# Patient Record
Sex: MALE | Race: WHITE | Employment: FULL TIME | URBAN - METROPOLITAN AREA
[De-identification: names, ages, dates, MRNs, and addresses within clinical notes are randomized per-mention and may not be internally consistent; named-entity substitution may affect disease eponyms.]

---

## 2017-01-17 ENCOUNTER — OFFICE VISIT (OUTPATIENT)
Dept: FAMILY MEDICINE | Facility: CLINIC | Age: 57
End: 2017-01-17
Payer: COMMERCIAL

## 2017-01-17 VITALS
HEART RATE: 72 BPM | HEIGHT: 71 IN | TEMPERATURE: 97.1 F | WEIGHT: 166 LBS | SYSTOLIC BLOOD PRESSURE: 108 MMHG | BODY MASS INDEX: 23.24 KG/M2 | DIASTOLIC BLOOD PRESSURE: 70 MMHG

## 2017-01-17 DIAGNOSIS — L30.9 ECZEMA, UNSPECIFIED TYPE: Primary | ICD-10-CM

## 2017-01-17 PROCEDURE — 99213 OFFICE O/P EST LOW 20 MIN: CPT | Performed by: NURSE PRACTITIONER

## 2017-01-17 RX ORDER — TRIAMCINOLONE ACETONIDE 1 MG/G
OINTMENT TOPICAL
Qty: 30 G | Refills: 0 | Status: SHIPPED | OUTPATIENT
Start: 2017-01-17 | End: 2017-03-20

## 2017-01-17 RX ORDER — LAMOTRIGINE 100 MG/1
100 TABLET ORAL 2 TIMES DAILY
Qty: 60 TABLET | Status: ON HOLD | COMMUNITY
Start: 2017-01-17 | End: 2018-06-20

## 2017-01-17 RX ORDER — ZIPRASIDONE HYDROCHLORIDE 40 MG/1
80 CAPSULE ORAL AT BEDTIME
COMMUNITY
Start: 2017-01-17 | End: 2018-06-14

## 2017-01-17 NOTE — PROGRESS NOTES
"  SUBJECTIVE:                                                    Jordan Barnett is a 56 year old male who presents to clinic today for the following health issues:        Wound on Right Hand- in between thumb and palm area      Duration: x 3 weeks     Description (location/character/radiation): wound/crack  on hand  between thumb and palm, keeps opening, sometimes it bleeds. painful    Intensity:  moderate    Accompanying signs and symptoms: pain    History (similar episodes/previous evaluation): \" cracked hands\" in the Winter     Precipitating or alleviating factors: None    Therapies tried and outcome: hand cream- unsure if this help. Helps when he is not using hand.            Problem list and histories reviewed & adjusted, as indicated.  Additional history: as documented    Problem list, Medication list, Allergies, and Medical/Social/Surgical histories reviewed in EPIC and updated as appropriate.    ROS:  Constitutional, HEENT, cardiovascular, pulmonary, gi and gu systems are negative, except as otherwise noted.    OBJECTIVE:                                                    /70 mmHg  Pulse 72  Temp(Src) 97.1  F (36.2  C) (Tympanic)  Ht 5' 10.5\" (1.791 m)  Wt 166 lb (75.297 kg)  BMI 23.47 kg/m2  Body mass index is 23.47 kg/(m^2).  GENERAL: healthy, alert and no distress  SKIN: dry skin throughout. Right hand - cracked skin in the webspace between the thumb and index finger.         ASSESSMENT/PLAN:                                                        ICD-10-CM    1. Eczema, unspecified type L30.9 triamcinolone (KENALOG) 0.1 % ointment       Patient Instructions   Apply triamcinolone ointment to cracked skin.  Cover with Aquaphor ointment.  Do this twice daily.      The risks, benefits and treatment options of prescribed medications or other treatments have been discussed with the patient. The patient verbalized their understanding and should call or follow up if no improvement or if they develop " further problems.  AUGUSTUS Rodriguez [facility]

## 2017-01-17 NOTE — MR AVS SNAPSHOT
After Visit Summary   1/17/2017    Jordan Barnett    MRN: [id number]           Patient Information     Date Of Birth          1960        Visit Information        Provider Department      1/17/2017 10:00 AM Halina Barrett APRN CNP [facility]        Today's Diagnoses     Eczema, unspecified type    -  1       Care Instructions    Apply triamcinolone ointment to cracked skin.  Cover with Aquaphor ointment.  Do this twice daily.          Thank you for choosing [facility]  You may be receiving a survey in the mail from Alicia Bojorquez regarding your visit today.  Please take a few minutes to complete and return the survey to let us know how we are doing.      If you have questions or concerns, please contact us via ethority or you can contact your care team at [phone].    Our Clinic hours are:  Monday 6:40 am  to 7:00 pm  Tuesday -Friday 6:40 am to 5:00 pm    The [place] outpatient lab hours are:  Monday - Friday 6:10 am to 4:45 pm  Saturdays 7:00 am to 11:00 am  Appointments are required, call [phone]    If you have clinical questions after hours or would like to schedule an appointment,  call the clinic at [phone].          Follow-ups after your visit        Who to contact     If you have questions or need follow up information about today's clinic visit or your schedule please contact [facility] directly at [phone].  Normal or non-critical lab and imaging results will be communicated to you by Moduslyhart, letter or phone within 4 business days after the clinic has received the results. If you do not hear from us within 7 days, please contact the clinic through [employer] or phone. If you have a critical or abnormal lab result, we will notify you by phone as soon as possible.  Submit refill requests through ethority or call your pharmacy and they will forward the refill request to us. Please allow 3 business days for your refill to be  "completed.          Additional Information About Your Visit        [employer] Information     [employer] lets you send messages to your doctor, view your test results, renew your prescriptions, schedule appointments and more. To sign up, go to [email or web address] . Click on \"Log in\" on the left side of the screen, which will take you to the Welcome page. Then click on \"Sign up Now\" on the right side of the page.     You will be asked to enter the access code listed below, as well as some personal information. Please follow the directions to create your username and password.     Your access code is: [email or web address]  Expires: 2017 10:16 AM     Your access code will  in 90 days. If you need help or a new code, please call your [place] clinic or [phone].        Care EveryWhere ID     This is your Care EveryWhere ID. This could be used by other organizations to access your [place] medical records  [id number]        Your Vitals Were     Pulse Temperature Height BMI (Body Mass Index)          72 97.1  F (36.2  C) (Tympanic) 5' 10.5\" (1.791 m) 23.47 kg/m2         Blood Pressure from Last 3 Encounters:   17 108/70   16 99/68   07/08/15 109/71    Weight from Last 3 Encounters:   17 166 lb (75.297 kg)   16 166 lb (75.297 kg)   07/08/15 158 lb (71.668 kg)              Today, you had the following     No orders found for display         Today's Medication Changes          These changes are accurate as of: 17 10:16 AM.  If you have any questions, ask your nurse or doctor.               Start taking these medicines.        Dose/Directions    triamcinolone 0.1 % ointment   Commonly known as:  KENALOG   Used for:  Eczema, unspecified type   Started by:  Halina Barrett APRN CNP        Apply sparingly to affected area two times daily for 14 days.   Quantity:  30 g   Refills:  0         These medicines have changed or have updated prescriptions.        Dose/Directions    " lamoTRIgine 100 MG tablet   Commonly known as:  LaMICtal   This may have changed:    - when to take this  - additional instructions   Changed by:  Halina Barrett APRN CNP        Dose:  100 mg   1 tablet (100 mg) 2 times daily   Quantity:  60 tablet   Refills:  0            Where to get your medicines      These medications were sent to [id number] - [place], [place] - [address]  [address], [facility] [address]     Phone:  [phone]    - triamcinolone 0.1 % ointment             Primary Care Provider Office Phone # Fax #    Juan Antonio Chadd Flanagan [phone]       [facility]        [address] [facility] [address]        Thank you!     Thank you for choosing [facility]  for your care. Our goal is always to provide you with excellent care. Hearing back from our patients is one way we can continue to improve our services. Please take a few minutes to complete the written survey that you may receive in the mail after your visit with us. Thank you!             Your Updated Medication List - Protect others around you: Learn how to safely use, store and throw away your medicines at www.disposemymeds.org.          This list is accurate as of: 1/17/17 10:16 AM.  Always use your most recent med list.                   Brand Name Dispense Instructions for use    FISH OIL CONCENTRATE 1000 MG Caps      2 in pm       GEODON 40 MG capsule   Generic drug:  ziprasidone      Take 2 capsules (80 mg) by mouth At Bedtime       lamoTRIgine 100 MG tablet    LaMICtal    60 tablet    1 tablet (100 mg) 2 times daily       losartan 100 MG tablet    COZAAR    90 tablet    Take 1 tablet (100 mg) by mouth daily       sildenafil 50 MG cap/tab    REVATIO/VIAGRA    6 tablet    Take 0.5-1 tablets (25-50 mg) by mouth daily as needed for erectile dysfunction Take 30 min to 4 hours before intercourse.  Never use with nitroglycerin, terazosin or  doxazosin.       triamcinolone 0.1 % ointment    KENALOG    30 g    Apply sparingly to affected area two times daily for 14 days.       TYLENOL 500 MG tablet   Generic drug:  acetaminophen      Take 1-2 tablets by mouth every 6 hours as needed.

## 2017-01-17 NOTE — NURSING NOTE
"Chief Complaint   Patient presents with     Wound Check     Right Hand between thumb and palm       Initial /70 mmHg  Pulse 72  Temp(Src) 97.1  F (36.2  C) (Tympanic)  Ht 5' 10.5\" (1.791 m)  Wt 166 lb (75.297 kg)  BMI 23.47 kg/m2 Estimated body mass index is 23.47 kg/(m^2) as calculated from the following:    Height as of this encounter: 5' 10.5\" (1.791 m).    Weight as of this encounter: 166 lb (75.297 kg).  BP completed using cuff size: regular    "

## 2017-01-17 NOTE — PATIENT INSTRUCTIONS
Apply triamcinolone ointment to cracked skin.  Cover with Aquaphor ointment.  Do this twice daily.          Thank you for choosing [facility].  You may be receiving a survey in the mail from Alicia Bojorquez regarding your visit today.  Please take a few minutes to complete and return the survey to let us know how we are doing.      If you have questions or concerns, please contact us via [employer] or you can contact your care team at [phone].    Our Clinic hours are:  Monday 6:40 am  to 7:00 pm  Tuesday -Friday 6:40 am to 5:00 pm    The [place] outpatient lab hours are:  Monday - Friday 6:10 am to 4:45 pm  Saturdays 7:00 am to 11:00 am  Appointments are required, call [phone]    If you have clinical questions after hours or would like to schedule an appointment,  call the clinic at [phone].

## 2017-01-31 ENCOUNTER — OFFICE VISIT (OUTPATIENT)
Dept: FAMILY MEDICINE | Facility: CLINIC | Age: 57
End: 2017-01-31
Payer: COMMERCIAL

## 2017-01-31 VITALS
HEART RATE: 70 BPM | SYSTOLIC BLOOD PRESSURE: 129 MMHG | HEIGHT: 70 IN | BODY MASS INDEX: 23.62 KG/M2 | TEMPERATURE: 97.3 F | OXYGEN SATURATION: 97 % | WEIGHT: 165 LBS | DIASTOLIC BLOOD PRESSURE: 92 MMHG

## 2017-01-31 DIAGNOSIS — M75.41 SHOULDER IMPINGEMENT SYNDROME, RIGHT: Primary | ICD-10-CM

## 2017-01-31 PROCEDURE — 99212 OFFICE O/P EST SF 10 MIN: CPT | Performed by: INTERNAL MEDICINE

## 2017-01-31 NOTE — PROGRESS NOTES
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  Sleeping on right side aggravates shoulder.     Alleviating factors:  Improved by: nothing       Therapies Tried and outcome: none     Pain is better today after avoiding overhead activities yesterday    Problem list and histories reviewed & adjusted, as indicated.  Additional history: as documented    Current Outpatient Prescriptions   Medication Sig Dispense Refill     lamoTRIgine (LAMICTAL) 100 MG tablet 1 tablet (100 mg) 2 times daily 60 tablet      ziprasidone (GEODON) 40 MG capsule Take 2 capsules (80 mg) by mouth At Bedtime       triamcinolone (KENALOG) 0.1 % ointment Apply sparingly to affected area two times daily for 14 days. 30 g 0     losartan (COZAAR) 100 MG tablet Take 1 tablet (100 mg) by mouth daily 90 tablet 3     acetaminophen (TYLENOL) 500 MG tablet Take 1-2 tablets by mouth every 6 hours as needed.       Allergies   Allergen Reactions     Lisinopril Nausea     Felt weakness nausea, couldn't sleep       ROS:  Constitutional and MSK systems are negative, except as otherwise noted.    OBJECTIVE:                                                    /92 mmHg  Pulse 70  Temp(Src) 97.3  F (36.3  C) (Tympanic)  Ht 5' 10.25\" (1.784 m)  Wt 165 lb (74.844 kg)  BMI 23.52 kg/m2  SpO2 97%  Body mass index is 23.52 " kg/(m^2).    GENERAL: healthy, alert and no distress  MS: lateral right shoulder tenderness on palpation, full shoulder ROM  NEURO: Normal strength and tone in arms and shoulder         ASSESSMENT/PLAN:                                                        1. Shoulder impingement syndrome, right    Location of pain and correlation with overhead activities is consistent with shoulder impingement.  Advised avoiding all overhead activities with the right arm for at least two weeks- work note provided.  He has Tylenol and Bengay at home- can use these for pain.  Avoid NSAIDs due to CKD3.  Follow-up if not improving in a few weeks.     Esteban Martinez MD  [facility]

## 2017-01-31 NOTE — MR AVS SNAPSHOT
After Visit Summary   1/31/2017    Jordan Barnett    MRN: [id number]           Patient Information     Date Of Birth          1960        Visit Information        Provider Department      1/31/2017 10:00 AM Esteban Martinez MD [facility]        Care Instructions      What Is Impingement Syndrome?  Shoulder pain when raising your arm may mean you have impingement syndrome. This is pinching within your shoulder. The problem may have been caused by repeating an overhead motion. In some cases, you may feel a nagging pain even when you re not using your shoulder.     A forceful action repeated day after day without rest can cause a repetitive motion injury (RMI). Shoulder impingement is often due to an RMI.      Symptoms of impingement  You may feel pain, pinching, or stiffness in your shoulder. Pain often comes with movement. But you may also feel it when you re not using your shoulder. For example, you may feel pain while trying to sleep.    Causes of impingement  Shoulder impingement is often caused by making repeated overhead movements. Constant shoulder use can irritate the tendons and bursa, leading to swelling. Swollen parts of the shoulder take up more room, making the joint space smaller:    Bursitis is inflammation of the bursa, a sac of fluid that cushions shoulder parts as they move. The bursa fills up with too much fluid, filling and squeezing the joint space.    Tendinitis is inflammation of the tendons, fibrous tissues that connect muscle to bone.    Bone problems can make impingement worse. The acromion is part of the shoulder bone. It may be flat or hooked. If your acromion is hooked, the joint space may be smaller than normal. This makes you more prone to shoulder problems. Bone spurs (growths on the bone) can also narrow the joint space.        7776-4472 [employer]. [address] [place] [address], [place] [address]. All rights reserved. This information is not  intended as a substitute for professional medical care. Always follow your healthcare professional's instructions.        Nonsurgical Treatment Options for Shoulder Impingement    Rest is key to healing your shoulder. If an activity hurts, don t do it. Otherwise, you may prevent healing and increase pain. Your shoulder needs active rest. This means avoiding overhead movements and activities that cause pain. But DO NOT stop using your shoulder completely. This can cause it to stiffen or  freeze.  In addition to rest, impingement can be treated a number of ways. Your healthcare provider can help you find which of these is best for you.     Ice  Ice reduces inflammation and relieves pain. Apply an ice pack for about 15 minutes, 3 times a day. You can also use a bag of frozen peas instead of an ice pack. A pillow placed under your arm may help make you more comfortable.  Note: Don t put the cold item directly on your skin. Place it on top of your shirt, or wrap it in a thin towel or washcloth.     Heat  Heat may soothe aching muscles, but it won t reduce inflammation. Use a heating pad or take a warm shower or bath. Do this for 15 minutes at a time.  Note: Avoid heat when pain is constant. Heat is best when used for warming up before an activity. You can also alternate ice and heat.     Medicine  To relieve pain and inflammation, try over-the-counter pain relievers, such as acetaminophen or ibuprofen. Or, your healthcare provider may prescribe medicines. Ask how and when to take your medicine. Be sure to follow all instructions you re given.     Electrical stimulation  Electrical stimulation can help reduce pain and swelling. Your healthcare provider attaches small pads to your shoulder. A mild electric current then flows into your shoulder. You may feel tingling, but you should not feel pain.     Ultrasound  Ultrasound can help reduce pain. First a slick gel or medicated cream is applied to your shoulder. Then your  healthcare provider places a small device over the area. The device uses sound waves to loosen shoulder tightness. This treatment should be pain-free.  A physical therapist can also help you with exercises specific for your condition.     Injection therapy  Injection therapy may be used to help diagnose your problem. It may also be used to reduce pain and inflammation. The injection typically includes two medicines. One is an anesthetic to numb the shoulder. The other is a steroid, such as cortisone, to help reduce painful swelling. It can take from a few hours to a couple of days before the injection helps. Talk to your healthcare provider about the possible risks and benefits of this therapy.    0326-2992 [employer]. [address] [place] [address], [place] [address]. All rights reserved. This information is not intended as a substitute for professional medical care. Always follow your healthcare professional's instructions.              Follow-ups after your visit        Who to contact     If you have questions or need follow up information about today's clinic visit or your schedule please contact [facility] directly at [phone].  Normal or non-critical lab and imaging results will be communicated to you by MyChart, letter or phone within 4 business days after the clinic has received the results. If you do not hear from us within 7 days, please contact the clinic through [employer] or phone. If you have a critical or abnormal lab result, we will notify you by phone as soon as possible.  Submit refill requests through [email or web address] or call your pharmacy and they will forward the refill request to us. Please allow 3 business days for your refill to be completed.          Additional Information About Your Visit        [email or web address] Information     [email or web address] lets you send messages to your doctor, view your test results, renew your prescriptions, schedule appointments and more. To sign up, go to  [email or web address] . Click on \"Log in\" on the left side of the screen, which will take you to the Welcome page. Then click on \"Sign up Now\" on the right side of the page.     You will be asked to enter the access code listed below, as well as some personal information. Please follow the directions to create your username and password.     Your access code is: [email or web address]  Expires: 2017 10:16 AM     Your access code will  in 90 days. If you need help or a new code, please call your [place] clinic or [phone].        Care EveryWhere ID     This is your Care EveryWhere ID. This could be used by other organizations to access your [place] medical records  [id number]        Your Vitals Were     Pulse Temperature Height BMI (Body Mass Index) Pulse Oximetry       70 97.3  F (36.3  C) (Tympanic) 5' 10.25\" (1.784 m) 23.52 kg/m2 97%        Blood Pressure from Last 3 Encounters:   17 129/92   17 108/70   16 99/68    Weight from Last 3 Encounters:   17 165 lb (74.844 kg)   17 166 lb (75.297 kg)   16 166 lb (75.297 kg)              Today, you had the following     No orders found for display       Primary Care Provider Office Phone # Fax #    Juan Antonio Flanagan [phone]       [facility]        [address] [facility] [address]        Thank you!     Thank you for choosing [facility]  for your care. Our goal is always to provide you with excellent care. Hearing back from our patients is one way we can continue to improve our services. Please take a few minutes to complete the written survey that you may receive in the mail after your visit with us. Thank you!             Your Updated Medication List - Protect others around you: Learn how to safely use, store and throw away your medicines at www.disposemymeds.org.          This list is accurate as of: 17 10:28 AM.  Always use your most recent med " list.                   Brand Name Dispense Instructions for use    FISH OIL CONCENTRATE 1000 MG Caps      2 in pm       GEODON 40 MG capsule   Generic drug:  ziprasidone      Take 2 capsules (80 mg) by mouth At Bedtime       lamoTRIgine 100 MG tablet    LaMICtal    60 tablet    1 tablet (100 mg) 2 times daily       losartan 100 MG tablet    COZAAR    90 tablet    Take 1 tablet (100 mg) by mouth daily       sildenafil 50 MG cap/tab    REVATIO/VIAGRA    6 tablet    Take 0.5-1 tablets (25-50 mg) by mouth daily as needed for erectile dysfunction Take 30 min to 4 hours before intercourse.  Never use with nitroglycerin, terazosin or doxazosin.       triamcinolone 0.1 % ointment    KENALOG    30 g    Apply sparingly to affected area two times daily for 14 days.       TYLENOL 500 MG tablet   Generic drug:  acetaminophen      Take 1-2 tablets by mouth every 6 hours as needed.

## 2017-01-31 NOTE — NURSING NOTE
"Chief Complaint   Patient presents with     Shoulder Pain     x 1.5 weeks, right shoulder pain, unable to lift arm        Initial /92 mmHg  Pulse 70  Temp(Src) 97.3  F (36.3  C) (Tympanic)  Ht 5' 10.25\" (1.784 m)  Wt 165 lb (74.844 kg)  BMI 23.52 kg/m2  SpO2 97% Estimated body mass index is 23.52 kg/(m^2) as calculated from the following:    Height as of this encounter: 5' 10.25\" (1.784 m).    Weight as of this encounter: 165 lb (74.844 kg).  BP completed using cuff size: alberta MONAHAN CMA (AAMA)        "

## 2017-01-31 NOTE — Clinical Note
[facility]  [address] [facility] [address]  Phone: [phone]    January 31, 2017        Jordan Barnett  [address]   [facility] [address]          To whom it may concern:    RE: Jordan WATSON Anibal    Patient was seen and treated today at our clinic.  Patient may return to work with the following:  Avoid all reaching overhead activities with the right arm for 2 weeks.  Can reach overhead with left arm.      Please contact me for questions or concerns.      Sincerely,        Esteban Martinez MD

## 2017-01-31 NOTE — PATIENT INSTRUCTIONS
What Is Impingement Syndrome?  Shoulder pain when raising your arm may mean you have impingement syndrome. This is pinching within your shoulder. The problem may have been caused by repeating an overhead motion. In some cases, you may feel a nagging pain even when you re not using your shoulder.     A forceful action repeated day after day without rest can cause a repetitive motion injury (RMI). Shoulder impingement is often due to an RMI.      Symptoms of impingement  You may feel pain, pinching, or stiffness in your shoulder. Pain often comes with movement. But you may also feel it when you re not using your shoulder. For example, you may feel pain while trying to sleep.    Causes of impingement  Shoulder impingement is often caused by making repeated overhead movements. Constant shoulder use can irritate the tendons and bursa, leading to swelling. Swollen parts of the shoulder take up more room, making the joint space smaller:    Bursitis is inflammation of the bursa, a sac of fluid that cushions shoulder parts as they move. The bursa fills up with too much fluid, filling and squeezing the joint space.    Tendinitis is inflammation of the tendons, fibrous tissues that connect muscle to bone.    Bone problems can make impingement worse. The acromion is part of the shoulder bone. It may be flat or hooked. If your acromion is hooked, the joint space may be smaller than normal. This makes you more prone to shoulder problems. Bone spurs (growths on the bone) can also narrow the joint space.        9429-6206 [employer]. [address] [place] [address], [place] [address]. All rights reserved. This information is not intended as a substitute for professional medical care. Always follow your healthcare professional's instructions.        Nonsurgical Treatment Options for Shoulder Impingement    Rest is key to healing your shoulder. If an activity hurts, don t do it. Otherwise, you may prevent healing and increase  pain. Your shoulder needs active rest. This means avoiding overhead movements and activities that cause pain. But DO NOT stop using your shoulder completely. This can cause it to stiffen or  freeze.  In addition to rest, impingement can be treated a number of ways. Your healthcare provider can help you find which of these is best for you.     Ice  Ice reduces inflammation and relieves pain. Apply an ice pack for about 15 minutes, 3 times a day. You can also use a bag of frozen peas instead of an ice pack. A pillow placed under your arm may help make you more comfortable.  Note: Don t put the cold item directly on your skin. Place it on top of your shirt, or wrap it in a thin towel or washcloth.     Heat  Heat may soothe aching muscles, but it won t reduce inflammation. Use a heating pad or take a warm shower or bath. Do this for 15 minutes at a time.  Note: Avoid heat when pain is constant. Heat is best when used for warming up before an activity. You can also alternate ice and heat.     Medicine  To relieve pain and inflammation, try over-the-counter pain relievers, such as acetaminophen or ibuprofen. Or, your healthcare provider may prescribe medicines. Ask how and when to take your medicine. Be sure to follow all instructions you re given.     Electrical stimulation  Electrical stimulation can help reduce pain and swelling. Your healthcare provider attaches small pads to your shoulder. A mild electric current then flows into your shoulder. You may feel tingling, but you should not feel pain.     Ultrasound  Ultrasound can help reduce pain. First a slick gel or medicated cream is applied to your shoulder. Then your healthcare provider places a small device over the area. The device uses sound waves to loosen shoulder tightness. This treatment should be pain-free.  A physical therapist can also help you with exercises specific for your condition.     Injection therapy  Injection therapy may be used to help diagnose  your problem. It may also be used to reduce pain and inflammation. The injection typically includes two medicines. One is an anesthetic to numb the shoulder. The other is a steroid, such as cortisone, to help reduce painful swelling. It can take from a few hours to a couple of days before the injection helps. Talk to your healthcare provider about the possible risks and benefits of this therapy.    2551-7854 [employer]. [address] [place] [address], [place] [address]. All rights reserved. This information is not intended as a substitute for professional medical care. Always follow your healthcare professional's instructions.

## 2017-02-06 ENCOUNTER — TELEPHONE (OUTPATIENT)
Dept: FAMILY MEDICINE | Facility: CLINIC | Age: 57
End: 2017-02-06

## 2017-02-14 ENCOUNTER — OFFICE VISIT (OUTPATIENT)
Dept: FAMILY MEDICINE | Facility: CLINIC | Age: 57
End: 2017-02-14
Payer: COMMERCIAL

## 2017-02-14 ENCOUNTER — TELEPHONE (OUTPATIENT)
Dept: FAMILY MEDICINE | Facility: CLINIC | Age: 57
End: 2017-02-14

## 2017-02-14 VITALS
DIASTOLIC BLOOD PRESSURE: 84 MMHG | SYSTOLIC BLOOD PRESSURE: 115 MMHG | WEIGHT: 167 LBS | HEART RATE: 72 BPM | HEIGHT: 71 IN | BODY MASS INDEX: 23.38 KG/M2 | TEMPERATURE: 96.5 F

## 2017-02-14 DIAGNOSIS — M25.811 SHOULDER IMPINGEMENT, RIGHT: Primary | ICD-10-CM

## 2017-02-14 DIAGNOSIS — F31.64 SEVERE BIPOLAR I DISORDER, MOST RECENT EPISODE MIXED, WITH PSYCHOTIC FEATURES (H): ICD-10-CM

## 2017-02-14 PROCEDURE — 99213 OFFICE O/P EST LOW 20 MIN: CPT | Performed by: INTERNAL MEDICINE

## 2017-02-14 NOTE — NURSING NOTE
"Chief Complaint   Patient presents with     Forms     HARSHA or return to work, Possible physical therapy       Initial /84 (BP Location: Left arm, Patient Position: Chair, Cuff Size: Adult Regular)  Pulse 72  Temp 96.5  F (35.8  C) (Tympanic)  Ht 5' 10.5\" (1.791 m)  Wt 167 lb (75.8 kg)  BMI 23.62 kg/m2 Estimated body mass index is 23.62 kg/(m^2) as calculated from the following:    Height as of this encounter: 5' 10.5\" (1.791 m).    Weight as of this encounter: 167 lb (75.8 kg).  Medication Reconciliation: complete   Vidhya MONAHAN CMA (AAMA)        "

## 2017-02-14 NOTE — MR AVS SNAPSHOT
After Visit Summary   2/14/2017    Jordan Barnett    MRN: [id number]           Patient Information     Date Of Birth          1960        Visit Information        Provider Department      2/14/2017 9:40 AM Esteban Martinez MD [facility]        Today's Diagnoses     CLIVE (generalized anxiety disorder)    -  1    Severe bipolar I disorder, most recent episode mixed, with psychotic features (H)           Follow-ups after your visit        Additional Services     CARE COORDINATION REFERRAL       Services are provided by a Care Coordinator for people with complex needs such as: medical, social, or financial troubles.  The Care Coordinator works with the patient and their Primary Care Provider to determine health goals, obtain resources, achieve outcomes, and develop care plans that help coordinate the patient's care.     Reason for Referral: Mental Status/Behavioral Changes    Provide additional details for Care Coordination to best meet the patient's current needs: Patient would like to pursue therapy for mental illness (already sees a psychiatrist, but doesn't have a counselor), but he has financial concerns and hasn't been able to afford the options he's found.  Wondering if you could help him investigate other options.  Thanks.     Clinical Staff have discussed the Care Coordination Referral with the patient and/or caregiver: yes                  Who to contact     If you have questions or need follow up information about today's clinic visit or your schedule please contact [facility] directly at [phone].  Normal or non-critical lab and imaging results will be communicated to you by MyChart, letter or phone within 4 business days after the clinic has received the results. If you do not hear from us within 7 days, please contact the clinic through MyChart or phone. If you have a critical or abnormal lab result, we will notify you by phone as soon as possible.  Submit  "refill requests through [facility] or call your pharmacy and they will forward the refill request to us. Please allow 3 business days for your refill to be completed.          Additional Information About Your Visit        [facility] Information     [facility] lets you send messages to your doctor, view your test results, renew your prescriptions, schedule appointments and more. To sign up, go to [email or web address] . Click on \"Log in\" on the left side of the screen, which will take you to the Welcome page. Then click on \"Sign up Now\" on the right side of the page.     You will be asked to enter the access code listed below, as well as some personal information. Please follow the directions to create your username and password.     Your access code is: [email or web address]  Expires: 2017 10:16 AM     Your access code will  in 90 days. If you need help or a new code, please call your [facility] clinic or [phone].        Care EveryWhere ID     This is your Care EveryWhere ID. This could be used by other organizations to access your [facility] medical records  [id number]        Your Vitals Were     Pulse Temperature Height BMI (Body Mass Index)          72 96.5  F (35.8  C) (Tympanic) 5' 10.5\" (1.791 m) 23.62 kg/m2         Blood Pressure from Last 3 Encounters:   17 115/84   17 (!) 129/92   17 108/70    Weight from Last 3 Encounters:   17 167 lb (75.8 kg)   17 165 lb (74.8 kg)   17 166 lb (75.3 kg)              We Performed the Following     CARE COORDINATION REFERRAL        Primary Care Provider Office Phone # Fax #    Juan Antonio Flanagan [phone]       [facility]        [address] [facility] [address]        Thank you!     Thank you for choosing [facility]  for your care. Our goal is always to provide you with excellent care. Hearing back from our patients is one way we can continue to improve our services. " Please take a few minutes to complete the written survey that you may receive in the mail after your visit with us. Thank you!             Your Updated Medication List - Protect others around you: Learn how to safely use, store and throw away your medicines at www.disposemymeds.org.          This list is accurate as of: 2/14/17 10:18 AM.  Always use your most recent med list.                   Brand Name Dispense Instructions for use    GEODON 40 MG capsule   Generic drug:  ziprasidone      Take 2 capsules (80 mg) by mouth At Bedtime       lamoTRIgine 100 MG tablet    LaMICtal    60 tablet    1 tablet (100 mg) 2 times daily       losartan 100 MG tablet    COZAAR    90 tablet    Take 1 tablet (100 mg) by mouth daily       MULTIVITAMIN ADULT PO          triamcinolone 0.1 % ointment    KENALOG    30 g    Apply sparingly to affected area two times daily for 14 days.       TYLENOL 500 MG tablet   Generic drug:  acetaminophen      Take 1-2 tablets by mouth every 6 hours as needed.

## 2017-02-14 NOTE — PROGRESS NOTES
SUBJECTIVE:                                                    Jordan Barnett is a 56 year old male who presents to clinic today for the following health issues:      Chief Complaint   Patient presents with     Forms     HARSHA or return to work, Possible physical therapy       I saw Jordan on 1/31 with right shoulder pain due to impingement syndrome.  I recommended he be on work restrictions with no overhead activities for 2 weeks, but apparently his job was not able to accommodate that so he was required to take 2 weeks of leave instead and requests paperwork to be filled out for that.  His shoulder is improved and he feels it would be appropriate to return to work later this week.        Anxiety Follow-Up    Status since last visit: Worsened     Other associated symptoms:None       GAD7     Jordan has a history of bipolar 1 disorder and schizoaffective disorder.  He is having significant anxiety related to his work situation- apparently in addition to the leave, he is also arguing with his employer about some unfair treatment of other employees.  He has a psychiatrist, but isn't due to see him for another couple of months.  He does not have a therapist because he states he has not been able to afford any of the options that he has found.        Problem list and histories reviewed & adjusted, as indicated.  Additional history: as documented    Current Outpatient Prescriptions   Medication Sig Dispense Refill     Multiple Vitamins-Minerals (MULTIVITAMIN ADULT PO)        lamoTRIgine (LAMICTAL) 100 MG tablet 1 tablet (100 mg) 2 times daily 60 tablet      ziprasidone (GEODON) 40 MG capsule Take 2 capsules (80 mg) by mouth At Bedtime       losartan (COZAAR) 100 MG tablet Take 1 tablet (100 mg) by mouth daily 90 tablet 3     triamcinolone (KENALOG) 0.1 % ointment Apply sparingly to affected area two times daily for 14 days. 30 g 0     acetaminophen (TYLENOL) 500 MG tablet Take 1-2 tablets by mouth every 6 hours as needed.  "      Allergies   Allergen Reactions     Lisinopril Nausea     Felt weakness nausea, couldn't sleep       ROS:  Constitutional, MSK, psych systems are negative, except as otherwise noted.    OBJECTIVE:                                                    /84 (BP Location: Left arm, Patient Position: Chair, Cuff Size: Adult Regular)  Pulse 72  Temp 96.5  F (35.8  C) (Tympanic)  Ht 5' 10.5\" (1.791 m)  Wt 167 lb (75.8 kg)  BMI 23.62 kg/m2  Body mass index is 23.62 kg/(m^2).  GENERAL: healthy, alert and no distress  MS: no shoulder pain on palpation, normal ROM and strength  PSYCH: tangential, anxious, speech pressured, judgement and insight intact and appearance well groomed    Diagnostic Test Results:  none      ASSESSMENT/PLAN:                                                        1. Shoulder impingement, right    Improved with rest.  His job was not able to accommodate work restriction, so he had to take a leave of absence, so I will fill out paperwork for this.  He is appropriate to return to work on 2/16.  If worsening shoulder symptoms, recommend PT.      2. Severe bipolar I disorder, most recent episode mixed, with psychotic features (H)    Jordan appears very anxious and agitated today.  His anxiety has been exacerbated by his workplace situation.  I feel that he would benefit from regular meetings with a therapist, however, he states all the options he has looked in to have not been affordable for him.  I have placed a care coordination referral with the hope that they will be able to explore some other options with him and hopefully help him find some affordable therapy.      - CARE COORDINATION REFERRAL    Follow-up as needed.      Esteban Martinez MD  [facility]  "

## 2017-02-15 ENCOUNTER — CARE COORDINATION (OUTPATIENT)
Dept: CARE COORDINATION | Facility: CLINIC | Age: 57
End: 2017-02-15

## 2017-02-15 NOTE — PROGRESS NOTES
Clinic Care Coordination Contact  [facility]/Voicemail    Referral Source: PCP  Clinical Data: Care Coordinator Outreach  Outreach attempted x 1.  Left message on voicemail with call back information and requested return call.  Plan: Care Coordinator will mail out care coordination introduction letter with care coordinator contact information and explanation of care coordination services. Care Coordinator will try to reach patient again in 1-2 business days.    Neeta Silva  Social Work Care Coordinator  [facility]  [phone]

## 2017-02-15 NOTE — TELEPHONE ENCOUNTER
[place] form completed, signed and faxed to [place] at [phone] and original mailed to patient.

## 2017-02-16 NOTE — PROGRESS NOTES
"Clinic Care Coordination Contact--late note for 2/15/16 due to computer problems  OUTREACH    Referral Information:  Referral Source: PCP  Reason for Contact: Provider referral states, \"Patient would like to pursue therapy for mental illness (already sees a psychiatrist, but doesn't have a counselor), but he has financial concerns and hasn't been able to afford the options he's found.  Wondering if you could help him investigate other options.\"  Care Conference: No     Universal Utilization:   ED Visits in last year: 0  Hospital visits in last year: 0  Last PCP appointment: 02/14/17  Missed Appointments: 0  Concerns: yes  Multiple Providers or Specialists: yes    Clinical Concerns:  Current Medical Concerns: Pt with sore shoulder, but going back to work from [place] on 2-16-17    Current Behavioral Concerns: Pt reports long history of mental health concerns; history of bipolar 1 disorder and schizoaffective disorder, and significant anxiety.  Pt reports he sees Psychiatrist, Rebel Chavez at [email or web address] in [place], but isn't due to see him for another month. He does not have a therapist because he states he has not been able to afford any of the options that he has found.    Education Provided to patient: SW and pt discussed options of [facility] vs Community based services.      Clinical Pathway: None    Medication Management:  Pt reports to taking medications as prescribed      Functional Status:  Mobility Status: Independent  Equipment Currently Used at Home: none  Transportation: Pt stated he has transportation as needed      Psychosocial:  Current living arrangement: I live in a private home with Lluvia gonzalez, and her children  Financial/Insurance: Works at [employer], pt states he has a $2500 deductible    Pt and SW discussed pt's options for therapy.  Pt shared that over a year ago, he was in a crisis situation and went to [facility] in [place] & worked " with a therapist, Radha, but that he was charged $80/session.  SW offered to establish cares with [facility] until pt can been seen appointment with [facility], however pt declined at this time.      Resources and Interventions:  Current Resources: [facility], [phone]        Advanced Care Plans/Directives on file:: No  Referrals Placed: Mental Health     Goals:   Goal 1 Statement: I want to discuss the need for a therapist  Goal 1 Progression Percent: 30%  Goal 1 Progression Date: 02/15/17  Barriers: Pt unable to make a decision today on need/want for a therapist  Strengths: Pt has a Psychiatrist that he has been seeing for 2+ years & wants to discuss topic with him.  Patient/Caregiver understanding: Pt and SW discussed several options and then created a TO DO list for the pt to help organize his thoughts/tasks.    Pt reports no feelings of self harm, most recent suicide attempt was when he was 20 years old, and has a safety play in place with his Psychiatrist.  Pt also was given [facility] phone numbers should pt find himself needing more urgent help.  Pt also aware he can utilize the ED for self cares if he has thoughts of self harm.         Plan: Pt is very particular about needing to do the following steps before allowing this writer to assist with finding him a therapist:  1.  Pt will call his Psychiatrist to speak with him and see if the Psych MD recommends a Therapist  2.  Pt will discuss the options of going to a therapist with his fiance, Lluvia, as it will affect their household finances  3.  Pt will contact his insurance company to: see how much therapy visit co-pays cost & to see if [facility] are in his network for services  4.  Pt will contact his employers EAP program    Pt is very concerned about the cost of going to a therapist, which is understandable.  However, pt would  not take resources or sliding fee scale or free resources, as he only wanted to use [facility] or [facility] for his therapy needs at this time.  SW to contact pt in 1-2 days to see how far he has gotten on his list of 1-4 above and offer to assist as needed.    Neeta Silva  Social Work Care Coordinator  [facility]  [phone]

## 2017-02-20 ENCOUNTER — TRANSFERRED RECORDS (OUTPATIENT)
Dept: HEALTH INFORMATION MANAGEMENT | Facility: CLINIC | Age: 57
End: 2017-02-20

## 2017-03-20 DIAGNOSIS — L30.9 ECZEMA, UNSPECIFIED TYPE: ICD-10-CM

## 2017-03-20 NOTE — TELEPHONE ENCOUNTER
Triamcinolon      Last Written Prescription Date: 01/17/17  Last Fill Quantity: 30g,  # refills: 0   Last Office Visit with G, [facility] or [facility] prescribing provider: 02/14/17

## 2017-03-27 ENCOUNTER — CARE COORDINATION (OUTPATIENT)
Dept: CARE COORDINATION | Facility: CLINIC | Age: 57
End: 2017-03-27

## 2017-03-27 RX ORDER — TRIAMCINOLONE ACETONIDE 1 MG/G
OINTMENT TOPICAL
Qty: 30 G | Refills: 0 | Status: SHIPPED | OUTPATIENT
Start: 2017-03-27 | End: 2017-12-27

## 2017-03-27 NOTE — LETTER
[facility] CARE COORDINATION  [address] [place] [address]      March 27, 2017      Jordan Barnett  [address]   [facility] [address]    Dear Jordan,  I am the Clinic Care Coordinator that works with your primary care provider's clinic. I recently tried to call and was unable to reach you. Below is a description of what Clinic Care Coordination is and how I can further assist you.     The Clinic Care Coordinator role is a Registered Nurse and/or  who understands the health care system. The goal of Clinic Care Coordination is to help you manage your health and improve access to the [facility] system in the most efficient manner.  The Registered Nurse can assist you in meeting your health care goals by providing education, coordinating services, and strengthening the communication among your providers. The  can assist you with financial, behavioral, psychosocial, and chemical dependency and counseling/psychiatric resources.    Please feel free to keep this letter and contact information to contact me at [phone] with any further questions or concerns that may arise. We at [facility] are focused on providing you with the highest-quality healthcare experience possible and that all starts with you.       Sincerely,     Neeta Stone    Enclosed: I have enclosed a copy of a 24 Hour Access Plan. This has helpful phone numbers for you to call when needed. Please keep this in an easy to access place to use as needed.

## 2017-03-27 NOTE — LETTER
Health Care Home - Access Care Plan    About Me  Patient Name:  Jordan Barnett    YOB: 1960  Age:                            56 year old   [place] MRN:         [id number] Telephone Information:     Home Phone [phone]   Mobile [phone]       Address:    [address] [place] [address] [address]   [facility] [address] Email address:  No e-mail address on record      Emergency Contact(s)  Name Relationship Lgl Grd Work Phone Home Phone Mobile Phone   1. JACI BARNETT Father   [phone]    2. BRISEYDA HAWLEY Friend    [phone]             Health Maintenance:      My Access Plan  Medical Emergency 919   Questions or concerns during clinic hours Primary Clinic Line, I will call the clinic directly: Primary Clinic: [facility]- [phone]   24 Hour Appointment Line [phone] or  [phone] [phone] ([phone])  (toll free)   24 Hour Nurse Line [phone] (toll free)   Questions or concerns outside clinic hours 24 Hour Appointment Line, I will call the after-hours on-call line:   [facility] [phone] or [phone] ([phone]) (toll-free)   Preferred Urgent Care Preferred Urgent Care: [facility] [phone]   Preferred Hospital Preferred Hospital: [place], [place]  [phone]   Preferred Pharmacy [facility] [address] [place] [address] [address] Behavioral Health Crisis Line [facility], [phone] or 911     My Care Team Members  Patient Care Team       Relationship Specialty Notifications Start End    Juan Antonio Flanagan MD PCP - General Family Practice  6/22/15     Phone: [phone] Fax: [phone]         [facility]        [address] [facility] [address]        My Medical and Care Information  Problem List   Patient Active Problem List   Diagnosis     Severe bipolar I disorder, most recent episode mixed, with psychotic features (H)     Sebaceous cyst     CKD  (chronic kidney disease) stage 3, GFR 30-59 ml/min     Schizoaffective disorder (H)     Hyperlipidemia LDL goal <160     Tubular adenoma of colon     Former smoker     Hypertension goal BP (blood pressure) < 140/90     Former smoker, stopped smoking in distant past      Current Medications and Allergies:  See printed Medication Report

## 2017-03-27 NOTE — PROGRESS NOTES
Clinic Care Coordination Contact  [facility]/Voicemail    Referral Source: PCP  Clinical Data: Care Coordinator Outreach  Outreach attempted x 1.  Left message on voicemail with call back information and requested return call.  Plan: Care Coordinator mailed out care coordination introduction letter on 3-27-17. Care Coordinator will try to reach patient again in 3-5 business days.    Neeta Silva  Social Work Care Coordinator  [facility]  [phone]

## 2017-05-02 ENCOUNTER — TRANSFERRED RECORDS (OUTPATIENT)
Dept: HEALTH INFORMATION MANAGEMENT | Facility: CLINIC | Age: 57
End: 2017-05-02

## 2017-05-10 ENCOUNTER — CARE COORDINATION (OUTPATIENT)
Dept: CARE COORDINATION | Facility: CLINIC | Age: 57
End: 2017-05-10

## 2017-05-10 NOTE — PROGRESS NOTES
Clinic Care Coordination Contact  [facility]/Voicemail    Referral Source: PCP  Clinical Data: Care Coordinator Outreach  Outreach attempted x 3.  Left message on voicemail with call back information and requested return call.  Plan: Care Coordinator mailed out care coordination introduction letter on 3/27/17. Care Coordinator will do no further outreaches at this time.      Neeta Price [facility]  Social Work Care Coordinator  [facility]  [phone]

## 2017-08-04 DIAGNOSIS — N18.30 CKD (CHRONIC KIDNEY DISEASE) STAGE 3, GFR 30-59 ML/MIN (H): ICD-10-CM

## 2017-08-04 DIAGNOSIS — I10 ESSENTIAL HYPERTENSION WITH GOAL BLOOD PRESSURE LESS THAN 140/90: ICD-10-CM

## 2017-08-04 NOTE — TELEPHONE ENCOUNTER
Losartan     Last Written Prescription Date: 07/28/16  Last Fill Quantity: 90, # refills: 3  Last Office Visit with [facility], [facility] or [facility] prescribing provider: 02/14/17       Potassium   Date Value Ref Range Status   07/28/2016 4.6 3.4 - 5.3 mmol/L Final     Creatinine   Date Value Ref Range Status   07/28/2016 1.67 (H) 0.66 - 1.25 mg/dL Final     BP Readings from Last 3 Encounters:   02/14/17 115/84   01/31/17 (!) 129/92   01/17/17 108/70

## 2017-08-09 NOTE — TELEPHONE ENCOUNTER
Routing refill request to provider for review/approval because:  Labs out of range:  See below    Kellen Mathews RN

## 2017-08-10 RX ORDER — LOSARTAN POTASSIUM 100 MG/1
100 TABLET ORAL DAILY
Qty: 30 TABLET | Refills: 0 | Status: SHIPPED | OUTPATIENT
Start: 2017-08-10 | End: 2017-08-17

## 2017-08-10 NOTE — TELEPHONE ENCOUNTER
Patient called and notified with refill and have scheduled office visit for 8-16-17, have reminded patient to be fasting for lab work. LORENA Nowak

## 2017-08-15 DIAGNOSIS — N18.30 CKD (CHRONIC KIDNEY DISEASE) STAGE 3, GFR 30-59 ML/MIN (H): ICD-10-CM

## 2017-08-15 DIAGNOSIS — I10 ESSENTIAL HYPERTENSION WITH GOAL BLOOD PRESSURE LESS THAN 140/90: ICD-10-CM

## 2017-08-15 RX ORDER — LOSARTAN POTASSIUM 100 MG/1
100 TABLET ORAL DAILY
Qty: 30 TABLET | Refills: 0 | Status: CANCELLED | OUTPATIENT
Start: 2017-08-15

## 2017-08-15 NOTE — TELEPHONE ENCOUNTER
Cozaar      Last Written Prescription Date: 08/10/2017  Last Fill Quantity: 30, # refills: 0  Last Office Visit with [facility], [facility] or [facility] prescribing provider: 02/14/2017  Next 5 appointments (look out 90 days)     Aug 17, 2017  7:20 AM CDT   SHORT with Juan Antonio [facility]agan MD   [facility]    [address] [address]   [phone]                   Potassium   Date Value Ref Range Status   07/28/2016 4.6 3.4 - 5.3 mmol/L Final     Creatinine   Date Value Ref Range Status   07/28/2016 1.67 (H) 0.66 - 1.25 mg/dL Final     BP Readings from Last 3 Encounters:   02/14/17 115/84   01/31/17 (!) 129/92   01/17/17 108/70

## 2017-08-17 ENCOUNTER — OFFICE VISIT (OUTPATIENT)
Dept: FAMILY MEDICINE | Facility: CLINIC | Age: 57
End: 2017-08-17
Payer: COMMERCIAL

## 2017-08-17 VITALS
BODY MASS INDEX: 22.26 KG/M2 | TEMPERATURE: 96.9 F | DIASTOLIC BLOOD PRESSURE: 71 MMHG | WEIGHT: 159 LBS | HEART RATE: 57 BPM | HEIGHT: 71 IN | SYSTOLIC BLOOD PRESSURE: 104 MMHG

## 2017-08-17 DIAGNOSIS — N18.30 CKD (CHRONIC KIDNEY DISEASE) STAGE 3, GFR 30-59 ML/MIN (H): ICD-10-CM

## 2017-08-17 DIAGNOSIS — E78.5 HYPERLIPIDEMIA LDL GOAL <160: ICD-10-CM

## 2017-08-17 DIAGNOSIS — Z11.59 NEED FOR HEPATITIS C SCREENING TEST: ICD-10-CM

## 2017-08-17 DIAGNOSIS — I10 ESSENTIAL HYPERTENSION WITH GOAL BLOOD PRESSURE LESS THAN 140/90: Primary | ICD-10-CM

## 2017-08-17 LAB
ANION GAP SERPL CALCULATED.3IONS-SCNC: 5 MMOL/L (ref 3–14)
BUN SERPL-MCNC: 27 MG/DL (ref 7–30)
CALCIUM SERPL-MCNC: 9.8 MG/DL (ref 8.5–10.1)
CHLORIDE SERPL-SCNC: 105 MMOL/L (ref 94–109)
CHOLEST SERPL-MCNC: 159 MG/DL
CO2 SERPL-SCNC: 28 MMOL/L (ref 20–32)
CREAT SERPL-MCNC: 1.52 MG/DL (ref 0.66–1.25)
CREAT UR-MCNC: 21 MG/DL
GFR SERPL CREATININE-BSD FRML MDRD: 48 ML/MIN/1.7M2
GLUCOSE SERPL-MCNC: 75 MG/DL (ref 70–99)
HDLC SERPL-MCNC: 77 MG/DL
HGB BLD-MCNC: 12.8 G/DL (ref 13.3–17.7)
LDLC SERPL CALC-MCNC: 64 MG/DL
MICROALBUMIN UR-MCNC: 9 MG/L
MICROALBUMIN/CREAT UR: 40.89 MG/G CR (ref 0–17)
NONHDLC SERPL-MCNC: 82 MG/DL
POTASSIUM SERPL-SCNC: 4.7 MMOL/L (ref 3.4–5.3)
SODIUM SERPL-SCNC: 138 MMOL/L (ref 133–144)
TRIGL SERPL-MCNC: 89 MG/DL

## 2017-08-17 PROCEDURE — 86803 HEPATITIS C AB TEST: CPT | Performed by: FAMILY MEDICINE

## 2017-08-17 PROCEDURE — 80061 LIPID PANEL: CPT | Performed by: FAMILY MEDICINE

## 2017-08-17 PROCEDURE — 85018 HEMOGLOBIN: CPT | Performed by: FAMILY MEDICINE

## 2017-08-17 PROCEDURE — 99214 OFFICE O/P EST MOD 30 MIN: CPT | Performed by: FAMILY MEDICINE

## 2017-08-17 PROCEDURE — 80048 BASIC METABOLIC PNL TOTAL CA: CPT | Performed by: FAMILY MEDICINE

## 2017-08-17 PROCEDURE — 36415 COLL VENOUS BLD VENIPUNCTURE: CPT | Performed by: FAMILY MEDICINE

## 2017-08-17 PROCEDURE — 82043 UR ALBUMIN QUANTITATIVE: CPT | Performed by: FAMILY MEDICINE

## 2017-08-17 RX ORDER — LOSARTAN POTASSIUM 100 MG/1
100 TABLET ORAL DAILY
Qty: 90 TABLET | Refills: 3 | Status: SHIPPED | OUTPATIENT
Start: 2017-08-17 | End: 2018-09-13

## 2017-08-17 NOTE — PROGRESS NOTES
SUBJECTIVE:                                                    Jordan Barnett is a 57 year old male who presents to clinic today for the following health issues:      Hypertension Follow-up  Chronic kidney disease       Outpatient blood pressures are not being checked.  States medication is more for his CKD.    Low Salt Diet: no added salt    Denies chest pain, dyspnea, HA, BOV, dizziness or urinary changes.    Patient has hx of hyperlipidemia as well but not on statin.  Needs repeat lipid panel today. Patient states he is fasting.    Also need for hep c screening.        Amount of exercise or physical activity: Active at work, stretches-tries to do this.    Problems taking medications regularly: No    Medication side effects: none  Diet: low fat/cholesterol      Problem list and histories reviewed & adjusted, as indicated.  Additional history: as documented    Patient Active Problem List   Diagnosis     Severe bipolar I disorder, most recent episode mixed, with psychotic features (H)     Sebaceous cyst     CKD (chronic kidney disease) stage 3, GFR 30-59 ml/min     Schizoaffective disorder (H)     Hyperlipidemia LDL goal <160     Tubular adenoma of colon     Former smoker     Hypertension goal BP (blood pressure) < 140/90     Former smoker, stopped smoking in distant past     Past Surgical History:   Procedure Laterality Date     COLONOSCOPY      normal. has fam hx colon cancer     COLONOSCOPY N/A 10/13/2014    Procedure: COLONOSCOPY;  Surgeon: Santy Constantino MD;  Location: [facility]     HERNIA REPAIR, INGUINAL RT/LT  02/12/2004     SURGICAL HISTORY OF -       Incised & Drained - ? Perirectal Abscess        Social History   Substance Use Topics     Smoking status: Former Smoker     Packs/day: 1.00     Years: 30.00     Types: Cigarettes     Quit date: 4/2/2012     Smokeless tobacco: Former User     Quit date: 4/2/2012      Comment: 2 PPD     Alcohol use Yes      Comment: RARE     Family History   Problem Relation  Age of Onset     Thyroid Disease Mother      Coronary Artery Disease Mother      pacemaker     CANCER Paternal Grandfather      CANCER Sister      Cancer - colorectal Sister      MENTAL ILLNESS Nephew      committed suicide at age 27         Current Outpatient Prescriptions   Medication Sig Dispense Refill     losartan (COZAAR) 100 MG tablet Take 1 tablet (100 mg) by mouth daily 90 tablet 3     triamcinolone (KENALOG) 0.1 % ointment Apply sparingly to affected area two times daily for 14 days. 30 g 0     Multiple Vitamins-Minerals (MULTIVITAMIN ADULT PO)        lamoTRIgine (LAMICTAL) 100 MG tablet 1 tablet (100 mg) 2 times daily 60 tablet      ziprasidone (GEODON) 40 MG capsule Take 2 capsules (80 mg) by mouth At Bedtime       acetaminophen (TYLENOL) 500 MG tablet Take 1-2 tablets by mouth every 6 hours as needed.       [DISCONTINUED] losartan (COZAAR) 100 MG tablet Take 1 tablet (100 mg) by mouth daily 30 tablet 0     Allergies   Allergen Reactions     Lisinopril Nausea     Felt weakness nausea, couldn't sleep     BP Readings from Last 3 Encounters:   08/17/17 104/71   02/14/17 115/84   01/31/17 (!) 129/92    Wt Readings from Last 3 Encounters:   08/17/17 159 lb (72.1 kg)   02/14/17 167 lb (75.8 kg)   01/31/17 165 lb (74.8 kg)                  Labs reviewed in EPIC        Reviewed and updated as needed this visit by clinical staffTobacco  Allergies  Med Hx  Surg Hx  Fam Hx  Soc Hx      Reviewed and updated as needed this visit by Provider         ROS:  C: NEGATIVE for fever, chills, change in weight  I: NEGATIVE for worrisome rashes, moles or lesions  E: NEGATIVE for vision changes or irritation  E/M: NEGATIVE for ear, mouth and throat problems  R: NEGATIVE for significant cough or SOB  CV: NEGATIVE for chest pain, palpitations or peripheral edema  GI: NEGATIVE for nausea, abdominal pain, heartburn, or change in bowel habits  : NEGATIVE for frequency, dysuria, or hematuria  M: NEGATIVE for significant  "arthralgias or myalgia  N: NEGATIVE for weakness, dizziness or paresthesias  E: NEGATIVE for temperature intolerance, skin/hair changes  H: NEGATIVE for bleeding problems  P: NEGATIVE for changes in mood or affect    OBJECTIVE:                                                    /71  Pulse 57  Temp 96.9  F (36.1  C) (Tympanic)  Ht 5' 10.5\" (1.791 m)  Wt 159 lb (72.1 kg)  BMI 22.49 kg/m2  Body mass index is 22.49 kg/(m^2).  GENERAL:  alert and no distress, ambulatory w/o assist  NECK: no tenderness, no adenopathy,  Thyroid not enlarged  RESP: lungs clear to auscultation - no rales, no rhonchi, no wheezes  CV: regular rates and rhythm, no murmur  MS: no edema  SKIN: no suspicious lesions, no rashes  NEURO: strength and tone- normal, sensory exam- grossly normal, mentation- intact, speech- normal, reflexes- symmetric  ABD:  nontender    Diagnostic test results:  Diagnostic Test Results:  none        ASSESSMENT/PLAN:                                                      ICD-10-CM    1. Essential hypertension with goal blood pressure less than 140/90 I10 Basic metabolic panel     losartan (COZAAR) 100 MG tablet  Controlled.  Low salt, low fat diet.   Exercise as tolerated.  Take meds as prescribed; call if with side effects.      2. CKD (chronic kidney disease) stage 3, GFR 30-59 ml/min N18.3 Basic metabolic panel     Hemoglobin     Albumin Random Urine Quantitative     losartan (COZAAR) 100 MG tablet  Patient's creatinine baseline is slightly above normal range.  Repeat labs today   Reinforced oral hydration.     3. Hyperlipidemia LDL goal <160 E78.5 Lipid panel reflex to direct LDL  Reinforced heart healthy lifestyle.     4. Need for hepatitis C screening test Z11.59 Hepatitis C Screen Reflex to HCV RNA Quant and Genotype       Follow up with Provider - depending on results   Patient Instructions   Go to [facility] now for your blood draw.  You will be contacted in 1-2 days for results of your lab tests.    Low " salt, low fat diet.   Eat 5 cups of vegetables, fruits and whole grains per day.  Limit starchy food (white rice, white bread, white pasta, white potatoes) to less than a cup per meal.  Minimize sweets, junk food and fastfood.  Exercise: moderate intensity sustained for at least 30 mins per episode, goal of 150 mins per week at least    Follow up and other instructions will be given once results are available.    Thank you for choosing [facility].  You may be receiving a survey in the mail from [employer] regarding your visit today.  Please take a few minutes to complete and return the survey to let us know how we are doing.      If you have questions or concerns, please contact us via [employer] or you can contact your care team at [phone].    Our Clinic hours are:  Monday 6:40 am  to 7:00 pm  Tuesday -Friday 6:40 am to 5:00 pm    The [place] outpatient lab hours are:  Monday - Friday 6:10 am to 4:45 pm  Saturdays 7:00 am to 11:00 am  Appointments are required, call [phone]    If you have clinical questions after hours or would like to schedule an appointment,  call the clinic at [phone].        Juan Antonio Flnaagan MD  [facility]

## 2017-08-17 NOTE — PATIENT INSTRUCTIONS
Go to Clinic B now for your blood draw.  You will be contacted in 1-2 days for results of your lab tests.    Low salt, low fat diet.   Eat 5 cups of vegetables, fruits and whole grains per day.  Limit starchy food (white rice, white bread, white pasta, white potatoes) to less than a cup per meal.  Minimize sweets, junk food and fastfood.  Exercise: moderate intensity sustained for at least 30 mins per episode, goal of 150 mins per week at least    Follow up and other instructions will be given once results are available.    Thank you for choosing [facility].  You may be receiving a survey in the mail from [employer] regarding your visit today.  Please take a few minutes to complete and return the survey to let us know how we are doing.      If you have questions or concerns, please contact us via [employer] or you can contact your care team at [phone].    Our Clinic hours are:  Monday 6:40 am  to 7:00 pm  Tuesday -Friday 6:40 am to 5:00 pm    The [place] outpatient lab hours are:  Monday - Friday 6:10 am to 4:45 pm  Saturdays 7:00 am to 11:00 am  Appointments are required, call [phone]    If you have clinical questions after hours or would like to schedule an appointment,  call the clinic at [phone].

## 2017-08-17 NOTE — NURSING NOTE
"Chief Complaint   Patient presents with     Hypertension     Recheck on blood pressure medication.  Also for CKD.     Lipids     He is fasting today for labs.       Initial /71  Pulse 57  Temp 96.9  F (36.1  C) (Tympanic)  Ht 5' 10.5\" (1.791 m)  Wt 159 lb (72.1 kg)  BMI 22.49 kg/m2 Estimated body mass index is 22.49 kg/(m^2) as calculated from the following:    Height as of this encounter: 5' 10.5\" (1.791 m).    Weight as of this encounter: 159 lb (72.1 kg).  Medication Reconciliation: complete  "

## 2017-08-17 NOTE — MR AVS SNAPSHOT
After Visit Summary   8/17/2017    Jordan Barnett    MRN: [id number]           Patient Information     Date Of Birth          1960        Visit Information        Provider Department      8/17/2017 7:20 AM Juan Antonio Flanagan MD [facility]        Today's Diagnoses     Essential hypertension with goal blood pressure less than 140/90    -  1    CKD (chronic kidney disease) stage 3, GFR 30-59 ml/min        Hyperlipidemia LDL goal <160        Need for hepatitis C screening test          Care Instructions    Go to Clinic B now for your blood draw.  You will be contacted in 1-2 days for results of your lab tests.    Low salt, low fat diet.   Eat 5 cups of vegetables, fruits and whole grains per day.  Limit starchy food (white rice, white bread, white pasta, white potatoes) to less than a cup per meal.  Minimize sweets, junk food and fastfood.  Exercise: moderate intensity sustained for at least 30 mins per episode, goal of 150 mins per week at least    Follow up and other instructions will be given once results are available.    Thank you for choosing [facility].  You may be receiving a survey in the mail from [employer] regarding your visit today.  Please take a few minutes to complete and return the survey to let us know how we are doing.      If you have questions or concerns, please contact us via [employer] or you can contact your care team at [phone].    Our Clinic hours are:  Monday 6:40 am  to 7:00 pm  Tuesday -Friday 6:40 am to 5:00 pm    The [place] outpatient lab hours are:  Monday - Friday 6:10 am to 4:45 pm  Saturdays 7:00 am to 11:00 am  Appointments are required, call [phone]    If you have clinical questions after hours or would like to schedule an appointment,  call the clinic at [phone].            Follow-ups after your visit        Who to contact     If you have questions or need follow up information about today's clinic visit or your schedule  "please contact [facility] directly at [phone].  Normal or non-critical lab and imaging results will be communicated to you by MyChart, letter or phone within 4 business days after the clinic has received the results. If you do not hear from us within 7 days, please contact the clinic through [employer] or phone. If you have a critical or abnormal lab result, we will notify you by phone as soon as possible.  Submit refill requests through [employer] or call your pharmacy and they will forward the refill request to us. Please allow 3 business days for your refill to be completed.          Additional Information About Your Visit        [employer] Information     [employer] lets you send messages to your doctor, view your test results, renew your prescriptions, schedule appointments and more. To sign up, go to [email or web address] . Click on \"Log in\" on the left side of the screen, which will take you to the Welcome page. Then click on \"Sign up Now\" on the right side of the page.     You will be asked to enter the access code listed below, as well as some personal information. Please follow the directions to create your username and password.     Your access code is: [email or web address]  Expires: 11/15/2017  8:00 AM     Your access code will  in 90 days. If you need help or a new code, please call your [place] clinic or [phone].        Care EveryWhere ID     This is your Care EveryWhere ID. This could be used by other organizations to access your [place] medical records  [id number]        Your Vitals Were     Pulse Temperature Height BMI (Body Mass Index)          57 96.9  F (36.1  C) (Tympanic) 5' 10.5\" (1.791 m) 22.49 kg/m2         Blood Pressure from Last 3 Encounters:   17 104/71   17 115/84   17 (!) 129/92    Weight from Last 3 Encounters:   17 159 lb (72.1 kg)   17 167 lb (75.8 kg)   17 165 lb (74.8 kg)              We Performed the Following     Albumin Random " Urine Quantitative     Basic metabolic panel     Hemoglobin     Hepatitis C Screen Reflex to HCV RNA Quant and Genotype     Lipid panel reflex to direct LDL          Where to get your medicines      These medications were sent to [facility] - [place], [place] - [address]   [address] [facility] [address]     Phone:  [phone]     losartan 100 MG tablet          Primary Care Provider Office Phone # Fax #    Juan Antonio Chadd Flanagan [phone] [address] [address]        Equal Access to Services     SWAPNA REAL : Hadii aad ku hadasho Soomaali, waaxda luqadaha, qaybta kaalmada adeegyada, waxthomas salas . So [facility] [phone].    ATENCIÓN: Si habla español, tiene a eaton disposición servicios gratuitos de asistencia lingüística. Llame al [phone].    We comply with applicable federal civil rights laws and [place] laws. We do not discriminate on the basis of race, color, national origin, age, disability sex, sexual orientation or gender identity.            Thank you!     Thank you for choosing [facility]  for your care. Our goal is always to provide you with excellent care. Hearing back from our patients is one way we can continue to improve our services. Please take a few minutes to complete the written survey that you may receive in the mail after your visit with us. Thank you!             Your Updated Medication List - Protect others around you: Learn how to safely use, store and throw away your medicines at www.disposemymeds.org.          This list is accurate as of: 8/17/17  8:00 AM.  Always use your most recent med list.                   Brand Name Dispense Instructions for use Diagnosis    GEODON 40 MG capsule   Generic drug:  ziprasidone      Take 2 capsules (80 mg) by mouth At Bedtime        lamoTRIgine 100 MG tablet    LaMICtal    60 tablet    1 tablet (100 mg) 2 times daily         losartan 100 MG tablet    COZAAR    90 tablet    Take 1 tablet (100 mg) by mouth daily    Essential hypertension with goal blood pressure less than 140/90, CKD (chronic kidney disease) stage 3, GFR 30-59 ml/min       MULTIVITAMIN ADULT PO           triamcinolone 0.1 % ointment    KENALOG    30 g    Apply sparingly to affected area two times daily for 14 days.    Eczema, unspecified type       TYLENOL 500 MG tablet   Generic drug:  acetaminophen      Take 1-2 tablets by mouth every 6 hours as needed.

## 2017-08-18 LAB — HCV AB SERPL QL IA: NONREACTIVE

## 2017-10-12 ENCOUNTER — TRANSFERRED RECORDS (OUTPATIENT)
Dept: HEALTH INFORMATION MANAGEMENT | Facility: CLINIC | Age: 57
End: 2017-10-12

## 2017-11-30 ENCOUNTER — OFFICE VISIT (OUTPATIENT)
Dept: FAMILY MEDICINE | Facility: CLINIC | Age: 57
End: 2017-11-30
Payer: OTHER MISCELLANEOUS

## 2017-11-30 VITALS
DIASTOLIC BLOOD PRESSURE: 79 MMHG | WEIGHT: 160.2 LBS | HEIGHT: 71 IN | BODY MASS INDEX: 22.43 KG/M2 | TEMPERATURE: 97.9 F | SYSTOLIC BLOOD PRESSURE: 105 MMHG | HEART RATE: 73 BPM

## 2017-11-30 DIAGNOSIS — M25.562 ACUTE PAIN OF LEFT KNEE: Primary | ICD-10-CM

## 2017-11-30 DIAGNOSIS — Z23 NEED FOR PROPHYLACTIC VACCINATION AND INOCULATION AGAINST INFLUENZA: ICD-10-CM

## 2017-11-30 DIAGNOSIS — S90.121A: ICD-10-CM

## 2017-11-30 PROCEDURE — 99213 OFFICE O/P EST LOW 20 MIN: CPT | Performed by: FAMILY MEDICINE

## 2017-11-30 PROCEDURE — 90471 IMMUNIZATION ADMIN: CPT | Performed by: FAMILY MEDICINE

## 2017-11-30 PROCEDURE — 90686 IIV4 VACC NO PRSV 0.5 ML IM: CPT | Performed by: FAMILY MEDICINE

## 2017-11-30 NOTE — MR AVS SNAPSHOT
After Visit Summary   11/30/2017    Jordan Barnett    MRN: [id number]           Patient Information     Date Of Birth          1960        Visit Information        Provider Department      11/30/2017 1:20 PM Juan Antonio Flanagan MD [facility]        Today's Diagnoses     Acute pain of left knee    -  1    Traumatic ecchymosis of toe, right, initial encounter        Need for prophylactic vaccination and inoculation against influenza          Care Instructions    Patient declined printed AVS.  Work restrictions form filled out for patient and given to him today; scanned copy in chart.          Follow-ups after your visit        Follow-up notes from your care team     Return if symptoms worsen or fail to improve.      Who to contact     If you have questions or need follow up information about today's clinic visit or your schedule please contact [facility] directly at [phone].  Normal or non-critical lab and imaging results will be communicated to you by [employer], letter or phone within 4 business days after the clinic has received the results. If you do not hear from us within 7 days, please contact the clinic through [employer] or phone. If you have a critical or abnormal lab result, we will notify you by phone as soon as possible.  Submit refill requests through [employer] or call your pharmacy and they will forward the refill request to us. Please allow 3 business days for your refill to be completed.          Additional Information About Your Visit        [employer] Information     [employer] gives you secure access to your electronic health record. If you see a primary care provider, you can also send messages to your care team and make appointments. If you have questions, please call your primary care clinic.  If you do not have a primary care provider, please call [phone] and they will assist you.        Care EveryWhere ID     This is your Care EveryWhere ID.  "This could be used by other organizations to access your [facility] medical records  [id number]        Your Vitals Were     Pulse Temperature Height BMI (Body Mass Index)          73 97.9  F (36.6  C) (Tympanic) 5' 10.5\" (1.791 m) 22.66 kg/m2         Blood Pressure from Last 3 Encounters:   11/30/17 105/79   08/17/17 104/71   02/14/17 115/84    Weight from Last 3 Encounters:   11/30/17 160 lb 3.2 oz (72.7 kg)   08/17/17 159 lb (72.1 kg)   02/14/17 167 lb (75.8 kg)              We Performed the Following     FLU VAC, SPLIT VIRUS IM > 3 YO (QUADRIVALENT) [81176]     Vaccine Administration, Initial [91915]        Primary Care Provider Office Phone # Fax #    Juan Antonio Chadd Flanagan [phone] [address] [facility] [address]        Equal Access to Services     SWAPNA REAL : Hadii aad ku hadasho Soomaali, waaxda luqadaha, qaybta kaalmada adeegyada, waxay torie salas . So [facility] [phone].    ATENCIÓN: Si habla español, tiene a eaton disposición servicios gratuitos de asistencia lingüística. Avelina al [phone].    We comply with applicable federal civil rights laws and [place] laws. We do not discriminate on the basis of race, color, national origin, age, disability, sex, sexual orientation, or gender identity.            Thank you!     Thank you for choosing [facility]  for your care. Our goal is always to provide you with excellent care. Hearing back from our patients is one way we can continue to improve our services. Please take a few minutes to complete the written survey that you may receive in the mail after your visit with us. Thank you!             Your Updated Medication List - Protect others around you: Learn how to safely use, store and throw away your medicines at www.disposemymeds.org.          This list is accurate as of: 11/30/17  5:23 PM.  Always use your most recent med list.                   Brand Name Dispense " Instructions for use Diagnosis    GEODON 40 MG capsule   Generic drug:  ziprasidone      Take 2 capsules (80 mg) by mouth At Bedtime        lamoTRIgine 100 MG tablet    LaMICtal    60 tablet    1 tablet (100 mg) 2 times daily        losartan 100 MG tablet    COZAAR    90 tablet    Take 1 tablet (100 mg) by mouth daily    Essential hypertension with goal blood pressure less than 140/90, CKD (chronic kidney disease) stage 3, GFR 30-59 ml/min       MULTIVITAMIN ADULT PO           triamcinolone 0.1 % ointment    KENALOG    30 g    Apply sparingly to affected area two times daily for 14 days.    Eczema, unspecified type       TYLENOL 500 MG tablet   Generic drug:  acetaminophen      Take 1-2 tablets by mouth every 6 hours as needed.

## 2017-11-30 NOTE — NURSING NOTE
"Chief Complaint   Patient presents with     Musculoskeletal Problem     Pt here for left knee pain.       Initial /79  Pulse 73  Temp 97.9  F (36.6  C) (Tympanic)  Ht 5' 10.5\" (1.791 m)  Wt 160 lb 3.2 oz (72.7 kg)  BMI 22.66 kg/m2 Estimated body mass index is 22.66 kg/(m^2) as calculated from the following:    Height as of this encounter: 5' 10.5\" (1.791 m).    Weight as of this encounter: 160 lb 3.2 oz (72.7 kg).  Medication Reconciliation: complete  Brianna Araujo CMA    "

## 2017-11-30 NOTE — PROGRESS NOTES
SUBJECTIVE:   Jordan Barnett is a 57 year old male who presents to clinic today for the following health issues:  Chief Complaint   Patient presents with     Musculoskeletal Problem     Pt here for left knee pain.     Flu Shot       Joint Pain    Onset: DOI 11/27/17    Description:   Location: left knee  Character: Sharp pain with bending and Dull ache, worse with walking on it    Intensity: 4-7/10    Progression of Symptoms: better    Accompanying Signs & Symptoms:  Other symptoms: radiation of pain to calve area, weakness of leg if he bends it and swelling of foot     History:   Previous similar pain: no       Precipitating factors:   Trauma or overuse: YES- pt was pulling a pallet, ran over his foot and ended up falling, knee started hurting after 2 hours, used crutches the next day    Alleviating factors:  Improved by: acetaminophen    Therapies Tried and outcome: ace wrap x1 day and a little ice    Verified above history with patient.    Patient states the knee and foot pain is 50% better.  STill hurts to bend knee, kneel and squat.  Patient reports can wal independently albeit with a limp and quite slower than his usual.        Problem list and histories reviewed & adjusted, as indicated.  Additional history: as documented    Patient Active Problem List   Diagnosis     Severe bipolar I disorder, most recent episode mixed, with psychotic features (H)     Sebaceous cyst     CKD (chronic kidney disease) stage 3, GFR 30-59 ml/min     Schizoaffective disorder (H)     Hyperlipidemia LDL goal <160     Tubular adenoma of colon     Former smoker     Hypertension goal BP (blood pressure) < 140/90     Former smoker, stopped smoking in distant past     Past Surgical History:   Procedure Laterality Date     COLONOSCOPY      normal. has fam hx colon cancer     COLONOSCOPY N/A 10/13/2014    Procedure: COLONOSCOPY;  Surgeon: Santy Constantino MD;  Location: [facility]     HERNIA REPAIR, INGUINAL RT/LT  02/12/2004     SURGICAL  "HISTORY OF -       Incised & Drained - ? Perirectal Abscess        Social History   Substance Use Topics     Smoking status: Former Smoker     Packs/day: 1.00     Years: 30.00     Types: Cigarettes     Quit date: 4/2/2012     Smokeless tobacco: Former User     Quit date: 4/2/2012      Comment: 2 PPD     Alcohol use Yes      Comment: RARE     Family History   Problem Relation Age of Onset     Thyroid Disease Mother      Coronary Artery Disease Mother      pacemaker     CANCER Paternal Grandfather      CANCER Sister      Cancer - colorectal Sister      MENTAL ILLNESS Nephew      committed suicide at age 27         Current Outpatient Prescriptions   Medication Sig Dispense Refill     losartan (COZAAR) 100 MG tablet Take 1 tablet (100 mg) by mouth daily 90 tablet 3     Multiple Vitamins-Minerals (MULTIVITAMIN ADULT PO)        lamoTRIgine (LAMICTAL) 100 MG tablet 1 tablet (100 mg) 2 times daily 60 tablet      ziprasidone (GEODON) 40 MG capsule Take 2 capsules (80 mg) by mouth At Bedtime       acetaminophen (TYLENOL) 500 MG tablet Take 1-2 tablets by mouth every 6 hours as needed.       triamcinolone (KENALOG) 0.1 % ointment Apply sparingly to affected area two times daily for 14 days. (Patient not taking: Reported on 11/30/2017) 30 g 0     Allergies   Allergen Reactions     Lisinopril Nausea     Felt weakness nausea, couldn't sleep         Reviewed and updated as needed this visit by clinical staffTobacco  Allergies  Med Hx  Surg Hx  Fam Hx  Soc Hx      Reviewed and updated as needed this visit by Provider         ROS:  C: NEGATIVE for fever, chills, change in weight  I: NEGATIVE for worrisome rashes, moles or lesions  MUSCULOSKELETAL:see above  N: NEGATIVE for weakness, dizziness or paresthesias  H: NEGATIVE for bleeding problems    OBJECTIVE:                                                    /79  Pulse 73  Temp 97.9  F (36.6  C) (Tympanic)  Ht 5' 10.5\" (1.791 m)  Wt 160 lb 3.2 oz (72.7 kg)  BMI 22.66 " kg/m2  Body mass index is 22.66 kg/(m^2).  GENERAL: well-nourished, alert and no distress, ambulatory with mild antalgia but no assist  Knee Exam, LEFT:  Inspection: AP/lateral alignment normal, small effusion, No quad atrophy, no ecchymosis/erythema  Tender: none  Active Range of Motion: full flexion, mild pain with flexion, full extension, mild pain with extension, no patellofemoral crepitus with extension  Strength: 5/5 all muscle groups  Special tests: normal Valgus stress test, normal Varus, negative Lachman's test, negative posterior drawer, negative hyperflexion external rotation test, no apprehension with lateral stress of the patella    RIGHT FOOT: ecchymosis on the 2nd toe but no TTP or swelling; full range of motion with no pain of all joints.    Also examined: hip full range of motion     Diagnostic test results:  Diagnostic Test Results:  none        ASSESSMENT/PLAN:                                                      ICD-10-CM    1. Acute pain of left knee M25.562 Patient able to ambulate with no assist. No red flag signs on exam.  Possibly moderate strain.  Patient concurerd with no imaging.  Activity modifications.  Ice compress.  Acetaminophen 325 mg orally 1-2 tabs every 4-6 hrs as needed for pain  Return precautions discussed and given to patient.     2. Traumatic ecchymosis of toe, right, initial encounter S90.121A No pain or swelling and patient is ambulatory w/o assist.  Patient was advised no strong suspicion for fracture.  Cold and warm compress.  Return precautions discussed and given to patient.  Consider imaging if pain ensues.     3. Need for prophylactic vaccination and inoculation against influenza Z23 FLU VAC, SPLIT VIRUS IM > 3 YO (QUADRIVALENT) [39807]     Vaccine Administration, Initial [34610]       Follow up with Provider - 1-2 weeks if worsening   There are no Patient Instructions on file for this visit.    Juan Antonio Flanagan MD  [facility]  Injectable Influenza  Immunization Documentation    1.  Is the person to be vaccinated sick today?   No    2. Does the person to be vaccinated have an allergy to a component   of the vaccine?   No  Egg Allergy Algorithm Link    3. Has the person to be vaccinated ever had a serious reaction   to influenza vaccine in the past?   No    4. Has the person to be vaccinated ever had Guillain-Barré syndrome?   No    Form completed by Brianna Araujo CMA

## 2017-11-30 NOTE — PATIENT INSTRUCTIONS
Patient declined printed AVS.  Work restrictions form filled out for patient and given to him today; scanned copy in chart.

## 2017-12-03 ENCOUNTER — TELEPHONE (OUTPATIENT)
Dept: FAMILY MEDICINE | Facility: CLINIC | Age: 57
End: 2017-12-03

## 2017-12-13 ENCOUNTER — OFFICE VISIT (OUTPATIENT)
Dept: FAMILY MEDICINE | Facility: CLINIC | Age: 57
End: 2017-12-13
Payer: OTHER MISCELLANEOUS

## 2017-12-13 VITALS
DIASTOLIC BLOOD PRESSURE: 73 MMHG | WEIGHT: 159.6 LBS | HEART RATE: 90 BPM | BODY MASS INDEX: 22.34 KG/M2 | SYSTOLIC BLOOD PRESSURE: 122 MMHG | TEMPERATURE: 98 F | HEIGHT: 71 IN

## 2017-12-13 DIAGNOSIS — M70.42 PREPATELLAR BURSITIS OF LEFT KNEE: Primary | ICD-10-CM

## 2017-12-13 PROCEDURE — 99213 OFFICE O/P EST LOW 20 MIN: CPT | Performed by: FAMILY MEDICINE

## 2017-12-13 ASSESSMENT — PAIN SCALES - GENERAL: PAINLEVEL: NO PAIN (1)

## 2017-12-13 NOTE — PATIENT INSTRUCTIONS
You will be contacted in 1-2 business days to get a schedule for the [id number] specialist    You may return to work as you tolerate.    Rest as needed.    Keep ACE bandage on left knee.    Thank you for choosing [facility].  You may be receiving a survey in the mail from [employer] regarding your visit today.  Please take a few minutes to complete and return the survey to let us know how we are doing.      If you have questions or concerns, please contact us via [employer] or you can contact your care team at [phone].    Our Clinic hours are:  Monday 6:40 am  to 7:00 pm  Tuesday -Friday 6:40 am to 5:00 pm    The [place] outpatient lab hours are:  Monday - Friday 6:10 am to 4:45 pm  Saturdays 7:00 am to 11:00 am  Appointments are required, call [phone]    If you have clinical questions after hours or would like to schedule an appointment,  call the clinic at [phone].    What is bursitis?  A bursa is a fluid-filled sac that helps cushion the muscles, tendons, and bones around a joint. When a bursa becomes inflamed, it s called bursitis. Common symptoms of bursitis include pain, tenderness, and swelling that limits movement of the joint.  What causes bursitis?  Bursitis is most often caused by overuse of a joint. The repeated movements irritate the bursa and may cause it to swell. When that happens, other surrounding tissues may become inflamed or have less space to move. Bursitis is most common in large joints such as the knee, shoulder, and hip.       Nonsurgical treatment involves both rest and exercise.    How is bursitis treated?  To help reduce pain and swelling, your healthcare provider may recommend one or more of the following:     Rest gives the bursa time to heal. This means limiting activities that put stress on the joint.    Anti-inflammatory medications help reduce painful swelling. In some cases, this can include injections of cortisone or other steroid medicines into the  bursa.    Splints and support bandages improve your comfort and allow the bursa to heal.    Physical therapy may be used to increase flexibility and strengthen muscles that support the joint.    Aspiration removes extra fluid from the bursa using a needle. This can help your healthcare provider find out what is causing your bursitis. For example, it might be an infection or overuse.     Surgery can be used to remove an inflamed or infected bursa. This is rarely needed.  Date Last Reviewed: 9/11/2015 2000-2017 [employer]. [address] [place] [address], [place] [address]. All rights reserved. This information is not intended as a substitute for professional medical care. Always follow your healthcare professional's instructions.        Understanding Prepatellar Bursitis    A bursa is a thin, slippery, sac-like film. It contains a small amount of fluid. This structure is found between bones and soft tissues in and around joints. A bursa cushions and protects a joint. It keeps parts of a joint from rubbing against each other.  The prepatellar bursa is found on top of the kneecap (patella). It lies just under the skin. If this bursa becomes irritated and inflamed, the condition is called prepatellar bursitis.  Causes of prepatellar bursitis  A common cause of this problem is repeated kneeling on the floor. For this reason, it is sometimes called  s knee. Injury from a blow to your kneecap can also cause it. Running on uneven ground may also play a role.  Symptoms of prepatellar bursitis  These may include:    Knee pain that gets worse with bending or pressure to the knee, and gets better with rest    Swelling over the kneecap    Tenderness or warmth over the kneecap    Crackling sound from the kneecap with movement  Treatment for prepatellar bursitis  This problem often gets better with rest and medicines. Other treatments may be needed. Possible treatments include:    Resting your knee. This allows  the area to heal. It includes avoiding things that trigger symptoms.    Prescription or over-the-counter pain medicines. These help reduce pain and swelling.    Stretching and strengthening exercises. These help improve the strength and flexibility of the muscles around the knee.    Cold packs or heat packs. These help reduce pain and swelling.    Physical therapy. This may include exercises, ultrasound, or other treatments.    Kneepads. These help protect your knees during sports.    Injection of medicine into the bursa or drainage of fluid from the bursa. These may help relieve symptoms.  For symptoms that don t get better with these treatments, you may need surgery to remove the bursa.  Possible complications    If germs get into the bursa through a cut in your skin, the bursa may become infected. An infection is generally treated with antibiotic medicine. In some cases, the infected bursa must be removed.    If your knee isn t given time to heal, this problem may become long-term (chronic). This can lead to trouble moving the knee joint.     When to call your healthcare provider  Call your healthcare provider right away if you have:    Fever of 100.4 F (38 C) or higher, or as directed    Increased swelling or warmth of the area, or drainage from the area    Symptoms that don t get better or get worse    New symptoms   Date Last Reviewed: 3/10/2016    6573-1748 [employer]. [address] [place] [address], [place] [address]. All rights reserved. This information is not intended as a substitute for professional medical care. Always follow your healthcare professional's instructions.

## 2017-12-13 NOTE — MR AVS SNAPSHOT
After Visit Summary   12/13/2017    Jordan Barnett    MRN: [id number]           Patient Information     Date Of Birth          1960        Visit Information        Provider Department      12/13/2017 10:00 AM Juan Antonio Flanagan MD [facility]        Today's Diagnoses     Prepatellar bursitis of left knee    -  1      Care Instructions    You will be contacted in 1-2 business days to get a schedule for the [id number] specialist    You may return to work as you tolerate.    Rest as needed.    Keep ACE bandage on left knee.    Thank you for choosing [facility].  You may be receiving a survey in the mail from [employer] regarding your visit today.  Please take a few minutes to complete and return the survey to let us know how we are doing.      If you have questions or concerns, please contact us via [employer] or you can contact your care team at [phone].    Our Clinic hours are:  Monday 6:40 am  to 7:00 pm  Tuesday -Friday 6:40 am to 5:00 pm    The [place] outpatient lab hours are:  Monday - Friday 6:10 am to 4:45 pm  Saturdays 7:00 am to 11:00 am  Appointments are required, call [phone]    If you have clinical questions after hours or would like to schedule an appointment,  call the clinic at [phone].    What is bursitis?  A bursa is a fluid-filled sac that helps cushion the muscles, tendons, and bones around a joint. When a bursa becomes inflamed, it s called bursitis. Common symptoms of bursitis include pain, tenderness, and swelling that limits movement of the joint.  What causes bursitis?  Bursitis is most often caused by overuse of a joint. The repeated movements irritate the bursa and may cause it to swell. When that happens, other surrounding tissues may become inflamed or have less space to move. Bursitis is most common in large joints such as the knee, shoulder, and hip.       Nonsurgical treatment involves both rest and exercise.    How is bursitis  treated?  To help reduce pain and swelling, your healthcare provider may recommend one or more of the following:     Rest gives the bursa time to heal. This means limiting activities that put stress on the joint.    Anti-inflammatory medications help reduce painful swelling. In some cases, this can include injections of cortisone or other steroid medicines into the bursa.    Splints and support bandages improve your comfort and allow the bursa to heal.    Physical therapy may be used to increase flexibility and strengthen muscles that support the joint.    Aspiration removes extra fluid from the bursa using a needle. This can help your healthcare provider find out what is causing your bursitis. For example, it might be an infection or overuse.     Surgery can be used to remove an inflamed or infected bursa. This is rarely needed.  Date Last Reviewed: 9/11/2015 2000-2017 [employer]. [address] [place] [address]. All rights reserved. This information is not intended as a substitute for professional medical care. Always follow your healthcare professional's instructions.        Understanding Prepatellar Bursitis    A bursa is a thin, slippery, sac-like film. It contains a small amount of fluid. This structure is found between bones and soft tissues in and around joints. A bursa cushions and protects a joint. It keeps parts of a joint from rubbing against each other.  The prepatellar bursa is found on top of the kneecap (patella). It lies just under the skin. If this bursa becomes irritated and inflamed, the condition is called prepatellar bursitis.  Causes of prepatellar bursitis  A common cause of this problem is repeated kneeling on the floor. For this reason, it is sometimes called  s knee. Injury from a blow to your kneecap can also cause it. Running on uneven ground may also play a role.  Symptoms of prepatellar bursitis  These may include:    Knee pain that gets worse with  bending or pressure to the knee, and gets better with rest    Swelling over the kneecap    Tenderness or warmth over the kneecap    Crackling sound from the kneecap with movement  Treatment for prepatellar bursitis  This problem often gets better with rest and medicines. Other treatments may be needed. Possible treatments include:    Resting your knee. This allows the area to heal. It includes avoiding things that trigger symptoms.    Prescription or over-the-counter pain medicines. These help reduce pain and swelling.    Stretching and strengthening exercises. These help improve the strength and flexibility of the muscles around the knee.    Cold packs or heat packs. These help reduce pain and swelling.    Physical therapy. This may include exercises, ultrasound, or other treatments.    Kneepads. These help protect your knees during sports.    Injection of medicine into the bursa or drainage of fluid from the bursa. These may help relieve symptoms.  For symptoms that don t get better with these treatments, you may need surgery to remove the bursa.  Possible complications    If germs get into the bursa through a cut in your skin, the bursa may become infected. An infection is generally treated with antibiotic medicine. In some cases, the infected bursa must be removed.    If your knee isn t given time to heal, this problem may become long-term (chronic). This can lead to trouble moving the knee joint.     When to call your healthcare provider  Call your healthcare provider right away if you have:    Fever of 100.4 F (38 C) or higher, or as directed    Increased swelling or warmth of the area, or drainage from the area    Symptoms that don t get better or get worse    New symptoms   Date Last Reviewed: 3/10/2016    6053-0849 [employer]. [address] [place] [address], [place] [address]. All rights reserved. This information is not intended as a substitute for professional medical care. Always follow your  healthcare professional's instructions.                Follow-ups after your visit        Additional Services     ORTHO  REFERRAL       [facility] is referring you to the Orthopedic  Services at [facility].       The  Representative will assist you in the coordination of your Orthopedic and Musculoskeletal Care as prescribed by your physician.    The  Representative will call you within 1 business day to help schedule your appointment, or you may contact the  Representative at:    All areas ~ [phone]     Type of Referral : Non Surgical       Timeframe requested: 1 - 2 days    Coverage of these services is subject to the terms and limitations of your health insurance plan.  Please call member services at your health plan with any benefit or coverage questions.      If X-rays, CT or MRI's have been performed, please contact the facility where they were done to arrange for , prior to your scheduled appointment.  Please bring this referral request to your appointment and present it to your specialist.                  Follow-up notes from your care team     Return if symptoms worsen or fail to improve.      Who to contact     If you have questions or need follow up information about today's clinic visit or your schedule please contact [facility] directly at [phone].  Normal or non-critical lab and imaging results will be communicated to you by MyChart, letter or phone within 4 business days after the clinic has received the results. If you do not hear from us within 7 days, please contact the clinic through MyChart or phone. If you have a critical or abnormal lab result, we will notify you by phone as soon as possible.  Submit refill requests through [employer] or call your pharmacy and they will forward the refill request to us. Please allow 3 business days for your refill to be completed.           "Additional Information About Your Visit        MyChart Information     [employer] gives you secure access to your electronic health record. If you see a primary care provider, you can also send messages to your care team and make appointments. If you have questions, please call your primary care clinic.  If you do not have a primary care provider, please call [phone] and they will assist you.        Care EveryWhere ID     This is your Care EveryWhere ID. This could be used by other organizations to access your [facility] medical records  [id number]        Your Vitals Were     Pulse Temperature Height BMI (Body Mass Index)          90 98  F (36.7  C) (Tympanic) 5' 10.5\" (1.791 m) 22.58 kg/m2         Blood Pressure from Last 3 Encounters:   12/13/17 122/73   11/30/17 105/79   08/17/17 104/71    Weight from Last 3 Encounters:   12/13/17 159 lb 9.6 oz (72.4 kg)   11/30/17 160 lb 3.2 oz (72.7 kg)   08/17/17 159 lb (72.1 kg)              We Performed the Following     ORTHO  REFERRAL        Primary Care Provider Office Phone # Fax #    Juan Antonio Flanagan [phone] [address] [facility] [address]        Equal Access to Services     SWAPNA REAL : Hadii zelda ku hadasho Soomaali, waaxda luqadaha, qaybta kaalmada adeegyada, waxay torie salas . So [facility] [phone].    ATENCIÓN: Si habla español, tiene a eaton disposición servicios gratuitos de asistencia lingüística. Llame al [phone].    We comply with applicable federal civil rights laws and [place] laws. We do not discriminate on the basis of race, color, national origin, age, disability, sex, sexual orientation, or gender identity.            Thank you!     Thank you for choosing [facility]  for your care. Our goal is always to provide you with excellent care. Hearing back from our patients is one way we can continue to improve our services. Please take a few minutes to " complete the written survey that you may receive in the mail after your visit with us. Thank you!             Your Updated Medication List - Protect others around you: Learn how to safely use, store and throw away your medicines at www.disposemymeds.org.          This list is accurate as of: 12/13/17 10:54 AM.  Always use your most recent med list.                   Brand Name Dispense Instructions for use Diagnosis    GEODON 40 MG capsule   Generic drug:  ziprasidone      Take 2 capsules (80 mg) by mouth At Bedtime        lamoTRIgine 100 MG tablet    LaMICtal    60 tablet    1 tablet (100 mg) 2 times daily        losartan 100 MG tablet    COZAAR    90 tablet    Take 1 tablet (100 mg) by mouth daily    Essential hypertension with goal blood pressure less than 140/90, CKD (chronic kidney disease) stage 3, GFR 30-59 ml/min       MULTIVITAMIN ADULT PO           triamcinolone 0.1 % ointment    KENALOG    30 g    Apply sparingly to affected area two times daily for 14 days.    Eczema, unspecified type       TYLENOL 500 MG tablet   Generic drug:  acetaminophen      Take 1-2 tablets by mouth every 6 hours as needed.

## 2017-12-13 NOTE — NURSING NOTE
"Chief Complaint   Patient presents with     Knee Pain     Left knee injury at work 11/27/17 at work, is improving, slight pain off and on with some movements, pain with kneeling but does not do this very often.  1/10 pain scale.       Initial /73 (BP Location: Right arm, Patient Position: Chair, Cuff Size: Adult Regular)  Pulse 90  Temp 98  F (36.7  C) (Tympanic)  Ht 5' 10.5\" (1.791 m)  Wt 159 lb 9.6 oz (72.4 kg)  BMI 22.58 kg/m2 Estimated body mass index is 22.58 kg/(m^2) as calculated from the following:    Height as of this encounter: 5' 10.5\" (1.791 m).    Weight as of this encounter: 159 lb 9.6 oz (72.4 kg).  Medication Reconciliation: complete  "

## 2017-12-13 NOTE — PROGRESS NOTES
SUBJECTIVE:   Jordan Barnett is a 57 year old male who presents to clinic today for the following health issues:      Chief Complaint   Patient presents with     Knee Pain     Left knee injury at work 11/27/17 at work, is improving, slight pain off and on with some movements, pain with kneeling but does not do this very often.  1/10 pain scale.         Patient states pain not interfering with work currently.  Patient denies new knee symptoms.    Problem list and histories reviewed & adjusted, as indicated.  Additional history: as documented    Patient Active Problem List   Diagnosis     Severe bipolar I disorder, most recent episode mixed, with psychotic features (H)     Sebaceous cyst     CKD (chronic kidney disease) stage 3, GFR 30-59 ml/min     Schizoaffective disorder (H)     Hyperlipidemia LDL goal <160     Tubular adenoma of colon     Former smoker     Hypertension goal BP (blood pressure) < 140/90     Former smoker, stopped smoking in distant past     Past Surgical History:   Procedure Laterality Date     COLONOSCOPY      normal. has fam hx colon cancer     COLONOSCOPY N/A 10/13/2014    Procedure: COLONOSCOPY;  Surgeon: Santy Constantino MD;  Location: [facility]     HERNIA REPAIR, INGUINAL RT/LT  02/12/2004     SURGICAL HISTORY OF -       Incised & Drained - ? Perirectal Abscess        Social History   Substance Use Topics     Smoking status: Former Smoker     Packs/day: 1.00     Years: 30.00     Types: Cigarettes     Quit date: 4/2/2012     Smokeless tobacco: Former User     Quit date: 4/2/2012      Comment: 2 PPD     Alcohol use Yes      Comment: RARE     Family History   Problem Relation Age of Onset     Thyroid Disease Mother      Coronary Artery Disease Mother      pacemaker     CANCER Paternal Grandfather      CANCER Sister      Cancer - colorectal Sister      MENTAL ILLNESS Nephew      committed suicide at age 27         Current Outpatient Prescriptions   Medication Sig Dispense Refill     losartan  "(COZAAR) 100 MG tablet Take 1 tablet (100 mg) by mouth daily 90 tablet 3     Multiple Vitamins-Minerals (MULTIVITAMIN ADULT PO)        lamoTRIgine (LAMICTAL) 100 MG tablet 1 tablet (100 mg) 2 times daily 60 tablet      ziprasidone (GEODON) 40 MG capsule Take 2 capsules (80 mg) by mouth At Bedtime       acetaminophen (TYLENOL) 500 MG tablet Take 1-2 tablets by mouth every 6 hours as needed.       triamcinolone (KENALOG) 0.1 % ointment Apply sparingly to affected area two times daily for 14 days. (Patient not taking: Reported on 11/30/2017) 30 g 0     Allergies   Allergen Reactions     Lisinopril Nausea     Felt weakness nausea, couldn't sleep         Reviewed and updated as needed this visit by clinical staff  Tobacco  Allergies  Meds  Problems  Med Hx  Surg Hx  Fam Hx  Soc Hx        Reviewed and updated as needed this visit by Provider  Allergies  Meds  Problems         ROS:  C: NEGATIVE for fever, chills, change in weight  I: NEGATIVE for worrisome rashes, moles or lesions  MUSCULOSKELETAL:see above  N: NEGATIVE for weakness, dizziness or paresthesias  H: NEGATIVE for bleeding problems    OBJECTIVE:                                                    /73 (BP Location: Right arm, Patient Position: Chair, Cuff Size: Adult Regular)  Pulse 90  Temp 98  F (36.7  C) (Tympanic)  Ht 5' 10.5\" (1.791 m)  Wt 159 lb 9.6 oz (72.4 kg)  BMI 22.58 kg/m2  Body mass index is 22.58 kg/(m^2).  GENERAL: healthy, alert and no distress  LEFT KNEE: no induration/erythema; soft boggy area over patella; no TTP; full range of motion with no pain.  SKIN: no suspicious lesions, no rashes    Diagnostic test results:  Diagnostic Test Results:  none        ASSESSMENT/PLAN:                                                        ICD-10-CM    1. Prepatellar bursitis of left knee M70.42 ORTHO  REFERRAL     Patient was advised on findings on exam. Most possibly due to recent trauma.  Discussed possible causes of the " condition.  Elastic wrap advised - patient states he prefers to use his own wrap at home.  May return to work with no restriction.  Consult ortho if not improving.  Return precautions discussed and given to patient.      Follow up with Provider - at ortho consult   Patient Instructions     You will be contacted in 1-2 business days to get a schedule for the [id number] specialist    You may return to work as you tolerate.    Rest as needed.    Keep ACE bandage on left knee.    Thank you for choosing [facility].  You may be receiving a survey in the mail from Alicia Bojorquez regarding your visit today.  Please take a few minutes to complete and return the survey to let us know how we are doing.      If you have questions or concerns, please contact us via [employer] or you can contact your care team at [phone].    Our Clinic hours are:  Monday 6:40 am  to 7:00 pm  Tuesday -Friday 6:40 am to 5:00 pm    The [place] outpatient lab hours are:  Monday - Friday 6:10 am to 4:45 pm  Saturdays 7:00 am to 11:00 am  Appointments are required, call [phone]    If you have clinical questions after hours or would like to schedule an appointment,  call the clinic at [phone].    What is bursitis?  A bursa is a fluid-filled sac that helps cushion the muscles, tendons, and bones around a joint. When a bursa becomes inflamed, it s called bursitis. Common symptoms of bursitis include pain, tenderness, and swelling that limits movement of the joint.  What causes bursitis?  Bursitis is most often caused by overuse of a joint. The repeated movements irritate the bursa and may cause it to swell. When that happens, other surrounding tissues may become inflamed or have less space to move. Bursitis is most common in large joints such as the knee, shoulder, and hip.       Nonsurgical treatment involves both rest and exercise.    How is bursitis treated?  To help reduce pain and swelling, your healthcare provider may recommend one  or more of the following:     Rest gives the bursa time to heal. This means limiting activities that put stress on the joint.    Anti-inflammatory medications help reduce painful swelling. In some cases, this can include injections of cortisone or other steroid medicines into the bursa.    Splints and support bandages improve your comfort and allow the bursa to heal.    Physical therapy may be used to increase flexibility and strengthen muscles that support the joint.    Aspiration removes extra fluid from the bursa using a needle. This can help your healthcare provider find out what is causing your bursitis. For example, it might be an infection or overuse.     Surgery can be used to remove an inflamed or infected bursa. This is rarely needed.  Date Last Reviewed: 9/11/2015 2000-2017 The [employer]. [address] [place] [address], [place] [address]. All rights reserved. This information is not intended as a substitute for professional medical care. Always follow your healthcare professional's instructions.        Understanding Prepatellar Bursitis    A bursa is a thin, slippery, sac-like film. It contains a small amount of fluid. This structure is found between bones and soft tissues in and around joints. A bursa cushions and protects a joint. It keeps parts of a joint from rubbing against each other.  The prepatellar bursa is found on top of the kneecap (patella). It lies just under the skin. If this bursa becomes irritated and inflamed, the condition is called prepatellar bursitis.  Causes of prepatellar bursitis  A common cause of this problem is repeated kneeling on the floor. For this reason, it is sometimes called  s knee. Injury from a blow to your kneecap can also cause it. Running on uneven ground may also play a role.  Symptoms of prepatellar bursitis  These may include:    Knee pain that gets worse with bending or pressure to the knee, and gets better with rest    Swelling over the  kneecap    Tenderness or warmth over the kneecap    Crackling sound from the kneecap with movement  Treatment for prepatellar bursitis  This problem often gets better with rest and medicines. Other treatments may be needed. Possible treatments include:    Resting your knee. This allows the area to heal. It includes avoiding things that trigger symptoms.    Prescription or over-the-counter pain medicines. These help reduce pain and swelling.    Stretching and strengthening exercises. These help improve the strength and flexibility of the muscles around the knee.    Cold packs or heat packs. These help reduce pain and swelling.    Physical therapy. This may include exercises, ultrasound, or other treatments.    Kneepads. These help protect your knees during sports.    Injection of medicine into the bursa or drainage of fluid from the bursa. These may help relieve symptoms.  For symptoms that don t get better with these treatments, you may need surgery to remove the bursa.  Possible complications    If germs get into the bursa through a cut in your skin, the bursa may become infected. An infection is generally treated with antibiotic medicine. In some cases, the infected bursa must be removed.    If your knee isn t given time to heal, this problem may become long-term (chronic). This can lead to trouble moving the knee joint.     When to call your healthcare provider  Call your healthcare provider right away if you have:    Fever of 100.4 F (38 C) or higher, or as directed    Increased swelling or warmth of the area, or drainage from the area    Symptoms that don t get better or get worse    New symptoms   Date Last Reviewed: 3/10/2016    [id number] [employer]. [address] [place] [address], [place] [address]. All rights reserved. This information is not intended as a substitute for professional medical care. Always follow your healthcare professional's instructions.            Juan Antonio Flanagan MD  [place]  [facility]

## 2017-12-27 ENCOUNTER — MYC REFILL (OUTPATIENT)
Dept: FAMILY MEDICINE | Facility: CLINIC | Age: 57
End: 2017-12-27

## 2017-12-27 DIAGNOSIS — L30.9 ECZEMA, UNSPECIFIED TYPE: ICD-10-CM

## 2017-12-28 RX ORDER — TRIAMCINOLONE ACETONIDE 1 MG/G
OINTMENT TOPICAL
Qty: 30 G | Refills: 0 | Status: ON HOLD | OUTPATIENT
Start: 2017-12-28 | End: 2018-06-20

## 2017-12-28 NOTE — TELEPHONE ENCOUNTER
Message from MyChart:  Original authorizing provider: MD Jordan Rodrigues would like a refill of the following medications:  triamcinolone (KENALOG) 0.1 % ointment [Juan Antonio Flanagan MD]    Preferred pharmacy: [facility] - [place], [place] - [address]    Comment:

## 2018-01-05 ENCOUNTER — OFFICE VISIT (OUTPATIENT)
Dept: ORTHOPEDICS | Facility: CLINIC | Age: 58
End: 2018-01-05
Payer: OTHER MISCELLANEOUS

## 2018-01-05 ENCOUNTER — RADIANT APPOINTMENT (OUTPATIENT)
Dept: GENERAL RADIOLOGY | Facility: CLINIC | Age: 58
End: 2018-01-05
Attending: PEDIATRICS
Payer: OTHER MISCELLANEOUS

## 2018-01-05 VITALS
HEIGHT: 71 IN | BODY MASS INDEX: 22.34 KG/M2 | WEIGHT: 159.6 LBS | SYSTOLIC BLOOD PRESSURE: 116 MMHG | DIASTOLIC BLOOD PRESSURE: 75 MMHG | HEART RATE: 80 BPM

## 2018-01-05 DIAGNOSIS — S82.092A OTHER CLOSED FRACTURE OF LEFT PATELLA, INITIAL ENCOUNTER: Primary | ICD-10-CM

## 2018-01-05 DIAGNOSIS — S89.92XA INJURY OF LEFT KNEE, INITIAL ENCOUNTER: ICD-10-CM

## 2018-01-05 PROCEDURE — 99244 OFF/OP CNSLTJ NEW/EST MOD 40: CPT | Performed by: PEDIATRICS

## 2018-01-05 PROCEDURE — 73562 X-RAY EXAM OF KNEE 3: CPT

## 2018-01-05 NOTE — LETTER
[facility]  [address]  [address]  [facility] [address]  Phone: [phone]  Fax: [phone]      REPORT OF WORK ABILITY    NOTE TO EMPLOYEE: You must promptly provide a copy of this report to your  employer or worker's compensation insurer, and Qualified Rehabilitation Consultant.    Date: 1/5/2018                     Employee Name: Jordan Barnett         YOB: 1960  Medical Record Number: [id number]   Soc.Sec.No: [id number]  Employer: None                Date of Injury: 11/27/2018  Managed Care Organization / Insurance Company Name: UNKNOWN    Diagnosis: Left Knee Patellar fracture  Work Related: yes     MMI: NO   Permanent Partial Disability(PPD) likely: UNKNOWN    EMPLOYEE IS ABLE TO WORK: Return to work with restrictions on 1/5/2018     RESTRICTIONS IF ANY:     Stand:  Continuously (6-8 hours)  Sit:  Continuously (6-8 hours)  Walk: Continuously (6-8 hours)  Kneel/[place]:  minimal  Ladder/Stair climb:  minimal    OTHER RESTRICTIONS: None    TREATMENT PLAN/NOTES: Obtain CT and follow up in clinic.    Lucita Goetz MD

## 2018-01-05 NOTE — MR AVS SNAPSHOT
After Visit Summary   2018    Jordan Barnett    MRN: [id number]           Patient Information     Date Of Birth          1960        Visit Information        Provider Department      2018 3:00 PM Lucita Goetz MD [facility]        Today's Diagnoses     Other closed fracture of left patella, initial encounter    -  1      Care Instructions    Plan:  - Today's Plan of Care:  CT of the left knee  Work Restrictions - letter provided  Medical Equipment: hinged knee brace    Advanced imaging is done by appointment in the imaging center.  Depending on your availability you can usually schedule within the next 1-2 days. Some insurance require a prior authorization to be completed which may delay the time until you are able to schedule your appointment.  Please call Advanced Imaging Central [phone] to schedule your CT.     Please make a follow up appointment in the clinic at least 2 days following your CT by calling [phone].              Follow-ups after your visit        Who to contact     If you have questions or need follow up information about today's clinic visit or your schedule please contact [facility] directly at [phone].  Normal or non-critical lab and imaging results will be communicated to you by MyChart, letter or phone within 4 business days after the clinic has received the results. If you do not hear from us within 7 days, please contact the clinic through [facility] or phone. If you have a critical or abnormal lab result, we will notify you by phone as soon as possible.  Submit refill requests through [facility] or call your pharmacy and they will forward the refill request to us. Please allow 3 business days for your refill to be completed.          Additional Information About Your Visit        MyChart Information     [facility] gives you secure access to your electronic health record. If you  "see a primary care provider, you can also send messages to your care team and make appointments. If you have questions, please call your primary care clinic.  If you do not have a primary care provider, please call [phone] and they will assist you.        Care EveryWhere ID     This is your Care EveryWhere ID. This could be used by other organizations to access your [facility] medical records  [id number]        Your Vitals Were     Pulse Height BMI (Body Mass Index)             80 5' 10.5\" (1.791 m) 22.58 kg/m2          Blood Pressure from Last 3 Encounters:   01/05/18 116/75   12/13/17 122/73   11/30/17 105/79    Weight from Last 3 Encounters:   01/05/18 159 lb 9.6 oz (72.4 kg)   12/13/17 159 lb 9.6 oz (72.4 kg)   11/30/17 160 lb 3.2 oz (72.7 kg)               Primary Care Provider Office Phone # Fax #    Juan Antonio Chadd Flanagan [phone] [address] [facility] [address]        Equal Access to Services     SWAPNA REAL AH: Hadii zelda cui hadasho Sovanessa, waaxda luqadaha, qaybta kaalmada adedenisse, shelly salas . So [facility] [phone].    ATENCIÓN: Si habla español, tiene a eaton disposición servicios gratuitos de asistencia lingüística. Dengame al [phone].    We comply with applicable federal civil rights laws and [place] laws. We do not discriminate on the basis of race, color, national origin, age, disability, sex, sexual orientation, or gender identity.            Thank you!     Thank you for choosing [facility]  for your care. Our goal is always to provide you with excellent care. Hearing back from our patients is one way we can continue to improve our services. Please take a few minutes to complete the written survey that you may receive in the mail after your visit with us. Thank you!             Your Updated Medication List - Protect others around you: Learn how to safely use, store and throw away your " medicines at www.disposemymeds.org.          This list is accurate as of: 1/5/18  3:48 PM.  Always use your most recent med list.                   Brand Name Dispense Instructions for use Diagnosis    GEODON 40 MG capsule   Generic drug:  ziprasidone      Take 2 capsules (80 mg) by mouth At Bedtime        lamoTRIgine 100 MG tablet    LaMICtal    60 tablet    1 tablet (100 mg) 2 times daily        losartan 100 MG tablet    COZAAR    90 tablet    Take 1 tablet (100 mg) by mouth daily    Essential hypertension with goal blood pressure less than 140/90, CKD (chronic kidney disease) stage 3, GFR 30-59 ml/min       MULTIVITAMIN ADULT PO           triamcinolone 0.1 % ointment    KENALOG    30 g    Apply sparingly to affected area two times daily for 14 days.    Eczema, unspecified type       TYLENOL 500 MG tablet   Generic drug:  acetaminophen      Take 1-2 tablets by mouth every 6 hours as needed.

## 2018-01-05 NOTE — PROGRESS NOTES
"Sports Medicine Clinic Visit    PCP: Juan Antonio Flanagan    Jordan Banrett is a 57 year old male who is seen  in consultation at the request of Dr. Flanagan presenting with left knee pain.    Injury: Patient reports an injury 5-6 weeks ago.  He had a fall, doesn't remember if he landed on knee or what exactly happened.  Swelling has resolved but has had an onset of pain and \"not feeling right.\"  Was on crutches for a couple days.  Pain over knee cap with standing for a long period of time.    Location of Pain: medial and lateral patellar border  Duration of Pain: 5-6 week(s)  Rating of Pain at worst: 5/10  Rating of Pain Currently: 1/10  Symptoms are better with: walking, avoid sitting  Symptoms are worse with: sitting, stairs and kneeling  Additional Features:   Positive: none   Negative: swelling, bruising, popping, grinding, catching, locking, instability, paresthesias, numbness, weakness, pain in other joints and systemic symptoms  Other evaluation and/or treatments so far consists of: referred by primary care  Prior History of related problems: none    Social History: [employer] [facility] [employer]    Review of Systems  Skin: no bruising, initial swelling  Musculoskeletal: as above  Neurologic: no numbness, paresthesias  Remainder of review of systems is negative including constitutional, CV, pulmonary, GI, except as noted in HPI or medical history.    Patient's current problem list, past medical and surgical history, and family history were reviewed.    Patient Active Problem List   Diagnosis     Severe bipolar I disorder, most recent episode mixed, with psychotic features (H)     Sebaceous cyst     CKD (chronic kidney disease) stage 3, GFR 30-59 ml/min     Schizoaffective disorder (H)     Hyperlipidemia LDL goal <160     Tubular adenoma of colon     Former smoker     Hypertension goal BP (blood pressure) < 140/90     Former smoker, stopped smoking in distant past     Past Medical History:   Diagnosis Date     " "Depressive disorder, not elsewhere classified     Manic      Unspecified schizophrenia, unspecified condition      Past Surgical History:   Procedure Laterality Date     COLONOSCOPY      normal. has fam hx colon cancer     COLONOSCOPY N/A 10/13/2014    Procedure: COLONOSCOPY;  Surgeon: Santy Constantino MD;  Location: [facility]     HERNIA REPAIR, INGUINAL RT/LT  02/12/2004     SURGICAL HISTORY OF -       Incised & Drained - ? Perirectal Abscess      Family History   Problem Relation Age of Onset     Thyroid Disease Mother      Coronary Artery Disease Mother      pacemaker     CANCER Paternal Grandfather      CANCER Sister      Cancer - colorectal Sister      MENTAL ILLNESS Nephew      committed suicide at age 27         Objective  /75  Pulse 80  Ht 5' 10.5\" (1.791 m)  Wt 159 lb 9.6 oz (72.4 kg)  BMI 22.58 kg/m2    GENERAL APPEARANCE: healthy, alert and no distress   GAIT: NORMAL  SKIN: no suspicious lesions or rashes  HEENT: Sclera clear, anicteric  CV: no lower extremity edema, good peripheral pulses  RESP: Breathing not labored  NEURO: Normal strength and tone, mentation intact and speech normal  PSYCH:  mentation appears normal and affect normal/bright    Bilateral Knee exam    Inspection:      Mild anterior knee swelling left    Patella:      Mobility -       hypomobile left       Crepitus noted in the patellofemoral joint left        positive (+) compression test left    Tender:      Mild directly over knee cap    Non Tender:      remainder of knee area left    Knee ROM:      Full extension, mildly decreased flexion    Strength:      5-/5 with knee extension left  - SLR intact    Special Tests:     neg (-) Trena left       neg (-) Lachmans left       neg (-) anterior drawer left       neg (-) posterior drawer left       neg (-) varus at 0 deg and 30 deg left       neg (-) valgus at 0 deg and 30 deg left    Gait:      normal    Neurovascular:      2+ peripheral pulses bilaterally and brisk capillary " refill       sensation grossly intact    Radiology  I ordered, visualized and reviewed these images with the patient  XR KNEE STANDING AP BILAT SUNRISE BILAT LAT LT   1/5/2018 3:25 PM   HISTORY: ; Injury of left knee, initial encounter  COMPARISON: None.  IMPRESSION: There is a longitudinal fracture of the left patella. No  significant displacement. Small knee joint effusion appears to be  present.  IMPRESSION: Fractured left patella.    Assessment:  1. Other closed fracture of left patella, initial encounter      Non-displaced, however, will obtain CT to evaluate for joint involvement and healing. Hinged brace and work restrictions in the interim.  Follow up after CT.    Plan:  - Today's Plan of Care:  CT of the left knee  Work Restrictions - letter provided  Medical Equipment: hinged knee brace    Concerning signs and symptoms were reviewed.  The patient expressed understanding of this management plan and all questions were answered at this time.    Thanks for the opportunity to participate in the care of this patient, I will keep you updated on their progress.    CC: Dr. Masha Goetz MD CAQ  Primary Care Sports Medicine  [facility]

## 2018-01-05 NOTE — PATIENT INSTRUCTIONS
Plan:  - Today's Plan of Care:  CT of the left knee  Work Restrictions - letter provided  Medical Equipment: hinged knee brace    Advanced imaging is done by appointment in the imaging center.  Depending on your availability you can usually schedule within the next 1-2 days. Some insurance require a prior authorization to be completed which may delay the time until you are able to schedule your appointment.  Please call Advanced Imaging Central [phone] to schedule your CT.     Please make a follow up appointment in the clinic at least 2 days following your CT by calling [phone].

## 2018-01-05 NOTE — LETTER
"    1/5/2018         RE: Jordan Barnett  [address]  [facility] [address]        Dear Colleague,    Thank you for referring your patient, Jordan Barnett, to the [facility]. Please see a copy of my visit note below.    Sports Medicine Clinic Visit    PCP: Juan Antonio Flanagan    Jordan Barnett is a 57 year old male who is seen  in consultation at the request of Dr. Flanagan presenting with left knee pain.    Injury: Patient reports an injury 5-6 weeks ago.  He had a fall, doesn't remember if he landed on knee or what exactly happened.  Swelling has resolved but has had an onset of pain and \"not feeling right.\"  Was on crutches for a couple days.  Pain over knee cap with standing for a long period of time.    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    Patient's current problem list, past medical and surgical history, and family history were reviewed.    Patient Active Problem List   Diagnosis     Severe bipolar I disorder, most recent episode mixed, with psychotic features (H)     Sebaceous cyst     CKD (chronic kidney disease) stage 3, GFR 30-59 ml/min     " "Schizoaffective disorder (H)     Hyperlipidemia LDL goal <160     Tubular adenoma of colon     Former smoker     Hypertension goal BP (blood pressure) < 140/90     Former smoker, stopped smoking in distant past     Past Medical History:   Diagnosis Date     Depressive disorder, not elsewhere classified     Manic      Unspecified schizophrenia, unspecified condition      Past Surgical History:   Procedure Laterality Date     COLONOSCOPY      normal. has fam hx colon cancer     COLONOSCOPY N/A 10/13/2014    Procedure: COLONOSCOPY;  Surgeon: Santy Constantino MD;  Location: [facility]     HERNIA REPAIR, INGUINAL RT/LT  02/12/2004     SURGICAL HISTORY OF -       Incised & Drained - ? Perirectal Abscess      Family History   Problem Relation Age of Onset     Thyroid Disease Mother      Coronary Artery Disease Mother      pacemaker     CANCER Paternal Grandfather      CANCER Sister      Cancer - colorectal Sister      MENTAL ILLNESS Nephew      committed suicide at age 27         Objective  /75  Pulse 80  Ht 5' 10.5\" (1.791 m)  Wt 159 lb 9.6 oz (72.4 kg)  BMI 22.58 kg/m2    GENERAL APPEARANCE: healthy, alert and no distress   GAIT: NORMAL  SKIN: no suspicious lesions or rashes  HEENT: Sclera clear, anicteric  CV: no lower extremity edema, good peripheral pulses  RESP: Breathing not labored  NEURO: Normal strength and tone, mentation intact and speech normal  PSYCH:  mentation appears normal and affect normal/bright    Bilateral Knee exam    Inspection:      Mild anterior knee swelling left    Patella:      Mobility -       hypomobile left       Crepitus noted in the patellofemoral joint left        positive (+) compression test left    Tender:      Mild directly over knee cap    Non Tender:      remainder of knee area left    Knee ROM:      Full extension, mildly decreased flexion    Strength:      5-/5 with knee extension left  - SLR intact    Special Tests:     neg (-) Trena left       neg (-) Lachmans left    "    neg (-) anterior drawer left       neg (-) posterior drawer left       neg (-) varus at 0 deg and 30 deg left       neg (-) valgus at 0 deg and 30 deg left    Gait:      normal    Neurovascular:      2+ peripheral pulses bilaterally and brisk capillary refill       sensation grossly intact    Radiology  I ordered, visualized and reviewed these images with the patient  XR KNEE STANDING AP BILAT SUNRISE BILAT LAT LT   1/5/2018 3:25 PM   HISTORY: ; Injury of left knee, initial encounter  COMPARISON: None.  IMPRESSION: There is a longitudinal fracture of the left patella. No  significant displacement. Small knee joint effusion appears to be  present.  IMPRESSION: Fractured left patella.    Assessment:  1. Other closed fracture of left patella, initial encounter      Non-displaced, however, will obtain CT to evaluate for joint involvement and healing. Hinged brace and work restrictions in the interim.  Follow up after CT.    Plan:  - Today's Plan of Care:  CT of the left knee  Work Restrictions - letter provided  Medical Equipment: hinged knee brace    Concerning signs and symptoms were reviewed.  The patient expressed understanding of this management plan and all questions were answered at this time.    Thanks for the opportunity to participate in the care of this patient, I will keep you updated on their progress.    CC: Dr. Masha Goetz MD [place]  Primary Care Sports Medicine  [facility]      Again, thank you for allowing me to participate in the care of your patient.        Sincerely,        Lucita Goetz MD

## 2018-01-11 ENCOUNTER — HOSPITAL ENCOUNTER (OUTPATIENT)
Dept: CT IMAGING | Facility: CLINIC | Age: 58
Discharge: HOME | End: 2018-01-11
Attending: PEDIATRICS | Admitting: PEDIATRICS
Payer: OTHER MISCELLANEOUS

## 2018-01-11 DIAGNOSIS — S82.092A OTHER CLOSED FRACTURE OF LEFT PATELLA, INITIAL ENCOUNTER: ICD-10-CM

## 2018-01-11 PROCEDURE — 73700 CT LOWER EXTREMITY W/O DYE: CPT | Mod: LT

## 2018-01-24 ENCOUNTER — OFFICE VISIT (OUTPATIENT)
Dept: ORTHOPEDICS | Facility: CLINIC | Age: 58
End: 2018-01-24
Payer: OTHER MISCELLANEOUS

## 2018-01-24 VITALS
WEIGHT: 160 LBS | BODY MASS INDEX: 22.4 KG/M2 | SYSTOLIC BLOOD PRESSURE: 125 MMHG | DIASTOLIC BLOOD PRESSURE: 85 MMHG | HEIGHT: 71 IN

## 2018-01-24 DIAGNOSIS — S82.092D OTHER CLOSED FRACTURE OF LEFT PATELLA WITH ROUTINE HEALING, SUBSEQUENT ENCOUNTER: Primary | ICD-10-CM

## 2018-01-24 PROCEDURE — 99213 OFFICE O/P EST LOW 20 MIN: CPT | Performed by: PEDIATRICS

## 2018-01-24 NOTE — MR AVS SNAPSHOT
"              After Visit Summary   1/24/2018    Jordan Barnett    MRN: [id number]           Patient Information     Date Of Birth          1960        Visit Information        Provider Department      1/24/2018 8:40 AM Lucita Goetz MD [facility]        Care Instructions    Plan:  - Today's Plan of Care:  Work Restrictions    Follow Up: 1 month in clinic for re-xray              Follow-ups after your visit        Who to contact     If you have questions or need follow up information about today's clinic visit or your schedule please contact [facility] directly at [phone].  Normal or non-critical lab and imaging results will be communicated to you by Tapuloushart, letter or phone within 4 business days after the clinic has received the results. If you do not hear from us within 7 days, please contact the clinic through Tapuloushart or phone. If you have a critical or abnormal lab result, we will notify you by phone as soon as possible.  Submit refill requests through [employer]ac or call your pharmacy and they will forward the refill request to us. Please allow 3 business days for your refill to be completed.          Additional Information About Your Visit        MyChart Information     [employer]ac gives you secure access to your electronic health record. If you see a primary care provider, you can also send messages to your care team and make appointments. If you have questions, please call your primary care clinic.  If you do not have a primary care provider, please call [phone] and they will assist you.        Care EveryWhere ID     This is your Care EveryWhere ID. This could be used by other organizations to access your [facility] medical records  [id number]        Your Vitals Were     Height BMI (Body Mass Index)                5' 10.5\" (1.791 m) 22.63 kg/m2           Blood Pressure from Last 3 Encounters:   01/24/18 125/85   01/05/18 116/75 "   12/13/17 122/73    Weight from Last 3 Encounters:   01/24/18 160 lb (72.6 kg)   01/05/18 159 lb 9.6 oz (72.4 kg)   12/13/17 159 lb 9.6 oz (72.4 kg)              Today, you had the following     No orders found for display       Primary Care Provider Office Phone # Fax #    Juan Antonio Chadd Flanagan [phone] [address] [facility] [address]        Equal Access to Services     SWAPNA REAL : Hadii aad ku hadasho Soomaali, waaxda luqadaha, qaybta kaalmada adeegyada, waxay torie haypeace salas . So [facility] [phone].    ATENCIÓN: Si habla español, tiene a eaton disposición servicios gratuitos de asistencia lingüística. Llame al [phone].    We comply with applicable federal civil rights laws and [place] laws. We do not discriminate on the basis of race, color, national origin, age, disability, sex, sexual orientation, or gender identity.            Thank you!     Thank you for choosing [facility]  for your care. Our goal is always to provide you with excellent care. Hearing back from our patients is one way we can continue to improve our services. Please take a few minutes to complete the written survey that you may receive in the mail after your visit with us. Thank you!             Your Updated Medication List - Protect others around you: Learn how to safely use, store and throw away your medicines at www.disposemymeds.org.          This list is accurate as of 1/24/18  9:17 AM.  Always use your most recent med list.                   Brand Name Dispense Instructions for use Diagnosis    GEODON 40 MG capsule   Generic drug:  ziprasidone      Take 2 capsules (80 mg) by mouth At Bedtime        lamoTRIgine 100 MG tablet    LaMICtal    60 tablet    1 tablet (100 mg) 2 times daily        losartan 100 MG tablet    COZAAR    90 tablet    Take 1 tablet (100 mg) by mouth daily    Essential hypertension with goal blood pressure less  than 140/90, CKD (chronic kidney disease) stage 3, GFR 30-59 ml/min       MULTIVITAMIN ADULT PO           order for DME     1 Device    Equipment being ordered: hinged knee brace    Other closed fracture of left patella, initial encounter       triamcinolone 0.1 % ointment    KENALOG    30 g    Apply sparingly to affected area two times daily for 14 days.    Eczema, unspecified type       TYLENOL 500 MG tablet   Generic drug:  acetaminophen      Take 1-2 tablets by mouth every 6 hours as needed.

## 2018-01-24 NOTE — PROGRESS NOTES
Sports Medicine Clinic Visit - Interim History January 24, 2018      PCP: Juan Antonio Flanagan    Jordan Barnett is a 57 year old male who is seen in f/u up for Other closed fracture of left patella with routine healing, subsequent encounter. Since last visit on 1/5/18, patient reports he has not been kneeling per restrictions and the only thing that is bothersome is sitting for a long period of time.  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    Patient's current problem list, past medical and surgical history, and family history were reviewed.    Patient Active Problem List   Diagnosis     Severe bipolar I disorder, most recent episode mixed, with psychotic features (H)     Sebaceous cyst     CKD (chronic kidney disease) stage 3, GFR 30-59 ml/min     Schizoaffective disorder (H)     Hyperlipidemia LDL goal <160     Tubular adenoma of colon     Former smoker     Hypertension goal BP (blood pressure) < 140/90     Former smoker, stopped smoking in distant past     Past Medical History:   Diagnosis Date     Depressive disorder, not elsewhere classified     Manic      Unspecified schizophrenia, unspecified condition      Past Surgical History:   Procedure Laterality Date     COLONOSCOPY      normal. has fam hx colon cancer     COLONOSCOPY N/A 10/13/2014    Procedure: COLONOSCOPY;  Surgeon: Santy Constantino MD;  Location: [facility]     HERNIA REPAIR, INGUINAL RT/LT  02/12/2004     SURGICAL HISTORY OF -       Incised & Drained - ? Perirectal Abscess      Family History   Problem Relation Age of Onset     Thyroid Disease Mother      Coronary Artery Disease Mother      pacemaker     CANCER Paternal Grandfather      CANCER Sister   "    Cancer - colorectal Sister      MENTAL ILLNESS Nephew      committed suicide at age 27       Objective  /85 (BP Location: Left arm, Patient Position: Sitting, Cuff Size: Adult Regular)  Ht 5' 10.5\" (1.791 m)  Wt 160 lb (72.6 kg)  BMI 22.63 kg/m2    GENERAL APPEARANCE: healthy, alert and no distress   GAIT: NORMAL  SKIN: no suspicious lesions or rashes  HEENT: Sclera clear, anicteric  CV: good peripheral pulses  RESP: Breathing not labored  NEURO: Normal strength and tone, mentation intact and speech normal  PSYCH:  mentation appears normal and affect normal/bright    Bilateral Knee exam  Inspection:      Mild anterior knee swelling left - improved     Patella:      Mobility -       hypomobile left       Crepitus noted in the patellofemoral joint left        positive (+) compression test left     Tender:      Mild directly over knee cap - improved     Non Tender:      remainder of knee area left     Knee ROM:      Full extension, mildly decreased flexion     Strength:      5-/5 with knee extension left  - SLR intact     Special Tests:     neg (-) Trena left       neg (-) Lachmans left       neg (-) anterior drawer left       neg (-) posterior drawer left       neg (-) varus at 0 deg and 30 deg left       neg (-) valgus at 0 deg and 30 deg left     Gait:      normal     Neurovascular:      2+ peripheral pulses bilaterally and brisk capillary refill       sensation grossly intact    Radiology  I ordered, visualized and reviewed these images with the patient    Recent Results (from the past 744 hour(s))   XR Knee Standing AP Bilat Clarks Bilat Lat Left    Narrative    XR KNEE STANDING AP BILAT SUNRISE BILAT LAT LT   1/5/2018 3:25 PM     HISTORY: ; Injury of left knee, initial encounter    COMPARISON: None.      Impression    IMPRESSION: There is a longitudinal fracture of the left patella. No  significant displacement. Small knee joint effusion appears to be  present.    IMPRESSION: Fractured left " patella.    PITA PENA MD   CT Lower Extremity Left w/o Contrast    Narrative    CT LOWER EXTREMITY (KNEE) LEFT WITHOUT CONTRAST 1/11/2018 3:12 PM     HISTORY: Evaluate patellar fracture for displacement. Other closed  fracture of left patella, initial encounter.     TECHNIQUE: Axial images with reconstructions. Radiation dose for this  scan was reduced using automated exposure control, adjustment of the  mA and/or kV according to patient size, or iterative reconstruction  technique.     FINDINGS: Anterior soft tissue swelling. Trochlear subchondral cystic  change presumably related to chondromalacia. There is a small  intra-articular spur of the trochlea and medial femoral condyle.      Impression    IMPRESSION: There is a vertically oriented fracture of the lateral  patella, with 3 mm of maximal distraction. Inferiorly, there is 2 mm  of articular surface depression.    JOAN PEDROZA MD         Assessment:  1. Other closed fracture of left patella with routine healing, subsequent encounter      Clinically doing well, no healing evident on CT.  We discussed continued bracing and work restrictions, follow up in 1 month.    Plan:  - Today's Plan of Care:  Work Restrictions    Follow Up: 1 month in clinic for re-xray    Concerning signs and symptoms were reviewed.  The patient expressed understanding of this management plan and all questions were answered at this time.    Lucita Goetz MD CA  Primary Care Sports Medicine  [facility]

## 2018-02-21 ENCOUNTER — OFFICE VISIT (OUTPATIENT)
Dept: ORTHOPEDICS | Facility: CLINIC | Age: 58
End: 2018-02-21
Payer: OTHER MISCELLANEOUS

## 2018-02-21 ENCOUNTER — RADIANT APPOINTMENT (OUTPATIENT)
Dept: GENERAL RADIOLOGY | Facility: CLINIC | Age: 58
End: 2018-02-21
Attending: PEDIATRICS
Payer: OTHER MISCELLANEOUS

## 2018-02-21 VITALS
SYSTOLIC BLOOD PRESSURE: 128 MMHG | BODY MASS INDEX: 22.4 KG/M2 | WEIGHT: 160 LBS | DIASTOLIC BLOOD PRESSURE: 84 MMHG | HEIGHT: 71 IN

## 2018-02-21 DIAGNOSIS — S82.092D OTHER FRACTURE OF LEFT PATELLA, SUBSEQUENT ENCOUNTER FOR CLOSED FRACTURE WITH ROUTINE HEALING: Primary | ICD-10-CM

## 2018-02-21 DIAGNOSIS — S82.092D OTHER FRACTURE OF LEFT PATELLA, SUBSEQUENT ENCOUNTER FOR CLOSED FRACTURE WITH ROUTINE HEALING: ICD-10-CM

## 2018-02-21 PROCEDURE — 99213 OFFICE O/P EST LOW 20 MIN: CPT | Performed by: PEDIATRICS

## 2018-02-21 PROCEDURE — 73562 X-RAY EXAM OF KNEE 3: CPT | Mod: LT

## 2018-02-21 NOTE — LETTER
[facility]  [address]  [address]  [facility] [place] [facility] [address]  Phone: [phone]  Fax: [phone]      REPORT OF WORK ABILITY      NOTE TO EMPLOYEE: You must promptly provide a copy of this report to your  employer or worker's compensation insurer, and Qualified Rehabilitation Consultant.      Date: 2/21/2018                     Employee Name: Jordan Barnett         YOB: 1960  Medical Record Number: [id number]   Soc.Sec.No: [id number]  Employer: None                Date of Injury: 11/27/2018  Managed Care Organization / Insurance Company Name: UNKNOWN      Diagnosis: Left Knee Patellar fracture  Work Related: yes     MMI: NO   Permanent Partial Disability(PPD) likely: UNKNOWN      EMPLOYEE IS ABLE TO WORK: Return to work with restrictions on 2/22/2018      RESTRICTIONS IF ANY:      Stand:  Continuously (6-8 hours)  Sit:  Continuously (6-8 hours)  Walk: Continuously (6-8 hours)  Kneel/Homestead:  minimal  Ladder/Stair climb:  minimal      OTHER RESTRICTIONS: None      TREATMENT PLAN/NOTES: Follow-up in clinic in 6-8 weeks for repeat x-ray      Lucita Goetz MD

## 2018-02-21 NOTE — MR AVS SNAPSHOT
After Visit Summary   2/21/2018    Jordan Barnett    MRN: [id number]           Patient Information     Date Of Birth          1960        Visit Information        Provider Department      2/21/2018 9:00 AM Lucita Goetz MD [facility]        Today's Diagnoses     Other fracture of left patella, subsequent encounter for closed fracture with routine healing    -  1      Care Instructions    Plan:  - Today's Plan of Care:  Work Restrictions--continue same restrictions    -We also discussed other future treatment options:  Rehab: Physical Therapy    Follow Up: 6 - 8 weeks              Follow-ups after your visit        Who to contact     If you have questions or need follow up information about today's clinic visit or your schedule please contact [facility] directly at [phone].  Normal or non-critical lab and imaging results will be communicated to you by SunLinkhart, letter or phone within 4 business days after the clinic has received the results. If you do not hear from us within 7 days, please contact the clinic through SunLinkhart or phone. If you have a critical or abnormal lab result, we will notify you by phone as soon as possible.  Submit refill requests through [employer] or call your pharmacy and they will forward the refill request to us. Please allow 3 business days for your refill to be completed.          Additional Information About Your Visit        MyChart Information     [employer] gives you secure access to your electronic health record. If you see a primary care provider, you can also send messages to your care team and make appointments. If you have questions, please call your primary care clinic.  If you do not have a primary care provider, please call [phone] and they will assist you.        Care EveryWhere ID     This is your Care EveryWhere ID. This could be used by other organizations to access your [facility]  "medical records  [id number]        Your Vitals Were     Height BMI (Body Mass Index)                5' 10.5\" (1.791 m) 22.63 kg/m2           Blood Pressure from Last 3 Encounters:   02/21/18 128/84   01/24/18 125/85   01/05/18 116/75    Weight from Last 3 Encounters:   02/21/18 160 lb (72.6 kg)   01/24/18 160 lb (72.6 kg)   01/05/18 159 lb 9.6 oz (72.4 kg)               Primary Care Provider Office Phone # Fax #    Juan Antonio Chadd Flanagan [phone] [address] [address]        Equal Access to Services     SWAPNA REAL : Alyssa westbrooko Sovanessa, waaxda luqadaha, qaybta kaalmada adeegyada, shelly salas . So [facility] [phone].    ATENCIÓN: Si habla español, tiene a eaton disposición servicios gratuitos de asistencia lingüística. Ll[facility] [phone].    We comply with applicable federal civil rights laws and [place] laws. We do not discriminate on the basis of race, color, national origin, age, disability, sex, sexual orientation, or gender identity.            Thank you!     Thank you for choosing [facility]  for your care. Our goal is always to provide you with excellent care. Hearing back from our patients is one way we can continue to improve our services. Please take a few minutes to complete the written survey that you may receive in the mail after your visit with us. Thank you!             Your Updated Medication List - Protect others around you: Learn how to safely use, store and throw away your medicines at www.disposemymeds.org.          This list is accurate as of 2/21/18  9:31 AM.  Always use your most recent med list.                   Brand Name Dispense Instructions for use Diagnosis    GEODON 40 MG capsule   Generic drug:  ziprasidone      Take 2 capsules (80 mg) by mouth At Bedtime        lamoTRIgine 100 MG tablet    LaMICtal    60 tablet    1 tablet (100 mg) 2 times daily        " losartan 100 MG tablet    COZAAR    90 tablet    Take 1 tablet (100 mg) by mouth daily    Essential hypertension with goal blood pressure less than 140/90, CKD (chronic kidney disease) stage 3, GFR 30-59 ml/min       MULTIVITAMIN ADULT PO           order for DME     1 Device    Equipment being ordered: hinged knee brace    Other closed fracture of left patella, initial encounter       triamcinolone 0.1 % ointment    KENALOG    30 g    Apply sparingly to affected area two times daily for 14 days.    Eczema, unspecified type       TYLENOL 500 MG tablet   Generic drug:  acetaminophen      Take 1-2 tablets by mouth every 6 hours as needed.

## 2018-02-21 NOTE — LETTER
2/21/2018         RE: Jordan Barnett  [address]  [facility] [address]        Dear Colleague,    Thank you for referring your patient, Jordan Barnett, to the [facility]. Please see a copy of my visit note below.    Sports Medicine Clinic Visit - Interim History February 21, 2018      PCP: Juan Atnonio Flanagan    Jordan Barnett is a 57 year old male who is seen in f/u up for Other fracture of left patella, subsequent encounter for closed fracture with routine healing. Since last visit on 1/24/18, patient has been doing well and working without problems other than not being able to kneel or squat.  Still wearing brace at work because it helps his stability.  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    Patient's current problem list, past medical and surgical history, and family history were reviewed.    Patient Active Problem List   Diagnosis     Severe bipolar I disorder, most recent episode mixed, with psychotic features (H)     Sebaceous cyst     CKD (chronic kidney disease) stage 3, GFR 30-59 ml/min     Schizoaffective disorder (H)     Hyperlipidemia LDL goal <160     Tubular adenoma of colon     Former smoker     Hypertension goal BP (blood pressure) < 140/90     Former smoker, stopped smoking in distant past     Past Medical History:   Diagnosis Date     Depressive disorder, not elsewhere classified     Manic      Unspecified schizophrenia, unspecified condition      Past Surgical History:   Procedure Laterality Date     COLONOSCOPY      normal. has fam hx colon cancer     COLONOSCOPY N/A 10/13/2014    Procedure: COLONOSCOPY;  Surgeon: Santy Constantino MD;  [facility]  "    HERNIA REPAIR, INGUINAL RT/LT  02/12/2004     SURGICAL HISTORY OF -       Incised & Drained - ? Perirectal Abscess      Family History   Problem Relation Age of Onset     Thyroid Disease Mother      Coronary Artery Disease Mother      pacemaker     CANCER Paternal Grandfather      CANCER Sister      Cancer - colorectal Sister      MENTAL ILLNESS Nephew      committed suicide at age 27       Objective  /84 (BP Location: Left arm, Patient Position: Sitting, Cuff Size: Adult Regular)  Ht 5' 10.5\" (1.791 m)  Wt 160 lb (72.6 kg)  BMI 22.63 kg/m2    GENERAL APPEARANCE: healthy, alert and no distress   GAIT: NORMAL  SKIN: no suspicious lesions or rashes  HEENT: Sclera clear, anicteric  CV: good peripheral pulses  RESP: Breathing not labored  NEURO: Normal strength and tone, mentation intact and speech normal  PSYCH:  mentation appears normal and affect normal/bright    Bilateral Knee exam  Inspection:      no swelling      Patella:      Mobility -       hypomobile left       Crepitus noted in the patellofemoral joint left        neg (-) compression test left      Tender:      none currently      Non Tender:      remainder of knee area left - including knee cap      Knee ROM:      Full extension, mildly decreased flexion      Strength:      5/5 with knee extension left  - SLR intact      Special Tests:     neg (-) Trena left       neg (-) Lachmans left       neg (-) anterior drawer left       neg (-) posterior drawer left       neg (-) varus at 0 deg and 30 deg left       neg (-) valgus at 0 deg and 30 deg left      Gait:      normal      Neurovascular:      2+ peripheral pulses bilaterally and brisk capillary refill       sensation grossly intact    Radiology  I ordered, visualized and reviewed these images with the patient  LEFT KNEE THREE VIEWS  2/21/2018 9:13 AM      HISTORY:  Other fracture of left patella, subsequent encounter for  closed fracture with routine healing.         IMPRESSION: Sagittally " oriented fracture in the lateral third of the  left patella, the alignment and appearance unchanged from 1/5/2018.  Lateral radiograph is indeterminate for small joint effusion.  Medial/lateral/patellofemoral compartment joint space widths appear  within normal limits.    Assessment:  1. Other fracture of left patella, subsequent encounter for closed fracture with routine healing      Healing fracture.  Would continue to limit kneeling and deep bending.  Follow up in 2 months, could consider PT if needed.    Plan:  - Today's Plan of Care:  Work Restrictions--continue same restrictions    -We also discussed other future treatment options:  Rehab: Physical Therapy    Follow Up: 6 - 8 weeks    Concerning signs and symptoms were reviewed.  The patient expressed understanding of this management plan and all questions were answered at this time.    Lucita Goetz MD CA  Primary Care Sports Medicine  [facility]      Again, thank you for allowing me to participate in the care of your patient.        Sincerely,        Lucita Goetz MD

## 2018-02-21 NOTE — PATIENT INSTRUCTIONS
Plan:  - Today's Plan of Care:  Work Restrictions--continue same restrictions    -We also discussed other future treatment options:  Rehab: Physical Therapy    Follow Up: 6 - 8 weeks

## 2018-02-21 NOTE — PROGRESS NOTES
Sports Medicine Clinic Visit - Interim History February 21, 2018      PCP: Juan Antonio Flanagan    Jordan Barnett is a 57 year old male who is seen in f/u up for Other fracture of left patella, subsequent encounter for closed fracture with routine healing. Since last visit on 1/24/18, patient has been doing well and working without problems other than not being able to kneel or squat.  Still wearing brace at work because it helps his stability.  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    Patient's current problem list, past medical and surgical history, and family history were reviewed.    Patient Active Problem List   Diagnosis     Severe bipolar I disorder, most recent episode mixed, with psychotic features (H)     Sebaceous cyst     CKD (chronic kidney disease) stage 3, GFR 30-59 ml/min     Schizoaffective disorder (H)     Hyperlipidemia LDL goal <160     Tubular adenoma of colon     Former smoker     Hypertension goal BP (blood pressure) < 140/90     Former smoker, stopped smoking in distant past     Past Medical History:   Diagnosis Date     Depressive disorder, not elsewhere classified     Manic      Unspecified schizophrenia, unspecified condition      Past Surgical History:   Procedure Laterality Date     COLONOSCOPY      normal. has fam hx colon cancer     COLONOSCOPY N/A 10/13/2014    Procedure: COLONOSCOPY;  Surgeon: Santy Constantino MD;  Location: [facility]     HERNIA REPAIR, INGUINAL RT/LT  02/12/2004     SURGICAL HISTORY OF -       Incised & Drained - ? Perirectal Abscess      Family History   Problem Relation Age of Onset     Thyroid Disease Mother      Coronary Artery Disease Mother      pacemaker     CANCER  "Paternal Grandfather      CANCER Sister      Cancer - colorectal Sister      MENTAL ILLNESS Nephew      committed suicide at age 27       Objective  /84 (BP Location: Left arm, Patient Position: Sitting, Cuff Size: Adult Regular)  Ht 5' 10.5\" (1.791 m)  Wt 160 lb (72.6 kg)  BMI 22.63 kg/m2    GENERAL APPEARANCE: healthy, alert and no distress   GAIT: NORMAL  SKIN: no suspicious lesions or rashes  HEENT: Sclera clear, anicteric  CV: good peripheral pulses  RESP: Breathing not labored  NEURO: Normal strength and tone, mentation intact and speech normal  PSYCH:  mentation appears normal and affect normal/bright    Bilateral Knee exam  Inspection:      no swelling      Patella:      Mobility -       hypomobile left       Crepitus noted in the patellofemoral joint left        neg (-) compression test left      Tender:      none currently      Non Tender:      remainder of knee area left - including knee cap      Knee ROM:      Full extension, mildly decreased flexion      Strength:      5/5 with knee extension left  - SLR intact      Special Tests:     neg (-) Trena left       neg (-) Lachmans left       neg (-) anterior drawer left       neg (-) posterior drawer left       neg (-) varus at 0 deg and 30 deg left       neg (-) valgus at 0 deg and 30 deg left      Gait:      normal      Neurovascular:      2+ peripheral pulses bilaterally and brisk capillary refill       sensation grossly intact    Radiology  I ordered, visualized and reviewed these images with the patient  LEFT KNEE THREE VIEWS  2/21/2018 9:13 AM      HISTORY:  Other fracture of left patella, subsequent encounter for  closed fracture with routine healing.         IMPRESSION: Sagittally oriented fracture in the lateral third of the  left patella, the alignment and appearance unchanged from 1/5/2018.  Lateral radiograph is indeterminate for small joint effusion.  Medial/lateral/patellofemoral compartment joint space widths appear  within normal " limits.    Assessment:  1. Other fracture of left patella, subsequent encounter for closed fracture with routine healing      Healing fracture.  Would continue to limit kneeling and deep bending.  Follow up in 2 months, could consider PT if needed.    Plan:  - Today's Plan of Care:  Work Restrictions--continue same restrictions    -We also discussed other future treatment options:  Rehab: Physical Therapy    Follow Up: 6 - 8 weeks    Concerning signs and symptoms were reviewed.  The patient expressed understanding of this management plan and all questions were answered at this time.    Lucita Goetz MD CAQ  Primary Care Sports Medicine  [facility]

## 2018-04-04 ENCOUNTER — RADIANT APPOINTMENT (OUTPATIENT)
Dept: GENERAL RADIOLOGY | Facility: CLINIC | Age: 58
End: 2018-04-04
Attending: PEDIATRICS
Payer: OTHER MISCELLANEOUS

## 2018-04-04 ENCOUNTER — OFFICE VISIT (OUTPATIENT)
Dept: ORTHOPEDICS | Facility: CLINIC | Age: 58
End: 2018-04-04
Payer: OTHER MISCELLANEOUS

## 2018-04-04 VITALS
DIASTOLIC BLOOD PRESSURE: 80 MMHG | SYSTOLIC BLOOD PRESSURE: 130 MMHG | BODY MASS INDEX: 21.91 KG/M2 | WEIGHT: 156.5 LBS | HEIGHT: 71 IN

## 2018-04-04 DIAGNOSIS — S82.092D OTHER FRACTURE OF LEFT PATELLA, SUBSEQUENT ENCOUNTER FOR CLOSED FRACTURE WITH ROUTINE HEALING: Primary | ICD-10-CM

## 2018-04-04 DIAGNOSIS — S82.092D OTHER FRACTURE OF LEFT PATELLA, SUBSEQUENT ENCOUNTER FOR CLOSED FRACTURE WITH ROUTINE HEALING: ICD-10-CM

## 2018-04-04 PROCEDURE — 99213 OFFICE O/P EST LOW 20 MIN: CPT | Performed by: PEDIATRICS

## 2018-04-04 PROCEDURE — 73562 X-RAY EXAM OF KNEE 3: CPT | Mod: LT

## 2018-04-04 NOTE — LETTER
[facility]  [address]  [address]  [facility] [address]  Phone: [phone]  Fax: [phone]      REPORT OF WORK ABILITY    NOTE TO EMPLOYEE: You must promptly provide a copy of this report to your  employer or worker's compensation insurer, and Qualified Rehabilitation Consultant.    Date: 4/4/2018                     Employee Name: Jordan Barnett         YOB: 1960  Medical Record Number: [id number]   Soc.Sec.No: [id number]  Employer: None                Date of Injury: 11/27/2018  Managed Care Organization / Insurance Company Name: UNKNOWN      Diagnosis: Left Knee Patellar fracture  Work Related: yes     MMI: NO   Permanent Partial Disability(PPD) likely: UNKNOWN      EMPLOYEE IS ABLE TO WORK: Return to work with restrictions on 4/6/2018      RESTRICTIONS IF ANY:      Stand:  Continuously (6-8 hours)  Sit:  Continuously (6-8 hours)  Walk: Continuously (6-8 hours)  Kneel/[place]:  minimal  Ladder/Stair climb:  minimal      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      TREATMENT PLAN/NOTES: Follow-up in clinic in 6-8 weeks.  Start PT.        Lucita Goetz MD

## 2018-04-04 NOTE — PATIENT INSTRUCTIONS
Plan:  - Today's Plan of Care:  Work Restrictions  Rehab: Physical Therapy[facility] - [phone]    Follow Up: 6 - 8 weeks    If you have any further questions for your physician or physician s care team you can call [phone] and use option 3 to leave a voice message. Calls received during business hours will be returned same day.

## 2018-04-04 NOTE — MR AVS SNAPSHOT
After Visit Summary   4/4/2018    Jordan Barnett    MRN: [id number]           Patient Information     Date Of Birth          1960        Visit Information        Provider Department      4/4/2018 9:20 AM Lucita Goetz MD [facility]        Today's Diagnoses     Other fracture of left patella, subsequent encounter for closed fracture with routine healing    -  1      Care Instructions    Plan:  - Today's Plan of Care:  Work Restrictions  Rehab: Physical Therapy: [facility] - [phone]    Follow Up: 6 - 8 weeks    If you have any further questions for your physician or physician s care team you can call [phone] and use option 3 to leave a voice message. Calls received during business hours will be returned same day.            Follow-ups after your visit        Additional Services     PHYSICAL THERAPY REFERRAL       *This therapy referral will be filtered to a centralized scheduling office at [facility] and the patient will receive a call to schedule an appointment at a [facility] location most convenient for them. *     [facility] provides Physical Therapy evaluation and treatment and many specialty services across the [facility] system.  If requesting a specialty program, please choose from the list below.    If you have not heard from the scheduling office within 2 business days, please call [phone] for all locations, with the exception of [place], please call [phone] and [facility], please call [phone]  Treatment: Evaluation & Treatment  Special Instructions/Modalities: None  Special Programs: None    Please be aware that coverage of these services is subject to the terms and limitations of your health insurance plan.  Call member services at your health plan with any benefit or coverage questions.      **Note to Provider:  If you are referring outside of [facility] for the therapy  "appointment, please list the name of the location in the \"special instructions\" above, print the referral and give to the patient to schedule the appointment.                  Who to contact     If you have questions or need follow up information about today's clinic visit or your schedule please contact [facility] directly at [phone].  Normal or non-critical lab and imaging results will be communicated to you by MyChart, letter or phone within 4 business days after the clinic has received the results. If you do not hear from us within 7 days, please contact the clinic through [employer] or phone. If you have a critical or abnormal lab result, we will notify you by phone as soon as possible.  Submit refill requests through [employer] or call your pharmacy and they will forward the refill request to us. Please allow 3 business days for your refill to be completed.          Additional Information About Your Visit        [employer] Information     [employer] gives you secure access to your electronic health record. If you see a primary care provider, you can also send messages to your care team and make appointments. If you have questions, please call your primary care clinic.  If you do not have a primary care provider, please call [phone] and they will assist you.        Care EveryWhere ID     This is your Care EveryWhere ID. This could be used by other organizations to access your [facility] medical records  [id number]        Your Vitals Were     Height BMI (Body Mass Index)                5' 10.5\" (1.791 m) 22.14 kg/m2           Blood Pressure from Last 3 Encounters:   04/04/18 130/80   02/21/18 128/84   01/24/18 125/85    Weight from Last 3 Encounters:   04/04/18 156 lb 8 oz (71 kg)   02/21/18 160 lb (72.6 kg)   01/24/18 160 lb (72.6 kg)              We Performed the Following     PHYSICAL THERAPY REFERRAL        Primary Care Provider Office Phone # Fax #    Juan Antonio Flanagan, " [phone]       [address] [address]        Equal Access to Services     SWAPNA REAL : Hadii aad ku hadelvasanaz Lissettvanessa, waspenserda leathaleftyha, alecta kasamyda gagandenisse, shelly kathrinein hayaafederica farahcande lopezsymone [place]. So [facility] [phone].    ATENCIÓN: Si habla español, tiene a eaton disposición servicios gratuitos de asistencia lingüística. Llame al [phone].    We comply with applicable federal civil rights laws and [place] laws. We do not discriminate on the basis of race, color, national origin, age, disability, sex, sexual orientation, or gender identity.            Thank you!     Thank you for choosing [facility]  for your care. Our goal is always to provide you with excellent care. Hearing back from our patients is one way we can continue to improve our services. Please take a few minutes to complete the written survey that you may receive in the mail after your visit with us. Thank you!             Your Updated Medication List - Protect others around you: Learn how to safely use, store and throw away your medicines at www.disposemymeds.org.          This list is accurate as of 4/4/18 10:00 AM.  Always use your most recent med list.                   Brand Name Dispense Instructions for use Diagnosis    GEODON 40 MG capsule   Generic drug:  ziprasidone      Take 2 capsules (80 mg) by mouth At Bedtime        lamoTRIgine 100 MG tablet    LaMICtal    60 tablet    1 tablet (100 mg) 2 times daily        losartan 100 MG tablet    COZAAR    90 tablet    Take 1 tablet (100 mg) by mouth daily    Essential hypertension with goal blood pressure less than 140/90, CKD (chronic kidney disease) stage 3, GFR 30-59 ml/min       MULTIVITAMIN ADULT PO           order for DME     1 Device    Equipment being ordered: hinged knee brace    Other closed fracture of left patella, initial encounter       triamcinolone 0.1 % ointment    KENALOG    30 g    Apply  sparingly to affected area two times daily for 14 days.    Eczema, unspecified type       TYLENOL 500 MG tablet   Generic drug:  acetaminophen      Take 1-2 tablets by mouth every 6 hours as needed.

## 2018-04-04 NOTE — PROGRESS NOTES
Sports Medicine Clinic Visit - Interim History April 4, 2018      PCP: Juan Antonio Flanagan    Abran Anibal is a 57 year old male who is seen in f/u up for Other fracture of left patella, subsequent encounter for closed fracture with routine healing. Since last visit on 2/21/18, patient has been doing well overall. 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  Some instability and weakness.  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    Patient's current problem list, past medical and surgical history, and family history were reviewed.    Patient Active Problem List   Diagnosis     Severe bipolar I disorder, most recent episode mixed, with psychotic features (H)     Sebaceous cyst     CKD (chronic kidney disease) stage 3, GFR 30-59 ml/min     Schizoaffective disorder (H)     Hyperlipidemia LDL goal <160     Tubular adenoma of colon     Former smoker     Hypertension goal BP (blood pressure) < 140/90     Former smoker, stopped smoking in distant past     Past Medical History:   Diagnosis Date     Depressive disorder, not elsewhere classified     Manic      Unspecified schizophrenia, unspecified condition      Past Surgical History:   Procedure Laterality Date     COLONOSCOPY      normal. has fam hx colon cancer     COLONOSCOPY N/A 10/13/2014    Procedure: COLONOSCOPY;  Surgeon: Santy Constantino MD;  Location: [facility]     HERNIA REPAIR, INGUINAL RT/LT  02/12/2004     SURGICAL HISTORY OF -       Incised & Drained - ? Perirectal Abscess      Family History   Problem Relation Age of Onset     Thyroid Disease Mother      Coronary Artery Disease Mother      pacemaker     CANCER Paternal Grandfather   "    CANCER Sister      Cancer - colorectal Sister      MENTAL ILLNESS Nephew      committed suicide at age 27       Objective  /80 (BP Location: Right arm, Patient Position: Sitting, Cuff Size: Adult Regular)  Ht 5' 10.5\" (1.791 m)  Wt 156 lb 8 oz (71 kg)  BMI 22.14 kg/m2    GENERAL APPEARANCE: healthy, alert and no distress   GAIT: NORMAL  SKIN: no suspicious lesions or rashes  HEENT: Sclera clear, anicteric  CV: good peripheral pulses  RESP: Breathing not labored  NEURO: Normal strength and tone, mentation intact and speech normal  PSYCH:  mentation appears normal and affect normal/bright    Bilateral Knee exam  Inspection:      no swelling      Patella:      Mobility -       hypomobile left       Crepitus noted in the patellofemoral joint left        neg (-) compression test left      Tender:      none currently      Non Tender:      remainder of knee area left - including knee cap      Knee ROM:      Full extension, mildly decreased flexion      Strength:      5/5 with knee extension left  - SLR intact      Special Tests:     neg (-) Trena left       neg (-) Lachmans left       neg (-) anterior drawer left       neg (-) posterior drawer left       neg (-) varus at 0 deg and 30 deg left       neg (-) valgus at 0 deg and 30 deg left      Gait:      normal      Neurovascular:      2+ peripheral pulses bilaterally and brisk capillary refill       sensation grossly intact    Radiology  I ordered, visualized and reviewed these images with the patient  3 XR views of left knee reviewed: progressive healing at left patellar fracture, still visible  - will follow official read      Assessment:  1. Other fracture of left patella, subsequent encounter for closed fracture with routine healing      Healing fracture, continue work restrictions and start PT for instability and strength.  Would consider CT to evaluate healing if needed in the future.    Plan:  - Today's Plan of Care:  Work Restrictions  Rehab: " Physical Therapy: [facility] - [phone]    Follow Up: 6 - 8 weeks    Concerning signs and symptoms were reviewed.  The patient expressed understanding of this management plan and all questions were answered at this time.    Lucita Goetz MD CAQ  Primary Care Sports Medicine  [facility]

## 2018-04-04 NOTE — LETTER
4/4/2018         RE: Jordan Barnett  [address]  [facility] [address]        Dear Colleague,    Thank you for referring your patient, Jordan Barnett, to the [facility]. Please see a copy of my visit note below.    Sports Medicine Clinic Visit - Interim History April 4, 2018      PCP: Juan Antonio Flanagan    Jordan Barnett is a 57 year old male who is seen in f/u up for Other fracture of left patella, subsequent encounter for closed fracture with routine healing. Since last visit on 2/21/18, patient has been doing well overall. 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  Some instability and weakness.  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    Patient's current problem list, past medical and surgical history, and family history were reviewed.    Patient Active Problem List   Diagnosis     Severe bipolar I disorder, most recent episode mixed, with psychotic features (H)     Sebaceous cyst     CKD (chronic kidney disease) stage 3, GFR 30-59 ml/min     Schizoaffective disorder (H)     Hyperlipidemia LDL goal <160     Tubular adenoma of colon     Former smoker     Hypertension goal BP (blood pressure) < 140/90     Former smoker, stopped smoking in distant past     Past Medical History:   Diagnosis Date     Depressive disorder, not elsewhere classified     Manic      Unspecified schizophrenia, unspecified condition      Past Surgical History:   Procedure Laterality Date     COLONOSCOPY      normal. has fam hx colon cancer     COLONOSCOPY N/A 10/13/2014    Procedure: COLONOSCOPY;  Surgeon: Santy Constantino MD;  Location: [facility]     HERNIA  "REPAIR, INGUINAL RT/LT  02/12/2004     SURGICAL HISTORY OF -       Incised & Drained - ? Perirectal Abscess      Family History   Problem Relation Age of Onset     Thyroid Disease Mother      Coronary Artery Disease Mother      pacemaker     CANCER Paternal Grandfather      CANCER Sister      Cancer - colorectal Sister      MENTAL ILLNESS Nephew      committed suicide at age 27       Objective  /80 (BP Location: Right arm, Patient Position: Sitting, Cuff Size: Adult Regular)  Ht 5' 10.5\" (1.791 m)  Wt 156 lb 8 oz (71 kg)  BMI 22.14 kg/m2    GENERAL APPEARANCE: healthy, alert and no distress   GAIT: NORMAL  SKIN: no suspicious lesions or rashes  HEENT: Sclera clear, anicteric  CV: good peripheral pulses  RESP: Breathing not labored  NEURO: Normal strength and tone, mentation intact and speech normal  PSYCH:  mentation appears normal and affect normal/bright    Bilateral Knee exam  Inspection:      no swelling      Patella:      Mobility -       hypomobile left       Crepitus noted in the patellofemoral joint left        neg (-) compression test left      Tender:      none currently      Non Tender:      remainder of knee area left - including knee cap      Knee ROM:      Full extension, mildly decreased flexion      Strength:      5/5 with knee extension left  - SLR intact      Special Tests:     neg (-) Trena left       neg (-) Lachmans left       neg (-) anterior drawer left       neg (-) posterior drawer left       neg (-) varus at 0 deg and 30 deg left       neg (-) valgus at 0 deg and 30 deg left      Gait:      normal      Neurovascular:      2+ peripheral pulses bilaterally and brisk capillary refill       sensation grossly intact    Radiology  I ordered, visualized and reviewed these images with the patient  3 XR views of left knee reviewed: progressive healing at left patellar fracture, still visible  - will follow official read      Assessment:  1. Other fracture of left patella, subsequent " encounter for closed fracture with routine healing      Healing fracture, continue work restrictions and start PT for instability and strength.  Would consider CT to evaluate healing if needed in the future.    Plan:  - Today's Plan of Care:  Work Restrictions  [phone]    Follow Up: 6 - 8 weeks    Concerning signs and symptoms were reviewed.  The patient expressed understanding of this management plan and all questions were answered at this time.    Lucita Goetz MD CAQ  Primary Care Sports Medicine  [facility]    Again, thank you for allowing me to participate in the care of your patient.        Sincerely,        Lucita Goetz MD

## 2018-04-05 ENCOUNTER — HOSPITAL ENCOUNTER (OUTPATIENT)
Dept: PHYSICAL THERAPY | Facility: CLINIC | Age: 58
Setting detail: THERAPIES SERIES
End: 2018-04-05
Attending: PEDIATRICS
Payer: OTHER MISCELLANEOUS

## 2018-04-05 PROCEDURE — 97161 PT EVAL LOW COMPLEX 20 MIN: CPT | Mod: GP | Performed by: PHYSICAL THERAPIST

## 2018-04-05 PROCEDURE — 97110 THERAPEUTIC EXERCISES: CPT | Mod: GP | Performed by: PHYSICAL THERAPIST

## 2018-04-05 PROCEDURE — 40000718 ZZHC STATISTIC PT DEPARTMENT ORTHO VISIT: Performed by: PHYSICAL THERAPIST

## 2018-04-05 NOTE — PROGRESS NOTES
"  Jordan Barnett   PHYSICAL THERAPY EVALUATION    04/05/18 1500   General Information   Type of Visit Initial OP Ortho PT Evaluation   Start of Care Date 04/05/18   Referring Physician Dr. Goetz   Patient/Family Goals Statement so knee doesn't feel wobbly   Orders Evaluate and Treat   Date of Order 04/04/18   Insurance Type (wprk comp)   Medical Diagnosis patellar fracture   Surgical/Medical history reviewed Yes   Body Part(s)   Body Part(s) Knee   Presentation and Etiology   Pertinent history of current problem (include personal factors and/or comorbidities that impact the POC) Pulling a pallet at work, fell on his face and must've hit knee 11/27/18.  Sx kneeling, crouching/full squat , is back working.  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  Pain 0-1/10.  Knee feels wobbly.    Onset date of current episode/exacerbation 11/27/17   Prior Level of Function   Functional Level Prior Comment work is physical, walks, [employer]   Current Level of Function   Patient role/employment history A. 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 No   Have you fallen and had an injury in the past year? Yes   Is patient a fall risk? No   Fall screen comments this one work injury   Knee Objective Findings   Side (if bilateral, select both right and left) Left   Posture mild genu varum B, slight hyperextension B   Gait/Locomotion WNL   Balance/Proprioception (Single Leg Stance) 30sec   Knee/Hip Strength Comments hip ext and flex 5/5   Step Test Height 6inches   Step Test Height Control Comment some ant knee migration over toes, correct with cues, \"can feel it\"  not painful   Anterior Drawer Test mild laxity L vs R   Palpation not tender   Left Knee Extension AROM 0*   Left Knee Extension PROM +5   Left Knee Flexion AROM WNL   Left Knee Flexion PROM WNL   Left Knee Flexion Strength 5   Left Knee Extension Strength 5   Left Hip " Abduction Strength 5   Left Quad Set Strength good   Left Hamstring Flexibility 65*   Left Hip Flexor Flexibility WNL   Left Quadricep Flexibility prone KF mild tight vs R   Planned Therapy Interventions   Planned Therapy Interventions strengthening;balance training   Clinical Impression   Criteria for Skilled Therapeutic Interventions Met yes, treatment indicated   PT Diagnosis mild knee laxity, minimal PF sx   Functional limitations due to impairments kneel, crouch   Clinical Presentation Stable/Uncomplicated   Clinical Decision Making (Complexity) Low complexity   Therapy Frequency 1 time/week   Predicted Duration of Therapy Intervention (days/wks) 1x planned, instructed pt to self monitor next 3-4 weeks, has follow up with MD during that time, will return if still have wobbly sensation   Risk & Benefits of therapy have been explained Yes   Patient, Family & other staff in agreement with plan of care Yes   Education Assessment   Preferred Learning Style Pictures/video   Barriers to Learning No barriers   ORTHO GOALS   PT Ortho Eval Goals 1   Ortho Goal 1   Goal Description pt to report no evidence for wobbliness while working in [employer]   Target Date 05/10/18   Total Evaluation Time   Total Evaluation Time 20   Kris Hoenk, PT #[id number]  [facility]

## 2018-04-09 ENCOUNTER — TRANSFERRED RECORDS (OUTPATIENT)
Dept: HEALTH INFORMATION MANAGEMENT | Facility: CLINIC | Age: 58
End: 2018-04-09

## 2018-05-16 ENCOUNTER — OFFICE VISIT (OUTPATIENT)
Dept: ORTHOPEDICS | Facility: CLINIC | Age: 58
End: 2018-05-16
Payer: OTHER MISCELLANEOUS

## 2018-05-16 ENCOUNTER — RADIANT APPOINTMENT (OUTPATIENT)
Dept: GENERAL RADIOLOGY | Facility: CLINIC | Age: 58
End: 2018-05-16
Attending: PEDIATRICS
Payer: OTHER MISCELLANEOUS

## 2018-05-16 VITALS
WEIGHT: 151.7 LBS | DIASTOLIC BLOOD PRESSURE: 62 MMHG | SYSTOLIC BLOOD PRESSURE: 118 MMHG | BODY MASS INDEX: 21.24 KG/M2 | HEIGHT: 71 IN

## 2018-05-16 DIAGNOSIS — S82.092D OTHER FRACTURE OF LEFT PATELLA, SUBSEQUENT ENCOUNTER FOR CLOSED FRACTURE WITH ROUTINE HEALING: Primary | ICD-10-CM

## 2018-05-16 DIAGNOSIS — S82.092D OTHER FRACTURE OF LEFT PATELLA, SUBSEQUENT ENCOUNTER FOR CLOSED FRACTURE WITH ROUTINE HEALING: ICD-10-CM

## 2018-05-16 PROCEDURE — 73560 X-RAY EXAM OF KNEE 1 OR 2: CPT | Mod: LT

## 2018-05-16 PROCEDURE — 99213 OFFICE O/P EST LOW 20 MIN: CPT | Performed by: PEDIATRICS

## 2018-05-16 NOTE — LETTER
[facility]  [address]  [address]  [place] [facility] [place] [address]  Phone: [phone]  Fax: [phone]      REPORT OF WORK ABILITY    NOTE TO EMPLOYEE: You must promptly provide a copy of this report to your  employer or worker's compensation insurer, and Qualified Rehabilitation Consultant.    Date: 5/16/2018  Employee Name: Jordan Barnett         YOB: 1960  Medical Record Number: [id number]   Soc.Sec.No: [id number]  Employer: None                Date of Injury: 11/27/2018  Managed Care Organization / Insurance Company Name: UNKNOWN      Diagnosis: Left Knee Patellar fracture  Work Related: yes     MMI: NO   Permanent Partial Disability(PPD) likely: UNKNOWN      EMPLOYEE IS ABLE TO WORK: Return to work with restrictions on 4/6/2018      RESTRICTIONS IF ANY:      Minimal kneeling on left knee - no weight on left knee if kneeling      OTHER RESTRICTIONS: No zoning lowest shelf      TREATMENT PLAN/NOTES: Follow-up as needed

## 2018-05-16 NOTE — LETTER
5/16/2018         RE: Jordan Barnett  [address]  [facility] [address]        Dear Colleague,    Thank you for referring your patient, Jordan Barnett, to the [facility]. Please see a copy of my visit note below.    Sports Medicine Clinic Visit - Interim History May 16, 2018      PCP: Juan Antonio Flanagan    Jordan Barnett is a 57 year old male who is seen in f/u up for Other fracture of left patella, subsequent encounter for closed fracture with routine healing. Since last visit on 4/4/18 patient has been evaluated by PT and was given home exercises to do that have been helpful. Reports he is still unable to kneel and crawl and zone the bottom shelf, however, otherwise doing well.  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    Patient's current problem list, past medical and surgical history, and family history were reviewed.    Patient Active Problem List   Diagnosis     Severe bipolar I disorder, most recent episode mixed, with psychotic features (H)     Sebaceous cyst     CKD (chronic kidney disease) stage 3, GFR 30-59 ml/min     Schizoaffective disorder (H)     Hyperlipidemia LDL goal <160     Tubular adenoma of colon     Former smoker     Hypertension goal BP (blood pressure) < 140/90     Former smoker, stopped smoking in distant past     Past Medical History:   Diagnosis Date     Depressive disorder, not elsewhere classified     Manic      Unspecified schizophrenia, unspecified condition      Past Surgical History:   Procedure Laterality Date     COLONOSCOPY      normal. has fam hx colon cancer     COLONOSCOPY N/A 10/13/2014    Procedure: COLONOSCOPY;  Surgeon: Santy Constantino  "MD Dale;  Location: [facility]     HERNIA REPAIR, INGUINAL RT/LT  02/12/2004     SURGICAL HISTORY OF -       Incised & Drained - ? Perirectal Abscess      Family History   Problem Relation Age of Onset     Thyroid Disease Mother      Coronary Artery Disease Mother      pacemaker     CANCER Paternal Grandfather      CANCER Sister      Cancer - colorectal Sister      MENTAL ILLNESS Nephew      committed suicide at age 27       Objective  /62 (BP Location: Right arm, Patient Position: Sitting, Cuff Size: Adult Regular)  Ht 5' 10.5\" (1.791 m)  Wt 151 lb 11.2 oz (68.8 kg)  BMI 21.46 kg/m2    GENERAL APPEARANCE: healthy, alert and no distress   GAIT: NORMAL  SKIN: no suspicious lesions or rashes  HEENT: Sclera clear, anicteric  CV: good peripheral pulses  RESP: Breathing not labored  NEURO: Normal strength and tone, mentation intact and speech normal  PSYCH:  mentation appears normal and affect normal/bright    Bilateral Knee exam  Inspection:      no swelling      Patella:      Mobility -       hypomobile left       Crepitus noted in the patellofemoral joint left        neg (-) compression test left      Tender:      none currently      Non Tender:      remainder of knee area left - including knee cap      Knee ROM:      Full extension, mildly decreased flexion      Strength:      5/5 with knee extension left  - SLR intact      Special Tests:     neg (-) Trena left       neg (-) Lachmans left       neg (-) anterior drawer left       neg (-) posterior drawer left       neg (-) varus at 0 deg and 30 deg left       neg (-) valgus at 0 deg and 30 deg left      Gait:      normal      Neurovascular:      2+ peripheral pulses bilaterally and brisk capillary refill       sensation grossly intact    Radiology  I ordered, visualized and reviewed these images with the patient  sunrise XR views of bilateral reviewed: healing left patella fracture  - will follow official read    Assessment:  1. Other fracture of left patella, " subsequent encounter for closed fracture with routine healing        Plan:  - Today's Plan of Care:  Work Restrictions - will likely have difficulty with kneeling for a while    Follow Up: as needed  - would consider CT next step if symptoms persist    Concerning signs and symptoms were reviewed.  The patient expressed understanding of this management plan and all questions were answered at this time.    Lucita Goetz MD [facility]  Primary Care Sports Medicine  [facility]      Again, thank you for allowing me to participate in the care of your patient.        Sincerely,        Lucita Goetz MD

## 2018-05-16 NOTE — PATIENT INSTRUCTIONS
Plan:  - Today's Plan of Care:  Work Restrictions    Follow Up: as needed    If you have any further questions for your physician or physician s care team you can call [phone] and use option 3 to leave a voice message. Calls received during business hours will be returned same day.

## 2018-05-16 NOTE — PROGRESS NOTES
Sports Medicine Clinic Visit - Interim History May 16, 2018      PCP: Juan Antonio Flanagan    Jordan Barnett is a 57 year old male who is seen in f/u up for Other fracture of left patella, subsequent encounter for closed fracture with routine healing. Since last visit on 4/4/18 patient has been evaluated by PT and was given home exercises to do that have been helpful. Reports he is still unable to kneel and crawl and zone the bottom shelf, however, otherwise doing well.  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    Patient's current problem list, past medical and surgical history, and family history were reviewed.    Patient Active Problem List   Diagnosis     Severe bipolar I disorder, most recent episode mixed, with psychotic features (H)     Sebaceous cyst     CKD (chronic kidney disease) stage 3, GFR 30-59 ml/min     Schizoaffective disorder (H)     Hyperlipidemia LDL goal <160     Tubular adenoma of colon     Former smoker     Hypertension goal BP (blood pressure) < 140/90     Former smoker, stopped smoking in distant past     Past Medical History:   Diagnosis Date     Depressive disorder, not elsewhere classified     Manic      Unspecified schizophrenia, unspecified condition      Past Surgical History:   Procedure Laterality Date     COLONOSCOPY      normal. has fam hx colon cancer     COLONOSCOPY N/A 10/13/2014    Procedure: COLONOSCOPY;  Surgeon: Santy Constantino MD;  Location: [facility]     HERNIA REPAIR, INGUINAL RT/LT  02/12/2004     SURGICAL HISTORY OF -       Incised & Drained - ? Perirectal Abscess      Family History   Problem Relation Age of Onset     Thyroid Disease Mother      Coronary Artery Disease Mother       "pacemaker     CANCER Paternal Grandfather      CANCER Sister      Cancer - colorectal Sister      MENTAL ILLNESS Nephew      committed suicide at age 27       Objective  /62 (BP Location: Right arm, Patient Position: Sitting, Cuff Size: Adult Regular)  Ht 5' 10.5\" (1.791 m)  Wt 151 lb 11.2 oz (68.8 kg)  BMI 21.46 kg/m2    GENERAL APPEARANCE: healthy, alert and no distress   GAIT: NORMAL  SKIN: no suspicious lesions or rashes  HEENT: Sclera clear, anicteric  CV: good peripheral pulses  RESP: Breathing not labored  NEURO: Normal strength and tone, mentation intact and speech normal  PSYCH:  mentation appears normal and affect normal/bright    Bilateral Knee exam  Inspection:      no swelling      Patella:      Mobility -       hypomobile left       Crepitus noted in the patellofemoral joint left        neg (-) compression test left      Tender:      none currently      Non Tender:      remainder of knee area left - including knee cap      Knee ROM:      Full extension, mildly decreased flexion      Strength:      5/5 with knee extension left  - SLR intact      Special Tests:     neg (-) Trena left       neg (-) Lachmans left       neg (-) anterior drawer left       neg (-) posterior drawer left       neg (-) varus at 0 deg and 30 deg left       neg (-) valgus at 0 deg and 30 deg left      Gait:      normal      Neurovascular:      2+ peripheral pulses bilaterally and brisk capillary refill       sensation grossly intact    Radiology  I ordered, visualized and reviewed these images with the patient  sunrise XR views of bilateral reviewed: healing left patella fracture  - will follow official read    Assessment:  1. Other fracture of left patella, subsequent encounter for closed fracture with routine healing        Plan:  - Today's Plan of Care:  Work Restrictions - will likely have difficulty with kneeling for a while    Follow Up: as needed  - would consider CT next step if symptoms persist    Concerning " signs and symptoms were reviewed.  The patient expressed understanding of this management plan and all questions were answered at this time.    Lucita Goetz MD CA  Primary Care Sports Medicine  [facility]

## 2018-05-16 NOTE — LETTER
[facility]  [address]  [address]  [place] [facility] [place] [address]  Phone: [phone]  Fax: [phone]      REPORT OF WORK ABILITY    NOTE TO EMPLOYEE: You must promptly provide a copy of this report to your  employer or worker's compensation insurer, and Qualified Rehabilitation Consultant.    Date: 5/16/2018                     Employee Name: Jordan Barnett         YOB: 1960  Medical Record Number: [id number]   Soc.Sec.No: [id number]  Employer: None                Date of Injury: 11/27/2018  Managed Care Organization / Insurance Company Name: UNKNOWN      Diagnosis: Left Knee Patellar fracture  Work Related: yes     MMI: NO   Permanent Partial Disability(PPD) likely: UNKNOWN      EMPLOYEE IS ABLE TO WORK: Return to work with restrictions on 4/6/2018      RESTRICTIONS IF ANY:      No kneeling on left knee      OTHER RESTRICTIONS: No zoning lowest shelf      TREATMENT PLAN/NOTES: Follow-up as needed

## 2018-05-25 NOTE — PROGRESS NOTES
PHYSICAL THERAPY DISCHARGE        Patient was seen one time as planned.  No further contacts have been made.  Will discharge this episode of care.    Kris Hoenk, PT #[id number]  [facility]

## 2018-05-25 NOTE — ADDENDUM NOTE
Encounter addended by: Hoenk, Kris, PT on: 5/25/2018 11:18 AM<BR>     Actions taken: Sign clinical note, Episode resolved

## 2018-06-05 ENCOUNTER — TELEPHONE (OUTPATIENT)
Dept: FAMILY MEDICINE | Facility: CLINIC | Age: 58
End: 2018-06-05

## 2018-06-05 NOTE — TELEPHONE ENCOUNTER
"Called him, \"I did call the psychiatrist. As soon as we hung up, I am waiting for the call back, I will just cancel that appt with Dr Flanagan, you can cancel it, I think I am ok, now, it is not bad, I appreciate your call though and I do understand, I will go to the [facility] if I am worse or unable to reach the psychiatrist. I am just waiting for them to call me back. \"    Patient seems to be clear and understands recommendations, and he asked me to cancel the appt with Dr Flanagan tomorrow, which I did do.     He was again advised he can call us back with any further questions or concerns as well. \"thank you, I appreciate your concern\" Alma Woods RNC    "

## 2018-06-05 NOTE — TELEPHONE ENCOUNTER
"S: Called to triage him per the request of [facility] Elisabet helping Dr Flanagan with appts tomorrow.   .   He has an appt tomorrow for \"feels nervous and auditory hallucinations\"     B: I called him, \"umm, boy, well, umm, no numbmess or weakness\"  Not dizzy, no fainting.   No trouble breathing.   \"I am worried about mental illness, I have a prn prescribed for me for hydroxyzine and I want to ask Dr Flanagan about it, \"  \"I have never really used it, and I was going to ask him about it. \"  \"would that help with like umm, getting upset? Because of auditory hallucinations maybe? \"  He has talked to the psychiatrist about the auditory hallucinations, \"he asked if I want to increase the medications and I said no\"   \"it is kind of like sometimes like if people are near me, I think that I can read their mind, it is an auditory hallucination and I think they might be thinking about me, and it is kind of hard. \"  \"I don't know for sure if I can believe what I am hearing. \"  \"They hydroxyzine was a long time ago, for the anxiety  And I have never taken it. \"  \"I didn't even get ahold of my psychiatrist yet, I didn't know what to do. It is hard to get in to him and I could get in with Dr Flanagan, so I thought I would see Dr Flanagan. \"   A: psych issue, has had these sx, and was recommended hydroxyzine by previous psychiatrist, which he never tried, and also this psychiatrist recommended increase in Geodon which he declined, but now he is wondering if that is what he should do, or take the hydroxyzine, or what to do to \"calm his nerves\" and \"quiet the auditory hallucinations \" he has , specifically when he is near someone else, he thinks he hears them talking about him/ thinking about him and can't \"trust\" what he is hearing.     R: Encouraged him to call psychiatrist and speak with their nurse about the hydroxyzine and geodon questions he has and his current sx.  \"Ok, I will do that, \"  Advised Dr Flanagan would likely defer issues pertaining to " "psych and psych meds to psychiatrist anyway, \"ok, makes sense, let me call them. \"    I told him I will leave his appt with Dr Flanagan, in case he still feels he needs to come in tomorrow am, and he says he will call back to cancel if he can get ahold of psychiatrist.     Dr Flanagan, FYI, unless you have anything else to add?   Alma Woods RNC      "

## 2018-06-05 NOTE — TELEPHONE ENCOUNTER
I agree that this is something that he should be addressing with his psychiatrist, particularly that he has been given advice on treatment that he has declined.  Active auditory hallucinations plus increasing symptoms of nervousness in spite of his current treatment may need more psychiatric involvement, and if severe should be evaluated at [facility].

## 2018-06-06 NOTE — ADDENDUM NOTE
Encounter addended by: Hoenk, Kris, PT on: 6/6/2018  7:58 AM<BR>     Actions taken: Episode un-resolved

## 2018-06-14 ENCOUNTER — TELEPHONE (OUTPATIENT)
Dept: BEHAVIORAL HEALTH | Facility: CLINIC | Age: 58
End: 2018-06-14

## 2018-06-14 ENCOUNTER — HOSPITAL ENCOUNTER (EMERGENCY)
Facility: CLINIC | Age: 58
Discharge: PSYCHIATRIC OR SUBSTANCE USE FACILITY | End: 2018-06-14
Attending: NURSE PRACTITIONER | Admitting: NURSE PRACTITIONER
Payer: COMMERCIAL

## 2018-06-14 ENCOUNTER — HOSPITAL ENCOUNTER (INPATIENT)
Facility: CLINIC | Age: 58
LOS: 6 days | Discharge: HOME | End: 2018-06-20
Attending: PSYCHIATRY & NEUROLOGY | Admitting: PSYCHIATRY & NEUROLOGY
Payer: COMMERCIAL

## 2018-06-14 VITALS
SYSTOLIC BLOOD PRESSURE: 140 MMHG | OXYGEN SATURATION: 100 % | DIASTOLIC BLOOD PRESSURE: 96 MMHG | HEART RATE: 65 BPM | BODY MASS INDEX: 21.5 KG/M2 | RESPIRATION RATE: 20 BRPM | TEMPERATURE: 97.4 F | WEIGHT: 152 LBS

## 2018-06-14 DIAGNOSIS — F22 DELUSIONS (H): ICD-10-CM

## 2018-06-14 DIAGNOSIS — R45.89 HOPELESSNESS: Primary | ICD-10-CM

## 2018-06-14 DIAGNOSIS — F25.0 SCHIZOAFFECTIVE DISORDER, BIPOLAR TYPE (H): Primary | ICD-10-CM

## 2018-06-14 LAB
ALBUMIN SERPL-MCNC: 3.6 G/DL (ref 3.4–5)
ALBUMIN UR-MCNC: NEGATIVE MG/DL
ALP SERPL-CCNC: 88 U/L (ref 40–150)
ALT SERPL W P-5'-P-CCNC: 32 U/L (ref 0–70)
AMPHETAMINES UR QL SCN: NEGATIVE
ANION GAP SERPL CALCULATED.3IONS-SCNC: 5 MMOL/L (ref 3–14)
APPEARANCE UR: CLEAR
AST SERPL W P-5'-P-CCNC: 15 U/L (ref 0–45)
BARBITURATES UR QL: NEGATIVE
BASOPHILS # BLD AUTO: 0 10E9/L (ref 0–0.2)
BASOPHILS NFR BLD AUTO: 0.4 %
BENZODIAZ UR QL: NEGATIVE
BILIRUB SERPL-MCNC: 0.2 MG/DL (ref 0.2–1.3)
BILIRUB UR QL STRIP: NEGATIVE
BUN SERPL-MCNC: 31 MG/DL (ref 7–30)
CALCIUM SERPL-MCNC: 9 MG/DL (ref 8.5–10.1)
CANNABINOIDS UR QL SCN: NEGATIVE
CHLORIDE SERPL-SCNC: 109 MMOL/L (ref 94–109)
CO2 SERPL-SCNC: 25 MMOL/L (ref 20–32)
COCAINE UR QL: NEGATIVE
COLOR UR AUTO: ABNORMAL
CREAT SERPL-MCNC: 1.52 MG/DL (ref 0.66–1.25)
DIFFERENTIAL METHOD BLD: ABNORMAL
EOSINOPHIL # BLD AUTO: 0.2 10E9/L (ref 0–0.7)
EOSINOPHIL NFR BLD AUTO: 1.8 %
ERYTHROCYTE [DISTWIDTH] IN BLOOD BY AUTOMATED COUNT: 12.2 % (ref 10–15)
GFR SERPL CREATININE-BSD FRML MDRD: 48 ML/MIN/1.7M2
GLUCOSE SERPL-MCNC: 96 MG/DL (ref 70–99)
GLUCOSE UR STRIP-MCNC: NEGATIVE MG/DL
HCT VFR BLD AUTO: 39.3 % (ref 40–53)
HGB BLD-MCNC: 13 G/DL (ref 13.3–17.7)
HGB UR QL STRIP: NEGATIVE
IMM GRANULOCYTES # BLD: 0.1 10E9/L (ref 0–0.4)
IMM GRANULOCYTES NFR BLD: 0.7 %
KETONES UR STRIP-MCNC: NEGATIVE MG/DL
LEUKOCYTE ESTERASE UR QL STRIP: NEGATIVE
LYMPHOCYTES # BLD AUTO: 2 10E9/L (ref 0.8–5.3)
LYMPHOCYTES NFR BLD AUTO: 23.4 %
MCH RBC QN AUTO: 31.2 PG (ref 26.5–33)
MCHC RBC AUTO-ENTMCNC: 33.1 G/DL (ref 31.5–36.5)
MCV RBC AUTO: 94 FL (ref 78–100)
MONOCYTES # BLD AUTO: 0.8 10E9/L (ref 0–1.3)
MONOCYTES NFR BLD AUTO: 8.9 %
MUCOUS THREADS #/AREA URNS LPF: PRESENT /LPF
NEUTROPHILS # BLD AUTO: 5.5 10E9/L (ref 1.6–8.3)
NEUTROPHILS NFR BLD AUTO: 64.8 %
NITRATE UR QL: NEGATIVE
NRBC # BLD AUTO: 0 10*3/UL
NRBC BLD AUTO-RTO: 0 /100
OPIATES UR QL SCN: NEGATIVE
PCP UR QL SCN: NEGATIVE
PH UR STRIP: 6 PH (ref 5–7)
PLATELET # BLD AUTO: 276 10E9/L (ref 150–450)
POTASSIUM SERPL-SCNC: 4.7 MMOL/L (ref 3.4–5.3)
PROT SERPL-MCNC: 7.6 G/DL (ref 6.8–8.8)
RBC # BLD AUTO: 4.17 10E12/L (ref 4.4–5.9)
RBC #/AREA URNS AUTO: 1 /HPF (ref 0–2)
SODIUM SERPL-SCNC: 139 MMOL/L (ref 133–144)
SOURCE: ABNORMAL
SP GR UR STRIP: 1 (ref 1–1.03)
TROPONIN I SERPL-MCNC: <0.015 UG/L (ref 0–0.04)
UROBILINOGEN UR STRIP-MCNC: NORMAL MG/DL (ref 0–2)
WBC # BLD AUTO: 8.4 10E9/L (ref 4–11)
WBC #/AREA URNS AUTO: 0 /HPF (ref 0–5)

## 2018-06-14 PROCEDURE — 25000132 ZZH RX MED GY IP 250 OP 250 PS 637: Performed by: NURSE PRACTITIONER

## 2018-06-14 PROCEDURE — 93005 ELECTROCARDIOGRAM TRACING: CPT | Performed by: NURSE PRACTITIONER

## 2018-06-14 PROCEDURE — 90791 PSYCH DIAGNOSTIC EVALUATION: CPT

## 2018-06-14 PROCEDURE — 81001 URINALYSIS AUTO W/SCOPE: CPT | Performed by: NURSE PRACTITIONER

## 2018-06-14 PROCEDURE — 85025 COMPLETE CBC W/AUTO DIFF WBC: CPT | Performed by: NURSE PRACTITIONER

## 2018-06-14 PROCEDURE — 84484 ASSAY OF TROPONIN QUANT: CPT | Performed by: NURSE PRACTITIONER

## 2018-06-14 PROCEDURE — 12400007 ZZH R&B MH INTERMEDIATE UMMC

## 2018-06-14 PROCEDURE — 80307 DRUG TEST PRSMV CHEM ANLYZR: CPT | Performed by: NURSE PRACTITIONER

## 2018-06-14 PROCEDURE — 93010 ELECTROCARDIOGRAM REPORT: CPT | Mod: Z6 | Performed by: NURSE PRACTITIONER

## 2018-06-14 PROCEDURE — 80053 COMPREHEN METABOLIC PANEL: CPT | Performed by: NURSE PRACTITIONER

## 2018-06-14 PROCEDURE — 99285 EMERGENCY DEPT VISIT HI MDM: CPT | Mod: 25 | Performed by: NURSE PRACTITIONER

## 2018-06-14 PROCEDURE — 99284 EMERGENCY DEPT VISIT MOD MDM: CPT | Mod: 25 | Performed by: NURSE PRACTITIONER

## 2018-06-14 RX ORDER — ACETAMINOPHEN 500 MG
500-1000 TABLET ORAL EVERY 6 HOURS PRN
Status: DISCONTINUED | OUTPATIENT
Start: 2018-06-14 | End: 2018-06-20 | Stop reason: HOSPADM

## 2018-06-14 RX ORDER — ZIPRASIDONE HYDROCHLORIDE 60 MG/1
120 CAPSULE ORAL DAILY
Status: DISCONTINUED | OUTPATIENT
Start: 2018-06-14 | End: 2018-06-15

## 2018-06-14 RX ORDER — LOSARTAN POTASSIUM 100 MG/1
100 TABLET ORAL DAILY
Status: DISCONTINUED | OUTPATIENT
Start: 2018-06-14 | End: 2018-06-20 | Stop reason: HOSPADM

## 2018-06-14 RX ORDER — ALUMINA, MAGNESIA, AND SIMETHICONE 2400; 2400; 240 MG/30ML; MG/30ML; MG/30ML
30 SUSPENSION ORAL EVERY 4 HOURS PRN
Status: DISCONTINUED | OUTPATIENT
Start: 2018-06-14 | End: 2018-06-20 | Stop reason: HOSPADM

## 2018-06-14 RX ORDER — HYDROXYZINE HYDROCHLORIDE 50 MG/1
50 TABLET, FILM COATED ORAL EVERY 6 HOURS PRN
Status: DISCONTINUED | OUTPATIENT
Start: 2018-06-14 | End: 2018-06-20 | Stop reason: HOSPADM

## 2018-06-14 RX ORDER — TRAZODONE HYDROCHLORIDE 50 MG/1
50 TABLET, FILM COATED ORAL
Status: DISCONTINUED | OUTPATIENT
Start: 2018-06-14 | End: 2018-06-20 | Stop reason: HOSPADM

## 2018-06-14 RX ORDER — BISACODYL 10 MG
10 SUPPOSITORY, RECTAL RECTAL DAILY PRN
Status: DISCONTINUED | OUTPATIENT
Start: 2018-06-14 | End: 2018-06-20 | Stop reason: HOSPADM

## 2018-06-14 RX ORDER — MULTIPLE VITAMINS W/ MINERALS TAB 9MG-400MCG
1 TAB ORAL AT BEDTIME
Status: DISCONTINUED | OUTPATIENT
Start: 2018-06-14 | End: 2018-06-20 | Stop reason: HOSPADM

## 2018-06-14 RX ORDER — OLANZAPINE 5 MG/1
5-10 TABLET ORAL 4 TIMES DAILY PRN
Status: DISCONTINUED | OUTPATIENT
Start: 2018-06-14 | End: 2018-06-20 | Stop reason: HOSPADM

## 2018-06-14 RX ORDER — LAMOTRIGINE 100 MG/1
100 TABLET ORAL 2 TIMES DAILY
Status: DISCONTINUED | OUTPATIENT
Start: 2018-06-14 | End: 2018-06-15

## 2018-06-14 RX ORDER — CHLORAL HYDRATE 500 MG
2000 CAPSULE ORAL EVERY EVENING
Status: DISCONTINUED | OUTPATIENT
Start: 2018-06-14 | End: 2018-06-20 | Stop reason: HOSPADM

## 2018-06-14 RX ADMIN — Medication 2000 MG: at 22:06

## 2018-06-14 RX ADMIN — ZIPRASIDONE HCL 120 MG: 60 CAPSULE ORAL at 22:07

## 2018-06-14 RX ADMIN — MULTIPLE VITAMINS W/ MINERALS TAB 1 TABLET: TAB at 22:06

## 2018-06-14 RX ADMIN — HYDROXYZINE HYDROCHLORIDE 50 MG: 50 TABLET, FILM COATED ORAL at 22:07

## 2018-06-14 RX ADMIN — LAMOTRIGINE 100 MG: 100 TABLET ORAL at 22:03

## 2018-06-14 RX ADMIN — LOSARTAN POTASSIUM 100 MG: 100 TABLET, FILM COATED ORAL at 22:07

## 2018-06-14 ASSESSMENT — ACTIVITIES OF DAILY LIVING (ADL)
SWALLOWING: 0 - SWALLOWS FOODS/LIQUIDS WITHOUT DIFFICULTY
COMMUNICATION: 0 - UNDERSTANDS/COMMUNICATES WITHOUT DIFFICULTY
RETIRED_COMMUNICATION: 0-->UNDERSTANDS/COMMUNICATES WITHOUT DIFFICULTY
DRESS: 0 - INDEPENDENT
NUMBER_OF_TIMES_PATIENT_HAS_FALLEN_WITHIN_LAST_SIX_MONTHS: 1
RETIRED_EATING: 0-->INDEPENDENT
TRANSFERRING: 0-->INDEPENDENT
EATING: 0 - INDEPENDENT
FALL_HISTORY_WITHIN_LAST_SIX_MONTHS: YES
AMBULATION: 0-->INDEPENDENT
COGNITION: 0 - NO COGNITION ISSUES REPORTED
DRESS: 0-->INDEPENDENT
GROOMING: INDEPENDENT
DRESS: INDEPENDENT
TOILETING: 0-->INDEPENDENT
AMBULATION: 0 - INDEPENDENT
TRANSFERRING: 0 - INDEPENDENT
SWALLOWING: 0-->SWALLOWS FOODS/LIQUIDS WITHOUT DIFFICULTY
TOILETING: 0 - INDEPENDENT
BATHING: 0-->INDEPENDENT
BATHING: 0 - INDEPENDENT
ORAL_HYGIENE: INDEPENDENT

## 2018-06-14 ASSESSMENT — ENCOUNTER SYMPTOMS
NAUSEA: 0
EYE PAIN: 0
HEMATURIA: 0
SHORTNESS OF BREATH: 0
FEVER: 0
DIARRHEA: 0
BLOOD IN STOOL: 0
DIFFICULTY URINATING: 0
VOMITING: 0
ABDOMINAL PAIN: 0
NERVOUS/ANXIOUS: 1
SORE THROAT: 0
COUGH: 0

## 2018-06-14 NOTE — ED PROVIDER NOTES
History     Chief Complaint   Patient presents with     Depression     pt presents states he's feeling hopeless, no thoughts or plan of self harm, pt is bipolar with schizo.     HPI  Jordan Barnett is a 58 year old male who presents with concerns of hopelessness.  Pt concerns that he is having extremely abnormal thoughts.  Pt states he is sure he is going to get admitted for this.  Pt has been hospitalized 4 times since age 22.  Pt denies thoughts of harming self or others.  Pt admits to delusions of grandeur but unable to elaborate.  Pt states when he was 18, he was in cross country skiing and had a teacher named Mr. Nj and feels that he came up with the idea of Teenage Mutant Ninja Turtles before it came out.  Pt states he know David through ideas that come to him.  Pt feels if you are hospitalized you are psychotic and if you just need medication then you are neurotic.      Problem List:    Patient Active Problem List    Diagnosis Date Noted     Former smoker, stopped smoking in distant past 07/28/2016     Priority: Medium     Hypertension goal BP (blood pressure) < 140/90 03/12/2013     Priority: Medium     Former smoker 02/25/2013     Priority: Medium     Tubular adenoma of colon 09/25/2012     Priority: Medium     On colonoscopy 2009       Hyperlipidemia LDL goal <160 08/29/2012     Priority: Medium     Schizoaffective disorder (H) 04/22/2011     Priority: Medium     Sebaceous cyst 04/21/2011     Priority: Medium     On scrotum.       CKD (chronic kidney disease) stage 3, GFR 30-59 ml/min 04/21/2011     Priority: Medium     secondary to medication  6/1/2011 Referral OK, though he probably does not need one now.   I would recommend starting lisinopril 10 mg daily, and recheck renal panel and microalbumin in 2 months.       Severe bipolar I disorder, most recent episode mixed, with psychotic features (H) 01/08/2008     Priority: Medium     SCHIZOPHRENIC TENDENCIES  Problem list name updated by automated  process. Provider to review          Past Medical History:    Past Medical History:   Diagnosis Date     Depressive disorder, not elsewhere classified      Unspecified schizophrenia, unspecified condition        Past Surgical History:    Past Surgical History:   Procedure Laterality Date     COLONOSCOPY      normal. has fam hx colon cancer     COLONOSCOPY N/A 10/13/2014    Procedure: COLONOSCOPY;  Surgeon: Santy Constantino MD;  Location: [facility]     HERNIA REPAIR, INGUINAL RT/LT  02/12/2004     SURGICAL HISTORY OF -       Incised & Drained - ? Perirectal Abscess        Family History:    Family History   Problem Relation Age of Onset     Thyroid Disease Mother      Coronary Artery Disease Mother      pacemaker     CANCER Paternal Grandfather      CANCER Sister      Cancer - colorectal Sister      MENTAL ILLNESS Nephew      committed suicide at age 27       Social History:  Marital Status:  Single [1]  Social History   Substance Use Topics     Smoking status: Former Smoker     Packs/day: 1.00     Years: 30.00     Types: Cigarettes     Quit date: 4/2/2012     Smokeless tobacco: Former User     Quit date: 4/2/2012      Comment: 2 PPD     Alcohol use Yes      Comment: RARE        Medications:      acetaminophen (TYLENOL) 500 MG tablet   HYDROXYZINE HCL PO   lamoTRIgine (LAMICTAL) 100 MG tablet   losartan (COZAAR) 100 MG tablet   Multiple Vitamins-Minerals (MULTIVITAMIN ADULT PO)   Omega-3 Fatty Acids (OMEGA 3 PO)   order for DME   triamcinolone (KENALOG) 0.1 % ointment   Ziprasidone HCl (GEODON PO)   [DISCONTINUED] ziprasidone (GEODON) 40 MG capsule         Review of Systems   Constitutional: Negative for fever.   HENT: Negative for ear pain and sore throat.    Eyes: Negative for pain.   Respiratory: Negative for cough and shortness of breath.    Cardiovascular: Negative for chest pain.   Gastrointestinal: Negative for abdominal pain, blood in stool, diarrhea, nausea and vomiting.   Genitourinary: Negative for  difficulty urinating and hematuria.   Psychiatric/Behavioral: Positive for behavioral problems. Negative for self-injury and suicidal ideas. The patient is nervous/anxious.    All other systems reviewed and are negative.      Physical Exam   BP: 123/80  Pulse: 65  Heart Rate: 65  Temp: 97.4  F (36.3  C)  Resp: 16  Weight: 68.9 kg (152 lb)  SpO2: 98 %      Physical Exam   Constitutional: He appears well-developed and well-nourished.   HENT:   Head: Normocephalic and atraumatic.   Cardiovascular: Normal rate, regular rhythm and normal heart sounds.  Exam reveals no gallop and no friction rub.    No murmur heard.  Pulmonary/Chest: Effort normal and breath sounds normal. No respiratory distress. He has no wheezes. He has no rales. He exhibits no tenderness.   Skin: He is not diaphoretic.   Nursing note and vitals reviewed.      ED Course     ED Course     Procedures       EKG Interpretation:      Interpreted by Helena Matthews and Kaleb Wahl MD   Time reviewed:1445   Symptoms at time of EKG: asymptomatic   Rhythm:  sinus  bradycardia  Rate: bradycardia  Axis: NORMAL  Ectopy: none  Conduction: normal  ST Segments/ T Waves: prominent T waves in V2, V3, V4  Q Waves: none  Comparison to prior: No old EKG available    Clinical Impression: no acute abnormalities      Results for orders placed or performed during the hospital encounter of 06/14/18 (from the past 24 hour(s))   CBC with platelets differential   Result Value Ref Range    WBC 8.4 4.0 - 11.0 10e9/L    RBC Count 4.17 (L) 4.4 - 5.9 10e12/L    Hemoglobin 13.0 (L) 13.3 - 17.7 g/dL    Hematocrit 39.3 (L) 40.0 - 53.0 %    MCV 94 78 - 100 fl    MCH 31.2 26.5 - 33.0 pg    MCHC 33.1 31.5 - 36.5 g/dL    RDW 12.2 10.0 - 15.0 %    Platelet Count 276 150 - 450 10e9/L    Diff Method Automated Method     % Neutrophils 64.8 %    % Lymphocytes 23.4 %    % Monocytes 8.9 %    % Eosinophils 1.8 %    % Basophils 0.4 %    % Immature Granulocytes 0.7 %    Nucleated RBCs 0 0 /100     Absolute Neutrophil 5.5 1.6 - 8.3 10e9/L    Absolute Lymphocytes 2.0 0.8 - 5.3 10e9/L    Absolute Monocytes 0.8 0.0 - 1.3 10e9/L    Absolute Eosinophils 0.2 0.0 - 0.7 10e9/L    Absolute Basophils 0.0 0.0 - 0.2 10e9/L    Abs Immature Granulocytes 0.1 0 - 0.4 10e9/L    Absolute Nucleated RBC 0.0    Comprehensive metabolic panel   Result Value Ref Range    Sodium 139 133 - 144 mmol/L    Potassium 4.7 3.4 - 5.3 mmol/L    Chloride 109 94 - 109 mmol/L    Carbon Dioxide 25 20 - 32 mmol/L    Anion Gap 5 3 - 14 mmol/L    Glucose 96 70 - 99 mg/dL    Urea Nitrogen 31 (H) 7 - 30 mg/dL    Creatinine 1.52 (H) 0.66 - 1.25 mg/dL    GFR Estimate 48 (L) >60 mL/min/1.7m2    GFR Estimate If Black 57 (L) >60 mL/min/1.7m2    Calcium 9.0 8.5 - 10.1 mg/dL    Bilirubin Total 0.2 0.2 - 1.3 mg/dL    Albumin 3.6 3.4 - 5.0 g/dL    Protein Total 7.6 6.8 - 8.8 g/dL    Alkaline Phosphatase 88 40 - 150 U/L    ALT 32 0 - 70 U/L    AST 15 0 - 45 U/L   Troponin I   Result Value Ref Range    Troponin I ES <0.015 0.000 - 0.045 ug/L       Medications - No data to display    Assessments & Plan (with Medical Decision Making)     I have reviewed the nursing notes.    I have reviewed the findings, diagnosis, plan and need for follow up with the patient.  Jordan Barnett is a 58 year old male who presents with concerns of hopelessness.  Pt concerns that he is having extremely abnormal thoughts.  During HPI total time spent with patient was 30 minutes of face-to-face time with patient having flight of ideas and tangential thought process throughout the time.  Patient expressing hopelessness and delusions throughout this timeframe.  EKG done with the thought process of consideration to Zyprexa showing prominent T waves.  Troponin completed due to prominent T waves and was negative.  CBC and comprehensive obtained and reveals elevated creatinine and BUN which can be consistent with mild dehydration.  DEC assessment completed with Ingrid, the therapist, due to  patient presentation and concerns regarding need for admission.  She agrees with need for admission.  Paperwork completed for admission to [facility].  Dr. Kaleb Barnett was consulted with this patient care for transfer and admission to [facility].    New Prescriptions    No medications on file       Final diagnoses:   Hopelessness   Delusions (H)       6/14/2018   [facility] EMERGENCY DEPARTMENT     Helena Matthews APRN CNP  06/14/18 [id number]       Helena Matthews APRN CNP  06/14/18 1527

## 2018-06-14 NOTE — IP AVS SNAPSHOT
MRN:[id number]                      After Visit Summary   6/14/2018    Jordan Barnett    MRN: [id number]           Thank you!     Thank you for choosing [facility] for your care. Our goal is always to provide you with excellent care.        Patient Information     Date Of Birth          1960        Designated Caregiver       Most Recent Value    Caregiver    Will someone help with your care after discharge? no      About your hospital stay     You were admitted on:  June 14, 2018 You last received care in the:   [facility] STP    You were discharged on:  June 20, 2018       Who to Call     For medical emergencies, please call 911.  For non-urgent questions about your medical care, please call your primary care provider or clinic, [phone]          Attending Provider     Provider Specialty    Néstor Isbell MD Psychiatry       Primary Care Provider Office Phone # Fax #    Juan Antonio Chadd Flanagan [phone]      Further instructions from your care team        Behavioral Discharge Planning and Instructions      Summary:  You were admitted on 6/14/2018  due to Psychotic Symptomology.  You were treated by AUGUSTUS Pollard DNP and discharged on 06/20/18 from [facility] to Home      Principal Diagnosis:   Schizoaffective disorder, bipolar type    Health Care Follow-up Appointments:   Psychiatrist:  Dr.Carlos Morrow- Your next appointment is scheduled for Friday July 27th 2018 3:00pm. Clinic has you on cancellation list and will contact you if an earlier appointment becomes available.   [facility]   [address] [place] [address] [address]  [place] [address]   [phone]  Fax: [phone]- [facility] to send AVS      Therapist:  You have requested to continue phone counseling through your EAP at [facility]. Once your health insurance deductible is met please discuss in person therapy options through your [facility] clinic listed above.      Attend all scheduled appointments with  "your outpatient providers. Call at least 24 hours in advance if you need to reschedule an appointment to ensure continued access to your outpatient providers.   Major Treatments, Procedures and Findings:  You were provided with: a psychiatric assessment, assessed for medical stability, medication evaluation and/or management, group therapy, milieu management and medical interventions    Symptoms to Report: feeling more aggressive, increased confusion, losing more sleep, mood getting worse or thoughts of suicide    Early warning signs can include: increased depression or anxiety sleep disturbances increased thoughts or behaviors of suicide or self-harm  increased unusual thinking, such as paranoia or hearing voices    Safety and Wellness:  Take all medicines as directed.  Make no changes unless your doctor suggests them.      Follow treatment recommendations.  Refrain from alcohol and non-prescribed drugs.  If there is a concern for safety, call [phone].    Resources:   Crisis Intervention: 754.984.7634 or 364-880-3242 (TTY: 747.126.3300).  Call anytime for help.  National [place] on Mental Illness (www.[place].frieda.org): 726.790.7053 or 726-851-7552.  Suicide Awareness Voices of Education (SAVE) (www.save.org): 049-878-MOFA (6409)  National Suicide Prevention Line (www.mentalhealthmn.org): 946-381-TMDB (8489)  Mental Health Consumer/Survivor Network of [place] (www.mhcsn.net): 317.202.5210 or 153-585-9267  Mental Health Association of [place] (www.mentalhealth.org): 577.447.8349 or 729-101-4205  Self- Management and Recovery Training., SMART-- Toll free: 547.721.3462  www.Needish.org  [facility] Crisis Response 164 223-9520  Text 4 Life: txt \"LIFE\" to 64258 for immediate support and crisis intervention  Crisis text line: Text \"[place]\" to 724913. Free, confidential, 24/7.  Crisis Intervention: 335.803.4281 or 284-886-5467. Call anytime for help.     The treatment team has appreciated the opportunity to work with you.     If " "you have any questions or concerns our unit number is [phone].  You may be receiving a follow-up phone call within the next three days from a representative from behavioral health.    You have identified the best phone number to reach you as [phone].        Pending Results     No orders found from 6/12/2018 to 6/15/2018.            Admission Information     Date & Time Provider Department Dept. Phone    6/14/2018 Néstor Isbell MD [facility] [phone] [phone]      Your Vitals Were     Blood Pressure Pulse Temperature Respirations Height Weight    134/88 61 95.7  F (35.4  C) (Tympanic) 16 1.778 m (5' 10\") 69.4 kg (153 lb)    Pulse Oximetry BMI (Body Mass Index)                98% 21.95 kg/m2          MyChart Information     [facility] gives you secure access to your electronic health record. If you see a primary care provider, you can also send messages to your care team and make appointments. If you have questions, please call your primary care clinic.  If you do not have a primary care provider, please call [phone] and they will assist you.        Care EveryWhere ID     This is your Care EveryWhere ID. This could be used by other organizations to access your [facility] medical records  [id number]        Equal Access to Services     SWAPNA REAL : Alyssa westbrooko Sovanessa, waaxda luqadaha, qaybta kaalmada adeegyada, shelly colorado. So [facility] [phone].    ATENCIÓN: Si habla español, tiene a eaton disposición servicios gratuitos de asistencia lingüística. Llame al [phone].    We comply with applicable federal civil rights laws and [place] laws. We do not discriminate on the basis of race, color, national origin, age, disability, sex, sexual orientation, or gender identity.               Review of your medicines      START taking        Dose / Directions    OLANZapine 7.5 MG tablet   Commonly known as:  zyPREXA        Dose:  7.5 mg   Take 1 tablet (7.5 mg) by mouth 2 times " daily   Quantity:  60 tablet   Refills:  0         CONTINUE these medicines which may have CHANGED, or have new prescriptions. If we are uncertain of the size of tablets/capsules you have at home, strength may be listed as something that might have changed.        Dose / Directions    lamoTRIgine 100 MG tablet   Commonly known as:  LaMICtal   This may have changed:  additional instructions        Dose:  100 mg   Take 1 tablet (100 mg) by mouth 2 times daily   Quantity:  60 tablet   Refills:  0         CONTINUE these medicines which have NOT CHANGED        Dose / Directions    HYDROXYZINE HCL PO   Indication:  Anxiety Neurosis, Sedation        Dose:  50 mg   Take 50 mg by mouth every 6 hours as needed   Refills:  0       losartan 100 MG tablet   Commonly known as:  COZAAR   Used for:  Essential hypertension with goal blood pressure less than 140/90, CKD (chronic kidney disease) stage 3, GFR 30-59 ml/min        Dose:  100 mg   Take 1 tablet (100 mg) by mouth daily   Quantity:  90 tablet   Refills:  3       MULTIVITAMIN ADULT PO        Dose:  1 tablet   Take 1 tablet by mouth At Bedtime   Refills:  0       OMEGA 3 PO        Dose:  2400 mg   Take 2,400 mg by mouth every evening   Refills:  0       order for DME   Used for:  Other closed fracture of left patella, initial encounter        Equipment being ordered: hinged knee brace   Quantity:  1 Device   Refills:  0       TYLENOL 500 MG tablet   Generic drug:  acetaminophen        Dose:  1-2 tablet   Take 1-2 tablets by mouth every 6 hours as needed.   Refills:  0         STOP taking     GEODON PO           triamcinolone 0.1 % ointment   Commonly known as:  KENALOG                Where to get your medicines      These medications were sent to [facility] [place], [place] - [address]  [address] [address]     Phone:  [phone]     lamoTRIgine 100 MG tablet    OLANZapine 7.5 MG tablet                Protect others around you:  Learn how to safely use, store and throw away your medicines at www.disposemymeds.org.             Medication List: This is a list of all your medications and when to take them. Check marks below indicate your daily home schedule. Keep this list as a reference.      Medications           Morning Afternoon Evening Bedtime As Needed    HYDROXYZINE HCL PO   Take 50 mg by mouth every 6 hours as needed   Last time this was given:  50 mg on 6/16/2018  9:09 AM                                lamoTRIgine 100 MG tablet   Commonly known as:  LaMICtal   Take 1 tablet (100 mg) by mouth 2 times daily   Last time this was given:  100 mg on 6/20/2018  8:33 AM                                losartan 100 MG tablet   Commonly known as:  COZAAR   Take 1 tablet (100 mg) by mouth daily   Last time this was given:  100 mg on 6/19/2018  9:54 PM                                MULTIVITAMIN ADULT PO   Take 1 tablet by mouth At Bedtime   Last time this was given:  1 tablet on 6/19/2018  9:55 PM                                OLANZapine 7.5 MG tablet   Commonly known as:  zyPREXA   Take 1 tablet (7.5 mg) by mouth 2 times daily   Last time this was given:  7.5 mg on 6/20/2018  8:33 AM                                OMEGA 3 PO   Take 2,400 mg by mouth every evening   Last time this was given:  2,000 mg on 6/19/2018  9:54 PM                                order for DME   Equipment being ordered: hinged knee brace                                TYLENOL 500 MG tablet   Take 1-2 tablets by mouth every 6 hours as needed.   Generic drug:  acetaminophen

## 2018-06-14 NOTE — TELEPHONE ENCOUNTER
S: DEC  calling from [facility] ED regarding this 58 year old male.     B: Pt has dx of schizoaffective disorder, bipolar type. Pt presenting as delusional and paranoid and tangential with flight of ideas. Pt reports the belief that he's receiving false information via the internet. Pt also feels like he's causing problems at work and thinks people are talking about him, though  doesn't elaborate much more regarding this. Also making comments of grandiosity, indicating he created the Teenage Mutant Martin Plunkett. Pt disorganized and jumping from topic to topic. Pt has not been hospitalized in many years for mental health. Lives alone and thinks he may get his medication mixed up sometimes and takes it wrong. Cooperative, wants help. Labs need to be drawn and resulted. Awaiting call from ED provider indicating that pt has been medically cleared. Update at 1613: Pt now medically cleared.    A: Voluntary.    R: Admit to 3B under [facility]/Dr. Isbell

## 2018-06-14 NOTE — ED NOTES
"Pt here voluntarily on the recommendation of a therapist he spoke to today. Hx of schizo-affective disorder with bipolar. Takes lamictal daily. Recently he has begun to feel \"hopeless\", denies thoughts of self-harm, suicide or hurting others. States he has been struggling recently following a fractured patella- states \"I don't think people at work believe that I am injured\". His girlfriend was also recently hospitalized for mental health problems and that is a big stressor for him. Pt is calm and cooperative in the room.  "

## 2018-06-14 NOTE — IP AVS SNAPSHOT
[facility]    [address]    [place] [address]    Phone:  [phone]                                       After Visit Summary   6/14/2018    Jordan Barnett    MRN: [id number]           After Visit Summary Signature Page     I have received my discharge instructions, and my questions have been answered. I have discussed any challenges I see with this plan with the nurse or doctor.    ..........................................................................................................................................  Patient/Patient Representative Signature      ..........................................................................................................................................  Patient Representative Print Name and Relationship to Patient    ..................................................               ................................................  Date                                            Time    ..........................................................................................................................................  Reviewed by Signature/Title    ...................................................              ..............................................  Date                                                            Time

## 2018-06-15 LAB
DEPRECATED CALCIDIOL+CALCIFEROL SERPL-MC: 36 UG/L (ref 20–75)
FOLATE SERPL-MCNC: 37.5 NG/ML
T PALLIDUM AB SER QL: NONREACTIVE
TSH SERPL DL<=0.005 MIU/L-ACNC: 3.7 MU/L (ref 0.4–4)
VIT B12 SERPL-MCNC: 1193 PG/ML (ref 193–986)

## 2018-06-15 PROCEDURE — 99223 1ST HOSP IP/OBS HIGH 75: CPT | Mod: AI | Performed by: NURSE PRACTITIONER

## 2018-06-15 PROCEDURE — 86780 TREPONEMA PALLIDUM: CPT | Performed by: NURSE PRACTITIONER

## 2018-06-15 PROCEDURE — 90853 GROUP PSYCHOTHERAPY: CPT

## 2018-06-15 PROCEDURE — 97150 GROUP THERAPEUTIC PROCEDURES: CPT | Mod: GO

## 2018-06-15 PROCEDURE — 99222 1ST HOSP IP/OBS MODERATE 55: CPT | Performed by: NURSE PRACTITIONER

## 2018-06-15 PROCEDURE — 84443 ASSAY THYROID STIM HORMONE: CPT | Performed by: NURSE PRACTITIONER

## 2018-06-15 PROCEDURE — 25000132 ZZH RX MED GY IP 250 OP 250 PS 637: Performed by: NURSE PRACTITIONER

## 2018-06-15 PROCEDURE — 82746 ASSAY OF FOLIC ACID SERUM: CPT | Performed by: NURSE PRACTITIONER

## 2018-06-15 PROCEDURE — 36415 COLL VENOUS BLD VENIPUNCTURE: CPT | Performed by: NURSE PRACTITIONER

## 2018-06-15 PROCEDURE — 99207 ZZC CONSULT E&M CHANGED TO INITIAL LEVEL: CPT | Performed by: NURSE PRACTITIONER

## 2018-06-15 PROCEDURE — 12400007 ZZH R&B MH INTERMEDIATE UMMC

## 2018-06-15 PROCEDURE — 82306 VITAMIN D 25 HYDROXY: CPT | Performed by: NURSE PRACTITIONER

## 2018-06-15 PROCEDURE — 82607 VITAMIN B-12: CPT | Performed by: NURSE PRACTITIONER

## 2018-06-15 RX ORDER — LAMOTRIGINE 100 MG/1
100 TABLET ORAL 2 TIMES DAILY
Status: DISCONTINUED | OUTPATIENT
Start: 2018-06-15 | End: 2018-06-20 | Stop reason: HOSPADM

## 2018-06-15 RX ORDER — OLANZAPINE 5 MG/1
5 TABLET ORAL 2 TIMES DAILY
Status: DISCONTINUED | OUTPATIENT
Start: 2018-06-15 | End: 2018-06-19

## 2018-06-15 RX ORDER — GABAPENTIN 100 MG/1
100-300 CAPSULE ORAL 3 TIMES DAILY PRN
Status: DISCONTINUED | OUTPATIENT
Start: 2018-06-15 | End: 2018-06-20 | Stop reason: HOSPADM

## 2018-06-15 RX ORDER — BENZTROPINE MESYLATE 1 MG/1
1 TABLET ORAL 2 TIMES DAILY PRN
Status: DISCONTINUED | OUTPATIENT
Start: 2018-06-15 | End: 2018-06-20 | Stop reason: HOSPADM

## 2018-06-15 RX ADMIN — LOSARTAN POTASSIUM 100 MG: 100 TABLET, FILM COATED ORAL at 21:55

## 2018-06-15 RX ADMIN — LAMOTRIGINE 100 MG: 100 TABLET ORAL at 07:56

## 2018-06-15 RX ADMIN — HYDROXYZINE HYDROCHLORIDE 50 MG: 50 TABLET, FILM COATED ORAL at 12:38

## 2018-06-15 RX ADMIN — MULTIPLE VITAMINS W/ MINERALS TAB 1 TABLET: TAB at 21:55

## 2018-06-15 RX ADMIN — Medication 2000 MG: at 21:55

## 2018-06-15 RX ADMIN — LAMOTRIGINE 100 MG: 100 TABLET ORAL at 17:31

## 2018-06-15 RX ADMIN — OLANZAPINE 5 MG: 5 TABLET, FILM COATED ORAL at 21:55

## 2018-06-15 ASSESSMENT — ACTIVITIES OF DAILY LIVING (ADL)
ORAL_HYGIENE: INDEPENDENT
DRESS: INDEPENDENT
ORAL_HYGIENE: INDEPENDENT
GROOMING: INDEPENDENT
DRESS: INDEPENDENT
GROOMING: INDEPENDENT
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
## 2018-06-15 NOTE — PLAN OF CARE
Problem: Patient Care Overview  Goal: Team Discussion  Team Plan:   BEHAVIORAL TEAM DISCUSSION    Participants: Amanda Stroud NP, Dwain Mcnair, RN, Ailin Nieto OT and FRED Ceja  Progress: Initial   Continued Stay Criteria/Rationale: Psychosis  Medical/Physical: Refer to medical   Precautions:   Behavioral Orders   Procedures     Code 1 - Restrict to Unit     Fall precautions     Routine Programming     As clinically indicated     Status 15     Every 15 minutes.     Plan: 58 y.o male with hx of schizoaffective d/o bipolar type bib self at the recommendation of his outpatient therapist for evaluation of delusions and paranoia. Pt has not had MH admission in 32 years. Has Hx of MH commitment in 1989. Current stressors: works over nights 5 days a week at [employer], missed medication, live in girlfriend had MH admission recently. Psychiatric and Medical Assessment. Medication Management. Therapeutic Milieu. Occupational therapy, mental health education, Individual and group support. Pt is voluntary.   Rationale for change in precautions or plan: NA

## 2018-06-15 NOTE — PLAN OF CARE
Problem: Patient Care Overview  Goal: Interdisciplinary Rounds/Family Conf  Outcome: Therapy, progress toward functional goals is gradual  Illness Management Recovery model:  Taking Action.    Patient identified the following actions they can take when early warning signs are present in order to prevent relapse:    1. Pt attendin groups and programming.

## 2018-06-15 NOTE — PROGRESS NOTES
"Initial Psychosocial Assessment    I have reviewed the chart, met with the patient, and developed Care Plan. Information for assessment was obtained from: pt and chart review    Presenting Problem:  58 y.o male with hx of schizoaffective disorder- bipolar type was referred to ED by his therapist for evaluation of delusions and paranoia. Pt has missed work recently and believes he \"invented the Teenage Mutant [employer] Turtles\".  Current stressors include- his live in girlfriend was discharged form hospital 5 days ago for mental health issues, work stress and irregular work and medication schedule.  Pt has Dr Chris Morrow as an outpatient psychiatrist at [facility]. Pt denied SI/HI/SIB. Pt was admitted voluntarily.     History of Mental Health and Chemical Dependency:  Schizoaffective d/o bipolar type.    admission 32 years ago.( Hx of 4  admits)  1 SA when pt was 18 y.o  Established outpatient psychiatrist and therapist.  Pt reports ETOH abuse hx but has not consumed ETOH for past 30 years. Pt attended AA in past but not currently.     Family Description (Constellation, Family Psychiatric History):  Pt is single with live in girlfriend.   Nephew committed suicide 1 year ago. Aunt with MI.      Significant Life Events (Illness, Abuse, Trauma, Death):  Irregular work (nights 5 days a week) schedule. Missed medications. Girlfriends recent  admission.  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   Financial Status:  Employement  Legal Issues:   Hx of  commitment at [place] 1989  Ethnic/Cultural Issues:      Spiritual Orientation:  [place]    Service History:  None  Current Treatment Providers are:    PCP:  Dr Flanagan- [facility]  Psychiatrist:  Dr Chris Morrow- [facility]  [phone] Fax: [phone]  Therapist:  Phone Counseling though [facility] at " [employer]- Pt has high deductible health insurance and reports he couldn't afford in person therapy, but once deductible is met he will seek in person therapy through [facility] as they have offered it in past.   CADI Worker:  None  Social Service Assessment/Plan:    Pt was  Hypervigilant but cooperative and insightful during assessment . Pt identified hearing AH prior to admission to [facility]. He was concerned about his employer knowing he is out as he works F- Tuesday 10pm-  7am.  assisted pt with going on employers website and reporting absence and need for medical leave during admission.  Pt was grateful for assistance. Pt would benefit from continued psychiatry support,individual therapy for symptoms management and to process stressful situations (IE girlfriends MH issues, stress at work). No MH admission for 32 years and pt appears to be functioning well with SPMI.     Hospital staff will provide a safe environment and a therapeutic milieu. Pt will have psychiatric assessment and medication management by the psychiatrist. CTC will do individual inpatient treatment planning and after care planning. Staff will continue to assess pt as needed. Patient will participate in unit groups and activities. Pt will receive individual and group support on the unit.     Patient admitted for safety/stabilization of mood disorder sx's.  Medication will be reviewed, adjusted per MD's as indicated.   Will contact outpatient providers for care coordination.  Will discuss options for increased community supports.  Will continue to assess, coordinate care, and ensure appropriate f/u care is in place.

## 2018-06-15 NOTE — PROGRESS NOTES
"   06/15/18 1200   General Information   Date Initially Attended OT 06/15/18   Clinical Impression   Affect Anxious   Orientation Oriented to person, place and time   Appearance and ADLs Neatly groomed   Attention to Internal Stimuli No observed signs   Interaction Skills Initiates appropriately with staff;Initiates appropriately with peers   Ability to Communicate Needs Independent   Verbal Content Clear;Appropriate to topic   Ability to Maintain Boundaries Maintains appropriate physical boundaries;Maintains appropriate verbal boundaries   Participation Initiates participation   Concentration Concentrates 50 minutes   Ability to Concentrate With structure   Follows and Comprehends Directions Independently follows 2 step verbal directions   Memory Delayed and immediate recall intact;Needs further assessment   Organization Independently organizes medium tasks   Decision Making Independent   Planning and Problem Solving Independently plans ahead;Needs further assessment   Ability to Apply and Learn Concepts Applies within group structure   Frustrations / Stress Tolerance Independently identifies sources of frustration/stress   Level of Insight Insightful into needs, issues, goals;Needs further assessment   Self Esteem Needs further assessment   Social Supports Unable to identify any supports   General Observation/Plan   General Observations/Plan See Comments   Attended 2 of 2 OT groups. He initiated comments, tried to offer feedback to a peer about an interaction which may have not been helpful, though in attempt to be thoughtful. He chose and worked on a task familiar in steps. Stated not wanting to work with a scissors so author adapted steps so he did not need to use one. He offered direct eye contact and was social. He participated in an activity focused on identifying helpful sensory modalities available on the unit. He appeared involved. He stated reason for admission as \"delusions care for self\". Changes he " "hopes for at time of d/c was \"live more with reality\". Pt was given and completed a written self assessment. He chose the goal focus to take better are of grooming, ask for help as needed, feel more comfortable with initiating comments and work more cooperatively with others. OT purpose was explained with the value of having involvement in treatment plan, and provided options to meet self identified goals. Plan: Provide structure, support, and encouragement. Offer education on coping strategies and life management skills. Assist pt to increase self awareness regarding mental health issues and expand network of stress and anxiety techniques. Assess further.       "

## 2018-06-15 NOTE — PROGRESS NOTES
Facilitated a group where participants stuart a life tree which represented pt's roots, aspirations and goals, legacies that have been passed on and which pt wishes to pass on and a compost heap that represents everything they do not wish to inhabit their life any longer. Pt participated in the group discussion.

## 2018-06-15 NOTE — PROGRESS NOTES
"CLINICAL NUTRITION SERVICES - ASSESSMENT NOTE     Nutrition Prescription    RECOMMENDATIONS FOR MDs/PROVIDERS TO ORDER:  None today    Malnutrition Status:    Non-severe malnutrition in the context of acute illness    Recommendations already ordered by Registered Dietitian (RD):  Double portions PRN    Future/Additional Recommendations:  If wt loss continues, consider Boost Plus     REASON FOR ASSESSMENT  Jordan Barnett is a/an 58 year old male assessed by the dietitian for Admission Nutrition Risk Screen for unintentional loss of 10# or more in the past two months    NUTRITION HISTORY  - Pt reports eating 3 meals a day PTA. He reports always having enough food but finds cooking to be more work then he always wants to do. He does not eat out often due to being expensive.   - Overall he has a good appetite, he just doesn't always cook so he sometimes will eat less or convenience foods d/t this.     CURRENT NUTRITION ORDERS  Diet: Regular  Intake/Tolerance: pt reports eating 100% of breakfast this morning and still being hungry.    LABS  Labs reviewed  - vitamin B12 1193 (H)  - vitamin D deficiency screen 36 (wnl)  - folate 37.5 (wnl)     MEDICATIONS  Medications reviewed  - fish oil  - theravit-m    ANTHROPOMETRICS  Height: 177.8 cm (5' 10\")  Most Recent Weight: 69.1 kg (152 lb 4.8 oz)    IBW: 75.5 kg  BMI: Normal BMI  Weight History: pt has lost 8 lbs (5%) over the last 3 months. He reports his UBW was 172 lbs but hasn't weighed this for years. Would like to regain to ~165 lbs.   Wt Readings from Last 10 Encounters:   06/14/18 69.1 kg (152 lb 4.8 oz)   06/14/18 68.9 kg (152 lb)   05/16/18 68.8 kg (151 lb 11.2 oz)   04/04/18 71 kg (156 lb 8 oz)   02/21/18 72.6 kg (160 lb)   01/24/18 72.6 kg (160 lb)   01/05/18 72.4 kg (159 lb 9.6 oz)   12/13/17 72.4 kg (159 lb 9.6 oz)   11/30/17 72.7 kg (160 lb 3.2 oz)   08/17/17 72.1 kg (159 lb)     Dosing Weight: 69 kg - admit wt    ASSESSED NUTRITION NEEDS  Estimated Energy " Needs: 4473-7520 kcals/day (25 - 30 kcals/kg )  Justification: Maintenance/Wt gain per pt preference   Estimated Protein Needs: 59-69 grams protein/day (0.8 - 1 grams of pro/kg)  Justification: Maintenance  Estimated Fluid Needs: 1 mL/kcal  Justification: Per provider pending fluid status    PHYSICAL FINDINGS  See malnutrition section below.    MALNUTRITION  % Intake: Decreased intake does not meet criteria  % Weight Loss: Weight loss does not meet criteria  Subcutaneous Fat Loss: Facial region: mild  Muscle Loss: Temporal, Facial & jaw region and Dorsal hand: mild  Fluid Accumulation/Edema: None noted  Malnutrition Diagnosis: Non-severe malnutrition in the context of acute illness    NUTRITION DIAGNOSIS  Predicted inadequate energy/protein intake related to variable PO intakes as evidenced by pt reliant on PO intakes to meet nutritional needs      INTERVENTIONS  Implementation  Discussed nutrition history and PO since admission. Discussed menu ordering and snacks available on the unit. Discussed oral nutrition supplements and pt would like to hold off for now, working on eating more food since he has a good appetite. Encouraged adequate PO of food and fluids.    Goals  Patient to consume % of nutritionally adequate meal trays TID, or the equivalent with supplements/snacks.     Monitoring/Evaluation  Progress toward goals will be monitored and evaluated per protocol.      Judi Palencia RD, LD  Unit Pager: [phone]

## 2018-06-15 NOTE — PLAN OF CARE
"Problem: Patient Care Overview  Goal: Individualization & Mutuality  Reasons you are in the hospital:  1) \"I thought my thoughts were unreal\".  2) Auditory hallucinations.     Goals for discharge:  1) Medication changes.  2) Physical therapy consultation. Per medical consult.        "

## 2018-06-15 NOTE — PROGRESS NOTES
06/14/18 2226   Patient Belongings   Did you bring any home meds/supplements to the hospital?  No   Patient Belongings other (see comments)   Disposition of Belongings Other (see comment)   Belongings Search Yes   Clothing Search Yes   Second Staff Chris WATSON               Admission:  I am responsible for any personal items that are not sent to the safe or pharmacy.  [facility] is not responsible for loss, theft or damage of any property in my possession.    Pt. Locker: shorts, belt, shoes, socks, phone, keys, shirt;  6/16/18: Car keys were given to brother.   Duffle bag with clothing, knee brace and keys were brought in by visitors-6/18/18    Pt. Room: glasses, watch, 2 jeans, 2 socks, 3 shirts, 2 underwear    Security: $22, visa (*[id number]), insurance card, [employer] card (*[id number]), Visa (*[id number]), 2 business cards, 's License (*[id number])    Signature:  _________________________________ Date: _______  Time: _____                                              Staff Signature:  ____________________________ Date: ________  Time: _____      2nd Staff person, if patient is unable/unwilling to sign:    Signature: ________________________________ Date: ________  Time: _____     Discharge:  [facility] has returned all of my personal belongings:    Signature: _________________________________ Date: ________  Time: _____                                          Staff Signature:  ____________________________ Date: ________  Time: _____

## 2018-06-15 NOTE — PROGRESS NOTES
Pt has been pleasant. Pt has been cooperative. Pt has been med compliant. Pt is eating well at meals. Pt has been busy with programming Pt has been  seen by medical and psychiatry. Pt denies any intent to injure himself. Pt with considerable anxiety. Offered and given hydroxyzine 50 mg. PRN.

## 2018-06-15 NOTE — CONSULTS
Internal Medicine Consult - Initial Visit       Jordan Barnett MRN# [id number]   YOB: 1960 Age: 58 year old   Date of Admission: 6/14/2018  PCP: Juan Antonio Flanagan  Date of Service: 6/15/2018    Referring Provider: Néstor Isbell MD  Reason for Consult: S/p fractured L patella          Assessment and Recommendations:   Jordan Barnett is a 58 year old male with a history of HTN, CKD stage III, schizophrenia, depression, and anxiety and is admitted for worsening depression and disorganized thoughts/delusions.     # Depression, schizophrenia - Management per Psychiatry.    # HTN - Stable.    - Continue PTA Losartan  - Monitor BPs per routine     # CKD stage III - BL Cr ~ 1.5, GFR 48.  BP well controlled.  Cr 1.52, BUN 31 this admission.   - Encourage PO intake  - BMP in am   - Avoid/minimize nephrotoxic medications     # S/p left patellar fracture - Occurred in 11/2017.  Has been going to PT oupt and doing well but complaining of some soreness over the last week.  Follows with Dr. Goetz (Sports Medicine), last seen in clinic on 5/16.    - PT consult   - Tylenol PRN for pain   - Monitor for redness, swelling       Medicine will review AM labs and sign off, no further recommendations at this time.  Please feel free to reconsult if patient's symptoms worsen or if new problems arise.  Thank you for the opportunity to care for this patient.       Alva Amaral CNP  Hospitalist Service   Pager: [phone]       Reason for Visit:   S/p fractured L patella          History of Present Illness:   History is obtained from the patient and medical record.     This patient is a 58 year old male with a history of HTN, CKD stage III, schizophrenia, depression, and anxiety and is admitted for worsening depression and disorganized thoughts/delusions.      Internal Medicine service was asked to see patient for recent L patellar fracture.  Jordan reports that he injured his left knee in November 2017.  He did  not require surgery.  He recently completed physical therapy outpatient with good effect but has been complaining of soreness over the last week.  Complains of some soreness with kneeling and ambulation.  Reports little headache today.  Otherwise denies chest pain, dyspnea, abdominal pain, nausea, vomiting, fevers, diarrhea, and chills.               Review of Systems:   A 10 point ROS was performed and negative unless otherwise noted in HPI.           Past Medical History:   Reviewed and updated in Epic.  Past Medical History:   Diagnosis Date     Depressive disorder, not elsewhere classified     Manic      Unspecified schizophrenia, unspecified condition              Past Surgical History:   Reviewed and updated in Epic.  Past Surgical History:   Procedure Laterality Date     COLONOSCOPY      normal. has fam hx colon cancer     COLONOSCOPY N/A 10/13/2014    Procedure: COLONOSCOPY;  Surgeon: Santy Constantino MD;  Location: [facility]     HERNIA REPAIR, INGUINAL RT/LT  02/12/2004     SURGICAL HISTORY OF -       Incised & Drained - ? Perirectal Abscess              Social History:   Reviewed and updated in Epic.  Social History     Social History     Marital status: Single     Spouse name: N/A     Number of children: N/A     Years of education: N/A     Occupational History     Not on file.     Social History Main Topics     Smoking status: Former Smoker     Packs/day: 1.00     Years: 30.00     Types: Cigarettes     Quit date: 4/2/2012     Smokeless tobacco: Former User     Quit date: 4/2/2012      Comment: 2 PPD     Alcohol use Yes      Comment: RARE     Drug use: No      Comment: 30 years ago     Sexual activity: No     Other Topics Concern     Parent/Sibling W/ Cabg, Mi Or Angioplasty Before 65f 55m? No     Social History Narrative              Family History:   Reviewed and updated in Epic.  Family History   Problem Relation Age of Onset     Thyroid Disease Mother      Coronary Artery Disease Mother      pacemaker  "    CANCER Paternal Grandfather      CANCER Sister      Cancer - colorectal Sister      MENTAL ILLNESS Nephew      committed suicide at age 27             Allergies:     Allergies   Allergen Reactions     Lisinopril Nausea     Felt weakness nausea, couldn't sleep             Medications:     Current Facility-Administered Medications   Medication     acetaminophen (TYLENOL) tablet 500-1,000 mg     alum & mag hydroxide-simethicone (MYLANTA ES/MAALOX  ES) suspension 30 mL     bisacodyl (DULCOLAX) Suppository 10 mg     fish oil-omega-3 fatty acids capsule 2,000 mg     hydrOXYzine (ATARAX) tablet 50 mg     lamoTRIgine (LaMICtal) tablet 100 mg     losartan (COZAAR) tablet 100 mg     magnesium hydroxide (MILK OF MAGNESIA) suspension 30 mL     multivitamin, therapeutic with minerals (THERA-VIT-M) tablet 1 tablet     OLANZapine (zyPREXA) tablet 5-10 mg     traZODone (DESYREL) tablet 50 mg     ziprasidone (GEODON) capsule 120 mg            Physical Exam:   Blood pressure 135/79, pulse 72, temperature 96.4  F (35.8  C), temperature source Tympanic, resp. rate 16, height 1.778 m (5' 10\"), weight 69.1 kg (152 lb 4.8 oz), SpO2 100 %.  Body mass index is 21.85 kg/(m^2).    GENERAL: Alert and oriented x 3. Well nourished, well developed.  No acute distress.    HEENT: Normocephalic, atraumatic. Anicteric sclera. Mucous membranes moist.   CV: RRR. S1, S2. No murmurs appreciated.   RESPIRATORY: Effort normal on room air. Lungs CTAB with no wheezing, rales, or rhonchi.   GI: Abdomen soft and non distended, bowel sounds present x all 4 quadrants. No tenderness, rebound, or guarding.   NEUROLOGICAL: No focal deficits. Follows commands.  Strength 5/5 in upper and lower extremities.   MUSCULOSKELETAL: No obvious joint swelling or tenderness. Moves all extremities.   EXTREMITIES: No gross deformities. No peripheral edema. Intact bilateral pedal pulses.   SKIN: Grossly warm, dry, and intact. No jaundice. No rashes.             Data:   I " personally reviewed the following studies:    ROUTINE IP LABS (Last four results)  CMP   Recent Labs  Lab 06/14/18  1500      POTASSIUM 4.7   CHLORIDE 109   CO2 25   ANIONGAP 5   GLC 96   BUN 31*   CR 1.52*   BENNY 9.0   PROTTOTAL 7.6   ALBUMIN 3.6   BILITOTAL 0.2   ALKPHOS 88   AST 15   ALT 32     CBC   Recent Labs  Lab 06/14/18  1500   WBC 8.4   RBC 4.17*   HGB 13.0*   HCT 39.3*   MCV 94   MCH 31.2   MCHC 33.1   RDW 12.2        INR No lab results found in last 7 days.      Unresulted Labs Ordered in the Past 30 Days of this Admission     Date and Time Order Name Status Description    6/15/2018 0030 Vitamin D In process     6/15/2018 0030 Folate In process     6/15/2018 0030 Vitamin B12 In process     6/15/2018 0030 Treponema Abs w Reflex to RPR and Titer In process

## 2018-06-16 LAB
ANION GAP SERPL CALCULATED.3IONS-SCNC: 6 MMOL/L (ref 3–14)
BUN SERPL-MCNC: 34 MG/DL (ref 7–30)
CALCIUM SERPL-MCNC: 9.1 MG/DL (ref 8.5–10.1)
CHLORIDE SERPL-SCNC: 107 MMOL/L (ref 94–109)
CO2 SERPL-SCNC: 27 MMOL/L (ref 20–32)
CREAT SERPL-MCNC: 1.54 MG/DL (ref 0.66–1.25)
GFR SERPL CREATININE-BSD FRML MDRD: 47 ML/MIN/1.7M2
GLUCOSE SERPL-MCNC: 88 MG/DL (ref 70–99)
POTASSIUM SERPL-SCNC: 4.3 MMOL/L (ref 3.4–5.3)
SODIUM SERPL-SCNC: 140 MMOL/L (ref 133–144)

## 2018-06-16 PROCEDURE — 36415 COLL VENOUS BLD VENIPUNCTURE: CPT | Performed by: NURSE PRACTITIONER

## 2018-06-16 PROCEDURE — 25000132 ZZH RX MED GY IP 250 OP 250 PS 637: Performed by: NURSE PRACTITIONER

## 2018-06-16 PROCEDURE — 80048 BASIC METABOLIC PNL TOTAL CA: CPT | Performed by: NURSE PRACTITIONER

## 2018-06-16 PROCEDURE — 12400007 ZZH R&B MH INTERMEDIATE UMMC

## 2018-06-16 RX ADMIN — HYDROXYZINE HYDROCHLORIDE 50 MG: 50 TABLET, FILM COATED ORAL at 09:09

## 2018-06-16 RX ADMIN — LAMOTRIGINE 100 MG: 100 TABLET ORAL at 07:17

## 2018-06-16 RX ADMIN — OLANZAPINE 5 MG: 5 TABLET, FILM COATED ORAL at 22:03

## 2018-06-16 RX ADMIN — MULTIPLE VITAMINS W/ MINERALS TAB 1 TABLET: TAB at 22:03

## 2018-06-16 RX ADMIN — LOSARTAN POTASSIUM 100 MG: 100 TABLET, FILM COATED ORAL at 22:02

## 2018-06-16 RX ADMIN — LAMOTRIGINE 100 MG: 100 TABLET ORAL at 13:11

## 2018-06-16 RX ADMIN — OLANZAPINE 5 MG: 5 TABLET, FILM COATED ORAL at 07:17

## 2018-06-16 RX ADMIN — Medication 2000 MG: at 22:03

## 2018-06-16 ASSESSMENT — ACTIVITIES OF DAILY LIVING (ADL)
DRESS: INDEPENDENT
ORAL_HYGIENE: INDEPENDENT
GROOMING: INDEPENDENT
GROOMING: INDEPENDENT
DRESS: INDEPENDENT
ORAL_HYGIENE: INDEPENDENT
LAUNDRY: UNABLE TO COMPLETE

## 2018-06-16 NOTE — PROGRESS NOTES
Patient stated he is having issues with his girlfriend and wonders about the future of their relationship.  It has cause a great deal of strain and there are trust issues  associated with this.  Patient did have a good meeting with his sister today and says he takes comfort in being here and is trying to be positive and the visitation helped with this. Patient denies SI and SIB at this time.

## 2018-06-16 NOTE — PLAN OF CARE
"Problem: Manic Symptoms  Goal: Manic Symptoms  Signs and symptoms of listed problems will be absent or manageable.  Pt will be absent of SI  Pt will exhibit a decrease in manic sx within 48 hours.   Pt will sleep a minimum of 6 hours per night.   Pt will be absent of disorganized thinking.   Pt will attend a minimum of 3 groups daily and participate in milieu activities.    Outcome: No Change    48-Hour Nursing Assessment:    Patient is visible in the milieu; social and participating in group activities. He is pleasant and cooperative. He takes all his medications. Patient denies SI/SIB but with auditory hallucinations. Patient did not elaborate the auditory hallucinations. Patient was unclear what these voices but one thing certain \"they are not telling to harm myself\". His speech is pressured. After calling his girlfriend after breakfast, patient became isolative. Patient states \"I'm upset with my girlfriend. She is aggressive and verbally abusive. I don't know if it will work but I love her\". Patient remains delusional. He thinks that he invented the mutant ninja turtles. Patient sleeps an average of 5-1/2 hours at night. His appetite is good.          "

## 2018-06-16 NOTE — PLAN OF CARE
"Problem: Patient Care Overview  Goal: Plan of Care/Patient Progress Review  Illness Management Recovery model:  Ways to [place].    Patient identified the following specific strategies to cope with their symptoms:    1. \"I meditate\"  2. \"I do exercise like walking\"  3. \"Keep it to myself\"        "

## 2018-06-16 NOTE — PLAN OF CARE
Problem: Patient Care Overview  Goal: Plan of Care/Patient Progress Review  PT - PT orders received and acknowledged for patellar fracture.  Per discussion with charge nurse, pt currently ambulating without an assistive device safely.  PT will follow up with pt tomorrow for home exercise program as needed.

## 2018-06-17 ENCOUNTER — APPOINTMENT (OUTPATIENT)
Dept: PHYSICAL THERAPY | Facility: CLINIC | Age: 58
End: 2018-06-17
Attending: NURSE PRACTITIONER
Payer: COMMERCIAL

## 2018-06-17 PROCEDURE — 97110 THERAPEUTIC EXERCISES: CPT | Mod: GP | Performed by: PHYSICAL THERAPIST

## 2018-06-17 PROCEDURE — 90853 GROUP PSYCHOTHERAPY: CPT

## 2018-06-17 PROCEDURE — 40000193 ZZH STATISTIC PT WARD VISIT: Performed by: PHYSICAL THERAPIST

## 2018-06-17 PROCEDURE — 97161 PT EVAL LOW COMPLEX 20 MIN: CPT | Mod: GP | Performed by: PHYSICAL THERAPIST

## 2018-06-17 PROCEDURE — 12400007 ZZH R&B MH INTERMEDIATE UMMC

## 2018-06-17 PROCEDURE — 25000132 ZZH RX MED GY IP 250 OP 250 PS 637: Performed by: NURSE PRACTITIONER

## 2018-06-17 PROCEDURE — 97530 THERAPEUTIC ACTIVITIES: CPT | Mod: GP | Performed by: PHYSICAL THERAPIST

## 2018-06-17 RX ADMIN — LAMOTRIGINE 100 MG: 100 TABLET ORAL at 14:50

## 2018-06-17 RX ADMIN — LAMOTRIGINE 100 MG: 100 TABLET ORAL at 08:06

## 2018-06-17 RX ADMIN — OLANZAPINE 5 MG: 5 TABLET, FILM COATED ORAL at 22:10

## 2018-06-17 RX ADMIN — LOSARTAN POTASSIUM 100 MG: 100 TABLET, FILM COATED ORAL at 22:10

## 2018-06-17 RX ADMIN — MULTIPLE VITAMINS W/ MINERALS TAB 1 TABLET: TAB at 22:10

## 2018-06-17 RX ADMIN — OLANZAPINE 5 MG: 5 TABLET, FILM COATED ORAL at 08:06

## 2018-06-17 RX ADMIN — Medication 2000 MG: at 22:10

## 2018-06-17 ASSESSMENT — ACTIVITIES OF DAILY LIVING (ADL)
DRESS: INDEPENDENT
ORAL_HYGIENE: INDEPENDENT
ORAL_HYGIENE: INDEPENDENT
LAUNDRY: WITH SUPERVISION
GROOMING: INDEPENDENT
GROOMING: INDEPENDENT
DRESS: INDEPENDENT

## 2018-06-17 NOTE — PROGRESS NOTES
"Physical Therapy Evaluation and Treatment    PT orders by Adrienne Amaral PA-C on 6/15/18 for patellar pain and soreness.    D: Pt reports L patellar fracture suffered in fall at work in November of 2017. 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 Pt states a week or 2 ago he was lifting boxes of bananas off just 1 pallet, but this is many boxes, and a couple days later he had some pain in the R knee.  Pt practices Dougie Chi, he has no pain with walking, stairs or squating. Pt is fearful of lifting and kneeling.  Pt went to 1 session of PT and was issued squats and 6\" step ups x30 reps each every other day. He does the exercises but not consistently.    I: EXAM: Pt demonstrates full active and passive L knee ROM without pain.  He demonstrates strength in L LE 5/5 for extension and flexion.  Pt completed R SLS for 25s. Pt able to complete 1/2 squat and then full catcher's squat. Pt performed lunge.  He denies pain. INTERVENTION: Exercise: Pt instructed in single limb stance as an exercises, wall squats or squats infront of bed tapping buttocks and standing back up. Activity: He is hesitant but trials kneeling. Pt able to kneel at EOB on pillow.  He is educated at home if he needs to kneel to use a pillow and at work he should have access to knee pads or not be required to kneel. Pt surprised to have no pain with tall kneeling and able to flex back for short kneeling with only a little pull at quads.  Pt transfers back to standing without a problem.  He is educated that some soreness is normal with return to activity after an injury and that he may need to use ice in the evening but to try to keep moving (going for a short walk).  PT asked if there is anything pt is allowed to take for pain (ex, ibuprophen, naproxen, etc) pt states that he can take Tylenol and PT suggested to do this if the pain is limiting sleeping or normal walking pattern following recommended doses.  Pt with good response to education.  He will " "try to do his squats and single leg stands.  Pt feeling like he can manage the pain with less fear of the \"patella splitting open\" now. Pt also states that he has been lifting with his back at work and educated to lift with his legs to restore strength he had prior to injury and prevent back injury.  PT impressed upon patient that back injury is not something that resolves as well and can be recurrent so very important to avoid.    A/P: Pt with fear avoidance due to patellar fracture with limited follow-up (1 session of PT and occasional MD visits). Pt could not reproduce pain with any activity, strength and ROM ar good. Fracture healing per x-rays but not completely resolved.  MD notes states \"Likely will have difficulty kneeling\" but not no kneeling, so PT educated on limiting and using padding, but no strict restriction to completely avoid. Pt appreciated reassurance, felt that going through the functional movements was helpful.    Total time: 23minutes: 5 eval, 8 therapeuctic exercise, 10 therapeutic activity  "

## 2018-06-17 NOTE — PROGRESS NOTES
"   06/16/18 1943   Behavioral Health   Hallucinations denies / not responding to hallucinations   Thinking distractable   Orientation person: oriented;place: oriented   Memory baseline memory   Insight poor   Judgement impaired   Eye Contact at examiner   Affect full range affect   Mood mood is calm   Physical Appearance/Attire neat   Hygiene well groomed   Suicidality other (see comments)  (denies )   1. Wish to be Dead No   2. Non-Specific Active Suicidal Thoughts  No   Self Injury other (see comment)  (denies )   Elopement (none reported or observed )   Activity other (see comment)  (attended group and socialized with others )   Speech pressured;clear;coherent   Activities of Daily Living   Hygiene/Grooming independent   Oral Hygiene independent   Dress independent   Laundry unable to complete   Room Organization independent       Pt denied SI and SIB.  Pt reported feeling \" I might be a little excited all of the sudden I feel really good.\"  Pt seems calm, ate supper, visible in the milieu, attended group and socialized with others.  Pt daily goal \" I guess to fill out my paper work for discharge.\"  Pt goal after discharge \" I think I'll take better care of my knee I think I need to talk to people more.\"  Pt denied having any psychotic symptoms \" I have had hallucination today, but not right now.\"  Pt denied having any racing thoughts.  Pt reported good appetite and sleeping well.  Pt reported pain broken knee cap sore (2).  Pt reported SE's feeling tired after taking hydroxyzine.  Pt reported feeling high for half an hour after taking lamictal.  Pt is independent with ADL's.  Pt wants visitors.     "

## 2018-06-17 NOTE — PROGRESS NOTES
IM aware of labs on 6/16, no changes at this time. Fluids encourage. Pt eating and taking fluids.

## 2018-06-17 NOTE — PLAN OF CARE
Problem: Manic Symptoms  Goal: Social and Therapeutic (Manic Symptoms)  Signs and symptoms of listed problems will be absent or manageable.     Pt attended 1 out of 1 OT groups offered. Pt attended a structured OT group with a focus on self-awareness and socialization. Pt appeared comfortable sharing positive personal information, and was respectful in listening and responding to peers. Pt was able to identify positive personal strengths and was pleasant during group this date.

## 2018-06-17 NOTE — PROGRESS NOTES
06/17/18 1305   Behavioral Health   Hallucinations denies / not responding to hallucinations   Thinking distractable   Orientation person: oriented;place: oriented;date: oriented;time: oriented   Memory baseline memory   Insight poor   Judgement impaired   Eye Contact at examiner   Affect full range affect   Mood mood is calm   Physical Appearance/Attire neat   Hygiene well groomed   Suicidality other (see comments)  (denied)   1. Wish to be Dead No   2. Non-Specific Active Suicidal Thoughts  No   Psycho Education   Type of Intervention 1:1 intervention   Response participates, initiates socially appropriate   Hours 0.5   Treatment Detail check in   Activities of Daily Living   Hygiene/Grooming independent   Oral Hygiene independent   Dress independent   Laundry with supervision   Room Organization independent      Pt has had a good day today. Less pressured speech and less paranoia than the previous day. Pt has spent a lot of time walking in the halls, playing games with other pts, and having conversations with other pts too. Pt shaved today and wants to shower. Pt seems to be in a good standing with SO right now. Pt feels that the medications are working well and pt is looking forward to going back to work. No depression and anxiety. No auditory hallucinations. Pt attended community meeting and the OT group. No SI/ SIB.

## 2018-06-18 PROCEDURE — 12400007 ZZH R&B MH INTERMEDIATE UMMC

## 2018-06-18 PROCEDURE — 25000132 ZZH RX MED GY IP 250 OP 250 PS 637: Performed by: NURSE PRACTITIONER

## 2018-06-18 PROCEDURE — 99232 SBSQ HOSP IP/OBS MODERATE 35: CPT | Performed by: NURSE PRACTITIONER

## 2018-06-18 PROCEDURE — 97150 GROUP THERAPEUTIC PROCEDURES: CPT | Mod: GO

## 2018-06-18 RX ADMIN — LAMOTRIGINE 100 MG: 100 TABLET ORAL at 15:16

## 2018-06-18 RX ADMIN — OLANZAPINE 5 MG: 5 TABLET, FILM COATED ORAL at 21:38

## 2018-06-18 RX ADMIN — LAMOTRIGINE 100 MG: 100 TABLET ORAL at 08:51

## 2018-06-18 RX ADMIN — MULTIPLE VITAMINS W/ MINERALS TAB 1 TABLET: TAB at 21:38

## 2018-06-18 RX ADMIN — OLANZAPINE 5 MG: 5 TABLET, FILM COATED ORAL at 08:52

## 2018-06-18 RX ADMIN — Medication 2000 MG: at 21:38

## 2018-06-18 ASSESSMENT — ACTIVITIES OF DAILY LIVING (ADL)
GROOMING: INDEPENDENT
HYGIENE/GROOMING: INDEPENDENT
DRESS: INDEPENDENT
ORAL_HYGIENE: INDEPENDENT
DRESS: INDEPENDENT
ORAL_HYGIENE: INDEPENDENT

## 2018-06-18 NOTE — PROGRESS NOTES
"Patient stated that he in not suicidal or self injurious and feel more tired than depressed with no anxiety at this time.  Patient stated he is perplexed with his relationship with his wife and feels she wants to have sex all the time and hejust two times a week. He approached her about that being part of her wanting to break up but has not gotten any real answer as of yet.  He says , \"she is very complex and I think about us all the time.\" he also stated he in concerned that she was also in the hospital not to long ago and is working on getting better herself.  "

## 2018-06-18 NOTE — PLAN OF CARE
Problem: Manic Symptoms  Goal: Social and Therapeutic (Manic Symptoms)  Signs and symptoms of listed problems will be absent or manageable.     Jordan participated in OT group designed to facilitate decision making, planning, judgment, concentration, trying new things, and experiencing success to improve self-esteem in treatment. Jordan continued on a goal-directed task. He was oriented to process with recollection of what he needed to do. He worked slowly and carefully. He was observed to mutter under his breath. He did not initiate conversation with others, but responding to questions asked by this writer and did participate in casual conversation about memories of childhood birthday parties. When he spoke, his speech was stuttered and he stammered often. It was unclear if he was responding occasionally to internal stimuli or just incredibly anxious. His eye contact was sporadic shifty and when he was not working on his project he wrung his hands. He reported that does not typically make things and remarked he  always tells my girlfriend s daughter that I can t do anything.  In response to how it feels to be making something he said  It feels really good!

## 2018-06-18 NOTE — PROGRESS NOTES
Writer met with pt regarding FMLA paperwork needed by pts employer. Pt plans to discharge Wednesday 06/20/18 home with follow up care.

## 2018-06-18 NOTE — PROGRESS NOTES
"[facility], [place]   Psychiatric Progress Note        Interim History:   Jordan Barnett is a 58 year old male admited for worsening anxiety and delusional thinking.  Patient was referred to the emergency room by his therapist.  He has a history of schizoaffective disorder, bipolar type.  Patient had been presenting with increased psychotic thinking, paranoia and delusions.  Patient has been perseverating over being \"very important person\", and that people are after him for fighting to get higher wages.  He has been feeling hopeless.  He has missed 3 or 4 days of work in the last week due to being disorganized.  Patient denies feeling suicidal and homicidal. He is not able to function in the community due to his recent decompensation. The patient is a very poor historian.  He is hypomanic, tangential, disorganized, difficult to follow.  His speech is pressured.  He is moving a lot.  He has symptoms of tardive dyskinesia including moving his feet, facial movement, and blinking.  Patient had a difficult time staying on topic.  He was very anxious. He was oriented to self, date, and place, and partially to situation.   The patient's care was discussed with the treatment team during the daily team meeting and/or staff's chart notes were reviewed.  Staff report patient has been calm, pleasant, cooperative. Patient is attending groups and participating appropriately. He is less pressured, more organized. Patient is eating well and taking medications as prescribed. Rates mood as depressed. Denies SI, SIB. Slept 6 hours.     Met with patient. Appear anxious. He is hyperverbal and pressured. States he is feeling much better. Anxiety is better but \"you make me anxious\". \"I don't feel anxious with others\". Patient things medications are working. He does not want more changes. He will ask for prn if he feels he needs it. States he is eating and sleeping well.           Medications:       fish " oil-omega-3 fatty acids  2,000 mg Oral QPM     lamoTRIgine  100 mg Oral BID     losartan  100 mg Oral Daily     multivitamin, therapeutic with minerals  1 tablet Oral At Bedtime     OLANZapine  5 mg Oral BID          Allergies:     Allergies   Allergen Reactions     Lisinopril Nausea     Felt weakness nausea, couldn't sleep          Labs:     Recent Results (from the past 672 hour(s))   CBC with platelets differential    Collection Time: 06/14/18  3:00 PM   Result Value Ref Range    WBC 8.4 4.0 - 11.0 10e9/L    RBC Count 4.17 (L) 4.4 - 5.9 10e12/L    Hemoglobin 13.0 (L) 13.3 - 17.7 g/dL    Hematocrit 39.3 (L) 40.0 - 53.0 %    MCV 94 78 - 100 fl    MCH 31.2 26.5 - 33.0 pg    MCHC 33.1 31.5 - 36.5 g/dL    RDW 12.2 10.0 - 15.0 %    Platelet Count 276 150 - 450 10e9/L    Diff Method Automated Method     % Neutrophils 64.8 %    % Lymphocytes 23.4 %    % Monocytes 8.9 %    % Eosinophils 1.8 %    % Basophils 0.4 %    % Immature Granulocytes 0.7 %    Nucleated RBCs 0 0 /100    Absolute Neutrophil 5.5 1.6 - 8.3 10e9/L    Absolute Lymphocytes 2.0 0.8 - 5.3 10e9/L    Absolute Monocytes 0.8 0.0 - 1.3 10e9/L    Absolute Eosinophils 0.2 0.0 - 0.7 10e9/L    Absolute Basophils 0.0 0.0 - 0.2 10e9/L    Abs Immature Granulocytes 0.1 0 - 0.4 10e9/L    Absolute Nucleated RBC 0.0    Comprehensive metabolic panel    Collection Time: 06/14/18  3:00 PM   Result Value Ref Range    Sodium 139 133 - 144 mmol/L    Potassium 4.7 3.4 - 5.3 mmol/L    Chloride 109 94 - 109 mmol/L    Carbon Dioxide 25 20 - 32 mmol/L    Anion Gap 5 3 - 14 mmol/L    Glucose 96 70 - 99 mg/dL    Urea Nitrogen 31 (H) 7 - 30 mg/dL    Creatinine 1.52 (H) 0.66 - 1.25 mg/dL    GFR Estimate 48 (L) >60 mL/min/1.7m2    GFR Estimate If Black 57 (L) >60 mL/min/1.7m2    Calcium 9.0 8.5 - 10.1 mg/dL    Bilirubin Total 0.2 0.2 - 1.3 mg/dL    Albumin 3.6 3.4 - 5.0 g/dL    Protein Total 7.6 6.8 - 8.8 g/dL    Alkaline Phosphatase 88 40 - 150 U/L    ALT 32 0 - 70 U/L    AST 15 0 - 45  U/L   Troponin I    Collection Time: 06/14/18  3:00 PM   Result Value Ref Range    Troponin I ES <0.015 0.000 - 0.045 ug/L   Drug abuse screen 77 urine (FL, RH, SH)    Collection Time: 06/14/18  4:56 PM   Result Value Ref Range    Amphetamine Qual Urine Negative NEG^Negative    Barbiturates Qual Urine Negative NEG^Negative    Benzodiazepine Qual Urine Negative NEG^Negative    Cannabinoids Qual Urine Negative NEG^Negative    Cocaine Qual Urine Negative NEG^Negative    Opiates Qualitative Urine Negative NEG^Negative    PCP Qual Urine Negative NEG^Negative   UA with Microscopic reflex to Culture    Collection Time: 06/14/18 10:45 PM   Result Value Ref Range    Color Urine Straw     Appearance Urine Clear     Glucose Urine Negative NEG^Negative mg/dL    Bilirubin Urine Negative NEG^Negative    Ketones Urine Negative NEG^Negative mg/dL    Specific Gravity Urine 1.005 1.003 - 1.035    Blood Urine Negative NEG^Negative    pH Urine 6.0 5.0 - 7.0 pH    Protein Albumin Urine Negative NEG^Negative mg/dL    Urobilinogen mg/dL Normal 0.0 - 2.0 mg/dL    Nitrite Urine Negative NEG^Negative    Leukocyte Esterase Urine Negative NEG^Negative    Source Midstream Urine     WBC Urine 0 0 - 5 /HPF    RBC Urine 1 0 - 2 /HPF    Mucous Urine Present (A) NEG^Negative /LPF   Treponema Abs w Reflex to RPR and Titer    Collection Time: 06/15/18  7:45 AM   Result Value Ref Range    Treponema Antibodies Nonreactive NR^Nonreactive   Vitamin B12    Collection Time: 06/15/18  7:45 AM   Result Value Ref Range    Vitamin B12 1193 (H) 193 - 986 pg/mL   Folate    Collection Time: 06/15/18  7:45 AM   Result Value Ref Range    Folate 37.5 >5.4 ng/mL   Vitamin D    Collection Time: 06/15/18  7:45 AM   Result Value Ref Range    Vitamin D Deficiency screening 36 20 - 75 ug/L   TSH with free T4 reflex and/or T3 as indicated    Collection Time: 06/15/18  7:45 AM   Result Value Ref Range    TSH 3.70 0.40 - 4.00 mU/L   Basic metabolic panel    Collection Time:  06/16/18  7:54 AM   Result Value Ref Range    Sodium 140 133 - 144 mmol/L    Potassium 4.3 3.4 - 5.3 mmol/L    Chloride 107 94 - 109 mmol/L    Carbon Dioxide 27 20 - 32 mmol/L    Anion Gap 6 3 - 14 mmol/L    Glucose 88 70 - 99 mg/dL    Urea Nitrogen 34 (H) 7 - 30 mg/dL    Creatinine 1.54 (H) 0.66 - 1.25 mg/dL    GFR Estimate 47 (L) >60 mL/min/1.7m2    GFR Estimate If Black 57 (L) >60 mL/min/1.7m2    Calcium 9.1 8.5 - 10.1 mg/dL            Psychiatric Examination:   Temp: 97.1  F (36.2  C) Temp src: Tympanic BP: 100/72 Pulse: 71   Resp: 16 SpO2: 97 % O2 Device: None (Room air)    Weight is 156 lbs 1.6 oz  Body mass index is 22.4 kg/(m^2).    Appearance: well groomed, awake, alert, cooperative, mild distress and thin  Attitude:  cooperative  Eye Contact:  good  Mood:  anxious  Affect:  mood congruent  Speech:  pressured speech and hyperverbal  Psychomotor Behavior:  evidence of tardive dyskinesia, evidence of tics, fidgeting and physical agitation  Throught Process:  disorganized, tangental and better  Associations:  no loose associations  Thought Content:  no evidence of suicidal ideation or homicidal ideation, no auditory hallucinations present and no visual hallucinations present  Insight:  fair  Judgement:  fair  Oriented to:  time, person, and place  Attention Span and Concentration:  intact  Recent and Remote Memory:  intact         Precautions:     Behavioral Orders   Procedures     Code 1 - Restrict to Unit     Fall precautions     Routine Programming     As clinically indicated     Status 15     Every 15 minutes.          DIagnoses:   1.  Schizoaffective disorder, bipolar type  2.  Medical problems includes chronic kidney disease, tubular adenoma of the colon.         Plan:   1.  Lamictal 100 mg twice a day.   2.  Zyprexa 5 mg twice a day  3.  As needed medications will include gabapentin, hydroxyzine, Zyprexa, and trazodone, cogentin  4.  Internal medicine to follow-up for medical problems  5.  Blood work  was reviewed  6.  Estimated length of stay 7-10 days  7.  Disposition; likely to home  The patient was consulted on nature of illness and treatment options. Care was coordinated with the treatment team.       Amanda COFFMAN CNP  Date: 06/18/18  Time: 12:32 PM

## 2018-06-18 NOTE — DISCHARGE INSTRUCTIONS
Behavioral Discharge Planning and Instructions      Summary:  You were admitted on 6/14/2018  due to Psychotic Symptomology.  You were treated by AUGUSTUS Pollard DNP and discharged on 06/20/18 from [facility] to Home      Principal Diagnosis:   Schizoaffective disorder, bipolar type    Health Care Follow-up Appointments:   Psychiatrist:  Dr.Carlos Morrow- Your next appointment is scheduled for Friday July 27th 2018 3:00pm. Clinic has you on cancellation list and will contact you if an earlier appointment becomes available.   [facility]   [address]  [place] [address]   [phone]  Fax: [phone]- HUC to send AVS      Therapist:  You have requested to continue phone counseling through your EAP at [facility]. Once your health insurance deductible is met please discuss in person therapy options through your [facility] clinic listed above.      Attend all scheduled appointments with your outpatient providers. Call at least 24 hours in advance if you need to reschedule an appointment to ensure continued access to your outpatient providers.   Major Treatments, Procedures and Findings:  You were provided with: a psychiatric assessment, assessed for medical stability, medication evaluation and/or management, group therapy, milieu management and medical interventions    Symptoms to Report: feeling more aggressive, increased confusion, losing more sleep, mood getting worse or thoughts of suicide    Early warning signs can include: increased depression or anxiety sleep disturbances increased thoughts or behaviors of suicide or self-harm  increased unusual thinking, such as paranoia or hearing voices    Safety and Wellness:  Take all medicines as directed.  Make no changes unless your doctor suggests them.      Follow treatment recommendations.  Refrain from alcohol and non-prescribed drugs.  If there is a concern for safety, call 911.    Resources:   Crisis Intervention:  "294.128.3295 or 716-744-2504 (TTY: 771.211.1577).  Call anytime for help.  National [place] on Mental Illness (www.[place].frieda.org): 205.559.6847 or 355-589-0321.  Suicide Awareness Voices of Education (SAVE) (www.save.org): 840-872-KUNY (0103)  National Suicide Prevention Line (www.mentalhealthmn.org): 656-956-DKNH (2795)  Mental Health Consumer/Survivor Network of [place] (www.mhcsn.net): 287.995.9643 or 202-428-3959  Mental Health Association of [place] (www.mentalhealth.org): 737.148.6658 or 115-478-9456  Self- Management and Recovery Training., SMART-- Toll free: 515.990.6779  www.EZMove.Knip  [facility] Crisis Response [phone]  Text 4 Life: txt \"LIFE\" to 45735 for immediate support and crisis intervention  Crisis text line: Text \"[place]\" to 009878. Free, confidential, 24/7.  Crisis Intervention: 336.927.1440 or 436-293-0516. Call anytime for help.     The treatment team has appreciated the opportunity to work with you.     If you have any questions or concerns our unit number is [phone].  You may be receiving a follow-up phone call within the next three days from a representative from behavioral health.    You have identified the best phone number to reach you as [phone].      "

## 2018-06-18 NOTE — PROGRESS NOTES
06/18/18 1400   Behavioral Health   Hallucinations denies / not responding to hallucinations   Thinking distractable   Orientation person: oriented;place: oriented;date: oriented;time: oriented   Memory baseline memory   Insight admits / accepts   Judgement intact   Eye Contact at examiner   Affect full range affect   Mood mood is calm   Physical Appearance/Attire attire appropriate to age and situation   1. Wish to be Dead No   2. Non-Specific Active Suicidal Thoughts  No   Psycho Education   Type of Intervention 1:1 intervention   Response participates, initiates socially appropriate   Hours 0.5   Treatment Detail check in   Activities of Daily Living   Hygiene/Grooming independent   Oral Hygiene independent   Dress independent   Room Organization independent   Activity   Activity Assistance Provided independent   Pt feels like he is getting more focused.  Thinks he might be able to to return to his job at [employer] by Thursday this week.  He likes work and the people contact it provides.

## 2018-06-19 PROCEDURE — G0177 OPPS/PHP; TRAIN & EDUC SERV: HCPCS

## 2018-06-19 PROCEDURE — 97150 GROUP THERAPEUTIC PROCEDURES: CPT | Mod: GO

## 2018-06-19 PROCEDURE — 25000132 ZZH RX MED GY IP 250 OP 250 PS 637: Performed by: NURSE PRACTITIONER

## 2018-06-19 PROCEDURE — 12400007 ZZH R&B MH INTERMEDIATE UMMC

## 2018-06-19 PROCEDURE — 99232 SBSQ HOSP IP/OBS MODERATE 35: CPT | Performed by: NURSE PRACTITIONER

## 2018-06-19 RX ADMIN — OLANZAPINE 7.5 MG: 2.5 TABLET, FILM COATED ORAL at 21:54

## 2018-06-19 RX ADMIN — LAMOTRIGINE 100 MG: 100 TABLET ORAL at 08:55

## 2018-06-19 RX ADMIN — MULTIPLE VITAMINS W/ MINERALS TAB 1 TABLET: TAB at 21:55

## 2018-06-19 RX ADMIN — OLANZAPINE 7.5 MG: 2.5 TABLET, FILM COATED ORAL at 08:55

## 2018-06-19 RX ADMIN — LOSARTAN POTASSIUM 100 MG: 100 TABLET, FILM COATED ORAL at 21:54

## 2018-06-19 RX ADMIN — LAMOTRIGINE 100 MG: 100 TABLET ORAL at 14:51

## 2018-06-19 RX ADMIN — Medication 2000 MG: at 21:54

## 2018-06-19 NOTE — PLAN OF CARE
"Problem: Manic Symptoms  Goal: Manic Symptoms  Signs and symptoms of listed problems will be absent or manageable.  Pt will be absent of SI  Pt will exhibit a decrease in manic sx within 48 hours.   Pt will sleep a minimum of 6 hours per night.   Pt will be absent of disorganized thinking.   Pt will attend a minimum of 3 groups daily and participate in milieu activities.    Outcome: Improving  Feels ready to go home. Accepts auditory hallucinations \"they arent bad\". Denies SI, denies wish to die. Wants to get back to his job. Slept 6 hours last night. Attends structured groups.       "

## 2018-06-19 NOTE — PLAN OF CARE
Problem: Manic Symptoms  Goal: Social and Therapeutic (Manic Symptoms)  Signs and symptoms of listed problems will be absent or manageable.       Pt. Attended and actively participated in 2 of 2 scheduled OT sessions today. Pt. Actively participated in goal directed task session. Quick to engage in task from previous day.  Pt worked independently.  Asked the group if they thought that hobbies were important.  Pt explained that he had few hobbies but was considering acquiring some.  Pt commented that he could even work on some craft activities with his girlfriend's 10yo daughter.  Patient attended goal directed task group which focussed on the topic of affirmations.  Session included a group discussion of affirmations and Pt. was given several handouts of affirmation examples.  Participants were asked to select a personal affirmation. Pt was organized in his efforts.  Affect remained fairly flat.  Pt.was cooperative and pleasant throughout session.

## 2018-06-19 NOTE — PROGRESS NOTES
"[facility], [place]   Psychiatric Progress Note        Interim History:   Jordan Barnett is a 58 year old male admited for worsening anxiety and delusional thinking.  Patient was referred to the emergency room by his therapist.  He has a history of schizoaffective disorder, bipolar type.  Patient had been presenting with increased psychotic thinking, paranoia and delusions.  Patient has been perseverating over being \"very important person\", and that people are after him for fighting to get higher wages.  He has been feeling hopeless.  He has missed 3 or 4 days of work in the last week due to being disorganized.  Patient denies feeling suicidal and homicidal. He is not able to function in the community due to his recent decompensation. The patient is a very poor historian.  He is hypomanic, tangential, disorganized, difficult to follow.  His speech is pressured.  He is moving a lot.  He has symptoms of tardive dyskinesia including moving his feet, facial movement, and blinking.  Patient had a difficult time staying on topic.  He was very anxious. He was oriented to self, date, and place, and partially to situation.   The patient's care was discussed with the treatment team during the daily team meeting and/or staff's chart notes were reviewed.  Staff report patient has been pleasant and cooperative. Patient is attending groups and participating appropriately. He is less pressured, more organized.Still a bit anxious. Appear responding to internal stimuli. Patient is eating well and taking medications as prescribed.  Denies SI, SIB. Slept 7 hours.     Met with patient. Continues to be anxious but able to sit still. Reports doing well. Admits of having anxiety. \"I am nervous around doctors\". \"Your eyes are too intense\". Speech continues to be disorganized and tangential but better. Discussed his medications and discharge. Patient is agreeable to increase Zyprexa. He would like to be " discharged tomorrow.   Increase Zyprexa to 7.5 mg, bid.          Medications:       fish oil-omega-3 fatty acids  2,000 mg Oral QPM     lamoTRIgine  100 mg Oral BID     losartan  100 mg Oral Daily     multivitamin, therapeutic with minerals  1 tablet Oral At Bedtime     OLANZapine  7.5 mg Oral BID          Allergies:     Allergies   Allergen Reactions     Lisinopril Nausea     Felt weakness nausea, couldn't sleep          Labs:     Recent Results (from the past 672 hour(s))   CBC with platelets differential    Collection Time: 06/14/18  3:00 PM   Result Value Ref Range    WBC 8.4 4.0 - 11.0 10e9/L    RBC Count 4.17 (L) 4.4 - 5.9 10e12/L    Hemoglobin 13.0 (L) 13.3 - 17.7 g/dL    Hematocrit 39.3 (L) 40.0 - 53.0 %    MCV 94 78 - 100 fl    MCH 31.2 26.5 - 33.0 pg    MCHC 33.1 31.5 - 36.5 g/dL    RDW 12.2 10.0 - 15.0 %    Platelet Count 276 150 - 450 10e9/L    Diff Method Automated Method     % Neutrophils 64.8 %    % Lymphocytes 23.4 %    % Monocytes 8.9 %    % Eosinophils 1.8 %    % Basophils 0.4 %    % Immature Granulocytes 0.7 %    Nucleated RBCs 0 0 /100    Absolute Neutrophil 5.5 1.6 - 8.3 10e9/L    Absolute Lymphocytes 2.0 0.8 - 5.3 10e9/L    Absolute Monocytes 0.8 0.0 - 1.3 10e9/L    Absolute Eosinophils 0.2 0.0 - 0.7 10e9/L    Absolute Basophils 0.0 0.0 - 0.2 10e9/L    Abs Immature Granulocytes 0.1 0 - 0.4 10e9/L    Absolute Nucleated RBC 0.0    Comprehensive metabolic panel    Collection Time: 06/14/18  3:00 PM   Result Value Ref Range    Sodium 139 133 - 144 mmol/L    Potassium 4.7 3.4 - 5.3 mmol/L    Chloride 109 94 - 109 mmol/L    Carbon Dioxide 25 20 - 32 mmol/L    Anion Gap 5 3 - 14 mmol/L    Glucose 96 70 - 99 mg/dL    Urea Nitrogen 31 (H) 7 - 30 mg/dL    Creatinine 1.52 (H) 0.66 - 1.25 mg/dL    GFR Estimate 48 (L) >60 mL/min/1.7m2    GFR Estimate If Black 57 (L) >60 mL/min/1.7m2    Calcium 9.0 8.5 - 10.1 mg/dL    Bilirubin Total 0.2 0.2 - 1.3 mg/dL    Albumin 3.6 3.4 - 5.0 g/dL    Protein Total 7.6 6.8  - 8.8 g/dL    Alkaline Phosphatase 88 40 - 150 U/L    ALT 32 0 - 70 U/L    AST 15 0 - 45 U/L   Troponin I    Collection Time: 06/14/18  3:00 PM   Result Value Ref Range    Troponin I ES <0.015 0.000 - 0.045 ug/L   Drug abuse screen 77 urine (FL, RH, SH)    Collection Time: 06/14/18  4:56 PM   Result Value Ref Range    Amphetamine Qual Urine Negative NEG^Negative    Barbiturates Qual Urine Negative NEG^Negative    Benzodiazepine Qual Urine Negative NEG^Negative    Cannabinoids Qual Urine Negative NEG^Negative    Cocaine Qual Urine Negative NEG^Negative    Opiates Qualitative Urine Negative NEG^Negative    PCP Qual Urine Negative NEG^Negative   UA with Microscopic reflex to Culture    Collection Time: 06/14/18 10:45 PM   Result Value Ref Range    Color Urine Straw     Appearance Urine Clear     Glucose Urine Negative NEG^Negative mg/dL    Bilirubin Urine Negative NEG^Negative    Ketones Urine Negative NEG^Negative mg/dL    Specific Gravity Urine 1.005 1.003 - 1.035    Blood Urine Negative NEG^Negative    pH Urine 6.0 5.0 - 7.0 pH    Protein Albumin Urine Negative NEG^Negative mg/dL    Urobilinogen mg/dL Normal 0.0 - 2.0 mg/dL    Nitrite Urine Negative NEG^Negative    Leukocyte Esterase Urine Negative NEG^Negative    Source Midstream Urine     WBC Urine 0 0 - 5 /HPF    RBC Urine 1 0 - 2 /HPF    Mucous Urine Present (A) NEG^Negative /LPF   Treponema Abs w Reflex to RPR and Titer    Collection Time: 06/15/18  7:45 AM   Result Value Ref Range    Treponema Antibodies Nonreactive NR^Nonreactive   Vitamin B12    Collection Time: 06/15/18  7:45 AM   Result Value Ref Range    Vitamin B12 1193 (H) 193 - 986 pg/mL   Folate    Collection Time: 06/15/18  7:45 AM   Result Value Ref Range    Folate 37.5 >5.4 ng/mL   Vitamin D    Collection Time: 06/15/18  7:45 AM   Result Value Ref Range    Vitamin D Deficiency screening 36 20 - 75 ug/L   TSH with free T4 reflex and/or T3 as indicated    Collection Time: 06/15/18  7:45 AM   Result  Value Ref Range    TSH 3.70 0.40 - 4.00 mU/L   Basic metabolic panel    Collection Time: 06/16/18  7:54 AM   Result Value Ref Range    Sodium 140 133 - 144 mmol/L    Potassium 4.3 3.4 - 5.3 mmol/L    Chloride 107 94 - 109 mmol/L    Carbon Dioxide 27 20 - 32 mmol/L    Anion Gap 6 3 - 14 mmol/L    Glucose 88 70 - 99 mg/dL    Urea Nitrogen 34 (H) 7 - 30 mg/dL    Creatinine 1.54 (H) 0.66 - 1.25 mg/dL    GFR Estimate 47 (L) >60 mL/min/1.7m2    GFR Estimate If Black 57 (L) >60 mL/min/1.7m2    Calcium 9.1 8.5 - 10.1 mg/dL            Psychiatric Examination:       BP: 101/78 Pulse: 73            Weight is 156 lbs 1.6 oz  Body mass index is 22.4 kg/(m^2).    Appearance: well groomed, awake, alert, cooperative, mild distress and thin  Attitude:  cooperative  Eye Contact:  good  Mood:  anxious  Affect:  mood congruent  Speech:  pressured speech and hyperverbal  Psychomotor Behavior:  evidence of tardive dyskinesia, evidence of tics, fidgeting and physical agitation  Throught Process:  disorganized, tangental and better  Associations:  no loose associations  Thought Content:  no evidence of suicidal ideation or homicidal ideation, no auditory hallucinations present and no visual hallucinations present  Insight:  fair  Judgement:  fair  Oriented to:  time, person, and place  Attention Span and Concentration:  intact  Recent and Remote Memory:  intact         Precautions:     Behavioral Orders   Procedures     Code 1 - Restrict to Unit     Fall precautions     Routine Programming     As clinically indicated     Status 15     Every 15 minutes.          DIagnoses:   1.  Schizoaffective disorder, bipolar type  2.  Medical problems includes chronic kidney disease, tubular adenoma of the colon.         Plan:   1.  Lamictal 100 mg twice a day.   2.  Increase Zyprexa to 7.5 mg twice a day  3.  As needed medications will include gabapentin, hydroxyzine, Zyprexa, and trazodone, cogentin  4.  Internal medicine to follow-up for medical  problems  5.  Blood work was reviewed. Repeat CBC and BMP on 6/20/18.   6.  Estimated length of stay 7-10 days  7.  Disposition; likely to home. Tentative discharge tomorrow.   The patient was consulted on nature of illness and treatment options. Care was coordinated with the treatment team.     Amanda COFFMAN CNP  Date: 06/19/18  Time: 1:26 PM

## 2018-06-19 NOTE — PROGRESS NOTES
"Showing some reactive anxiety in regards to medication schedule when going back to working nights and hx of antipsychotic causing him to be tired during day. No c/o with Zyprexa at this time. Compliant with increase in Zyprexa. Reports mood as \"serious\".   "

## 2018-06-19 NOTE — PROGRESS NOTES
06/19/18 1032   Behavioral Health   Hallucinations denies / not responding to hallucinations   Thinking distractable   Orientation person: oriented;place: oriented;date: oriented;time: oriented   Memory baseline memory   Insight insight appropriate to situation;insight appropriate to events   Judgement impaired   Eye Contact at examiner   Affect full range affect   Mood mood is calm   Physical Appearance/Attire attire appropriate to age and situation;appears stated age;neat   Hygiene well groomed   Suicidality other (see comments)  (denies)   Enviromental Risk Factors Medical bed   Self Injury other (see comment)  (denies)   Speech clear;coherent   Medication Sensitivity no observed side effects;no stated side effects   Psychomotor / Gait balanced;steady     Patient was calm, polite, and cooperative. He stated his move as good and appeared to have a positive shift. Patient attended groups and was appropriate with peers. Of note however, patient was questioning some discharge planning such as meeting with a therapist when he leaves. Also, patient did accidentally go in the wrong room and realized his mistake and went so far as to apoligize to the other patient by finding him in the lounge to say what happened.

## 2018-06-20 VITALS
BODY MASS INDEX: 21.9 KG/M2 | SYSTOLIC BLOOD PRESSURE: 134 MMHG | HEART RATE: 61 BPM | HEIGHT: 70 IN | TEMPERATURE: 95.7 F | DIASTOLIC BLOOD PRESSURE: 88 MMHG | OXYGEN SATURATION: 98 % | WEIGHT: 153 LBS | RESPIRATION RATE: 16 BRPM

## 2018-06-20 LAB
ANION GAP SERPL CALCULATED.3IONS-SCNC: 6 MMOL/L (ref 3–14)
BASOPHILS # BLD AUTO: 0 10E9/L (ref 0–0.2)
BASOPHILS NFR BLD AUTO: 0.3 %
BUN SERPL-MCNC: 22 MG/DL (ref 7–30)
CALCIUM SERPL-MCNC: 8.8 MG/DL (ref 8.5–10.1)
CHLORIDE SERPL-SCNC: 109 MMOL/L (ref 94–109)
CO2 SERPL-SCNC: 28 MMOL/L (ref 20–32)
CREAT SERPL-MCNC: 1.44 MG/DL (ref 0.66–1.25)
DIFFERENTIAL METHOD BLD: ABNORMAL
EOSINOPHIL # BLD AUTO: 0.3 10E9/L (ref 0–0.7)
EOSINOPHIL NFR BLD AUTO: 4.4 %
ERYTHROCYTE [DISTWIDTH] IN BLOOD BY AUTOMATED COUNT: 12.3 % (ref 10–15)
GFR SERPL CREATININE-BSD FRML MDRD: 51 ML/MIN/1.7M2
GLUCOSE SERPL-MCNC: 75 MG/DL (ref 70–99)
HCT VFR BLD AUTO: 38.2 % (ref 40–53)
HGB BLD-MCNC: 13 G/DL (ref 13.3–17.7)
IMM GRANULOCYTES # BLD: 0 10E9/L (ref 0–0.4)
IMM GRANULOCYTES NFR BLD: 0.4 %
LYMPHOCYTES # BLD AUTO: 1.4 10E9/L (ref 0.8–5.3)
LYMPHOCYTES NFR BLD AUTO: 20.5 %
MCH RBC QN AUTO: 31.2 PG (ref 26.5–33)
MCHC RBC AUTO-ENTMCNC: 34 G/DL (ref 31.5–36.5)
MCV RBC AUTO: 92 FL (ref 78–100)
MONOCYTES # BLD AUTO: 0.7 10E9/L (ref 0–1.3)
MONOCYTES NFR BLD AUTO: 9.4 %
NEUTROPHILS # BLD AUTO: 4.5 10E9/L (ref 1.6–8.3)
NEUTROPHILS NFR BLD AUTO: 65 %
NRBC # BLD AUTO: 0 10*3/UL
NRBC BLD AUTO-RTO: 0 /100
PLATELET # BLD AUTO: 249 10E9/L (ref 150–450)
POTASSIUM SERPL-SCNC: 4.4 MMOL/L (ref 3.4–5.3)
RBC # BLD AUTO: 4.17 10E12/L (ref 4.4–5.9)
SODIUM SERPL-SCNC: 143 MMOL/L (ref 133–144)
WBC # BLD AUTO: 7 10E9/L (ref 4–11)

## 2018-06-20 PROCEDURE — 80048 BASIC METABOLIC PNL TOTAL CA: CPT | Performed by: NURSE PRACTITIONER

## 2018-06-20 PROCEDURE — 99238 HOSP IP/OBS DSCHRG MGMT 30/<: CPT | Performed by: NURSE PRACTITIONER

## 2018-06-20 PROCEDURE — 85025 COMPLETE CBC W/AUTO DIFF WBC: CPT | Performed by: NURSE PRACTITIONER

## 2018-06-20 PROCEDURE — 36415 COLL VENOUS BLD VENIPUNCTURE: CPT | Performed by: NURSE PRACTITIONER

## 2018-06-20 PROCEDURE — 25000132 ZZH RX MED GY IP 250 OP 250 PS 637: Performed by: NURSE PRACTITIONER

## 2018-06-20 PROCEDURE — 97150 GROUP THERAPEUTIC PROCEDURES: CPT | Mod: GO

## 2018-06-20 RX ORDER — LAMOTRIGINE 100 MG/1
100 TABLET ORAL 2 TIMES DAILY
Qty: 60 TABLET | Refills: 0 | Status: SHIPPED | OUTPATIENT
Start: 2018-06-20 | End: 2023-01-12

## 2018-06-20 RX ORDER — OLANZAPINE 7.5 MG/1
7.5 TABLET, FILM COATED ORAL 2 TIMES DAILY
Qty: 60 TABLET | Refills: 0 | Status: SHIPPED | OUTPATIENT
Start: 2018-06-20 | End: 2018-07-21

## 2018-06-20 RX ADMIN — LAMOTRIGINE 100 MG: 100 TABLET ORAL at 08:33

## 2018-06-20 RX ADMIN — OLANZAPINE 7.5 MG: 2.5 TABLET, FILM COATED ORAL at 08:33

## 2018-06-20 RX ADMIN — LAMOTRIGINE 100 MG: 100 TABLET ORAL at 13:22

## 2018-06-20 ASSESSMENT — ACTIVITIES OF DAILY LIVING (ADL)
ORAL_HYGIENE: INDEPENDENT
GROOMING: INDEPENDENT
DRESS: INDEPENDENT
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
## 2018-06-20 NOTE — PLAN OF CARE
Problem: Manic Symptoms  Goal: Manic Symptoms  Signs and symptoms of listed problems will be absent or manageable.  Pt will be absent of SI  Pt will exhibit a decrease in manic sx within 48 hours.   Pt will sleep a minimum of 6 hours per night.   Pt will be absent of disorganized thinking.   Pt will attend a minimum of 3 groups daily and participate in milieu activities.    Outcome: Improving  Nursing Assessment:  Jordan was up ad philipp, spent most of the shift out of his room and in the milieu. Pt attended Community Meeting and Movie group at . Pt was pleasant on approach, he denied feeling anxious or depressed though appeared restless at times and his speech was slightly pressured. Jordan said his left knee cap pain is being effectively controlled while wearing his brace. Pt's gait has been steady. Jordan was med compliant, cooperative.

## 2018-06-21 ENCOUNTER — TELEPHONE (OUTPATIENT)
Dept: FAMILY MEDICINE | Facility: CLINIC | Age: 58
End: 2018-06-21

## 2018-06-21 NOTE — TELEPHONE ENCOUNTER
ED/UC/IP follow up phone call:   schizoaffective disorder, bipolar type (H)    RN please call to follow up    Number of ED visits in past 12 mths:   1    Dixie Salazar on 6/21/2018 at 10:43 AM

## 2018-06-21 NOTE — TELEPHONE ENCOUNTER
ED / Discharge Outreach Protocol    Patient Contact    Attempt # 1    Was call answered?  No.  Left message on voicemail with information to call me back.    Emely CORNEJO Rn

## 2018-06-26 NOTE — TELEPHONE ENCOUNTER
"ED / Discharge Outreach Protocol    Patient Contact    Attempt # 2    Was call answered?  Yes.  \"May I please speak with <patient name>\"  Is patient available?   Yes  Hospital/TCU/ED for chronic condition Discharge Protocol    \"Hi, my name is Alma Chuck, a registered nurse, and I am calling from [facility].  I am calling to follow up and see how things are going for you after your recent emergency visit/hospital/TCU stay.\"    Tell me how you are doing now that you are home?\" \"doing ok\"         Discharge Instructions    \"Let's review your discharge instructions.  What is/are the follow-up recommendations?  Pt. Response: \"I have an appt with my psychiatrist  - July 27th.... Chris Franco at [facility]. My  made the appt, that was the earliest I could get in. \"       \"Has an appointment with your primary care provider been scheduled?\"   \"dont' think I need to see Dr Flanagan at this time. I am going to see a psychiatrist, and then when the bills come in, I will see the counselor. \"    \"When you see the provider, I would recommend that you bring your medications with you.\"    Medications    \"Tell me what changed about your medicines when you discharged?\"    Changes to chronic meds?    0-1    \"What questions do you have about your medications?\"    None     New diagnoses of heart failure, COPD, diabetes, or MI?    No              Medication reconciliation completed? Yes  Was MTM referral placed (*Make sure to put transitions as reason for referral)?   No    Call Summary    \"What questions or concerns do you have about your recent visit and your follow-up care?\"     none  -\"I am going to call Dr Franco for the refills. \"      \"If you have questions or things don't continue to improve, we encourage you contact us through the main clinic number [phone] (give number).  Even if the clinic is not open, triage nurses are available 24/7 to help you.     We would like you to know that our clinic has " "extended hours 7 am to 7 pm some days each week (provide information).  We also have urgent care noon to eight in the ED daily  (provide details on closest location and hours/contact info)\"      \"Thank you for your time and take care!\"    Alma Woods RNC                   "

## 2018-07-21 ENCOUNTER — NURSE TRIAGE (OUTPATIENT)
Dept: NURSING | Facility: CLINIC | Age: 58
End: 2018-07-21

## 2018-07-21 DIAGNOSIS — F25.0 SCHIZOAFFECTIVE DISORDER, BIPOLAR TYPE (H): ICD-10-CM

## 2018-07-21 NOTE — TELEPHONE ENCOUNTER
Patient only has enough Olanzapine to get him through the weekend.  He has an appointment at [facility] on Wednesday July 25, so he will need enough medication to get him through.  He prefers to get his medication filled at [facility] pharmacy in [place].   Routed to Amanda Hardin RN  [facility]

## 2018-07-21 NOTE — TELEPHONE ENCOUNTER
Patient only has enough Olanzapine to get him through the weekend.  He has an appointment at [facility] on Wednesday July 25, so he will need enough medication to get him through.  He prefers to get his medication filled at [facility] in [place].   Routed to Amanda COFFMAN [facility]    Reason for Disposition    Caller requesting a NON-URGENT new prescription or refill and triager unable to refill per unit policy    Protocols used: MEDICATION QUESTION CALL-ADULT-

## 2018-07-23 ENCOUNTER — NURSE TRIAGE (OUTPATIENT)
Dept: NURSING | Facility: CLINIC | Age: 58
End: 2018-07-23

## 2018-07-24 NOTE — TELEPHONE ENCOUNTER
"Requested Prescriptions   Pending Prescriptions Disp Refills     OLANZapine (ZYPREXA) 7.5 MG tablet 60 tablet 0     Sig: Take 1 tablet (7.5 mg) by mouth 2 times daily    Antipsychotic Medications Failed    7/21/2018  5:17 PM       Failed - Recent (6 mo) or future (30 days) visit within the authorizing provider's specialty    Patient had office visit in the last 6 months or has a visit in the next 30 days with authorizing provider or within the authorizing provider's specialty.  See \"Patient Info\" tab in inbasket, or \"Choose Columns\" in Meds & Orders section of the refill encounter.           Passed - Blood pressure under 140/90 in past 12 months    BP Readings from Last 3 Encounters:   06/19/18 134/88   06/14/18 (!) 140/96   05/16/18 118/62                Passed - Patient is 12 years of age or older       Passed - Lipid panel on file within the past 12 months    Recent Labs   Lab Test  08/17/17   0807   09/19/15   0853   CHOL  159   < >  154   TRIG  89   < >  117   HDL  77   < >  69   LDL  64   < >  62   NHDL  82   < >   --    VLDL   --    --   23   CHOLHDLRATIO   --    --   2.2    < > = values in this interval not displayed.              Passed - CBC on file in past 12 months    Recent Labs   Lab Test  06/20/18   0854   WBC  7.0   RBC  4.17*   HGB  13.0*   HCT  38.2*   PLT  249       For [facility] ONLY: [id number] = WBC, [id number] = RBC         Passed - Heart Rate on file within past 12 months    Pulse Readings from Last 3 Encounters:   06/19/18 61   06/14/18 65   01/05/18 80              Passed - A1c or Glucose on file in past 12 months    Recent Labs   Lab Test  06/20/18   0854   07/28/16   0746   GLC  75   < >  97   A1C   --    --   5.5    < > = values in this interval not displayed.       Please review patients last 3 weights. If a weight gain of >10 lbs exists, you may refill the prescription once after instructing the patient to schedule an appointment within the next 30 days.    Wt Readings from Last 3 Encounters: "   06/19/18 153 lb (69.4 kg)   06/14/18 152 lb (68.9 kg)   05/16/18 151 lb 11.2 oz (68.8 kg)             Routing refill request to provider for review/approval because:  Failed protocol, prescribed by another Provider than Dr. Flanagan.

## 2018-07-27 RX ORDER — OLANZAPINE 7.5 MG/1
7.5 TABLET, FILM COATED ORAL 2 TIMES DAILY
Qty: 60 TABLET | Refills: 0 | Status: SHIPPED | OUTPATIENT
Start: 2018-07-27 | End: 2019-10-29 | Stop reason: DRUGHIGH

## 2018-09-13 ENCOUNTER — TELEPHONE (OUTPATIENT)
Dept: FAMILY MEDICINE | Facility: CLINIC | Age: 58
End: 2018-09-13

## 2018-09-13 DIAGNOSIS — N18.30 CKD (CHRONIC KIDNEY DISEASE) STAGE 3, GFR 30-59 ML/MIN (H): ICD-10-CM

## 2018-09-13 DIAGNOSIS — I10 ESSENTIAL HYPERTENSION WITH GOAL BLOOD PRESSURE LESS THAN 140/90: ICD-10-CM

## 2018-09-14 NOTE — TELEPHONE ENCOUNTER
Routing refill request to provider for review/approval because:  Labs out of range:  Creatinine    Creatinine   Date Value Ref Range Status   06/20/2018 1.44 (H) 0.66 - 1.25 mg/dL Final        Bianca JENKINS RN

## 2018-09-14 NOTE — TELEPHONE ENCOUNTER
"Requested Prescriptions   Pending Prescriptions Disp Refills     losartan (COZAAR) 100 MG tablet [Pharmacy Med Name: LOSARTAN 100MG   TAB] 90 tablet 3     Sig: TAKE ONE TABLET BY MOUTH ONCE DAILY    Angiotensin-II Receptors Failed    9/13/2018  9:56 PM       Failed - Normal serum creatinine on file in past 12 months    Recent Labs   Lab Test  06/20/18   0854   CR  1.44*            Passed - Blood pressure under 140/90 in past 12 months    BP Readings from Last 3 Encounters:   06/19/18 134/88   06/14/18 (!) 140/96   05/16/18 118/62                Passed - Recent (12 mo) or future (30 days) visit within the authorizing provider's specialty    Patient had office visit in the last 12 months or has a visit in the next 30 days with authorizing provider or within the authorizing provider's specialty.  See \"Patient Info\" tab in inbasket, or \"Choose Columns\" in Meds & Orders section of the refill encounter.           Passed - Patient is age 18 or older       Passed - Normal serum potassium on file in past 12 months    Recent Labs   Lab Test  06/20/18   0854   POTASSIUM  4.4                    Last Written Prescription Date:  8/17/17  Last Fill Quantity: 90,  # refills: 3   Last office visit: 12/13/2017 with prescribing provider:  FAUSTO   Future Office Visit:      "

## 2018-09-17 ENCOUNTER — MYC REFILL (OUTPATIENT)
Dept: FAMILY MEDICINE | Facility: CLINIC | Age: 58
End: 2018-09-17

## 2018-09-17 DIAGNOSIS — N18.30 CKD (CHRONIC KIDNEY DISEASE) STAGE 3, GFR 30-59 ML/MIN (H): ICD-10-CM

## 2018-09-17 DIAGNOSIS — I10 ESSENTIAL HYPERTENSION WITH GOAL BLOOD PRESSURE LESS THAN 140/90: ICD-10-CM

## 2018-09-17 RX ORDER — LOSARTAN POTASSIUM 100 MG/1
TABLET ORAL
Qty: 90 TABLET | Refills: 3 | Status: SHIPPED | OUTPATIENT
Start: 2018-09-17 | End: 2018-09-17

## 2018-09-18 RX ORDER — LOSARTAN POTASSIUM 100 MG/1
100 TABLET ORAL DAILY
Qty: 90 TABLET | Refills: 3 | Status: SHIPPED | OUTPATIENT
Start: 2018-09-18 | End: 2019-12-11

## 2018-09-18 NOTE — TELEPHONE ENCOUNTER
"Requested Prescriptions   Pending Prescriptions Disp Refills     losartan (COZAAR) 100 MG tablet 90 tablet 3     Sig: Take 1 tablet (100 mg) by mouth daily    Angiotensin-II Receptors Failed    9/18/2018  7:44 AM       Failed - Normal serum creatinine on file in past 12 months    Recent Labs   Lab Test  06/20/18   0854   CR  1.44*            Passed - Blood pressure under 140/90 in past 12 months    BP Readings from Last 3 Encounters:   06/19/18 134/88   06/14/18 (!) 140/96   05/16/18 118/62                Passed - Recent (12 mo) or future (30 days) visit within the authorizing provider's specialty    Patient had office visit in the last 12 months or has a visit in the next 30 days with authorizing provider or within the authorizing provider's specialty.  See \"Patient Info\" tab in inbasket, or \"Choose Columns\" in Meds & Orders section of the refill encounter.           Passed - Patient is age 18 or older       Passed - Normal serum potassium on file in past 12 months    Recent Labs   Lab Test  06/20/18   0854   POTASSIUM  4.4                      "

## 2018-09-18 NOTE — TELEPHONE ENCOUNTER
Message from [employer]:  Original authorizing provider: MD Jordan Rodrigues would like a refill of the following medications:  losartan (COZAAR) 100 MG tablet [Juan Antonio Flanagan MD]    Preferred pharmacy: [facility] - [place], [place] - [address]    Comment:

## 2018-09-18 NOTE — TELEPHONE ENCOUNTER
Dr. Flanagan,    Routing refill request to provider for review/approval because:  Labs out of range:  Creatinine of 1.44.    Last office visit 12/2017.  Please advise on refill for Losartan. Virgen JUNIOR RN

## 2018-09-26 ENCOUNTER — OFFICE VISIT (OUTPATIENT)
Dept: FAMILY MEDICINE | Facility: CLINIC | Age: 58
End: 2018-09-26
Payer: COMMERCIAL

## 2018-09-26 VITALS
OXYGEN SATURATION: 96 % | HEART RATE: 77 BPM | DIASTOLIC BLOOD PRESSURE: 78 MMHG | BODY MASS INDEX: 24.34 KG/M2 | WEIGHT: 170 LBS | SYSTOLIC BLOOD PRESSURE: 116 MMHG | HEIGHT: 70 IN | TEMPERATURE: 97.1 F | RESPIRATION RATE: 16 BRPM

## 2018-09-26 DIAGNOSIS — E78.5 HYPERLIPIDEMIA LDL GOAL <160: ICD-10-CM

## 2018-09-26 DIAGNOSIS — Z11.4 SCREENING FOR HUMAN IMMUNODEFICIENCY VIRUS: ICD-10-CM

## 2018-09-26 DIAGNOSIS — I10 BENIGN ESSENTIAL HYPERTENSION: ICD-10-CM

## 2018-09-26 DIAGNOSIS — Z23 NEED FOR PROPHYLACTIC VACCINATION AND INOCULATION AGAINST INFLUENZA: ICD-10-CM

## 2018-09-26 DIAGNOSIS — N18.30 CKD (CHRONIC KIDNEY DISEASE) STAGE 3, GFR 30-59 ML/MIN (H): ICD-10-CM

## 2018-09-26 DIAGNOSIS — Z00.00 ENCOUNTER FOR ROUTINE ADULT HEALTH EXAMINATION WITHOUT ABNORMAL FINDINGS: Primary | ICD-10-CM

## 2018-09-26 LAB
CREAT UR-MCNC: 18 MG/DL
MICROALBUMIN UR-MCNC: 11 MG/L
MICROALBUMIN/CREAT UR: 63.33 MG/G CR (ref 0–17)

## 2018-09-26 PROCEDURE — 82043 UR ALBUMIN QUANTITATIVE: CPT | Performed by: FAMILY MEDICINE

## 2018-09-26 PROCEDURE — 99396 PREV VISIT EST AGE 40-64: CPT | Mod: 25 | Performed by: FAMILY MEDICINE

## 2018-09-26 PROCEDURE — 90686 IIV4 VACC NO PRSV 0.5 ML IM: CPT | Performed by: FAMILY MEDICINE

## 2018-09-26 PROCEDURE — 90471 IMMUNIZATION ADMIN: CPT | Performed by: FAMILY MEDICINE

## 2018-09-26 NOTE — PROGRESS NOTES
SUBJECTIVE:   CC: Jordan Barnett is an 58 year old male who presents for preventative health visit.     Physical   Annual:     Getting at least 3 servings of Calcium per day:  Yes    Bi-annual eye exam:  Yes    Dental care twice a year:  Yes    Sleep apnea or symptoms of sleep apnea:  None    Diet:  Regular (no restrictions)    Frequency of exercise:  6-7 days/week    Duration of exercise:  15-30 minutes    Taking medications regularly:  Yes    Medication side effects:  Other    Additional concerns today:  No        Today's PHQ-2 Score:   PHQ-2 ( 1999 Pfizer) 9/26/2018   Q1: Little interest or pleasure in doing things 0   Q2: Feeling down, depressed or hopeless 0   PHQ-2 Score 0   Q1: Little interest or pleasure in doing things -   Q2: Feeling down, depressed or hopeless -   PHQ-2 Score -       Abuse: Current or Past(Physical, Sexual or Emotional)- No  Do you feel safe in your environment - Yes    Social History   Substance Use Topics     Smoking status: Former Smoker     Packs/day: 1.00     Years: 30.00     Types: Cigarettes     Quit date: 4/2/2012     Smokeless tobacco: Former User     Quit date: 4/2/2012      Comment: 2 PPD     Alcohol use Yes      Comment: RARE     Alcohol Use 9/25/2018   If you drink alcohol do you typically have greater than 3 drinks per day OR greater than 7 drinks per week? No       Last PSA:   PSA   Date Value Ref Range Status   06/09/2011 1.63 0 - 4 ug/L Final       Reviewed orders with patient. Reviewed health maintenance and updated orders accordingly - Yes  Patient Active Problem List   Diagnosis     Severe bipolar I disorder, most recent episode mixed, with psychotic features (H)     Sebaceous cyst     CKD (chronic kidney disease) stage 3, GFR 30-59 ml/min     Schizoaffective disorder (H)     Hyperlipidemia LDL goal <160     Tubular adenoma of colon     Former smoker     Benign essential hypertension     Former smoker, stopped smoking in distant past     Past Surgical History:    Procedure Laterality Date     COLONOSCOPY      normal. has fam hx colon cancer     COLONOSCOPY N/A 10/13/2014    Procedure: COLONOSCOPY;  Surgeon: Santy Constantino MD;  Location: [facility]     HERNIA REPAIR, INGUINAL RT/LT  02/12/2004     SURGICAL HISTORY OF -       Incised & Drained - ? Perirectal Abscess        Social History   Substance Use Topics     Smoking status: Former Smoker     Packs/day: 1.00     Years: 30.00     Types: Cigarettes     Quit date: 4/2/2012     Smokeless tobacco: Former User     Quit date: 4/2/2012      Comment: 2 PPD     Alcohol use Yes      Comment: RARE     Family History   Problem Relation Age of Onset     Thyroid Disease Mother      Coronary Artery Disease Mother      pacemaker     Cancer Paternal Grandfather      Cancer Sister      Cancer - colorectal Sister      Mental Illness Nephew      committed suicide at age 27         Current Outpatient Prescriptions   Medication Sig Dispense Refill     acetaminophen (TYLENOL) 500 MG tablet Take 1-2 tablets by mouth every 6 hours as needed.       HYDROXYZINE HCL PO Take 50 mg by mouth every 6 hours as needed        losartan (COZAAR) 100 MG tablet Take 1 tablet (100 mg) by mouth daily 90 tablet 3     Multiple Vitamins-Minerals (MULTIVITAMIN ADULT PO) Take 1 tablet by mouth At Bedtime        OLANZapine (ZYPREXA) 7.5 MG tablet Take 1 tablet (7.5 mg) by mouth 2 times daily 60 tablet 0     Omega-3 Fatty Acids (OMEGA 3 PO) Take 2,400 mg by mouth every evening       lamoTRIgine (LAMICTAL) 100 MG tablet Take 1 tablet (100 mg) by mouth 2 times daily 60 tablet 0     Allergies   Allergen Reactions     Lisinopril Nausea     Felt weakness nausea, couldn't sleep       Reviewed and updated as needed this visit by clinical staff  Tobacco  Allergies  Meds  Problems  Med Hx  Surg Hx  Fam Hx  Soc Hx          Reviewed and updated as needed this visit by Provider  Allergies  Meds  Problems        Past Medical History:   Diagnosis Date     CKD (chronic  "kidney disease)     stage 3     Depressive disorder, not elsewhere classified     Manic      HTN (hypertension)      Unspecified schizophrenia, unspecified condition       Past Surgical History:   Procedure Laterality Date     COLONOSCOPY      normal. has fam hx colon cancer     COLONOSCOPY N/A 10/13/2014    Procedure: COLONOSCOPY;  Surgeon: Santy Constantino MD;  Location: [facility]     HERNIA REPAIR, INGUINAL RT/LT  02/12/2004     SURGICAL HISTORY OF -       Incised & Drained - ? Perirectal Abscess        Review of Systems  CONSTITUTIONAL: NEGATIVE for fever, chills or change in weight  INTEGUMENTARY/SKIN: NEGATIVE for worrisome rashes, moles or lesions  EYES: NEGATIVE for vision changes or irritation  ENT: NEGATIVE for ear, mouth and throat problems  RESP: NEGATIVE for significant cough or SOB  CV: NEGATIVE for chest pain, palpitations or peripheral edema  GI: NEGATIVE for nausea, abdominal pain, heartburn, or change in bowel habits   male: negative for dysuria, hematuria, decreased urinary stream, erectile dysfunction, urethral discharge  MUSCULOSKELETAL: NEGATIVE for significant arthralgias or myalgia  NEURO: NEGATIVE for weakness, dizziness or paresthesias  ENDOCRINE: NEGATIVE for temperature intolerance, skin/hair changes  HEME/ALLERGY/IMMUNE: NEGATIVE for bleeding problems  PSYCHIATRIC: NEGATIVE for changes in mood or affect    Patient denies BM or urinary changes.  No fam hx of colon cancer.  No fam hx of prostate cancer.    OBJECTIVE:   /78 (BP Location: Right arm, Patient Position: Chair, Cuff Size: Adult Regular)  Pulse 77  Temp 97.1  F (36.2  C) (Tympanic)  Resp 16  Ht 5' 10.25\" (1.784 m)  Wt 170 lb (77.1 kg)  SpO2 96%  BMI 24.22 kg/m2    Physical Exam  GENERAL APPEARANCE: well-nourished, alert and no distress  EYES: pink conj, no icterus, PERRL, EOMI  HENT: ear canals and TM's normal, nose and mouth without ulcers or lesions, oropharynx clear and oral mucous membranes moist  NECK: no " adenopathy, no asymmetry, masses, or scars and thyroid normal to palpation  RESP: lungs clear to auscultation - no rales, rhonchi or wheezes  CV: regular rates and rhythm, normal S1 S2, no S3 or S4, no murmur, click or rub, no peripheral edema and peripheral pulses strong  ABDOMEN: soft, nontender, no hepatosplenomegaly, no masses and bowel sounds normal  RECTAL: deferred  MS: no musculoskeletal defects are noted and gait is age appropriate without ataxia  SKIN: no suspicious lesions or rashes  NEURO: Normal strength and tone, sensory exam grossly normal, mentation intact and speech normal    Diagnostic Test Results:  none     ASSESSMENT/PLAN:   Jordan was seen today for physical and flu shot.    Diagnoses and all orders for this visit:    Encounter for routine adult health examination without abnormal findings  Patient was advised on recommended screening and preventive health recommendations.  He verbalized understanding and agreed to the plans below.    Hyperlipidemia LDL goal <160  -     Lipid panel reflex to direct LDL Fasting; Future  Reinforced heart healthy lifestyle.    Benign essential hypertension  -     Albumin Random Urine Quantitative with Creat Ratio  Controlled.  Low salt, low fat diet.   Exercise as tolerated.  Take meds as prescribed; call if with side effects.     CKD (chronic kidney disease) stage 3, GFR 30-59 ml/min  -     Albumin Random Urine Quantitative with Creat Ratio  Stable creatinine recently.    Screening for human immunodeficiency virus  -     HIV Antigen Antibody Combo; Future    Need for prophylactic vaccination and inoculation against influenza  -     FLU VACCINE, SPLIT VIRUS, IM (QUADRIVALENT) [43839]- >3 YRS  -     Vaccine Administration, Initial [48723]        COUNSELING:   Reviewed preventive health counseling, as reflected in patient instructions       Regular exercise       Healthy diet/nutrition       Vision screening       Hearing screening       Immunizations    Vaccinated  "for: Influenza             Aspirin Prophylaxsis       Alcohol Use       Safe sex practices/STD prevention       HIV screeninx in teen years, 1x in adult years, and at intervals if high risk       Colon cancer screening       Prostate cancer screening       Osteoporosis Prevention/Bone Health       The 10-year ASCVD risk score (Gaurav FABIAN Jr, et al., 2013) is: 3.6%    Values used to calculate the score:      Age: 58 years      Sex: Male      Is Non- : No      Diabetic: No      Tobacco smoker: No      Systolic Blood Pressure: 116 mmHg      Is BP treated: Yes      HDL Cholesterol: 77 mg/dL      Total Cholesterol: 159 mg/dL    BP Readings from Last 1 Encounters:   18 116/78     Estimated body mass index is 24.22 kg/(m^2) as calculated from the following:    Height as of this encounter: 5' 10.25\" (1.784 m).    Weight as of this encounter: 170 lb (77.1 kg).           reports that he quit smoking about 6 years ago. His smoking use included Cigarettes. He has a 30.00 pack-year smoking history. He quit smokeless tobacco use about 6 years ago.      Counseling Resources:  ATP IV Guidelines  Pooled Cohorts Equation Calculator  FRAX Risk Assessment  ICSI Preventive Guidelines  Dietary Guidelines for Americans, 2010  [employer] MyPlate  ASA Prophylaxis  Lung CA Screening    Juan Antonio Flanagan MD  [facility]    Injectable Influenza Immunization Documentation    1.  Is the person to be vaccinated sick today?   No    2. Does the person to be vaccinated have an allergy to a component   of the vaccine?   No  Egg Allergy Algorithm Link    3. Has the person to be vaccinated ever had a serious reaction   to influenza vaccine in the past?   No    4. Has the person to be vaccinated ever had Guillain-Barré syndrome?   No    Form completed by patient  ALENA Weber MA           "

## 2018-09-26 NOTE — MR AVS SNAPSHOT
After Visit Summary   9/26/2018    Jordan Barnett    MRN: [id number]           Patient Information     Date Of Birth          1960        Visit Information        Provider Department      9/26/2018 9:20 AM Juan Antonio Flanagan MD [facility]        Today's Diagnoses     Need for prophylactic vaccination and inoculation against influenza    -  1    Encounter for routine adult health examination without abnormal findings        Hyperlipidemia LDL goal <160        Benign essential hypertension        CKD (chronic kidney disease) stage 3, GFR 30-59 ml/min        Screening for human immunodeficiency virus          Care Instructions    Schedule your lab tests; make sure you are fasting for more than 12 hrs before your blood draw.    Be consistent with low salt, low trans fat and saturated fat diet.  Eat food rich in omega-3-fatty acids as you tolerate. (salmon, olive oil)  Eat 5 cups of vegetables, fruits and whole grains per day.  Limit starchy food (white rice, white bread, white pasta, white potatoes) to less than a cup per meal.  Minimize sweets, junk food and fastfood. Limit soda beverages to one serving per day; best to avoid it altogether though.  Exercise: moderate intensity sustained for at least 30 mins per episode, goal of 150 mins per week at least  Combine cardiovascular and resistance exercises.  These exercise recommendations are in addition to your daily activity at work or home.    Flu shot today.      Preventive Health Recommendations  Male Ages 50 - 64    Yearly exam:             See your health care provider every year in order to  o   Review health changes.   o   Discuss preventive care.    o   Review your medicines if your doctor has prescribed any.     Have a cholesterol test every 5 years, or more frequently if you are at risk for high cholesterol/heart disease.     Have a diabetes test (fasting glucose) every three years. If you are at risk for diabetes, you  should have this test more often.     Have a colonoscopy at age 50, or have a yearly FIT test (stool test). These exams will check for colon cancer.      Talk with your health care provider about whether or not a prostate cancer screening test (PSA) is right for you.    You should be tested each year for STDs (sexually transmitted diseases), if you re at risk.     Shots: Get a flu shot each year. Get a tetanus shot every 10 years.     Nutrition:    Eat at least 5 servings of fruits and vegetables daily.     Eat whole-grain bread, whole-wheat pasta and brown rice instead of white grains and rice.     Get adequate Calcium and Vitamin D.     Lifestyle    Exercise for at least 150 minutes a week (30 minutes a day, 5 days a week). This will help you control your weight and prevent disease.     Limit alcohol to one drink per day.     No smoking.     Wear sunscreen to prevent skin cancer.     See your dentist every six months for an exam and cleaning.     See your eye doctor every 1 to 2 years.          Thank you for choosing [facility].  You may be receiving a survey in the mail from Alicia [employer] regarding your visit today.  Please take a few minutes to complete and return the survey to let us know how we are doing.      If you have questions or concerns, please contact us via [email or web address] or you can contact your care team at [phone].    Our Clinic hours are:  Monday 6:40 am  to 7:00 pm  Tuesday -Friday 6:40 am to 5:00 pm    The [place] outpatient lab hours are:  Monday - Friday 6:10 am to 4:45 pm  Saturdays 7:00 am to 11:00 am  Appointments are required, call [phone]    If you have clinical questions after hours or would like to schedule an appointment,  call the clinic at [phone].            Follow-ups after your visit        Follow-up notes from your care team     Return in about 8 days (around 10/4/2018) for Lab Work.      Your next 10 appointments already scheduled     Oct 04, 2018  9:10 AM CDT   LAB  with [facility]   [facility]    [address]  [facility] [place] [address] [phone]           Please do not eat 10-12 hours before your appointment if you are coming in fasting for labs on lipids, cholesterol, or glucose (sugar). This does not apply to pregnant women. Water, hot tea and black coffee (with nothing added) are okay. Do not drink other fluids, diet soda or chew gum.              Future tests that were ordered for you today     Open Future Orders        Priority Expected Expires Ordered    HIV Antigen Antibody Combo Routine 9/27/2018 12/29/2018 9/26/2018    Lipid panel reflex to direct LDL Fasting Routine 9/27/2018 12/29/2018 9/26/2018            Who to contact     If you have questions or need follow up information about today's clinic visit or your schedule please contact [facility] directly at [phone].  Normal or non-critical lab and imaging results will be communicated to you by Zapniphart, letter or phone within 4 business days after the clinic has received the results. If you do not hear from us within 7 days, please contact the clinic through [employer] or phone. If you have a critical or abnormal lab result, we will notify you by phone as soon as possible.  Submit refill requests through [employer] System or call your pharmacy and they will forward the refill request to us. Please allow 3 business days for your refill to be completed.          Additional Information About Your Visit        ZapnipharWHOOP Information     [employer] System gives you secure access to your electronic health record. If you see a primary care provider, you can also send messages to your care team and make appointments. If you have questions, please call your primary care clinic.  If you do not have a primary care provider, please call [phone] and they will assist you.        Care EveryWhere ID     This is your Care EveryWhere ID. This could be used by other organizations to access  "your [place] medical records  [id number]        Your Vitals Were     Pulse Temperature Respirations Height Pulse Oximetry BMI (Body Mass Index)    77 97.1  F (36.2  C) (Tympanic) 16 5' 10.25\" (1.784 m) 96% 24.22 kg/m2       Blood Pressure from Last 3 Encounters:   09/26/18 116/78   06/19/18 134/88   06/14/18 (!) 140/96    Weight from Last 3 Encounters:   09/26/18 170 lb (77.1 kg)   06/19/18 153 lb (69.4 kg)   06/14/18 152 lb (68.9 kg)              We Performed the Following     Albumin Random Urine Quantitative with Creat Ratio     FLU VACCINE, SPLIT VIRUS, IM (QUADRIVALENT) [43506]- >3 YRS     Vaccine Administration, Initial [[id number]]          Today's Medication Changes          These changes are accurate as of 9/26/18  9:56 AM.  If you have any questions, ask your nurse or doctor.               Stop taking these medicines if you haven't already. Please contact your care team if you have questions.     order for DME   Stopped by:  Juan Antonio Flanagan MD                    Primary Care Provider Office Phone # Fax #    Juan Antonio Flanagan [phone] [address] [facility] [address]        Equal Access to Services     SWAPNA REAL : Hadii zelda cui hadasho Soomaali, waaxda luqadaha, qaybta kaalmada adeegyada, shelly salas . So [facility] [phone].    ATENCIÓN: Si habla español, tiene a eaton disposición servicios gratuitos de asistencia lingüística. Avelina al [phone].    We comply with applicable federal civil rights laws and [place] laws. We do not discriminate on the basis of race, color, national origin, age, disability, sex, sexual orientation, or gender identity.            Thank you!     Thank you for choosing [facility]  for your care. Our goal is always to provide you with excellent care. Hearing back from our patients is one way we can continue to improve our services. Please take a few minutes to complete the " written survey that you may receive in the mail after your visit with us. Thank you!             Your Updated Medication List - Protect others around you: Learn how to safely use, store and throw away your medicines at www.disposemymeds.org.          This list is accurate as of 9/26/18  9:56 AM.  Always use your most recent med list.                   Brand Name Dispense Instructions for use Diagnosis    HYDROXYZINE HCL PO      Take 50 mg by mouth every 6 hours as needed        lamoTRIgine 100 MG tablet    LaMICtal    60 tablet    Take 1 tablet (100 mg) by mouth 2 times daily    Schizoaffective disorder, bipolar type (H)       losartan 100 MG tablet    COZAAR    90 tablet    Take 1 tablet (100 mg) by mouth daily    Essential hypertension with goal blood pressure less than 140/90, CKD (chronic kidney disease) stage 3, GFR 30-59 ml/min       MULTIVITAMIN ADULT PO      Take 1 tablet by mouth At Bedtime        OLANZapine 7.5 MG tablet    zyPREXA    60 tablet    Take 1 tablet (7.5 mg) by mouth 2 times daily    Schizoaffective disorder, bipolar type (H)       OMEGA 3 PO      Take 2,400 mg by mouth every evening        TYLENOL 500 MG tablet   Generic drug:  acetaminophen      Take 1-2 tablets by mouth every 6 hours as needed.

## 2018-09-26 NOTE — PATIENT INSTRUCTIONS
Schedule your lab tests; make sure you are fasting for more than 12 hrs before your blood draw.    Be consistent with low salt, low trans fat and saturated fat diet.  Eat food rich in omega-3-fatty acids as you tolerate. (salmon, olive oil)  Eat 5 cups of vegetables, fruits and whole grains per day.  Limit starchy food (white rice, white bread, white pasta, white potatoes) to less than a cup per meal.  Minimize sweets, junk food and fastfood. Limit soda beverages to one serving per day; best to avoid it altogether though.  Exercise: moderate intensity sustained for at least 30 mins per episode, goal of 150 mins per week at least  Combine cardiovascular and resistance exercises.  These exercise recommendations are in addition to your daily activity at work or home.    Flu shot today.      Preventive Health Recommendations  Male Ages 50 - 64    Yearly exam:             See your health care provider every year in order to  o   Review health changes.   o   Discuss preventive care.    o   Review your medicines if your doctor has prescribed any.     Have a cholesterol test every 5 years, or more frequently if you are at risk for high cholesterol/heart disease.     Have a diabetes test (fasting glucose) every three years. If you are at risk for diabetes, you should have this test more often.     Have a colonoscopy at age 50, or have a yearly FIT test (stool test). These exams will check for colon cancer.      Talk with your health care provider about whether or not a prostate cancer screening test (PSA) is right for you.    You should be tested each year for STDs (sexually transmitted diseases), if you re at risk.     Shots: Get a flu shot each year. Get a tetanus shot every 10 years.     Nutrition:    Eat at least 5 servings of fruits and vegetables daily.     Eat whole-grain bread, whole-wheat pasta and brown rice instead of white grains and rice.     Get adequate Calcium and Vitamin D.     Lifestyle    Exercise for at  least 150 minutes a week (30 minutes a day, 5 days a week). This will help you control your weight and prevent disease.     Limit alcohol to one drink per day.     No smoking.     Wear sunscreen to prevent skin cancer.     See your dentist every six months for an exam and cleaning.     See your eye doctor every 1 to 2 years.          Thank you for choosing [facility].  You may be receiving a survey in the mail from [employer] regarding your visit today.  Please take a few minutes to complete and return the survey to let us know how we are doing.      If you have questions or concerns, please contact us via [employer] or you can contact your care team at [phone].    Our Clinic hours are:  Monday 6:40 am  to 7:00 pm  Tuesday -Friday 6:40 am to 5:00 pm    The [place] outpatient lab hours are:  Monday - Friday 6:10 am to 4:45 pm  Saturdays 7:00 am to 11:00 am  Appointments are required, call [phone]    If you have clinical questions after hours or would like to schedule an appointment,  call the clinic at [phone].

## 2018-10-04 DIAGNOSIS — Z11.4 SCREENING FOR HUMAN IMMUNODEFICIENCY VIRUS: ICD-10-CM

## 2018-10-04 DIAGNOSIS — E78.5 HYPERLIPIDEMIA LDL GOAL <160: ICD-10-CM

## 2018-10-04 LAB
CHOLEST SERPL-MCNC: 163 MG/DL
HDLC SERPL-MCNC: 72 MG/DL
HIV 1+2 AB+HIV1 P24 AG SERPL QL IA: NONREACTIVE
LDLC SERPL CALC-MCNC: 77 MG/DL
NONHDLC SERPL-MCNC: 91 MG/DL
TRIGL SERPL-MCNC: 69 MG/DL

## 2018-10-04 PROCEDURE — 87389 HIV-1 AG W/HIV-1&-2 AB AG IA: CPT | Performed by: FAMILY MEDICINE

## 2018-10-04 PROCEDURE — 80061 LIPID PANEL: CPT | Performed by: FAMILY MEDICINE

## 2018-10-04 PROCEDURE — 36415 COLL VENOUS BLD VENIPUNCTURE: CPT | Performed by: FAMILY MEDICINE

## 2019-07-18 LAB — PHQ9 SCORE: 2

## 2019-09-20 ASSESSMENT — ENCOUNTER SYMPTOMS
DIARRHEA: 0
WEAKNESS: 0
PARESTHESIAS: 0
MYALGIAS: 0
CONSTIPATION: 0
JOINT SWELLING: 0
SHORTNESS OF BREATH: 0
SORE THROAT: 0
HEMATOCHEZIA: 0
NAUSEA: 0
DIZZINESS: 1
HEADACHES: 0
ARTHRALGIAS: 0
HEMATURIA: 0
FEVER: 0
HEARTBURN: 0
CHILLS: 0
DYSURIA: 0
FREQUENCY: 0
COUGH: 0
NERVOUS/ANXIOUS: 1
EYE PAIN: 0
PALPITATIONS: 0
ABDOMINAL PAIN: 0

## 2019-09-26 ENCOUNTER — OFFICE VISIT (OUTPATIENT)
Dept: FAMILY MEDICINE | Facility: CLINIC | Age: 59
End: 2019-09-26
Payer: COMMERCIAL

## 2019-09-26 VITALS
DIASTOLIC BLOOD PRESSURE: 72 MMHG | WEIGHT: 171 LBS | OXYGEN SATURATION: 97 % | SYSTOLIC BLOOD PRESSURE: 104 MMHG | HEART RATE: 58 BPM | BODY MASS INDEX: 24.48 KG/M2 | HEIGHT: 70 IN | TEMPERATURE: 96.9 F | RESPIRATION RATE: 16 BRPM

## 2019-09-26 DIAGNOSIS — Z12.11 SCREENING FOR MALIGNANT NEOPLASM OF COLON: ICD-10-CM

## 2019-09-26 DIAGNOSIS — E78.2 MIXED HYPERLIPIDEMIA: ICD-10-CM

## 2019-09-26 DIAGNOSIS — Z23 NEED FOR VACCINATION: ICD-10-CM

## 2019-09-26 DIAGNOSIS — Z87.891 PERSONAL HISTORY OF TOBACCO USE: ICD-10-CM

## 2019-09-26 DIAGNOSIS — F25.0 SCHIZOAFFECTIVE DISORDER, BIPOLAR TYPE (H): ICD-10-CM

## 2019-09-26 DIAGNOSIS — I10 ESSENTIAL HYPERTENSION WITH GOAL BLOOD PRESSURE LESS THAN 140/90: ICD-10-CM

## 2019-09-26 DIAGNOSIS — Z12.5 SCREENING FOR PROSTATE CANCER: ICD-10-CM

## 2019-09-26 DIAGNOSIS — N18.30 CKD (CHRONIC KIDNEY DISEASE) STAGE 3, GFR 30-59 ML/MIN (H): ICD-10-CM

## 2019-09-26 DIAGNOSIS — Z00.00 ROUTINE GENERAL MEDICAL EXAMINATION AT A HEALTH CARE FACILITY: Primary | ICD-10-CM

## 2019-09-26 LAB
ALBUMIN SERPL-MCNC: 3.7 G/DL (ref 3.4–5)
ALP SERPL-CCNC: 69 U/L (ref 40–150)
ALT SERPL W P-5'-P-CCNC: 39 U/L (ref 0–70)
ANION GAP SERPL CALCULATED.3IONS-SCNC: 5 MMOL/L (ref 3–14)
AST SERPL W P-5'-P-CCNC: 21 U/L (ref 0–45)
BILIRUB SERPL-MCNC: 0.5 MG/DL (ref 0.2–1.3)
BUN SERPL-MCNC: 35 MG/DL (ref 7–30)
CALCIUM SERPL-MCNC: 9.3 MG/DL (ref 8.5–10.1)
CHLORIDE SERPL-SCNC: 104 MMOL/L (ref 94–109)
CHOLEST SERPL-MCNC: 151 MG/DL
CO2 SERPL-SCNC: 24 MMOL/L (ref 20–32)
CREAT SERPL-MCNC: 1.43 MG/DL (ref 0.66–1.25)
CREAT UR-MCNC: 8 MG/DL
GFR SERPL CREATININE-BSD FRML MDRD: 53 ML/MIN/{1.73_M2}
GLUCOSE SERPL-MCNC: 82 MG/DL (ref 70–99)
HDLC SERPL-MCNC: 64 MG/DL
LDLC SERPL CALC-MCNC: 70 MG/DL
MICROALBUMIN UR-MCNC: 6 MG/L
MICROALBUMIN/CREAT UR: 74.73 MG/G CR (ref 0–17)
NONHDLC SERPL-MCNC: 87 MG/DL
POTASSIUM SERPL-SCNC: 4 MMOL/L (ref 3.4–5.3)
PROT SERPL-MCNC: 7.4 G/DL (ref 6.8–8.8)
PSA SERPL-ACNC: 2.47 UG/L (ref 0–4)
SODIUM SERPL-SCNC: 133 MMOL/L (ref 133–144)
TRIGL SERPL-MCNC: 83 MG/DL

## 2019-09-26 PROCEDURE — 82043 UR ALBUMIN QUANTITATIVE: CPT | Performed by: FAMILY MEDICINE

## 2019-09-26 PROCEDURE — G0296 VISIT TO DETERM LDCT ELIG: HCPCS | Performed by: FAMILY MEDICINE

## 2019-09-26 PROCEDURE — 36415 COLL VENOUS BLD VENIPUNCTURE: CPT | Performed by: FAMILY MEDICINE

## 2019-09-26 PROCEDURE — G0103 PSA SCREENING: HCPCS | Performed by: FAMILY MEDICINE

## 2019-09-26 PROCEDURE — 80061 LIPID PANEL: CPT | Performed by: FAMILY MEDICINE

## 2019-09-26 PROCEDURE — 99396 PREV VISIT EST AGE 40-64: CPT | Mod: 25 | Performed by: FAMILY MEDICINE

## 2019-09-26 PROCEDURE — 90682 RIV4 VACC RECOMBINANT DNA IM: CPT | Performed by: FAMILY MEDICINE

## 2019-09-26 PROCEDURE — 90471 IMMUNIZATION ADMIN: CPT | Performed by: FAMILY MEDICINE

## 2019-09-26 PROCEDURE — 80053 COMPREHEN METABOLIC PANEL: CPT | Performed by: FAMILY MEDICINE

## 2019-09-26 ASSESSMENT — ENCOUNTER SYMPTOMS
DIARRHEA: 0
DYSURIA: 0
NERVOUS/ANXIOUS: 1
ARTHRALGIAS: 0
MYALGIAS: 0
CONSTIPATION: 0
JOINT SWELLING: 0
SORE THROAT: 0
NAUSEA: 0
ABDOMINAL PAIN: 0
COUGH: 0
EYE PAIN: 0
SHORTNESS OF BREATH: 0
FEVER: 0
DIZZINESS: 1
HEMATOCHEZIA: 0
CHILLS: 0
HEMATURIA: 0
WEAKNESS: 0
FREQUENCY: 0
HEADACHES: 0
HEARTBURN: 0
PARESTHESIAS: 0
PALPITATIONS: 0

## 2019-09-26 ASSESSMENT — MIFFLIN-ST. JEOR: SCORE: 1605.87

## 2019-09-26 NOTE — PATIENT INSTRUCTIONS
You will be contacted in 1-2 days for results of your lab tests.    Be consistent with low salt,  low trans fat and low saturated fat diet.  Eat food rich in omega-3-fatty acids as you tolerate. (salmon, olive oil)  Eat 5 cups of vegetables, fruits and whole grains per day.  Limit starchy food (white rice, white bread, white pasta, white potatoes) to less than a cup per meal.  Minimize sweets, junk food and fastfood. Limit soda beverages to one serving per day; best to avoid it altogether though.  Exercise: moderate intensity sustained for at least 30 mins per episode, goal of 150 mins per week at least  Combine cardiovascular and resistance exercises.  These exercise recommendations are in addition to your daily activity at work or home.    Continue all medications unchanged.    Schedule colonoscopy for screening for colon cancer at your soonest convenience.    You may get the Shingrix vaccine for shingles if you desire, and after you verify with insurance how they cover the vaccine.    To schedule the CT scan, call [phone].    Preventive Health Recommendations  Male Ages 50 - 64    Yearly exam:             See your health care provider every year in order to  o   Review health changes.   o   Discuss preventive care.    o   Review your medicines if your doctor has prescribed any.     Have a cholesterol test every 5 years, or more frequently if you are at risk for high cholesterol/heart disease.     Have a diabetes test (fasting glucose) every three years. If you are at risk for diabetes, you should have this test more often.     Have a colonoscopy at age 50, or have a yearly FIT test (stool test). These exams will check for colon cancer.      Talk with your health care provider about whether or not a prostate cancer screening test (PSA) is right for you.    You should be tested each year for STDs (sexually transmitted diseases), if you re at risk.     Shots: Get a flu shot each year. Get a tetanus shot every  10 years.     Nutrition:    Eat at least 5 servings of fruits and vegetables daily.     Eat whole-grain bread, whole-wheat pasta and brown rice instead of white grains and rice.     Get adequate Calcium and Vitamin D.     Lifestyle    Exercise for at least 150 minutes a week (30 minutes a day, 5 days a week). This will help you control your weight and prevent disease.     Limit alcohol to one drink per day.     No smoking.     Wear sunscreen to prevent skin cancer.     See your dentist every six months for an exam and cleaning.     See your eye doctor every 1 to 2 years.    Lung Cancer Screening   Frequently Asked Questions  If you are at high-risk for lung cancer, getting screened with low-dose computed tomography (LDCT) every year can help save your life. This handout offers answers to some of the most common questions about lung cancer screening. If you have other questions, please call [phone] ([phone]).     What is it?  Lung cancer screening uses special X-ray technology to create an image of your lung tissue. The exam is quick and easy and takes less than 10 seconds. We don t give you any medicine or use any needles. You can eat before and after the exam. You don t need to change your clothes as long as the clothing on your chest doesn t contain metal. But, you do need to be able to hold your breath for at least 6 seconds during the exam.    What is the goal of lung cancer screening?  The goal of lung cancer screening is to save lives. Many times, lung cancer is not found until a person starts having physical symptoms. Lung cancer screening can help detect lung cancer in the earliest stages when it may be easier to treat.    Who should be screened for lung cancer?  We suggest lung cancer screening for anyone who is at high-risk for lung cancer. You are in the high-risk group if you:      are between the ages of 55 and 79, and    have smoked at least 1 pack of cigarettes a day for 30 or more  years, and    still smoke or have quit within the past 15 years.    However, if you have a new cough or shortness of breath, you should talk to your doctor before being screened.    Some national lung health advocacy groups also recommend screening for people ages 50 to 79 who have smoked an average of 1 pack of cigarettes a day for 20 years. They must also have at least 1 other risk factor for lung cancer, not including exposure to secondhand smoke. Other risk factors are having had cancer in the past, emphysema, pulmonary fibrosis, COPD, a family history of lung cancer, or exposure to certain materials such as arsenic, asbestos, beryllium, cadmium, chromium, diesel fumes, nickel, radon or silica. Your care team can help you know if you have one of these risk factors.     Why does it matter if I have symptoms?  Certain symptoms can be a sign that you have a condition in your lungs that should be checked and treated by your doctor. These symptoms include fever, chest pain, a new or changing cough, shortness of breath that you have never felt before, coughing up blood or unexplained weight loss. Having any of these symptoms can greatly affect the results of lung cancer screening.       Should all smokers get an LDCT lung cancer screening exam?  It depends. Lung cancer screening is for a very specific group of men and women who have a history of heavy smoking over a long period of time (see  Who should be screened for lung cancer  above).  I am in the high-risk group, but have been diagnosed with cancer in the past. Is LDCT lung cancer screening right for me?  In some cases, you should not have LDCT lung screening, such as when your doctor is already following your cancer with CT scan studies. Your doctor will help you decide if LDCT lung screening is right for you.  Do I need to have a screening exam every year?  Yes. If you are in the high-risk group described earlier, you should get an LDCT lung cancer screening  exam every year until you are 79, or are no longer willing or able to undergo screening and possible procedures to diagnose and treat lung cancer.  How effective is LDCT at preventing death from lung cancer?  Studies have shown that LDCT lung cancer screening can lower the risk of death from lung cancer by 20 percent in people who are at high-risk.  What are the risks?  There are some risks and limitations of LDCT lung cancer screening. We want to make sure you understand the risks and benefits, so please let us know if you have any questions. Your doctor may want to talk with you more about these risks.    Radiation exposure: As with any exam that uses radiation, there is a very small increased risk of cancer. The amount of radiation in LDCT is small--about the same amount a person would get from a mammogram. Your doctor orders the exam when he or she feels the potential benefits outweigh the risks.    False negatives: No test is perfect, including LDCT. It is possible that you may have a medical condition, including lung cancer, that is not found during your exam. This is called a false negative result.    False positives and more testing: LDCT very often finds something in the lung that could be cancer, but in fact is not. This is called a false positive result. False positive tests often cause anxiety. To make sure these findings are not cancer, you may need to have more tests. These tests will be done only if you give us permission. Sometimes patients need a treatment that can have side effects, such as a biopsy. For more information on false positives, see  What can I expect from the results?     Findings not related to lung cancer: Your LDCT exam also takes pictures of areas of your body next to your lungs. In a very small number of cases, the CT scan will show an abnormal finding in one of these areas, such as your kidneys, adrenal glands, liver or thyroid. This finding may not be serious, but you may need  more tests. Your doctor can help you decide what other tests you may need, if any.  What can I expect from the results?  About 1 out of 4 LDCT exams will find something that may need more tests. Most of the time, these findings are lung nodules. Lung nodules are very small collections of tissue in the lung. These nodules are very common, and the vast majority--more than 97 percent--are not cancer (benign). Most are normal lymph nodes or small areas of scarring from past infections.  But, if a small lung nodule is found to be cancer, the cancer can be cured more than 90 percent of the time. To know if the nodule is cancer, we may need to get more images before your next yearly screening exam. If the nodule has suspicious features (for example, it is large, has an odd shape or grows over time), we will refer you to a specialist for further testing.  Will my doctor also get the results?  Yes. Your doctor will get a copy of your results.  Is it okay to keep smoking now that there s a cancer screening exam?  No. Tobacco is one of the strongest cancer-causing agents. It causes not only lung cancer, but other cancers and cardiovascular (heart) diseases as well. The damage caused by smoking builds over time. This means that the longer you smoke, the higher your risk of disease. While it is never too late to quit, the sooner you quit, the better.  Where can I find help to quit smoking?  The best way to prevent lung cancer is to stop smoking. If you have already quit smoking, congratulations and keep it up! For help on quitting smoking, please call [employer] at 3-117-404-TOUS ([phone]) or the American Cancer Society at 1-592.903.9123 to find local resources near you.  One-on-one health coaching:  If you d prefer to work individually with a health care provider on tobacco cessation, we offer:      Medication Therapy Management:  Our specially trained pharmacists work closely with you and your doctor to help you quit smoking.  Call  [phone] or [phone] (toll free).     Can Do: Health coaching offered by [facility].  [email or web address]

## 2019-09-26 NOTE — PROGRESS NOTES
SUBJECTIVE:   CC: Jordan Barnett is an 59 year old male who presents for preventative health visit.     Healthy Habits:     Getting at least 3 servings of Calcium per day:  Yes    Bi-annual eye exam:  Yes    Dental care twice a year:  Yes    Sleep apnea or symptoms of sleep apnea:  None    Diet:  Regular (no restrictions)    Frequency of exercise:  6-7 days/week    Duration of exercise:  15-30 minutes    Taking medications regularly:  Yes    Barriers to taking medications:  None    Medication side effects:  Significant flushing and Lightheadedness    PHQ-2 Total Score: 1    Additional concerns today:  No    Patient requests annual therapeutic monitoring labs for his psychotropic and antihypertensive meds.    Patient states the flushing and lightheadedness appears to be related to increased anxiety - states he hyperventilates and he tighetns up on his upper back; he flushes during this time and then feels lightheaded due to breathing shallow and too fast. Patient is working with cognitive behavior therapist.    Today's PHQ-2 Score:   PHQ-2 (  [employer]) 2019   Q1: Little interest or pleasure in doing things 0   Q2: Feeling down, depressed or hopeless 1   PHQ-2 Score 1   Q1: Little interest or pleasure in doing things Not at all   Q2: Feeling down, depressed or hopeless Several days   PHQ-2 Score 1       Abuse: Current or Past(Physical, Sexual or Emotional)- No  Do you feel safe in your environment? Yes    Social History     Tobacco Use     Smoking status: Former Smoker     Packs/day: 1.00     Years: 30.00     Pack years: 30.00     Types: Cigarettes     Last attempt to quit: 2012     Years since quittin.4     Smokeless tobacco: Former User     Quit date: 2012     Tobacco comment: 2 PPD   Substance Use Topics     Alcohol use: Yes     Comment: RARE     If you drink alcohol do you typically have >3 drinks per day or >7 drinks per week? Not applicable      Last PSA:   PSA   Date Value Ref Range Status    2019 2.47 0 - 4 ug/L Final     Comment:     Assay Method:  Chemiluminescence using Siemens Vista analyzer       Reviewed orders with patient. Reviewed health maintenance and updated orders accordingly - Yes  Patient Active Problem List   Diagnosis     Severe bipolar I disorder, most recent episode mixed, with psychotic features (H)     Sebaceous cyst     CKD (chronic kidney disease) stage 3, GFR 30-59 ml/min (H)     Schizoaffective disorder (H)     Hyperlipidemia LDL goal <160     Tubular adenoma of colon     Former smoker     Benign essential hypertension     Former smoker, stopped smoking in distant past     Past Surgical History:   Procedure Laterality Date     COLONOSCOPY      normal. has fam hx colon cancer     COLONOSCOPY N/A 10/13/2014    Procedure: COLONOSCOPY;  Surgeon: Santy Constantino MD;  Location: [facility]     HERNIA REPAIR, INGUINAL RT/LT  2004     SURGICAL HISTORY OF -       Incised & Drained - ? Perirectal Abscess        Social History     Tobacco Use     Smoking status: Former Smoker     Packs/day: 1.00     Years: 30.00     Pack years: 30.00     Types: Cigarettes     Last attempt to quit: 2012     Years since quittin.4     Smokeless tobacco: Former User     Quit date: 2012     Tobacco comment: 2 PPD   Substance Use Topics     Alcohol use: Yes     Comment: RARE     Family History   Problem Relation Age of Onset     Thyroid Disease Mother      Coronary Artery Disease Mother         pacemaker     Cancer Paternal Grandfather      Cancer Sister      Cancer - colorectal Sister      Mental Illness Nephew         committed suicide at age 27         Current Outpatient Medications   Medication Sig Dispense Refill     losartan (COZAAR) 100 MG tablet Take 1 tablet (100 mg) by mouth daily 90 tablet 3     Multiple Vitamins-Minerals (MULTIVITAMIN ADULT PO) Take 1 tablet by mouth At Bedtime        OLANZapine (ZYPREXA) 7.5 MG tablet Take 1 tablet (7.5 mg) by mouth 2 times daily (Patient  taking differently: Take 5 mg by mouth 2 times daily ) 60 tablet 0     Omega-3 Fatty Acids (OMEGA 3 PO) Take 2,400 mg by mouth every evening       acetaminophen (TYLENOL) 500 MG tablet Take 1-2 tablets by mouth every 6 hours as needed.       HYDROXYZINE HCL PO Take 50 mg by mouth every 6 hours as needed        lamoTRIgine (LAMICTAL) 100 MG tablet Take 1 tablet (100 mg) by mouth 2 times daily 60 tablet 0     Allergies   Allergen Reactions     Lisinopril Nausea     Felt weakness nausea, couldn't sleep       Reviewed and updated as needed this visit by clinical staff  Tobacco  Allergies  Meds  Problems  Med Hx  Surg Hx  Fam Hx  Soc Hx          Reviewed and updated as needed this visit by Provider  Tobacco  Allergies  Meds  Problems  Med Hx  Surg Hx  Fam Hx        Past Medical History:   Diagnosis Date     CKD (chronic kidney disease)     stage 3     Depressive disorder, not elsewhere classified     Manic      HTN (hypertension)      Unspecified schizophrenia, unspecified condition       Past Surgical History:   Procedure Laterality Date     COLONOSCOPY      normal. has fam hx colon cancer     COLONOSCOPY N/A 10/13/2014    Procedure: COLONOSCOPY;  Surgeon: Santy Constantino MD;  Location: [facility]     HERNIA REPAIR, INGUINAL RT/LT  02/12/2004     SURGICAL HISTORY OF -       Incised & Drained - ? Perirectal Abscess        Review of Systems   Constitutional: Negative for chills and fever.   HENT: Negative for congestion, hearing loss and sore throat.    Eyes: Negative for pain and visual disturbance.   Respiratory: Negative for cough and shortness of breath.    Cardiovascular: Negative for chest pain, palpitations and peripheral edema.   Gastrointestinal: Negative for abdominal pain, constipation, diarrhea, heartburn, hematochezia and nausea.   Genitourinary: Negative for discharge, dysuria, frequency, genital sores, hematuria, impotence and urgency.   Musculoskeletal: Negative for arthralgias, joint swelling  "and myalgias.   Neurological: Positive for dizziness. Negative for weakness, headaches and paresthesias.   Psychiatric/Behavioral: The patient is nervous/anxious.      Patient denies BM or urinary changes.  No fam hx of colon cancer..  No fam hx of prostate cancer.  OBJECTIVE:   /72   Pulse 58   Temp 96.9  F (36.1  C) (Tympanic)   Resp 16   Ht 1.784 m (5' 10.25\")   Wt 77.6 kg (171 lb)   SpO2 97%   BMI 24.36 kg/m      Physical Exam  GENERAL APPEARANCE: healthy, alert and no distress  EYES: pink conj, no icterus, PERRL, EOMI  HENT: ear canals and TM's normal, nose and mouth without ulcers or lesions, oropharynx clear and oral mucous membranes moist  NECK: no adenopathy, no asymmetry, masses, or scars and thyroid normal to palpation  RESP: lungs clear to auscultation - no rales, rhonchi or wheezes  CV: regular rates and rhythm, normal S1 S2, no S3 or S4, no murmur, click or rub, no peripheral edema and peripheral pulses strong  ABDOMEN: soft, nontender, no hepatosplenomegaly, no masses and bowel sounds normal  RECTAL: deferred  MS: no musculoskeletal defects are noted and gait is age appropriate without ataxia  SKIN: no suspicious lesions or rashes  NEURO: Normal strength and tone, sensory exam grossly normal, mentation intact and speech normal  PSYCH: linear thought process, well-kept, normal mood, appropriate affect, no hallucination, no delusion.  Diagnostic Test Results:  none     ASSESSMENT/PLAN:   Jordan was seen today for physical.    Diagnoses and all orders for this visit:    Routine general medical examination at a health care facility  Patient was advised on recommended screening and preventive health recommendations.  He verbalized understanding and agreed to the plans below.    Mixed hyperlipidemia  -     Lipid Profile (Chol, Trig, HDL, LDL calc)  -     Comprehensive metabolic panel  Reinforced heart healthy lifestyle.    Essential hypertension with goal blood pressure less than 140/90  -     " Albumin Random Urine Quantitative with Creat Ratio  -     Comprehensive metabolic panel  Controlled.  Low salt, low fat diet.   Exercise as tolerated.  Take meds as prescribed; call if with side effects.     CKD (chronic kidney disease) stage 3, GFR 30-59 ml/min (H)  -     Albumin Random Urine Quantitative with Creat Ratio  Optimize control of co-morbidities.   Monitor renal function annually or as needed.    Schizoaffective disorder, bipolar type (H)  -     Comprehensive metabolic panel  Stable. Seeing psychiatry.    Personal history of tobacco use  -     Prof Fee: Shared Decision Making Visit for Lung Cancer Screening  -     CT Chest Lung Cancer Scrn Low Dose wo; Future    Screening for malignant neoplasm of colon  -     GASTROENTEROLOGY ADULT REF PROCEDURE ONLY [facility] [phone]; [place] [facility] Surgeon    Screening for prostate cancer  -     Prostate spec antigen screen    Need for vaccination  -     C RIV4 (FLUBLOK) VACCINE RECOMBINANT DNA PRSRV ANTIBIO FREE, IM [[id number]]        COUNSELING:   Reviewed preventive health counseling, as reflected in patient instructions       Regular exercise       Healthy diet/nutrition       Vision screening       Hearing screening       Immunizations    Vaccinated for: Influenza             Alcohol Use       Safe sex practices/STD prevention       Colon cancer screening       Prostate cancer screening       Osteoporosis Prevention/Bone Health       Consider lung cancer screening for ages 55-80 years and 30 pack-year smoking history         The 10-year ASCVD risk score (Gaurav FABIAN Jr., et al., 2013) is: 3.7%    Values used to calculate the score:      Age: 59 years      Sex: Male      Is Non- : No      Diabetic: No      Tobacco smoker: No      Systolic Blood Pressure: 104 mmHg      Is BP treated: Yes      HDL Cholesterol: 64 mg/dL      Total Cholesterol: 151 mg/dL    Estimated body mass index is 24.36 kg/m  as calculated from the following:     "Height as of this encounter: 1.784 m (5' 10.25\").    Weight as of this encounter: 77.6 kg (171 lb).     Weight management plan: Discussed healthy diet and exercise guidelines     reports that he quit smoking about 7 years ago. His smoking use included cigarettes. He has a 30.00 pack-year smoking history. He quit smokeless tobacco use about 7 years ago.      Counseling Resources:  ATP IV Guidelines  Pooled Cohorts Equation Calculator  FRAX Risk Assessment  ICSI Preventive Guidelines  Dietary Guidelines for Americans, 2010  [employer] MyPlate  ASA Prophylaxis  Lung CA Screening    Juan Antonio Flanagan MD  [facility]  Lung Cancer Screening Shared Decision Making Visit     Jordan Barnett is eligible for lung cancer screening on the basis of the information provided in my signed lung cancer screening order.     I have discussed with patient the risks and benefits of screening for lung cancer with low-dose CT.     The risks include:  radiation exposure: one low dose chest CT has as much ionizing radiation as about 15 chest x-rays or 6 months of background radiation living in [place]    false positives: 96% of positive findings/nodules are NOT cancer, but some might still require additional diagnostic evaluation, including biopsy  over-diagnosis: some slow growing cancers that might never have been clinically significant will be detected and treated unnecessarily     The benefit of early detection of lung cancer is contingent upon adherence to annual screening or more frequent follow up if indicated.     Furthermore, reaping the benefits of screening requires Jordan Barnett to be willing and physically able to undergo diagnostic procedures, if indicated. Although no specific guide is available for determining severity of comorbidities, it is reasonable to withhold screening in patients who have greater mortality risk from other diseases.     We did discuss that the only way to prevent lung cancer is to not " smoke. Smoking cessation assistance was not offered.    I did offer risk estimation using a calculator such as this one:    ShouldIScreen

## 2019-09-27 ENCOUNTER — HOSPITAL ENCOUNTER (OUTPATIENT)
Facility: CLINIC | Age: 59
End: 2019-09-27
Attending: SURGERY | Admitting: SURGERY
Payer: COMMERCIAL

## 2019-10-04 ENCOUNTER — HEALTH MAINTENANCE LETTER (OUTPATIENT)
Age: 59
End: 2019-10-04

## 2019-10-11 ENCOUNTER — TELEPHONE (OUTPATIENT)
Dept: FAMILY MEDICINE | Facility: CLINIC | Age: 59
End: 2019-10-11

## 2019-10-11 NOTE — TELEPHONE ENCOUNTER
"Reason for Call: Request for an order or referral: Soft CT Scan    Order or referral being requested: Soft CT Scan    Date needed: as soon as possible    Has the patient been seen by the PCP for this problem? YES    Additional comments: Patient was told at his last visit with Dr. Flanagan that he needed a \"Soft CT Scan\" and to call his insurance to make sure it was covered.  Patient called insurance and they stated they need a Prior Auth from us.  [employer] [place] is insurance and the number they gave the patient to have us call is [phone].      Phone number Patient can be reached at:  Home number on file [phone] (home)    Best Time:  Any    Can we leave a detailed message on this number?  YES    Call taken on 10/11/2019 at 12:36 PM by Bettina Cunningham  "

## 2019-10-11 NOTE — TELEPHONE ENCOUNTER
Patient notified he should schedule appointment to start the prior authorization process with the Imaging Department.      KISHA ArredondoN, RN

## 2019-10-29 ENCOUNTER — ALLIED HEALTH/NURSE VISIT (OUTPATIENT)
Dept: FAMILY MEDICINE | Facility: CLINIC | Age: 59
End: 2019-10-29
Payer: COMMERCIAL

## 2019-10-29 ENCOUNTER — HOSPITAL ENCOUNTER (OUTPATIENT)
Dept: ULTRASOUND IMAGING | Facility: CLINIC | Age: 59
Discharge: HOME | End: 2019-10-29
Attending: NURSE PRACTITIONER | Admitting: NURSE PRACTITIONER
Payer: COMMERCIAL

## 2019-10-29 ENCOUNTER — ANTICOAGULATION THERAPY VISIT (OUTPATIENT)
Dept: ANTICOAGULATION | Facility: CLINIC | Age: 59
End: 2019-10-29

## 2019-10-29 ENCOUNTER — OFFICE VISIT (OUTPATIENT)
Dept: FAMILY MEDICINE | Facility: CLINIC | Age: 59
End: 2019-10-29
Payer: COMMERCIAL

## 2019-10-29 VITALS
SYSTOLIC BLOOD PRESSURE: 110 MMHG | OXYGEN SATURATION: 97 % | DIASTOLIC BLOOD PRESSURE: 78 MMHG | HEIGHT: 70 IN | TEMPERATURE: 99.5 F | HEART RATE: 106 BPM | BODY MASS INDEX: 24.48 KG/M2 | WEIGHT: 171 LBS | RESPIRATION RATE: 20 BRPM

## 2019-10-29 DIAGNOSIS — I82.401 ACUTE DEEP VEIN THROMBOSIS (DVT) OF RIGHT LOWER EXTREMITY, UNSPECIFIED VEIN (H): Primary | ICD-10-CM

## 2019-10-29 DIAGNOSIS — I82.409 DEEP VEIN THROMBOSIS (DVT) (H): ICD-10-CM

## 2019-10-29 DIAGNOSIS — M79.89 RIGHT LEG SWELLING: ICD-10-CM

## 2019-10-29 DIAGNOSIS — I82.409 DEEP VEIN THROMBOSIS (DVT) (H): Primary | ICD-10-CM

## 2019-10-29 PROCEDURE — 93971 EXTREMITY STUDY: CPT | Mod: RT

## 2019-10-29 PROCEDURE — 99207 ZZC NO CHARGE NURSE ONLY: CPT

## 2019-10-29 PROCEDURE — 99214 OFFICE O/P EST MOD 30 MIN: CPT | Performed by: NURSE PRACTITIONER

## 2019-10-29 RX ORDER — OLANZAPINE 5 MG/1
5 TABLET ORAL 2 TIMES DAILY
COMMUNITY
End: 2022-12-01

## 2019-10-29 RX ORDER — WARFARIN SODIUM 5 MG/1
5 TABLET ORAL DAILY
Qty: 30 TABLET | Refills: 3 | Status: SHIPPED | OUTPATIENT
Start: 2019-10-29 | End: 2019-11-11

## 2019-10-29 ASSESSMENT — PAIN SCALES - GENERAL: PAINLEVEL: MILD PAIN (3)

## 2019-10-29 ASSESSMENT — MIFFLIN-ST. JEOR: SCORE: 1601.9

## 2019-10-29 NOTE — PROGRESS NOTES
"Subjective     Jordan Barnett is a 59 year old male who presents to clinic today for the following health issues:  Chief Complaint   Patient presents with     Leg Swelling     Right Lower leg swelling - calf area, \"feels hard all of the time\", warmth, Right upper leg feels like it has a \"cramp\" . No known injury      Letter for School/Work     will need letter for work if he has any restrictions or is to be off work. 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    Previous history of similar problem:   None   No history of blood clot  No history of cellulitis.    Precipitating factors:   Worsened by: laying down   No recent travel, surgery or inactivity    Alleviating factors:  Improved by: walking around     Therapies Tried and outcome: tylenol - \"unsure if this helped\"                 Reviewed and updated as needed this visit by Provider         Review of Systems   ROS COMP: Constitutional, HEENT, cardiovascular, pulmonary, gi and gu systems are negative, except as otherwise noted.      Objective    /78 (BP Location: Right arm, Patient Position: Chair, Cuff Size: Adult Regular)   Pulse 106   Temp 99.5  F (37.5  C) (Tympanic)   Resp 20   Ht 1.778 m (5' 10\")   Wt 77.6 kg (171 lb)   SpO2 97%   BMI 24.54 kg/m    Body mass index is 24.54 kg/m .  Physical Exam   GENERAL: healthy, alert and no distress  RESP: lungs clear to auscultation - no rales, rhonchi or wheezes  CV: regular rate and rhythm, normal S1 S2, no S3 or S4, no murmur, click or rub, no peripheral edema and peripheral pulses strong  MS: RLE: " Calf is edematous, semi-firm and tender to touch. Ankle and foot are erythematous, +1 edema - no tenderness to touch.    Diagnostic Test Results:  VENOUS DOPPLER RIGHT LOWER EXTREMITY 10/29/2019 11:36 AM     HISTORY: Right leg swelling.     COMPARISON: None.     FINDINGS: Color-flow imaging and Doppler waveform spectral analysis  were utilized. There is occlusive thrombus throughout the right  femoral, popliteal and visualized calf veins consistent with extensive  deep venous thrombosis. The common femoral vein appears patent. There  is also a patent deep femoral vein and superficial saphenous system.                                                                      IMPRESSION: Extensive deep venous thrombosis throughout the right  lower extremity.          Assessment & Plan       ICD-10-CM    1. Acute deep vein thrombosis (DVT) of right lower extremity, unspecified vein (H) I82.401 INR CLINIC REFERRAL     warfarin ANTICOAGULANT (COUMADIN) 5 MG tablet     enoxaparin ANTICOAGULANT (LOVENOX) 80 MG/0.8ML syringe   2. Right leg swelling M79.89 US Lower Extremity Venous Duplex Right     Acute occlusive DVT involving the right femoral, popliteal and calf veins.  Symptomatic with pain and swelling.  This clot appears to be unprovoked - this is his first clot.    Start warfarin - bridge with enoxaparin.  Referral to INR clinic.  Anticipate 3 months of treatment.    RN in clinic completed enoxaparin teaching and the patient's first injection.    Letter written excusing him from work.  Follow up with PCP next week.    Patient Instructions   Make an appointment with the INR (anticoagulation clinic) within the next 1-2 days.  Start warfarin 5 mg once daily.  Start enoxaparin injections twice daily - will stop when your INR is therapeutic.   The INR clinic will tell you when to stop the injections.  The INR clinic will tell you when to return for lab checks and how to dose your warfarin.      Thank you for choosing [facility].  You may be receiving an email and/or telephone survey request from [facility] Customer Experience regarding your visit today.  Please take a few minutes to respond to the survey to let us know how we are doing.      If you have questions or concerns, please contact us via [employer] or you can contact your care team at [phone].    Our Clinic hours are:  Monday 6:40 am  to 7:00 pm  Tuesday -Friday 6:40 am to 5:00 pm    The [place] outpatient lab hours are:  Monday - Friday 6:10 am to 4:45 pm  Saturdays 7:00 am to 11:00 am  Appointments are required, call [phone]    If you have clinical questions after hours or would like to schedule an appointment,  call the clinic at [phone].        Return in about 1 week (around 11/5/2019).    The risks, benefits and treatment options of prescribed medications or other treatments have been discussed with the patient. The patient verbalized their understanding and should call or follow up if no improvement or if they develop further problems.    AUGUSTUS Woodruff [facility]

## 2019-10-29 NOTE — PATIENT INSTRUCTIONS
Make an appointment with the INR (anticoagulation clinic) within the next 1-2 days.  Start warfarin 5 mg once daily.  Start enoxaparin injections twice daily - will stop when your INR is therapeutic.   The INR clinic will tell you when to stop the injections.  The INR clinic will tell you when to return for lab checks and how to dose your warfarin.      Thank you for choosing [facility].  You may be receiving an email and/or telephone survey request from [facility] Customer Experience regarding your visit today.  Please take a few minutes to respond to the survey to let us know how we are doing.      If you have questions or concerns, please contact us via [employer] or you can contact your care team at [phone].    Our Clinic hours are:  Monday 6:40 am  to 7:00 pm  Tuesday -Friday 6:40 am to 5:00 pm    The [place] outpatient lab hours are:  Monday - Friday 6:10 am to 4:45 pm  Saturdays 7:00 am to 11:00 am  Appointments are required, call [phone]    If you have clinical questions after hours or would like to schedule an appointment,  call the clinic at [phone].

## 2019-10-29 NOTE — LETTER
[facility]  [address] [address]  [phone]          October 29, 2019    RE:  Jordan WATSON Anibal                                                                                                                                                       [address]   [place] [facility] [facility] [address]            To whom it may concern:    Jordan Barnett is unable to work in any capacity for the next one week. He will be re-evaluated in clinic next week to reassess workability.        Sincerely,          Halina Barrett, CNP

## 2019-10-29 NOTE — PROGRESS NOTES
Pt saw Halina Barrett today for acute DVT of lower right leg confirmed on x-ray today.  Per Halina:  1.  Start Lovenox injections twice a day.  2.  Stop Lovenox injections once INR lab is in therapeutic range.  Anticoagulation clinic will let him know when he is in therapeutic range.  3.  Attend anticoagulation clinic ([facility]) within 1-2 days and thereafter according to [facility] instructions.  We have left a message with [facility] regarding the need to be seen in [facility].  Please call them at [phone] if you do not hear back from them by tomorrow.  4.  Begin warfarin, an oral medication taken to thin the blood.  Follow further instructions for how to take this from [facility].    Ailin Pradhan RN

## 2019-10-30 NOTE — NURSING NOTE
Pt presented to clinic at 1:15 for Lovenox teaching.  Pt paid for medication at retail pharmacy.  Dose verified and correct as prescribed from provider.  Pt will self-administer as directed every 12 hours.  Pt works the night shift so next scheduled injection at 1:15 pm is acceptable time for pt.    Reviewed step by step self-injection instructions with pt.  Pt declined written instructions for this because he prefers to use the step by step instructions that came with the Lovenox.  Pt self-administered the injection with good understanding and technique.  Pt waited 15 min in the lobby after injection.    Pt will start jantoven as prescribe today and take as directed.  Reviewed medication precautions with pt and pt verified that he received these instruction sheets from pharmacy for both Lovenox and jantoven.    Pt has pending appt on 11/1/19 with anticoagulation clinic.    Pt has no further questions or concerns.  Pt invited to call back if any questions or concerns.  He is welcome to meet with the RN again if any questions too.    Patient instructed to report any adverse reaction to staff immediately .    Ailin Pradhan RN

## 2019-10-30 NOTE — PATIENT INSTRUCTIONS
Patient Education     Deep Vein Thrombosis (DVT)     Vein, blood clot, embolus     Deep vein thrombosis (DVT) occurs when a blood clot (thrombus) forms in a deep vein. This happens most often in the leg. It can also happen in the arms or other parts of the body. A part of the clot called an embolus can break off and travel to the lungs. When this happens, it s called a pulmonary embolism (PE). PE is a medical emergency. It can cut off blood flow and lead to death. Both DVT and PE are closely related. Together, they are often referred to by the term venous thromboembolism (VTE).  Risk factors for DVT  Anything that slows blood flow, injures the lining of a vein, or increases blood clotting can make you more prone to having DVT. This includes the following:    Long periods without movement (such as when sitting for many hours at a time or when recovering from major surgery or illness)    Estrogen (female hormone) therapy, such as hormone replacement therapy (HRT) or birth control pills    Fractured hip or leg    Major surgery or joint replacement    Major trauma or spinal cord injury    Cancer    Family history    Excess weight or obesity    Smoking    Older age  Symptoms  DVT does not always cause symptoms. When symptoms do occur, they may appear around the site of the DVT, such as in the leg. Possible symptoms include:    Swelling    Pain    Warmth    Redness    Tenderness  Home care    You were likely prescribed blood thinners (anticoagulants). They may be given as pills (oral) or shots (injections). Follow all instructions when using these medicines. Note: Don't take blood thinners with other medicines, herbal remedies, or supplements without talking to your provider first. Certain medicines or products can affect how blood thinners work.    Follow your provider s instructions about activity and rest.    If support or compression stockings are prescribed, wear them as directed. These may help improve blood flow  in the legs.    When sitting or lying down, move your ankles, toes and knees often. This may also help improve blood flow in the legs.  Follow-up care  Follow up with your healthcare provider, or as advised. If imaging tests were done, they may need further review by a doctor. You will be told of any new findings that may affect your care.  When to seek medical advice  Call your healthcare provider right away if any of these occur:    New or increased swelling, pain, tenderness, warmth, or redness, in the leg, arm, or other area    Blood in the urine    Bleeding with bowel movements  Call 911  Call 911 if any of these occur:    Bleeding from the nose, gums, a cut, or vagina    Heavy or uncontrolled bleeding    Trouble breathing    Chest pain or discomfort that worsens with deep breathing or coughing    Coughing (may cough up blood)    Fast heartbeat    Sweating    Anxiety    Lightheadedness, dizziness, or fainting  Date Last Reviewed: 4/1/2018 2000-2018 [employer]. [address] [place] [address]. All rights reserved. This information is not intended as a substitute for professional medical care. Always follow your healthcare professional's instructions.           Patient Education     Enoxaparin Sodium Solution for injection  What is this medicine?  ENOXAPARIN (ee nox a PA rin) is used after knee, hip, or abdominal surgeries to prevent blood clotting. It is also used to treat existing blood clots in the lungs or in the veins.  This medicine may be used for other purposes; ask your health care provider or pharmacist if you have questions.  What should I tell my health care provider before I take this medicine?  They need to know if you have any of these conditions:    bleeding disorders, hemorrhage, or hemophilia    infection of the heart or heart valves    kidney or liver disease    previous stroke    prosthetic heart valve    recent surgery or delivery of a baby    ulcer in the stomach  or intestine, diverticulitis, or other bowel disease    an unusual or allergic reaction to enoxaparin, heparin, pork or pork products, other medicines, foods, dyes, or preservatives    pregnant or trying to get pregnant    breast-feeding  How should I use this medicine?  This medicine is for injection under the skin. It is usually given by a health-care professional. You or a family member may be trained on how to give the injections. If you are to give yourself injections, make sure you understand how to use the syringe, measure the dose if necessary, and give the injection. To avoid bruising, do not rub the site where this medicine has been injected. Do not take your medicine more often than directed. Do not stop taking except on the advice of your doctor or health care professional.  Make sure you receive a puncture-resistant container to dispose of the needles and syringes once you have finished with them. Do not reuse these items. Return the container to your doctor or health care professional for proper disposal.  Talk to your pediatrician regarding the use of this medicine in children. Special care may be needed.  Overdosage: If you think you have taken too much of this medicine contact a poison control center or emergency room at once.  NOTE: This medicine is only for you. Do not share this medicine with others.  What if I miss a dose?  If you miss a dose, take it as soon as you can. If it is almost time for your next dose, take only that dose. Do not take double or extra doses.  What may interact with this medicine?    aspirin and aspirin-like medicines    certain medicines that treat or prevent blood clots    dipyridamole    NSAIDs, medicines for pain and inflammation, like ibuprofen or naproxen  This list may not describe all possible interactions. Give your health care provider a list of all the medicines, herbs, non-prescription drugs, or dietary supplements you use. Also tell them if you smoke, drink  alcohol, or use illegal drugs. Some items may interact with your medicine.  What should I watch for while using this medicine?  Visit your doctor or health care professional for regular checks on your progress. Your condition will be monitored carefully while you are receiving this medicine.  Notify your doctor or health care professional and seek emergency treatment if you develop breathing problems; changes in vision; chest pain; severe, sudden headache; pain, swelling, warmth in the leg; trouble speaking; sudden numbness or weakness of the face, arm, or leg. These can be signs that your condition has gotten worse.  If you are going to have surgery, tell your doctor or health care professional that you are taking this medicine.  Do not stop taking this medicine without first talking to your doctor. Be sure to refill your prescription before you run out of medicine.  Avoid sports and activities that might cause injury while you are using this medicine. Severe falls or injuries can cause unseen bleeding. Be careful when using sharp tools or knives. Consider using an electric razor. Take special care brushing or flossing your teeth. Report any injuries, bruising, or red spots on the skin to your doctor or health care professional.  What side effects may I notice from receiving this medicine?  Side effects that you should report to your doctor or health care professional as soon as possible:    allergic reactions like skin rash, itching or hives, swelling of the face, lips, or tongue    feeling faint or lightheaded, falls    signs and symptoms of bleeding such as bloody or black, tarry stools; red or dark-brown urine; spitting up blood or brown material that looks like coffee grounds; red spots on the skin; unusual bruising or bleeding from the eye, gums, or nose  Side effects that usually do not require medical attention (report to your doctor or health care professional if they continue or are bothersome):    pain,  redness, or irritation at site where injected  This list may not describe all possible side effects. Call your doctor for medical advice about side effects. You may report side effects to FDA at 6-256-ASN-9398.  Where should I keep my medicine?  Keep out of the reach of children.  Store at room temperature between 15 and 30 degrees C (59 and 86 degrees F). Do not freeze. If your injections have been specially prepared, you may need to store them in the refrigerator. Ask your pharmacist. Throw away any unused medicine after the expiration date.  NOTE:This sheet is a summary. It may not cover all possible information. If you have questions about this medicine, talk to your doctor, pharmacist, or health care provider. Copyright  2016 [employer]           Patient Education     Enoxaparin injection  Brand Name: Lovenox  What is this medicine?  ENOXAPARIN (ee nox a PA rin) is used after knee, hip, or abdominal surgeries to prevent blood clotting. It is also used to treat existing blood clots in the lungs or in the veins.  How should I use this medicine?  This medicine is for injection under the skin. It is usually given by a health-care professional. You or a family member may be trained on how to give the injections. If you are to give yourself injections, make sure you understand how to use the syringe, measure the dose if necessary, and give the injection. To avoid bruising, do not rub the site where this medicine has been injected. Do not take your medicine more often than directed. Do not stop taking except on the advice of your doctor or health care professional.  Make sure you receive a puncture-resistant container to dispose of the needles and syringes once you have finished with them. Do not reuse these items. Return the container to your doctor or health care professional for proper disposal.  Talk to your pediatrician regarding the use of this medicine in children. Special care may be needed.  What side  effects may I notice from receiving this medicine?  Side effects that you should report to your doctor or health care professional as soon as possible:    allergic reactions like skin rash, itching or hives, swelling of the face, lips, or tongue    feeling faint or lightheaded, falls    signs and symptoms of bleeding such as bloody or black, tarry stools; red or dark-brown urine; spitting up blood or brown material that looks like coffee grounds; red spots on the skin; unusual bruising or bleeding from the eye, gums, or nose  Side effects that usually do not require medical attention (report to your doctor or health care professional if they continue or are bothersome):    pain, redness, or irritation at site where injected  What may interact with this medicine?      aspirin and aspirin-like medicines    certain medicines that treat or prevent blood clots    dipyridamole    NSAIDs, medicines for pain and inflammation, like ibuprofen or naproxen  What if I miss a dose?  If you miss a dose, take it as soon as you can. If it is almost time for your next dose, take only that dose. Do not take double or extra doses.  Where should I keep my medicine?  Keep out of the reach of children.  Store at room temperature between 15 and 30 degrees C (59 and 86 degrees F). Do not freeze. If your injections have been specially prepared, you may need to store them in the refrigerator. Ask your pharmacist. Throw away any unused medicine after the expiration date.  What should I tell my health care provider before I take this medicine?  They need to know if you have any of these conditions:    bleeding disorders, hemorrhage, or hemophilia    infection of the heart or heart valves    kidney or liver disease    previous stroke    prosthetic heart valve    recent surgery or delivery of a baby    ulcer in the stomach or intestine, diverticulitis, or other bowel disease    an unusual or allergic reaction to enoxaparin, heparin, pork or pork  products, other medicines, foods, dyes, or preservatives    pregnant or trying to get pregnant    breast-feeding  What should I watch for while using this medicine?  Visit your doctor or health care professional for regular checks on your progress. Your condition will be monitored carefully while you are receiving this medicine.  Notify your doctor or health care professional and seek emergency treatment if you develop breathing problems; changes in vision; chest pain; severe, sudden headache; pain, swelling, warmth in the leg; trouble speaking; sudden numbness or weakness of the face, arm, or leg. These can be signs that your condition has gotten worse.  If you are going to have surgery, tell your doctor or health care professional that you are taking this medicine.  Do not stop taking this medicine without first talking to your doctor. Be sure to refill your prescription before you run out of medicine.  Avoid sports and activities that might cause injury while you are using this medicine. Severe falls or injuries can cause unseen bleeding. Be careful when using sharp tools or knives. Consider using an electric razor. Take special care brushing or flossing your teeth. Report any injuries, bruising, or red spots on the skin to your doctor or health care professional.  NOTE:This sheet is a summary. It may not cover all possible information. If you have questions about this medicine, talk to your doctor, pharmacist, or health care provider. Copyright  2019 [employer]           Patient Education       Guide to Enoxaparin (Lovenox) Therapy: Treatment to Prevent Blood Clots     For informational purposes only. Not to replace the advice of your health care provider.  Copyright   2018 [facility]. All rights reserved.           Patient Education     Patient Education    Warfarin Sodium Oral tablet    Warfarin Sodium Solution for injection  Warfarin Sodium Oral tablet  What is this medicine?  WARFARIN (WAR far  in) is an anticoagulant. It is used to treat or prevent clots in the veins, arteries, lungs, or heart.  This medicine may be used for other purposes; ask your health care provider or pharmacist if you have questions.  What should I tell my health care provider before I take this medicine?  They need to know if you have any of these conditions:    alcoholism    anemia    bleeding disorders    cancer    diabetes    heart disease    high blood pressure    history of bleeding in the gastrointestinal tract    history of stroke or other brain injury or disease    kidney or liver disease    protein C deficiency    protein S deficiency    psychosis or dementia    recent injury, recent or planned surgery or procedure    an unusual or allergic reaction to warfarin, other medicines, foods, dyes, or preservatives    pregnant or trying to get pregnant    breast-feeding  How should I use this medicine?  Take this medicine by mouth with a glass of water. Follow the directions on the prescription label. You can take this medicine with or without food. Take your medicine at the same time each day. Do not take it more often than directed. Do not stop taking except on your doctor's advice. Stopping this medicine may increase your risk of a blood clot. Be sure to refill your prescription before you run out of medicine.  If your doctor or healthcare professional calls to change your dose, write down the dose and any other instructions. Always read the dose and instructions back to him or her to make sure you understand them. Tell your doctor or healthcare professional what strength of tablets you have on hand. Ask how many tablets you should take to equal your new dose. Write the date on the new instructions and keep them near your medicine. If you are told to stop taking your medicine until your next blood test, call your doctor or healthcare professional if you do not hear anything within 24 hours of the test to find out your new dose  or when to restart your prior dose.  A special MedGuide will be given to you by the pharmacist with each prescription and refill. Be sure to read this information carefully each time.  Talk to your pediatrician regarding the use of this medicine in children. Special care may be needed.  Overdosage: If you think you have taken too much of this medicine contact a poison control center or emergency room at once.  NOTE: This medicine is only for you. Do not share this medicine with others.  What if I miss a dose?  It is important not to miss a dose. If you miss a dose, call your healthcare provider. Take the dose as soon as possible on the same day. If it is almost time for your next dose, take only that dose. Do not take double or extra doses to make up for a missed dose.  What may interact with this medicine?  Do not take this medicine with any of the following medications:    agents that prevent or dissolve blood clots    aspirin or other salicylates    danshen    dextrothyroxine    mifepristone    [place]'s Wort    red yeast rice  This medicine may also interact with the following medications:    acetaminophen    agents that lower cholesterol    alcohol    allopurinol    amiodarone    antibiotics or medicines for treating bacterial, fungal or viral infections    azathioprine    barbiturate medicines for inducing sleep or treating seizures    certain medicines for diabetes    certain medicines for heart rhythm problems    certain medicines for high blood pressure    chloral hydrate    cisapride    disulfiram    female hormones, including contraceptive or birth control pills    general anesthetics    herbal or dietary products like garlic, ginkgo, ginseng, green tea, or kava kava    influenza virus vaccine    male hormones    medicines for mental depression or psychosis    medicines for some types of cancer    medicines for stomach problems    methylphenidate    NSAIDs, medicines for pain and inflammation, like  ibuprofen or naproxen    propoxyphene    quinidine, quinine    raloxifene    seizure or epilepsy medicine like carbamazepine, phenytoin, and valproic acid    steroids like cortisone and prednisone    tamoxifen    thyroid medicine    tramadol    vitamin c, vitamin e, and vitamin K    zafirlukast    zileuton  This list may not describe all possible interactions. Give your health care provider a list of all the medicines, herbs, non-prescription drugs, or dietary supplements you use. Also tell them if you smoke, drink alcohol, or use illegal drugs. Some items may interact with your medicine.  What should I watch for while using this medicine?  Visit your doctor or health care professional for regular checks on your progress. You will need to have a blood test called a PT/INR regularly. The PT/INR blood test is done to make sure you are getting the right dose of this medicine. It is important to not miss your appointment for the blood tests. When you first start taking this medicine, these tests are done often. Once the correct dose is determined and you take your medicine properly, these tests can be done less often.  Notify your doctor or health care professional and seek emergency treatment if you develop breathing problems; changes in vision; chest pain; severe, sudden headache; pain, swelling, warmth in the leg; trouble speaking; sudden numbness or weakness of the face, arm or leg. These can be signs that your condition has gotten worse.  While you are taking this medicine, carry an identification card with your name, the name and dose of medicine(s) being used, and the name and phone number of your doctor or health care professional or person to contact in an emergency.  Do not start taking or stop taking any medicines or over-the-counter medicines except on the advice of your doctor or health care professional.  You should discuss your diet with your doctor or health care professional. Do not make major changes  in your diet. Vitamin K can affect how well this medicine works. Many foods contain vitamin K. It is important to eat a consistent amount of foods with vitamin K. Other foods with vitamin K that you should eat in consistent amounts are asparagus, basil, beef or pork liver, black eyed peas, broccoli, brussel sprouts, cabbage, chickpeas, cucumber with peel, green onions, green tea, okra, parsley, peas, thyme, and green leafy vegetables like beet greens, fuad greens, endive, kale, mustard greens, spinach, turnip greens, watercress, or certain lettuces like green leaf or kendell.  This medicine can cause birth defects or bleeding in an unborn child. Women of childbearing age should use effective birth control while taking this medicine. If a woman becomes pregnant while taking this medicine, she should discuss the potential risks and her options with her health care professional.  Avoid sports and activities that might cause injury while you are using this medicine. Severe falls or injuries can cause unseen bleeding. Be careful when using sharp tools or knives. Consider using an electric razor. Take special care brushing or flossing your teeth. Report any injuries, bruising, or red spots on the skin to your doctor or health care professional.  If you have an illness that causes vomiting, diarrhea, or fever for more than a few days, contact your doctor. Also check with your doctor if you are unable to eat for several days. These problems can change the effect of this medicine.  Even after you stop taking this medicine, it takes several days before your body recovers its normal ability to clot blood. Ask your doctor or health care professional how long you need to be careful. If you are going to have surgery or dental work, tell your doctor or health care professional that you have been taking this medicine.  What side effects may I notice from receiving this medicine?  Side effects that you should report to your  doctor or health care professional as soon as possible:    back pain    chills    dizziness    fever    heavy menstrual bleeding or vaginal bleeding    painful, blue, or purple toes    painful, prolonged erection    signs and symptoms of bleeding such as bloody or black, tarry stools; red or dark-brown urine; spitting up blood or brown material that looks like coffee grounds; red spots on the skin; unusual bruising or bleeding from the eye, gums, or noseskin rash, itching or skin damage    stomach pain    unusually weak or tired    yellowing of skin or eyes  Side effects that usually do not require medical attention (report to your doctor or health care professional if they continue or are bothersome):    diarrhea    hair loss  This list may not describe all possible side effects. Call your doctor for medical advice about side effects. You may report side effects to FDA at 3-114-FDA-2237.  Where should I keep my medicine?  Keep out of the reach of children.  Store at room temperature between 15 and 30 degrees C (59 and 86 degrees F). Protect from light. Throw away any unused medicine after the expiration date. Do not flush down the toilet.  NOTE: This sheet is a summary. It may not cover all possible information. If you have questions about this medicine, talk to your doctor, pharmacist, or health care provider.  NOTE:This sheet is a summary. It may not cover all possible information. If you have questions about this medicine, talk to your doctor, pharmacist, or health care provider. Copyright  2016 [employer]

## 2019-11-01 ENCOUNTER — ANTICOAGULATION THERAPY VISIT (OUTPATIENT)
Dept: ANTICOAGULATION | Facility: CLINIC | Age: 59
End: 2019-11-01
Payer: COMMERCIAL

## 2019-11-01 DIAGNOSIS — I82.401 ACUTE DEEP VEIN THROMBOSIS (DVT) OF RIGHT LOWER EXTREMITY, UNSPECIFIED VEIN (H): ICD-10-CM

## 2019-11-01 DIAGNOSIS — I82.409 DEEP VEIN THROMBOSIS (DVT) (H): ICD-10-CM

## 2019-11-01 LAB — INR POINT OF CARE: 1.4 (ref 0.86–1.14)

## 2019-11-01 PROCEDURE — 36416 COLLJ CAPILLARY BLOOD SPEC: CPT

## 2019-11-01 PROCEDURE — 99207 ZZC NO CHARGE NURSE ONLY: CPT

## 2019-11-01 PROCEDURE — 85610 PROTHROMBIN TIME: CPT | Mod: QW

## 2019-11-01 NOTE — PROGRESS NOTES
ANTICOAGULATION FOLLOW-UP CLINIC VISIT    Patient Name:  Jordan Barnett  Date:  2019  Contact Type:  Face to Face    SUBJECTIVE:  Patient Findings     Comments:   Patient has a new DVT and is on lovenox. He had a colonoscopy scheduled for  but states they are cancelling this for him due to getting the DVT. The colonoscopy was standard and not urgent. Per protocol, INR where it should be at day 4. Will continue lovenox and 5 mg warfarin. Patient works overnights at [employer]. He will recheck INR on  and new consult on .        Clinical Outcomes     Comments:   Patient has a new DVT and is on lovenox. He had a colonoscopy scheduled for  but states they are cancelling this for him due to getting the DVT. The colonoscopy was standard and not urgent. Per protocol, INR where it should be at day 4. Will continue lovenox and 5 mg warfarin. Patient works overnights at [employer]. He will recheck INR on  and new consult on .           OBJECTIVE    INR Protime   Date Value Ref Range Status   2019 1.4 (A) 0.86 - 1.14 Final       ASSESSMENT / PLAN  INR assessment SUB initiation   Recheck INR In: 3 DAYS    INR Location Clinic      Anticoagulation Summary  As of 2019    INR goal:   2.0-3.0   TTR:   --   INR used for dosin.4! (2019)   Warfarin maintenance plan:   No maintenance plan   Full warfarin instructions:   : 5 mg; : 5 mg; 11/3: 5 mg; Otherwise No maintenance plan   Next INR check:   2019   Target end date:   2020    Indications    Deep vein thrombosis (DVT) (H) [I82.409]             Anticoagulation Episode Summary     INR check location:       Preferred lab:       Send INR reminders to:   FLAVIO FRANCIS    Comments:   * works overnights      Anticoagulation Care Providers     Provider Role Specialty Phone number    Juan Antonio Flanagan MD [facility] [phone]    Halina Barrett APRN CNP  Nurse Practitioner [phone]             See the Encounter Report to view Anticoagulation Flowsheet and Dosing Calendar (Go to Encounters tab in chart review, and find the Anticoagulation Therapy Visit)        Maryellen Alberts RN

## 2019-11-01 NOTE — PATIENT INSTRUCTIONS
Continue 5 mg daily of coumadin. Continue every 12 hours of lovenox (enoxaparin). Recheck INR 11-4 and 11-7 next week.

## 2019-11-04 ENCOUNTER — ANTICOAGULATION THERAPY VISIT (OUTPATIENT)
Dept: ANTICOAGULATION | Facility: CLINIC | Age: 59
End: 2019-11-04
Payer: COMMERCIAL

## 2019-11-04 DIAGNOSIS — I82.409 DEEP VEIN THROMBOSIS (DVT) (H): ICD-10-CM

## 2019-11-04 LAB — INR POINT OF CARE: 2.5 (ref 0.86–1.14)

## 2019-11-04 PROCEDURE — 99207 ZZC NO CHARGE NURSE ONLY: CPT

## 2019-11-04 PROCEDURE — 36416 COLLJ CAPILLARY BLOOD SPEC: CPT

## 2019-11-04 PROCEDURE — 85610 PROTHROMBIN TIME: CPT | Mod: QW

## 2019-11-04 NOTE — PROGRESS NOTES
ANTICOAGULATION FOLLOW-UP CLINIC VISIT    Patient Name:  Jordan Barnett  Date:  2019  Contact Type:  Face to Face    SUBJECTIVE:  Patient Findings     Comments:   Still establishing maintenance dose. INR therapeutic, can consider a reduction in dose per protocol. Will continue with 5mg daily since patient has a new clot and just stopped Lovenox injections.     Patient was given the okay to discontinue the injections because the INR was >2.3.         Clinical Outcomes     Negatives:   Major bleeding event, Thromboembolic event, Anticoagulation-related hospital admission, Anticoagulation-related ED visit, Anticoagulation-related fatality    Comments:   Still establishing maintenance dose. INR therapeutic, can consider a reduction in dose per protocol. Will continue with 5mg daily since patient has a new clot and just stopped Lovenox injections.     Patient was given the okay to discontinue the injections because the INR was >2.3.            OBJECTIVE    INR Protime   Date Value Ref Range Status   2019 2.5 (A) 0.86 - 1.14 Final       ASSESSMENT / PLAN  INR assessment THER    Recheck INR In: 3 DAYS    INR Location Clinic      Anticoagulation Summary  As of 2019    INR goal:   2.0-3.0   TTR:   --   INR used for dosin.5 (2019)   Warfarin maintenance plan:   No maintenance plan   Full warfarin instructions:   : 5 mg; : 5 mg; : 5 mg; Otherwise No maintenance plan   Next INR check:   2019   Priority:   INR   Target end date:   2020    Indications    Deep vein thrombosis (DVT) (H) [I82.409]             Anticoagulation Episode Summary     INR check location:       Preferred lab:       Send INR reminders to:   FLAVIO FRANCIS    Comments:   * works overnights. New consult 19      Anticoagulation Care Providers     Provider Role Specialty Phone number    Juan Antonio Flanagan MD [facility] [phone]    Halina Barrett APRN CNP  Nurse  Practitioner [phone]            See the Encounter Report to view Anticoagulation Flowsheet and Dosing Calendar (Go to Encounters tab in chart review, and find the Anticoagulation Therapy Visit)        Amy Renteria RN [facility]

## 2019-11-05 ENCOUNTER — OFFICE VISIT (OUTPATIENT)
Dept: FAMILY MEDICINE | Facility: CLINIC | Age: 59
End: 2019-11-05
Payer: COMMERCIAL

## 2019-11-05 VITALS
BODY MASS INDEX: 24.48 KG/M2 | TEMPERATURE: 97.8 F | HEART RATE: 78 BPM | RESPIRATION RATE: 18 BRPM | HEIGHT: 70 IN | WEIGHT: 171 LBS | SYSTOLIC BLOOD PRESSURE: 130 MMHG | OXYGEN SATURATION: 98 % | DIASTOLIC BLOOD PRESSURE: 82 MMHG

## 2019-11-05 DIAGNOSIS — I82.401 ACUTE DEEP VEIN THROMBOSIS (DVT) OF RIGHT LOWER EXTREMITY, UNSPECIFIED VEIN (H): Primary | ICD-10-CM

## 2019-11-05 PROCEDURE — 99213 OFFICE O/P EST LOW 20 MIN: CPT | Performed by: FAMILY MEDICINE

## 2019-11-05 ASSESSMENT — MIFFLIN-ST. JEOR: SCORE: 1601.9

## 2019-11-05 NOTE — PROGRESS NOTES
Subjective     Jordan Barnett is a 59 year old male who presents to clinic today for the following health issues:  Chief Complaint   Patient presents with     Forms     Pt here to discuss going back to work with no restrictions due to recent dvt.       HPI   DVT - unprovoked      Duration: dx with on 10/29    Description (location/character/radiation): dvt in right lower leg    Intensity:  No pain in leg but does have some pain in right groin pain, rates 1-2     Accompanying signs and symptoms: none    History (similar episodes/previous evaluation): None    Precipitating or alleviating factors: None    Therapies tried and outcome: finished lovenox yesterday, still taking coumadin     INR therapeutic now.    11/4/19     Component Value Flag Ref Range Units Status Collected Lab   INR Protime 2.5  Abnormal   0.86 - 1.14  Final 11/04/2019 12:00 AM      Denies chest pain, dyspnea, HA, BOV, dizziness, spontaneous bleeding or urinary changes.      Patient Active Problem List   Diagnosis     Severe bipolar I disorder, most recent episode mixed, with psychotic features (H)     Sebaceous cyst     CKD (chronic kidney disease) stage 3, GFR 30-59 ml/min (H)     Schizoaffective disorder (H)     Hyperlipidemia LDL goal <160     Tubular adenoma of colon     Former smoker     Benign essential hypertension     Former smoker, stopped smoking in distant past     Deep vein thrombosis (DVT) (H)     Past Surgical History:   Procedure Laterality Date     COLONOSCOPY      normal. has fam hx colon cancer     COLONOSCOPY N/A 10/13/2014    Procedure: COLONOSCOPY;  Surgeon: Santy Constantino MD;  Location: [facility]     HERNIA REPAIR, INGUINAL RT/LT  02/12/2004     SURGICAL HISTORY OF -       Incised & Drained - ? Perirectal Abscess        Social History     Tobacco Use     Smoking status: Former Smoker     Packs/day: 1.00     Years: 30.00     Pack years: 30.00     Types: Cigarettes     Last attempt to quit: 4/2/2012     Years since quitting:  "7.5     Smokeless tobacco: Former User     Quit date: 4/2/2012     Tobacco comment: 2 PPD   Substance Use Topics     Alcohol use: Yes     Comment: RARE     Family History   Problem Relation Age of Onset     Thyroid Disease Mother      Coronary Artery Disease Mother         pacemaker     Cancer Paternal Grandfather      Cancer Sister      Cancer - colorectal Sister      Mental Illness Nephew         committed suicide at age 27         Current Outpatient Medications   Medication Sig Dispense Refill     HYDROXYZINE HCL PO Take 50 mg by mouth every 6 hours as needed        lamoTRIgine (LAMICTAL) 100 MG tablet Take 1 tablet (100 mg) by mouth 2 times daily 60 tablet 0     losartan (COZAAR) 100 MG tablet Take 1 tablet (100 mg) by mouth daily 90 tablet 3     Multiple Vitamins-Minerals (MULTIVITAMIN ADULT PO) Take 1 tablet by mouth At Bedtime        OLANZapine (ZYPREXA) 5 MG tablet Take 5 mg by mouth 2 times daily       Omega-3 Fatty Acids (OMEGA 3 PO) Take 2,400 mg by mouth every evening       warfarin ANTICOAGULANT (COUMADIN) 5 MG tablet Take 1 tablet (5 mg) by mouth daily 30 tablet 3     acetaminophen (TYLENOL) 500 MG tablet Take 1-2 tablets by mouth every 6 hours as needed.       enoxaparin ANTICOAGULANT (LOVENOX) 80 MG/0.8ML syringe Inject 0.78 mLs (78 mg) Subcutaneous every 12 hours (Patient not taking: Reported on 11/5/2019) 5 Syringe 0     Allergies   Allergen Reactions     Lisinopril Nausea     Felt weakness nausea, couldn't sleep       Reviewed and updated as needed this visit by Provider         Review of Systems   ROS COMP: Constitutional, HEENT, cardiovascular, pulmonary, GI, , musculoskeletal, neuro, skin, endocrine and psych systems are negative, except as otherwise noted.      Objective    /82   Pulse 78   Temp 97.8  F (36.6  C) (Tympanic)   Resp 18   Ht 1.778 m (5' 10\")   Wt 77.6 kg (171 lb)   SpO2 98%   BMI 24.54 kg/m    Body mass index is 24.54 kg/m .  Physical Exam   GENERAL: healthy, alert " and no distress, ambulatory w/o assist  NECK: no tenderness, no adenopathy,  Thyroid not enlarged  RESP: lungs clear to auscultation - no rales, no rhonchi, no wheezes  CV: regular rates and rhythm, no murmur  MS: RLE with distal very mild pitting non-erythematous edema and mild medial thigh TTP; full range of motion x 4  SKIN: no suspicious lesions, no rashes  NEURO: strength and tone- normal, sensory exam- grossly normal, mentation- intact, speech- normal, reflexes- symmetric  ABD:  nontender    Diagnostic Test Results:  Labs reviewed in Epic  VENOUS DOPPLER RIGHT LOWER EXTREMITY 10/29/2019 11:36 AM     HISTORY: Right leg swelling.     COMPARISON: None.     FINDINGS: Color-flow imaging and Doppler waveform spectral analysis  were utilized. There is occlusive thrombus throughout the right  femoral, popliteal and visualized calf veins consistent with extensive  deep venous thrombosis. The common femoral vein appears patent. There  is also a patent deep femoral vein and superficial saphenous system.                                                                      IMPRESSION: Extensive deep venous thrombosis throughout the right  lower extremity.     ALEJANDRO WEI MD        Assessment & Plan     Jordan was seen today for forms.    Diagnoses and all orders for this visit:    Acute deep vein thrombosis (DVT) of right lower extremity, unspecified vein (H)  -     Oncology/Hematology Adult Referral    Due to unprovoked extensive DVT, refer to hematology.  Continue warfarin. INR therapeutic now.  anticoag clinic in 2 days.  Patient has no mobility or lower extremity limitations. 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 Advised to stop work if with significant pain or dyspnea.  Return precautions discussed and given to patient.         Patient Instructions     Schedule hematology consult.    Continue warfarin at current dose.  See anticoagulation clinic in 2 days as  scheduled.      Patient Education     Deep Vein Thrombosis (DVT)     Vein, blood clot, embolus     Deep vein thrombosis (DVT) occurs when a blood clot (thrombus) forms in a deep vein. This happens most often in the leg. It can also happen in the arms or other parts of the body. A part of the clot called an embolus can break off and travel to the lungs. When this happens, it s called a pulmonary embolism (PE). PE is a medical emergency. It can cut off blood flow and lead to death. Both DVT and PE are closely related. Together, they are often referred to by the term venous thromboembolism (VTE).  Risk factors for DVT  Anything that slows blood flow, injures the lining of a vein, or increases blood clotting can make you more prone to having DVT. This includes the following:    Long periods without movement (such as when sitting for many hours at a time or when recovering from major surgery or illness)    Estrogen (female hormone) therapy, such as hormone replacement therapy (HRT) or birth control pills    Fractured hip or leg    Major surgery or joint replacement    Major trauma or spinal cord injury    Cancer    Family history    Excess weight or obesity    Smoking    Older age  Symptoms  DVT does not always cause symptoms. When symptoms do occur, they may appear around the site of the DVT, such as in the leg. Possible symptoms include:    Swelling    Pain    Warmth    Redness    Tenderness  Home care    You were likely prescribed blood thinners (anticoagulants). They may be given as pills (oral) or shots (injections). Follow all instructions when using these medicines. Note: Don't take blood thinners with other medicines, herbal remedies, or supplements without talking to your provider first. Certain medicines or products can affect how blood thinners work.    Follow your provider s instructions about activity and rest.    If support or compression stockings are prescribed, wear them as directed. These may help  improve blood flow in the legs.    When sitting or lying down, move your ankles, toes and knees often. This may also help improve blood flow in the legs.  Follow-up care  Follow up with your healthcare provider, or as advised. If imaging tests were done, they may need further review by a doctor. You will be told of any new findings that may affect your care.  When to seek medical advice  Call your healthcare provider right away if any of these occur:    New or increased swelling, pain, tenderness, warmth, or redness, in the leg, arm, or other area    Blood in the urine    Bleeding with bowel movements  Call 911  Call 911 if any of these occur:    Bleeding from the nose, gums, a cut, or vagina    Heavy or uncontrolled bleeding    Trouble breathing    Chest pain or discomfort that worsens with deep breathing or coughing    Coughing (may cough up blood)    Fast heartbeat    Sweating    Anxiety    Lightheadedness, dizziness, or fainting  Date Last Reviewed: 4/1/2018 2000-2018 [employer]. [address] [place] [address]. All rights reserved. This information is not intended as a substitute for professional medical care. Always follow your healthcare professional's instructions.               Return in about 2 days (around 11/7/2019) for anticoagulation clinic.    Juan Antonio Flanagan MD  [facility]

## 2019-11-05 NOTE — PATIENT INSTRUCTIONS
Schedule hematology consult.    Continue warfarin at current dose.  See anticoagulation clinic in 2 days as scheduled.      Patient Education     Deep Vein Thrombosis (DVT)     Vein, blood clot, embolus     Deep vein thrombosis (DVT) occurs when a blood clot (thrombus) forms in a deep vein. This happens most often in the leg. It can also happen in the arms or other parts of the body. A part of the clot called an embolus can break off and travel to the lungs. When this happens, it s called a pulmonary embolism (PE). PE is a medical emergency. It can cut off blood flow and lead to death. Both DVT and PE are closely related. Together, they are often referred to by the term venous thromboembolism (VTE).  Risk factors for DVT  Anything that slows blood flow, injures the lining of a vein, or increases blood clotting can make you more prone to having DVT. This includes the following:    Long periods without movement (such as when sitting for many hours at a time or when recovering from major surgery or illness)    Estrogen (female hormone) therapy, such as hormone replacement therapy (HRT) or birth control pills    Fractured hip or leg    Major surgery or joint replacement    Major trauma or spinal cord injury    Cancer    Family history    Excess weight or obesity    Smoking    Older age  Symptoms  DVT does not always cause symptoms. When symptoms do occur, they may appear around the site of the DVT, such as in the leg. Possible symptoms include:    Swelling    Pain    Warmth    Redness    Tenderness  Home care    You were likely prescribed blood thinners (anticoagulants). They may be given as pills (oral) or shots (injections). Follow all instructions when using these medicines. Note: Don't take blood thinners with other medicines, herbal remedies, or supplements without talking to your provider first. Certain medicines or products can affect how blood thinners work.    Follow your provider s instructions about activity  and rest.    If support or compression stockings are prescribed, wear them as directed. These may help improve blood flow in the legs.    When sitting or lying down, move your ankles, toes and knees often. This may also help improve blood flow in the legs.  Follow-up care  Follow up with your healthcare provider, or as advised. If imaging tests were done, they may need further review by a doctor. You will be told of any new findings that may affect your care.  When to seek medical advice  Call your healthcare provider right away if any of these occur:    New or increased swelling, pain, tenderness, warmth, or redness, in the leg, arm, or other area    Blood in the urine    Bleeding with bowel movements  Call 911  Call 911 if any of these occur:    Bleeding from the nose, gums, a cut, or vagina    Heavy or uncontrolled bleeding    Trouble breathing    Chest pain or discomfort that worsens with deep breathing or coughing    Coughing (may cough up blood)    Fast heartbeat    Sweating    Anxiety    Lightheadedness, dizziness, or fainting  Date Last Reviewed: 4/1/2018 2000-2018 [employer]. [address] [place] [address], [place] [address]. All rights reserved. This information is not intended as a substitute for professional medical care. Always follow your healthcare professional's instructions.

## 2019-11-06 ENCOUNTER — TELEPHONE (OUTPATIENT)
Dept: FAMILY MEDICINE | Facility: CLINIC | Age: 59
End: 2019-11-06

## 2019-11-06 NOTE — TELEPHONE ENCOUNTER
ONCOLOGY INTAKE: Records Information      APPT INFORMATION:  Referring provider:  Juan Antonio Flanagan MD  Referring provider s clinic:   [facility]  Reason for visit/diagnosis:  I82.401 (ICD-10-CM) - Acute deep vein thrombosis (DVT) of right lower extremity, unspecified vein (H)  Has patient been notified of appointment date and time?: No    RECORDS INFORMATION:  Were the records received with the referral (via [employer])? No, Internal Referral      ADDITIONAL INFORMATION:  LVM and Letter Sent

## 2019-11-07 ENCOUNTER — ANTICOAGULATION THERAPY VISIT (OUTPATIENT)
Dept: ANTICOAGULATION | Facility: CLINIC | Age: 59
End: 2019-11-07
Payer: COMMERCIAL

## 2019-11-07 DIAGNOSIS — I82.409 DEEP VEIN THROMBOSIS (DVT) (H): ICD-10-CM

## 2019-11-07 LAB — INR POINT OF CARE: 2.9 (ref 0.86–1.14)

## 2019-11-07 PROCEDURE — 99207 ZZC NO CHARGE LOS: CPT

## 2019-11-07 PROCEDURE — 36416 COLLJ CAPILLARY BLOOD SPEC: CPT

## 2019-11-07 PROCEDURE — 85610 PROTHROMBIN TIME: CPT | Mod: QW

## 2019-11-07 NOTE — TELEPHONE ENCOUNTER
ONCOLOGY INTAKE: Records Information      APPT INFORMATION:  Referring provider:  Dr. Michelet Flanagan  Referring provider s clinic:  [facility]  Reason for visit/diagnosis:  DVT, lower extremity  Has patient been notified of appointment date and time?: yes    RECORDS INFORMATION:  Were the records received with the referral (via [employer])? No, internal referral

## 2019-11-07 NOTE — PROGRESS NOTES
ANTICOAGULATION INITIAL CLINIC VISIT    Patient Name:  Jordan Barnett  Date:  11/7/2019  Referred by: Arnold  Contact Type:  Face to Face    SUBJECTIVE:  Coumadin education was completed today.  Topics covered include:  -Introduction to coumadin  -Proper Administration  -INR Testing  -Sign/Symptoms of Bleeding  -Signs/Symptoms of Clot Formation or Stroke  -Dietary Intake of Vitamin K  -Drug Interactions  -Anticoagulation Identification (bracelet, necklace or wallet card)  -Future Surgery  -Effects of Alcohol, Tobacco, and Exercise on Coumadin    Coumadin Education Booklet and Coumadin Identification Wallet Card were given to the patient.    Patient Findings     Positives:   Change in activity (Less activity this week - off work)    Comments:   No changes in medications or diet noted. No concerns with clotting, bleeding, or increased bruising noted. Took warfarin as prescribed.  Patient had 35 mg in the last 7 days, will continue same dose and check on Monday  Patient educated on the increased risk for bleeding, precautions to take, and when to seek medical attention.  Patient verbalizes understanding and agrees to plan. No further questions or concerns.        Clinical Outcomes     Negatives:   Major bleeding event, Thromboembolic event, Anticoagulation-related hospital admission, Anticoagulation-related ED visit, Anticoagulation-related fatality    Comments:   No changes in medications or diet noted. No concerns with clotting, bleeding, or increased bruising noted. Took warfarin as prescribed.  Patient had 35 mg in the last 7 days, will continue same dose and check on Monday  Patient educated on the increased risk for bleeding, precautions to take, and when to seek medical attention.  Patient verbalizes understanding and agrees to plan. No further questions or concerns.          OBJECTIVE    INR Protime   Date Value Ref Range Status   11/07/2019 2.9 (A) 0.86 - 1.14 Final       ASSESSMENT / PLAN  INR assessment  THER    Recheck INR In: 4 DAYS    INR Location Clinic      Anticoagulation Summary  As of 2019    INR goal:   2.0-3.0   TTR:   --   INR used for dosin.9 (2019)   Warfarin maintenance plan:   5 mg (5 mg x 1) every day   Full warfarin instructions:   5 mg every day   Weekly warfarin total:   35 mg   Plan last modified:   Joann Gurrola RN (2019)   Next INR check:   2019   Priority:   INR   Target end date:   2020    Indications    Deep vein thrombosis (DVT) (H) [I82.409]             Anticoagulation Episode Summary     INR check location:       Preferred lab:       Send INR reminders to:   FLAVIO FRANCIS    Comments:   * works overnights. New consult 19      Anticoagulation Care Providers     Provider Role Specialty Phone number    Juan Antonio Flanagan MD [facility] [phone]    Halina Barrett APRN CNP  Nurse Practitioner [phone]            See the Encounter Report to view Anticoagulation Flowsheet and Dosing Calendar (Go to Encounters tab in chart review, and find the Anticoagulation Therapy Visit)    Dosage adjustment made based on physician directed care plan.    Joann Gurrola, LORENA

## 2019-11-08 NOTE — TELEPHONE ENCOUNTER
RECORDS STATUS - ALL OTHER DIAGNOSIS      RECORDS RECEIVED FROM: Epic/   DATE RECEIVED: 11/13/2019    NOTES STATUS DETAILS   OFFICE NOTE from referring provider Complete  Dr. Michelet Flanagan   OFFICE NOTE from medical oncologist N/A    DISCHARGE SUMMARY from hospital N/A    DISCHARGE REPORT from the ER N/A    OPERATIVE REPORT N/A    MEDICATION LIST Complete [facility]   CLINICAL TRIAL TREATMENTS TO DATE N/A    LABS     PATHOLOGY REPORTS     ANYTHING RELATED TO DIAGNOSIS Complete EPIC   GENONOMIC TESTING     TYPE:     IMAGING (NEED IMAGES & REPORT)     CT SCANS Complete [facility]   MRI     MAMMO     ULTRASOUND Complete EPIC   PET

## 2019-11-11 ENCOUNTER — TELEPHONE (OUTPATIENT)
Dept: ONCOLOGY | Facility: CLINIC | Age: 59
End: 2019-11-11

## 2019-11-11 ENCOUNTER — ANTICOAGULATION THERAPY VISIT (OUTPATIENT)
Dept: ANTICOAGULATION | Facility: CLINIC | Age: 59
End: 2019-11-11
Payer: COMMERCIAL

## 2019-11-11 DIAGNOSIS — I82.409 DEEP VEIN THROMBOSIS (DVT) (H): ICD-10-CM

## 2019-11-11 DIAGNOSIS — I82.401 ACUTE DEEP VEIN THROMBOSIS (DVT) OF RIGHT LOWER EXTREMITY, UNSPECIFIED VEIN (H): ICD-10-CM

## 2019-11-11 LAB — INR POINT OF CARE: 4.2 (ref 0.86–1.14)

## 2019-11-11 PROCEDURE — 85610 PROTHROMBIN TIME: CPT | Mod: QW

## 2019-11-11 PROCEDURE — 99207 ZZC NO CHARGE NURSE ONLY: CPT

## 2019-11-11 PROCEDURE — 36416 COLLJ CAPILLARY BLOOD SPEC: CPT

## 2019-11-11 RX ORDER — WARFARIN SODIUM 5 MG/1
TABLET ORAL
Qty: 90 TABLET | Refills: 0 | Status: SHIPPED | OUTPATIENT
Start: 2019-11-11 | End: 2019-11-14

## 2019-11-11 NOTE — TELEPHONE ENCOUNTER
Left message for Jordan to call New Patient scheduling back to reschedule his appointment on 11/13/19 with Dr. Whitaker.    Dr Whitaker sees new patients at 3:30 or sooner.  Jordan's 4pm appointment is too late in the day.    Appointment will stay on the schedule until we hear back from Jordan to reschedule.

## 2019-11-11 NOTE — PROGRESS NOTES
ANTICOAGULATION FOLLOW-UP CLINIC VISIT    Patient Name:  Jordan Barnett  Date:  2019  Contact Type:  Face to Face    SUBJECTIVE:  Patient Findings     Comments:   Patient reports no changes in medication, activity, or diet. Patient reports no changes in health. Patient reports has taken warfarin as instructed. Patient reports no increased bruising or bleeding and no signs or symptoms of a blood clot. Patient will hold his dose of warfarin today, take 5 mg Tue/Wed and recheck INR on Thursday as planned. Patient denies signs or symptoms of bleeding. Writer educated patient regarding increased bleed risk and when to seek immediate medical attention. Patient verbalized understanding.          Clinical Outcomes     Negatives:   Major bleeding event, Thromboembolic event, Anticoagulation-related hospital admission, Anticoagulation-related ED visit, Anticoagulation-related fatality    Comments:   Patient reports no changes in medication, activity, or diet. Patient reports no changes in health. Patient reports has taken warfarin as instructed. Patient reports no increased bruising or bleeding and no signs or symptoms of a blood clot. Patient will hold his dose of warfarin today, take 5 mg Tue/Wed and recheck INR on Thursday as planned. Patient denies signs or symptoms of bleeding. Writer educated patient regarding increased bleed risk and when to seek immediate medical attention. Patient verbalized understanding.             OBJECTIVE    INR Protime   Date Value Ref Range Status   2019 4.2 (A) 0.86 - 1.14 Final       ASSESSMENT / PLAN  INR assessment SUPRA    Recheck INR In: 3 DAYS    INR Location Clinic      Anticoagulation Summary  As of 2019    INR goal:   2.0-3.0   TTR:   0.0 % (3 d)   INR used for dosin.2! (2019)   Warfarin maintenance plan:   5 mg (5 mg x 1) every day   Full warfarin instructions:   : Hold; Otherwise 5 mg every day   Weekly warfarin total:   35 mg   Plan last  modified:   Joann Gurrola, RN (11/7/2019)   Next INR check:   11/14/2019   Priority:   INR   Target end date:   1/29/2020    Indications    Deep vein thrombosis (DVT) (H) [I82.409]             Anticoagulation Episode Summary     INR check location:       Preferred lab:       Send INR reminders to:   FLAVIO W[place]OMING    Comments:   * works overnights. New consult 11-7-19      Anticoagulation Care Providers     Provider Role Specialty Phone number    Juan Antonio Flanagan MD [facility] [phone]    Halina Barrett APRN CNP  Nurse Practitioner [phone]            See the Encounter Report to view Anticoagulation Flowsheet and Dosing Calendar (Go to Encounters tab in chart review, and find the Anticoagulation Therapy Visit)      Vani Dumont RN

## 2019-11-12 ENCOUNTER — TELEPHONE (OUTPATIENT)
Dept: ONCOLOGY | Facility: CLINIC | Age: 59
End: 2019-11-12

## 2019-11-12 NOTE — TELEPHONE ENCOUNTER
3rd attempt at calling Jordan to reschedule his 11/13/19 at 4pm with Dr. Whitaker at [facility]

## 2019-11-13 ENCOUNTER — PRE VISIT (OUTPATIENT)
Dept: ONCOLOGY | Facility: CLINIC | Age: 59
End: 2019-11-13

## 2019-11-14 ENCOUNTER — ANTICOAGULATION THERAPY VISIT (OUTPATIENT)
Dept: ANTICOAGULATION | Facility: CLINIC | Age: 59
End: 2019-11-14
Payer: COMMERCIAL

## 2019-11-14 DIAGNOSIS — I82.401 ACUTE DEEP VEIN THROMBOSIS (DVT) OF RIGHT LOWER EXTREMITY, UNSPECIFIED VEIN (H): ICD-10-CM

## 2019-11-14 DIAGNOSIS — I82.409 DEEP VEIN THROMBOSIS (DVT) (H): ICD-10-CM

## 2019-11-14 LAB — INR POINT OF CARE: 3.4 (ref 0.86–1.14)

## 2019-11-14 PROCEDURE — 99207 ZZC NO CHARGE NURSE ONLY: CPT

## 2019-11-14 PROCEDURE — 85610 PROTHROMBIN TIME: CPT | Mod: QW

## 2019-11-14 PROCEDURE — 36416 COLLJ CAPILLARY BLOOD SPEC: CPT

## 2019-11-14 RX ORDER — WARFARIN SODIUM 5 MG/1
TABLET ORAL
Qty: 80 TABLET | Refills: 0 | Status: SHIPPED | OUTPATIENT
Start: 2019-11-14 | End: 2019-11-27

## 2019-11-14 NOTE — PATIENT INSTRUCTIONS
Some signs and symptoms of bleeding include: Nose bleed or cut that does not stop bleeding in 10 minutes, bleeding of the gums, vomiting (will look like coffee grounds) or coughing up blood, unusual, easy or large areas of bruising, increased or unexpected vaginal bleeding or increased menstrual flow, red or black stools, red or orange urine, prolonged or severe headache, pale skin, unusual or constant tiredness.  If you have these please call 911 or seek medical care immediately.

## 2019-11-14 NOTE — PROGRESS NOTES
ANTICOAGULATION FOLLOW-UP CLINIC VISIT    Patient Name:  Jordan Barnett  Date:  11/14/2019  Contact Type:  Face to Face    SUBJECTIVE:  Patient Findings     Positives:   Change in activity (Less activity), Change in diet/appetite (Has not been eating a lot of vegetables)    Comments:   No changes in medications noted. No concerns with clotting, bleeding, or increased bruising noted. Took warfarin as prescribed.  Patient had 30 mg in the last 7 days, will adjust dose to 27.5 mg by next INR check on Monday (~8% increase)  Patient educated on the increased risk for bleeding, precautions to take, and when to seek medical attention.  Patient verbalizes understanding and agrees to plan. No further questions or concerns.        Clinical Outcomes     Negatives:   Major bleeding event, Thromboembolic event, Anticoagulation-related hospital admission, Anticoagulation-related ED visit, Anticoagulation-related fatality    Comments:   No changes in medications noted. No concerns with clotting, bleeding, or increased bruising noted. Took warfarin as prescribed.  Patient had 30 mg in the last 7 days, will adjust dose to 27.5 mg by next INR check on Monday (~8% increase)  Patient educated on the increased risk for bleeding, precautions to take, and when to seek medical attention.  Patient verbalizes understanding and agrees to plan. No further questions or concerns.           OBJECTIVE    INR Protime   Date Value Ref Range Status   11/14/2019 3.4 (A) 0.86 - 1.14 Final       ASSESSMENT / PLAN  INR assessment SUPRA    Recheck INR In: 4 DAYS    INR Location Clinic      Anticoagulation Summary  As of 11/14/2019    INR goal:   2.0-3.0   TTR:   0.0 % (6 d)   INR used for dosing:   3.4! (11/14/2019)   Warfarin maintenance plan:   2.5 mg (5 mg x 0.5) every Mon, Wed, Fri; 5 mg (5 mg x 1) all other days   Full warfarin instructions:   2.5 mg every Mon, Wed, Fri; 5 mg all other days   Weekly warfarin total:   27.5 mg   Plan last modified:    Joann Gurrola, RN (11/14/2019)   Next INR check:   11/18/2019   Priority:   INR   Target end date:   1/29/2020    Indications    Deep vein thrombosis (DVT) (H) [I82.409]             Anticoagulation Episode Summary     INR check location:       Preferred lab:       Send INR reminders to:   YASHIRAHARLEY W[place]OMING    Comments:   * works overnights. New consult 11-7-19      Anticoagulation Care Providers     Provider Role Specialty Phone number    Juan Antonio Flanagan MD [facility] [phone]    Halina Barrett APRN CNP  Nurse Practitioner [phone]            See the Encounter Report to view Anticoagulation Flowsheet and Dosing Calendar (Go to Encounters tab in chart review, and find the Anticoagulation Therapy Visit)        Joann Gurrola RN

## 2019-11-18 ENCOUNTER — ANTICOAGULATION THERAPY VISIT (OUTPATIENT)
Dept: ANTICOAGULATION | Facility: CLINIC | Age: 59
End: 2019-11-18
Payer: COMMERCIAL

## 2019-11-18 DIAGNOSIS — I82.409 DEEP VEIN THROMBOSIS (DVT) (H): ICD-10-CM

## 2019-11-18 LAB — INR POINT OF CARE: 3.2 (ref 0.86–1.14)

## 2019-11-18 PROCEDURE — 85610 PROTHROMBIN TIME: CPT | Mod: QW

## 2019-11-18 PROCEDURE — 99207 ZZC NO CHARGE NURSE ONLY: CPT

## 2019-11-18 PROCEDURE — 36416 COLLJ CAPILLARY BLOOD SPEC: CPT

## 2019-11-18 NOTE — PROGRESS NOTES
ANTICOAGULATION FOLLOW-UP CLINIC VISIT    Patient Name:  Jordan Barnett  Date:  11/18/2019  Contact Type:  Face to Face    SUBJECTIVE:  Patient Findings     Comments:   Patient took warfarin dosing as directed by [facility]. Patient's anticipated weekly total is 27.5 mg, will keep the weekly mg total the same by next INR on Thursday. Patient will have 2 days of 2.5 mg by his next INR so writer did not want to decrease dose further at this time. Discussed adding greens more consistently into his diet to help stabilize his INR. Patient denies signs or symptoms of bleeding. Writer educated patient regarding increased bleed risk and when to seek immediate medical attention. Patient verbalized understanding. 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           Clinical Outcomes     Negatives:   Major bleeding event, Thromboembolic event, Anticoagulation-related hospital admission, Anticoagulation-related ED visit, Anticoagulation-related fatality    Comments:   Patient took warfarin dosing as directed by [facility]. Patient's anticipated weekly total is 27.5 mg, will keep the weekly mg total the same by next INR on Thursday. Patient will have 2 days of 2.5 mg by his next INR so writer did not want to decrease dose further at this time. Discussed adding greens more consistently into his diet to help stabilize his INR. Patient denies signs or symptoms of bleeding. Writer educated patient regarding increased bleed risk and when to seek immediate medical attention. Patient verbalized understanding. 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              OBJECTIVE    INR Protime   Date Value Ref Range Status   11/18/2019 3.2 (A) 0.86 - 1.14 Final       ASSESSMENT / PLAN  INR assessment SUPRA    Recheck INR In: 3 DAYS    INR Location Clinic      Anticoagulation Summary  As of 11/18/2019    INR goal:   2.0-3.0   TTR:    0.0 % (1.4 wk)   INR used for dosing:   3.2! (11/18/2019)   Warfarin maintenance plan:   2.5 mg (5 mg x 0.5) every Mon, Wed, Fri; 5 mg (5 mg x 1) all other days   Full warfarin instructions:   2.5 mg every Mon, Wed, Fri; 5 mg all other days   Weekly warfarin total:   27.5 mg   No change documented:   Vani Dumont RN   Plan last modified:   Joann Gurrola RN (11/14/2019)   Next INR check:   11/21/2019   Priority:   INR   Target end date:   1/29/2020    Indications    Deep vein thrombosis (DVT) (H) [I82.409]             Anticoagulation Episode Summary     INR check location:       Preferred lab:       Send INR reminders to:   FLAVIO FRANCIS    Comments:   * works overnights. New consult 11-7-19      Anticoagulation Care Providers     Provider Role Specialty Phone number    Juan Antonio Flanagan MD [facility] [phone]    Halina Barrett APRN CNP  Nurse Practitioner [phone]            See the Encounter Report to view Anticoagulation Flowsheet and Dosing Calendar (Go to Encounters tab in chart review, and find the Anticoagulation Therapy Visit)      Vani Dumont, RN

## 2019-11-20 ENCOUNTER — HOSPITAL ENCOUNTER (OUTPATIENT)
Dept: LAB | Facility: CLINIC | Age: 59
Discharge: HOME | End: 2019-11-20
Attending: INTERNAL MEDICINE | Admitting: INTERNAL MEDICINE
Payer: COMMERCIAL

## 2019-11-20 ENCOUNTER — ONCOLOGY VISIT (OUTPATIENT)
Dept: ONCOLOGY | Facility: CLINIC | Age: 59
End: 2019-11-20
Attending: INTERNAL MEDICINE
Payer: COMMERCIAL

## 2019-11-20 ENCOUNTER — HOSPITAL ENCOUNTER (OUTPATIENT)
Facility: CLINIC | Age: 59
End: 2019-11-20
Attending: INTERNAL MEDICINE
Payer: COMMERCIAL

## 2019-11-20 VITALS
DIASTOLIC BLOOD PRESSURE: 83 MMHG | WEIGHT: 174 LBS | SYSTOLIC BLOOD PRESSURE: 128 MMHG | TEMPERATURE: 95.4 F | BODY MASS INDEX: 24.91 KG/M2 | OXYGEN SATURATION: 98 % | RESPIRATION RATE: 18 BRPM | HEART RATE: 70 BPM | HEIGHT: 70 IN

## 2019-11-20 DIAGNOSIS — E78.5 HYPERLIPIDEMIA LDL GOAL <160: ICD-10-CM

## 2019-11-20 DIAGNOSIS — I82.411 ACUTE DEEP VEIN THROMBOSIS (DVT) OF FEMORAL VEIN OF RIGHT LOWER EXTREMITY (H): Primary | ICD-10-CM

## 2019-11-20 DIAGNOSIS — I10 BENIGN ESSENTIAL HYPERTENSION: ICD-10-CM

## 2019-11-20 PROCEDURE — G0463 HOSPITAL OUTPT CLINIC VISIT: HCPCS

## 2019-11-20 PROCEDURE — 81241 F5 GENE: CPT | Performed by: INTERNAL MEDICINE

## 2019-11-20 PROCEDURE — 36415 COLL VENOUS BLD VENIPUNCTURE: CPT | Performed by: INTERNAL MEDICINE

## 2019-11-20 PROCEDURE — 36415 COLL VENOUS BLD VENIPUNCTURE: CPT

## 2019-11-20 PROCEDURE — 86147 CARDIOLIPIN ANTIBODY EA IG: CPT | Performed by: INTERNAL MEDICINE

## 2019-11-20 PROCEDURE — 99205 OFFICE O/P NEW HI 60 MIN: CPT | Performed by: INTERNAL MEDICINE

## 2019-11-20 PROCEDURE — 81240 F2 GENE: CPT | Performed by: INTERNAL MEDICINE

## 2019-11-20 ASSESSMENT — MIFFLIN-ST. JEOR: SCORE: 1615.51

## 2019-11-20 ASSESSMENT — PAIN SCALES - GENERAL: PAINLEVEL: NO PAIN (0)

## 2019-11-20 NOTE — LETTER
"    11/20/2019         RE: Jordan Barnett  [address]  [facility] [address]        Dear Colleague,    Thank you for referring your patient, Jordan Barnett, to the [facility]. Please see a copy of my visit note below.    Oncology Rooming Note    November 20, 2019 1:09 PM   Jordan Barnett is a 59 year old male who presents for:    Chief Complaint   Patient presents with     Hematology     NEW, DVT lower extremity.      Initial Vitals: /83 (BP Location: Right arm, Patient Position: Sitting, Cuff Size: Adult Large)   Pulse 70   Temp 95.4  F (35.2  C) (Tympanic)   Resp 18   Ht 1.778 m (5' 10\")   Wt 78.9 kg (174 lb)   SpO2 98%   BMI 24.97 kg/m    Estimated body mass index is 24.97 kg/m  as calculated from the following:    Height as of this encounter: 1.778 m (5' 10\").    Weight as of this encounter: 78.9 kg (174 lb). Body surface area is 1.97 meters squared.  No Pain (0) Comment: Data Unavailable   No LMP for male patient.  Allergies reviewed: Yes  Medications reviewed: Yes    Medications: Medication refills not needed today.  Pharmacy name entered into INBE[facility]: [id number] - [place], [place] - [address]    Clinical concerns: NEW, DVT lower extremity. Right foot edema at the end of October of 2019.      Judi Basurto CMA              DATE OF VISIT: Nov 20, 2019    REASON FOR REFERRAL: Management of deep venous thrombosis    CHIEF COMPLAINT:   Chief Complaint   Patient presents with     Hematology     NEW, DVT lower extremity.        HISTORY OF PRESENT ILLNESS:   Jordan Barnett is a 59-year-old male patient who presented to the primary care physician with complaint of right lower leg swelling associated with pain swelling and warmth.  Subsequently the patient went on to have Doppler ultrasound which showed extensive deep venous thrombosis throughout the right lower extremity.  Subsequently the patient was started on MiraLAX and switch to warfarin.  He was " referred for further evaluation of the venous thromboembolism.    REVIEW OF SYSTEMS:   Constitutional: Negative for fever, chills, and night sweats.  Skin: negative.  Eyes: negative.  Ears/Nose/Throat: negative.  Respiratory: No shortness of breath, dyspnea on exertion, cough, or hemoptysis.  Cardiovascular: negative.  Gastrointestinal: negative.  Genitourinary: negative.  Musculoskeletal: Pain and swelling of the right lower extremity as mentioned above  Neurologic: negative.  Psychiatric: negative.  Hematologic/Lymphatic/Immunologic: negative.  Endocrine: negative.    PAST MEDICAL HISTORY:   Past Medical History:   Diagnosis Date     CKD (chronic kidney disease)     stage 3     Depressive disorder, not elsewhere classified     Manic      HTN (hypertension)      Unspecified schizophrenia, unspecified condition        PAST SURGICAL HISTORY:   Past Surgical History:   Procedure Laterality Date     COLONOSCOPY      normal. has fam hx colon cancer     COLONOSCOPY N/A 10/13/2014    Procedure: COLONOSCOPY;  Surgeon: Santy Constantino MD;  Location: [facility]     HERNIA REPAIR, INGUINAL RT/LT  02/12/2004     SURGICAL HISTORY OF -       Incised & Drained - ? Perirectal Abscess        ALLERGIES:   Allergies as of 11/20/2019 - Reviewed 11/20/2019   Allergen Reaction Noted     Lisinopril Nausea 10/04/2011       MEDICATIONS:   Current Outpatient Medications   Medication Sig Dispense Refill     lamoTRIgine (LAMICTAL) 100 MG tablet Take 1 tablet (100 mg) by mouth 2 times daily 60 tablet 0     losartan (COZAAR) 100 MG tablet Take 1 tablet (100 mg) by mouth daily 90 tablet 3     Multiple Vitamins-Minerals (MULTIVITAMIN ADULT PO) Take 1 tablet by mouth At Bedtime        OLANZapine (ZYPREXA) 5 MG tablet Take 5 mg by mouth 2 times daily       Omega-3 Fatty Acids (OMEGA 3 PO) Take 2,400 mg by mouth every evening       warfarin ANTICOAGULANT (COUMADIN) 5 MG tablet Take 2.5 mg every Mon, Wed, Fri; 5 mg all other days or As directed by  Anticoagulation clinic 80 tablet 0     acetaminophen (TYLENOL) 500 MG tablet Take 1-2 tablets by mouth every 6 hours as needed.       enoxaparin ANTICOAGULANT (LOVENOX) 80 MG/0.8ML syringe Inject 0.78 mLs (78 mg) Subcutaneous every 12 hours (Patient not taking: Reported on 2019) 5 Syringe 0     HYDROXYZINE HCL PO Take 50 mg by mouth every 6 hours as needed           FAMILY HISTORY:   Family History   Problem Relation Age of Onset     Thyroid Disease Mother      Coronary Artery Disease Mother         pacemaker     Cancer Paternal Grandfather      Cancer Sister      Cancer - colorectal Sister      Mental Illness Nephew         committed suicide at age 27      Family history was reviewed with the patient.  There is no history of thromboembolic disease in his family.    SOCIAL HISTORY:   Social History     Socioeconomic History     Marital status: Single     Spouse name: None     Number of children: None     Years of education: None     Highest education level: None   Occupational History     None   Social Needs     Financial resource strain: None     Food insecurity:     Worry: None     Inability: None     Transportation needs:     Medical: None     Non-medical: None   Tobacco Use     Smoking status: Former Smoker     Packs/day: 1.00     Years: 30.00     Pack years: 30.00     Types: Cigarettes     Last attempt to quit: 2012     Years since quittin.6     Smokeless tobacco: Former User     Quit date: 2012     Tobacco comment: 2 PPD   Substance and Sexual Activity     Alcohol use: Yes     Comment: RARE     Drug use: No     Comment: 30 years ago     Sexual activity: Yes   Lifestyle     Physical activity:     Days per week: None     Minutes per session: None     Stress: None   Relationships     Social connections:     Talks on phone: None     Gets together: None     Attends Holiness service: None     Active member of club or organization: None     Attends meetings of clubs or organizations: None      "Relationship status: None     Intimate partner violence:     Fear of current or ex partner: None     Emotionally abused: None     Physically abused: None     Forced sexual activity: None   Other Topics Concern     Parent/sibling w/ CABG, MI or angioplasty before 65F 55M? No   Social History Narrative     None       PHYSICAL EXAMINATION:   /83 (BP Location: Right arm, Patient Position: Sitting, Cuff Size: Adult Large)   Pulse 70   Temp 95.4  F (35.2  C) (Tympanic)   Resp 18   Ht 1.778 m (5' 10\")   Wt 78.9 kg (174 lb)   SpO2 98%   BMI 24.97 kg/m     Wt Readings from Last 10 Encounters:   11/20/19 78.9 kg (174 lb)   11/05/19 77.6 kg (171 lb)   10/29/19 77.6 kg (171 lb)   09/26/19 77.6 kg (171 lb)   09/26/18 77.1 kg (170 lb)   06/19/18 69.4 kg (153 lb)   06/14/18 68.9 kg (152 lb)   05/16/18 68.8 kg (151 lb 11.2 oz)   04/04/18 71 kg (156 lb 8 oz)   02/21/18 72.6 kg (160 lb)        GENERAL APPEARANCE: Healthy, alert and in no acute distress.  HEENT: Sclerae anicteric. PERRLA. Oropharynx without ulcers, lesions, or thrush.  NECK: Supple. No asymmetry or masses.  LYMPHATICS: No palpable cervical, supraclavicular, axillary, or inguinal lymphadenopathy.  RESP: Lungs clear to auscultation bilaterally without rales, rhonchi or wheezes.  CARDIOVASCULAR: Regular rate and rhythm. Normal S1, S2; no S3 or S4. No murmur, gallop, or rub.  ABDOMEN: Soft, nontender. Bowel sounds normal. No palpable organomegaly or masses.  MUSCULOSKELETAL: Extremities without gross deformities noted.  Swelling of the right extremity.  SKIN: No suspicious lesions or rashes.  NEURO: Alert and oriented x 3. Cranial nerves II-XII grossly intact.  PSYCHIATRIC: Mentation and affect appear normal.    LABORATORY RESULTS:  Anticoagulation Therapy Visit on 11/18/2019   Component Date Value Ref Range Status     INR Protime 11/18/2019 3.2* 0.86 - 1.14 Final       IMAGING RESULTS:  Recent Results (from the past 744 hour(s))   US Lower Extremity Venous " Duplex Right    Narrative    VENOUS DOPPLER RIGHT LOWER EXTREMITY 10/29/2019 11:36 AM    HISTORY: Right leg swelling.    COMPARISON: None.    FINDINGS: Color-flow imaging and Doppler waveform spectral analysis  were utilized. There is occlusive thrombus throughout the right  femoral, popliteal and visualized calf veins consistent with extensive  deep venous thrombosis. The common femoral vein appears patent. There  is also a patent deep femoral vein and superficial saphenous system.      Impression    IMPRESSION: Extensive deep venous thrombosis throughout the right  lower extremity.    ALEJANDRO WEI MD   CT Chest Lung Cancer Scrn Low Dose wo    Narrative    CT CHEST LUNG CANCER SCREEN LOW DOSE WITHOUT  11/21/2019 7:15 AM    HISTORY:  Personal history of tobacco use.    TECHNIQUE:  Scans obtained from the apices through the diaphragm  without IV contrast. Low dose CT chest technique was utilized.    COMPARISON:  None.    FINDINGS: A 14 mm densely calcified benign granuloma in the right  upper lobe is seen on axial image 38. Minimal linear atelectasis or  scarring is seen at the lung bases. Small calcified mediastinal and  right hilar lymph nodes suggest sequela of old healed granulomatous  infection. The thoracic aorta is normal in caliber.       Impression    IMPRESSION:   1. ACR Assessment Category:  Lung-RADS Category 1. Negative, continue  annual screening. .   2. Significant Incidental Finding(s):  Category S: No.    NELLY PEREZ MD       ASSESSMENT AND PLAN:  (I82.411) Acute deep vein thrombosis (DVT) of femoral vein of right lower extremity (H)  (primary encounter diagnosis)  Jordan Barnett is a 59-year-old male patient with a recent diagnosis of unprovoked deep venous thrombosis of the right lower extremity.  I discussed with the patient causes of thromboembolic disease.  Today we will check Cardiolipin Elena IgG and IgM, Factor 5 leiden mutation analysis, F2 prothrombin 05872V Mut Anal, US Lower Extremity  Venous Duplex Right, D  dimer quantitative.  Protein C, protein S and Antithrombin will not be checked today because the patient is currently on warfarin.  I discussed with the patient duration of anticoagulation.  I will recommended to check Doppler ultrasound of the right lower extremity and d-dimer prior to stopping anticoagulation.  I will see the patient again in 3 months time to discuss progression of anticoagulation long-term management plan.    (I10) Benign essential hypertension  Blood pressures currently well controlled on losartan 100 mg orally daily.    The patient is ready to learn, no apparent learning barriers were identified, Diagnosis and treatment plans were explained to the patient. The patient expressed understanding of the content. The patient questions were answered to his satisfaction.    Olivia Whitaker MD    Chart documentation with Dragon Voice recognition Software. Although reviewed after completion, some words and grammatical errors may remain.      Again, thank you for allowing me to participate in the care of your patient.        Sincerely,        Olivia Whitaker MD

## 2019-11-20 NOTE — PROGRESS NOTES
"Oncology Rooming Note    November 20, 2019 1:09 PM   Jordan Barnett is a 59 year old male who presents for:    Chief Complaint   Patient presents with     Hematology     NEW, DVT lower extremity.      Initial Vitals: /83 (BP Location: Right arm, Patient Position: Sitting, Cuff Size: Adult Large)   Pulse 70   Temp 95.4  F (35.2  C) (Tympanic)   Resp 18   Ht 1.778 m (5' 10\")   Wt 78.9 kg (174 lb)   SpO2 98%   BMI 24.97 kg/m   Estimated body mass index is 24.97 kg/m  as calculated from the following:    Height as of this encounter: 1.778 m (5' 10\").    Weight as of this encounter: 78.9 kg (174 lb). Body surface area is 1.97 meters squared.  No Pain (0) Comment: Data Unavailable   No LMP for male patient.  Allergies reviewed: Yes  Medications reviewed: Yes    Medications: Medication refills not needed today.  Pharmacy name entered into [facility]: [facility] - [place], [place] - [address]    Clinical concerns: NEW, DVT lower extremity. Right foot edema at the end of October of 2019.      Judi Basurto, CMA            "

## 2019-11-21 ENCOUNTER — HOSPITAL ENCOUNTER (OUTPATIENT)
Dept: CT IMAGING | Facility: CLINIC | Age: 59
Discharge: HOME | End: 2019-11-21
Attending: FAMILY MEDICINE | Admitting: FAMILY MEDICINE
Payer: COMMERCIAL

## 2019-11-21 ENCOUNTER — ANTICOAGULATION THERAPY VISIT (OUTPATIENT)
Dept: ANTICOAGULATION | Facility: CLINIC | Age: 59
End: 2019-11-21
Payer: COMMERCIAL

## 2019-11-21 DIAGNOSIS — Z87.891 PERSONAL HISTORY OF TOBACCO USE: ICD-10-CM

## 2019-11-21 DIAGNOSIS — I82.411 ACUTE DEEP VEIN THROMBOSIS (DVT) OF FEMORAL VEIN OF RIGHT LOWER EXTREMITY (H): ICD-10-CM

## 2019-11-21 LAB
CARDIOLIPIN ANTIBODY IGG: <1.6 GPL-U/ML (ref 0–19.9)
CARDIOLIPIN ANTIBODY IGM: 0.2 MPL-U/ML (ref 0–19.9)
INR POINT OF CARE: 2.6 (ref 0.86–1.14)

## 2019-11-21 PROCEDURE — 99207 ZZC NO CHARGE NURSE ONLY: CPT

## 2019-11-21 PROCEDURE — 85610 PROTHROMBIN TIME: CPT | Mod: QW

## 2019-11-21 PROCEDURE — G0297 LDCT FOR LUNG CA SCREEN: HCPCS

## 2019-11-21 PROCEDURE — 36416 COLLJ CAPILLARY BLOOD SPEC: CPT

## 2019-11-21 NOTE — PROGRESS NOTES
ANTICOAGULATION FOLLOW-UP CLINIC VISIT    Patient Name:  Jordan Barnett  Date:  2019  Contact Type:  Face to Face    SUBJECTIVE:  Patient Findings     Comments:   No changes in medications, activity, or diet noted. No concerns with clotting, bleeding, or increased bruising noted. Took warfarin as prescribed.  Patient is to continue maintenance warfarin plan, and check INR on Monday.  Patient verbalizes understanding and agrees to plan. No further questions or concerns.        Clinical Outcomes     Negatives:   Major bleeding event, Thromboembolic event, Anticoagulation-related hospital admission, Anticoagulation-related ED visit, Anticoagulation-related fatality    Comments:   No changes in medications, activity, or diet noted. No concerns with clotting, bleeding, or increased bruising noted. Took warfarin as prescribed.  Patient is to continue maintenance warfarin plan, and check INR on Monday.  Patient verbalizes understanding and agrees to plan. No further questions or concerns.           OBJECTIVE    INR Protime   Date Value Ref Range Status   2019 2.6 (A) 0.86 - 1.14 Final       ASSESSMENT / PLAN  INR assessment THER    Recheck INR In: 4 DAYS    INR Location Clinic      Anticoagulation Summary  As of 2019    INR goal:   2.0-3.0   TTR:   15.4 % (1.9 wk)   INR used for dosin.6 (2019)   Warfarin maintenance plan:   2.5 mg (5 mg x 0.5) every Mon, Wed, Fri; 5 mg (5 mg x 1) all other days   Full warfarin instructions:   2.5 mg every Mon, Wed, Fri; 5 mg all other days   Weekly warfarin total:   27.5 mg   No change documented:   Joann Gurrola RN   Plan last modified:   Joann Gurrola RN (2019)   Next INR check:   2019   Priority:   INR   Target end date:   2020    Indications    Deep vein thrombosis (DVT) (H) [I82.409]             Anticoagulation Episode Summary     INR check location:       Preferred lab:       Send INR reminders to:   FLAVIO FRANCIS    Comments:    * works overnights. New consult 11-7-19      Anticoagulation Care Providers     Provider Role Specialty Phone number    Juan Antonio Flanagan MD [facility] [phone]    Halina Barrett APRN CNP  Nurse Practitioner [phone]            See the Encounter Report to view Anticoagulation Flowsheet and Dosing Calendar (Go to Encounters tab in chart review, and find the Anticoagulation Therapy Visit)        Joann Gurrola RN

## 2019-11-25 ENCOUNTER — ANTICOAGULATION THERAPY VISIT (OUTPATIENT)
Dept: ANTICOAGULATION | Facility: CLINIC | Age: 59
End: 2019-11-25
Payer: COMMERCIAL

## 2019-11-25 DIAGNOSIS — I82.411 ACUTE DEEP VEIN THROMBOSIS (DVT) OF FEMORAL VEIN OF RIGHT LOWER EXTREMITY (H): ICD-10-CM

## 2019-11-25 LAB
COPATH REPORT: NORMAL
INR POINT OF CARE: 1.8 (ref 0.86–1.14)

## 2019-11-25 PROCEDURE — 85610 PROTHROMBIN TIME: CPT | Mod: QW

## 2019-11-25 PROCEDURE — 36416 COLLJ CAPILLARY BLOOD SPEC: CPT

## 2019-11-25 PROCEDURE — 99207 ZZC NO CHARGE NURSE ONLY: CPT

## 2019-11-25 NOTE — PROGRESS NOTES
DATE OF VISIT: Nov 20, 2019    REASON FOR REFERRAL: Management of deep venous thrombosis    CHIEF COMPLAINT:   Chief Complaint   Patient presents with     Hematology     NEW, DVT lower extremity.        HISTORY OF PRESENT ILLNESS:   Jordan Barnett is a 59-year-old male patient who presented to the primary care physician with complaint of right lower leg swelling associated with pain swelling and warmth.  Subsequently the patient went on to have Doppler ultrasound which showed extensive deep venous thrombosis throughout the right lower extremity.  Subsequently the patient was started on MiraLAX and switch to warfarin.  He was referred for further evaluation of the venous thromboembolism.    REVIEW OF SYSTEMS:   Constitutional: Negative for fever, chills, and night sweats.  Skin: negative.  Eyes: negative.  Ears/Nose/Throat: negative.  Respiratory: No shortness of breath, dyspnea on exertion, cough, or hemoptysis.  Cardiovascular: negative.  Gastrointestinal: negative.  Genitourinary: negative.  Musculoskeletal: Pain and swelling of the right lower extremity as mentioned above  Neurologic: negative.  Psychiatric: negative.  Hematologic/Lymphatic/Immunologic: negative.  Endocrine: negative.    PAST MEDICAL HISTORY:   Past Medical History:   Diagnosis Date     CKD (chronic kidney disease)     stage 3     Depressive disorder, not elsewhere classified     Manic      HTN (hypertension)      Unspecified schizophrenia, unspecified condition        PAST SURGICAL HISTORY:   Past Surgical History:   Procedure Laterality Date     COLONOSCOPY      normal. has fam hx colon cancer     COLONOSCOPY N/A 10/13/2014    Procedure: COLONOSCOPY;  Surgeon: Santy Constantino MD;  Location: [facility]     HERNIA REPAIR, INGUINAL RT/LT  02/12/2004     SURGICAL HISTORY OF -       Incised & Drained - ? Perirectal Abscess        ALLERGIES:   Allergies as of 11/20/2019 - Reviewed 11/20/2019   Allergen Reaction Noted     Lisinopril Nausea 10/04/2011        MEDICATIONS:   Current Outpatient Medications   Medication Sig Dispense Refill     lamoTRIgine (LAMICTAL) 100 MG tablet Take 1 tablet (100 mg) by mouth 2 times daily 60 tablet 0     losartan (COZAAR) 100 MG tablet Take 1 tablet (100 mg) by mouth daily 90 tablet 3     Multiple Vitamins-Minerals (MULTIVITAMIN ADULT PO) Take 1 tablet by mouth At Bedtime        OLANZapine (ZYPREXA) 5 MG tablet Take 5 mg by mouth 2 times daily       Omega-3 Fatty Acids (OMEGA 3 PO) Take 2,400 mg by mouth every evening       warfarin ANTICOAGULANT (COUMADIN) 5 MG tablet Take 2.5 mg every Mon, Wed, Fri; 5 mg all other days or As directed by Anticoagulation clinic 80 tablet 0     acetaminophen (TYLENOL) 500 MG tablet Take 1-2 tablets by mouth every 6 hours as needed.       enoxaparin ANTICOAGULANT (LOVENOX) 80 MG/0.8ML syringe Inject 0.78 mLs (78 mg) Subcutaneous every 12 hours (Patient not taking: Reported on 11/5/2019) 5 Syringe 0     HYDROXYZINE HCL PO Take 50 mg by mouth every 6 hours as needed           FAMILY HISTORY:   Family History   Problem Relation Age of Onset     Thyroid Disease Mother      Coronary Artery Disease Mother         pacemaker     Cancer Paternal Grandfather      Cancer Sister      Cancer - colorectal Sister      Mental Illness Nephew         committed suicide at age 27      Family history was reviewed with the patient.  There is no history of thromboembolic disease in his family.    SOCIAL HISTORY:   Social History     Socioeconomic History     Marital status: Single     Spouse name: None     Number of children: None     Years of education: None     Highest education level: None   Occupational History     None   Social Needs     Financial resource strain: None     Food insecurity:     Worry: None     Inability: None     Transportation needs:     Medical: None     Non-medical: None   Tobacco Use     Smoking status: Former Smoker     Packs/day: 1.00     Years: 30.00     Pack years: 30.00     Types: Cigarettes  "    Last attempt to quit: 2012     Years since quittin.6     Smokeless tobacco: Former User     Quit date: 2012     Tobacco comment: 2 PPD   Substance and Sexual Activity     Alcohol use: Yes     Comment: RARE     Drug use: No     Comment: 30 years ago     Sexual activity: Yes   Lifestyle     Physical activity:     Days per week: None     Minutes per session: None     Stress: None   Relationships     Social connections:     Talks on phone: None     Gets together: None     Attends [employer] service: None     Active member of club or organization: None     Attends meetings of clubs or organizations: None     Relationship status: None     Intimate partner violence:     Fear of current or ex partner: None     Emotionally abused: None     Physically abused: None     Forced sexual activity: None   Other Topics Concern     Parent/sibling w/ CABG, MI or angioplasty before 65F 55M? No   Social History Narrative     None       PHYSICAL EXAMINATION:   /83 (BP Location: Right arm, Patient Position: Sitting, Cuff Size: Adult Large)   Pulse 70   Temp 95.4  F (35.2  C) (Tympanic)   Resp 18   Ht 1.778 m (5' 10\")   Wt 78.9 kg (174 lb)   SpO2 98%   BMI 24.97 kg/m    Wt Readings from Last 10 Encounters:   19 78.9 kg (174 lb)   19 77.6 kg (171 lb)   10/29/19 77.6 kg (171 lb)   19 77.6 kg (171 lb)   18 77.1 kg (170 lb)   18 69.4 kg (153 lb)   18 68.9 kg (152 lb)   18 68.8 kg (151 lb 11.2 oz)   18 71 kg (156 lb 8 oz)   18 72.6 kg (160 lb)        GENERAL APPEARANCE: Healthy, alert and in no acute distress.  HEENT: Sclerae anicteric. PERRLA. Oropharynx without ulcers, lesions, or thrush.  NECK: Supple. No asymmetry or masses.  LYMPHATICS: No palpable cervical, supraclavicular, axillary, or inguinal lymphadenopathy.  RESP: Lungs clear to auscultation bilaterally without rales, rhonchi or wheezes.  CARDIOVASCULAR: Regular rate and rhythm. Normal S1, S2; no S3 or " S4. No murmur, gallop, or rub.  ABDOMEN: Soft, nontender. Bowel sounds normal. No palpable organomegaly or masses.  MUSCULOSKELETAL: Extremities without gross deformities noted.  Swelling of the right extremity.  SKIN: No suspicious lesions or rashes.  NEURO: Alert and oriented x 3. Cranial nerves II-XII grossly intact.  PSYCHIATRIC: Mentation and affect appear normal.    LABORATORY RESULTS:  Anticoagulation Therapy Visit on 11/18/2019   Component Date Value Ref Range Status     INR Protime 11/18/2019 3.2* 0.86 - 1.14 Final       IMAGING RESULTS:  Recent Results (from the past 744 hour(s))   US Lower Extremity Venous Duplex Right    Narrative    VENOUS DOPPLER RIGHT LOWER EXTREMITY 10/29/2019 11:36 AM    HISTORY: Right leg swelling.    COMPARISON: None.    FINDINGS: Color-flow imaging and Doppler waveform spectral analysis  were utilized. There is occlusive thrombus throughout the right  femoral, popliteal and visualized calf veins consistent with extensive  deep venous thrombosis. The common femoral vein appears patent. There  is also a patent deep femoral vein and superficial saphenous system.      Impression    IMPRESSION: Extensive deep venous thrombosis throughout the right  lower extremity.    ALEJANDRO WEI MD   CT Chest Lung Cancer Scrn Low Dose wo    Narrative    CT CHEST LUNG CANCER SCREEN LOW DOSE WITHOUT  11/21/2019 7:15 AM    HISTORY:  Personal history of tobacco use.    TECHNIQUE:  Scans obtained from the apices through the diaphragm  without IV contrast. Low dose CT chest technique was utilized.    COMPARISON:  None.    FINDINGS: A 14 mm densely calcified benign granuloma in the right  upper lobe is seen on axial image 38. Minimal linear atelectasis or  scarring is seen at the lung bases. Small calcified mediastinal and  right hilar lymph nodes suggest sequela of old healed granulomatous  infection. The thoracic aorta is normal in caliber.       Impression    IMPRESSION:   1. ACR Assessment Category:   Lung-RADS Category 1. Negative, continue  annual screening. .   2. Significant Incidental Finding(s):  Category S: No.    NELLY PEREZ MD       ASSESSMENT AND PLAN:  (I82.411) Acute deep vein thrombosis (DVT) of femoral vein of right lower extremity (H)  (primary encounter diagnosis)  Jordan Barnett is a 59-year-old male patient with a recent diagnosis of unprovoked deep venous thrombosis of the right lower extremity.  I discussed with the patient causes of thromboembolic disease.  Today we will check Cardiolipin Elena IgG and IgM, Factor 5 leiden mutation analysis, F2 prothrombin 63791G Mut Anal, US Lower Extremity Venous Duplex Right, D  dimer quantitative.  Protein C, protein S and Antithrombin will not be checked today because the patient is currently on warfarin.  I discussed with the patient duration of anticoagulation.  I will recommended to check Doppler ultrasound of the right lower extremity and d-dimer prior to stopping anticoagulation.  I will see the patient again in 3 months time to discuss progression of anticoagulation long-term management plan.    (I10) Benign essential hypertension  Blood pressures currently well controlled on losartan 100 mg orally daily.    The patient is ready to learn, no apparent learning barriers were identified, Diagnosis and treatment plans were explained to the patient. The patient expressed understanding of the content. The patient questions were answered to his satisfaction.    Olivia Whitaker MD    Chart documentation with Dragon Voice recognition Software. Although reviewed after completion, some words and grammatical errors may remain.

## 2019-11-25 NOTE — PROGRESS NOTES
ANTICOAGULATION FOLLOW-UP CLINIC VISIT    Patient Name:  Jordan Barnett  Date:  2019  Contact Type:  Face to Face    SUBJECTIVE:  Patient Findings     Positives:   Missed doses (missed dose on Friday )    Comments:   Patient missed his dose on Friday, will have patient take 5 mg today and tomorrow (will have 27.5 mg by Wed which is his set maintenance dose). Patient denies concerns of bleeding, bruising, or signs/symptoms of a blood clot. Patient to call [facility] with any changes or concerns. Patient verbalized understanding of all instructions, denies questions or concerns at this time.             Clinical Outcomes     Comments:   Patient missed his dose on Friday, will have patient take 5 mg today and tomorrow (will have 27.5 mg by Wed which is his set maintenance dose). Patient denies concerns of bleeding, bruising, or signs/symptoms of a blood clot. Patient to call [facility] with any changes or concerns. Patient verbalized understanding of all instructions, denies questions or concerns at this time.                OBJECTIVE    INR Protime   Date Value Ref Range Status   2019 1.8 (A) 0.86 - 1.14 Final       ASSESSMENT / PLAN  INR assessment SUB    Recheck INR In: 2 DAYS    INR Location Clinic      Anticoagulation Summary  As of 2019    INR goal:   2.0-3.0   TTR:   29.4 % (2.4 wk)   INR used for dosin.8! (2019)   Warfarin maintenance plan:   2.5 mg (5 mg x 0.5) every Mon, Wed, Fri; 5 mg (5 mg x 1) all other days   Full warfarin instructions:   : 5 mg; Otherwise 2.5 mg every Mon, Wed, Fri; 5 mg all other days   Weekly warfarin total:   27.5 mg   Plan last modified:   Joann Gurrola RN (2019)   Next INR check:   2019   Priority:   High   Target end date:   2020    Indications    Deep vein thrombosis (DVT) (H) [I82.409]             Anticoagulation Episode Summary     INR check location:       Preferred lab:       Send INR reminders to:   FLAVIO FRANCIS    Comments:   *  works overnights. New consult 11-7-19      Anticoagulation Care Providers     Provider Role Specialty Phone number    Juan Antonio Flanagan MD [facility] [phone]    Halina Barrett APRN CNP  Nurse Practitioner [phone]            See the Encounter Report to view Anticoagulation Flowsheet and Dosing Calendar (Go to Encounters tab in chart review, and find the Anticoagulation Therapy Visit)    Patient declined AVS today.     Vani Dmuont RN

## 2019-11-27 ENCOUNTER — ANTICOAGULATION THERAPY VISIT (OUTPATIENT)
Dept: ANTICOAGULATION | Facility: CLINIC | Age: 59
End: 2019-11-27
Payer: COMMERCIAL

## 2019-11-27 DIAGNOSIS — I82.411 ACUTE DEEP VEIN THROMBOSIS (DVT) OF FEMORAL VEIN OF RIGHT LOWER EXTREMITY (H): ICD-10-CM

## 2019-11-27 DIAGNOSIS — I82.401 ACUTE DEEP VEIN THROMBOSIS (DVT) OF RIGHT LOWER EXTREMITY, UNSPECIFIED VEIN (H): ICD-10-CM

## 2019-11-27 DIAGNOSIS — I82.409 DEEP VEIN THROMBOSIS (DVT) (H): ICD-10-CM

## 2019-11-27 LAB — INR POINT OF CARE: 2.2 (ref 0.86–1.14)

## 2019-11-27 PROCEDURE — 99207 ZZC NO CHARGE NURSE ONLY: CPT

## 2019-11-27 PROCEDURE — 36416 COLLJ CAPILLARY BLOOD SPEC: CPT

## 2019-11-27 PROCEDURE — 85610 PROTHROMBIN TIME: CPT | Mod: QW

## 2019-11-27 RX ORDER — WARFARIN SODIUM 5 MG/1
TABLET ORAL
Qty: 80 TABLET | Refills: 0 | COMMUNITY
Start: 2019-11-27 | End: 2020-02-19

## 2019-11-27 NOTE — PROGRESS NOTES
ANTICOAGULATION FOLLOW-UP CLINIC VISIT    Patient Name:  Jordan Barnett  Date:  2019  Contact Type:  Face to Face    SUBJECTIVE:  Patient Findings     Comments:   No changes in medications, activity, or diet noted. No concerns with clotting, bleeding, or increased bruising noted. Took warfarin as prescribed.  Patient had 30 mg in the last 7 days, will adjust dose to 30mg by next INR check on Monday.  Patient verbalizes understanding and agrees to plan. No further questions or concerns.        Clinical Outcomes     Negatives:   Major bleeding event, Thromboembolic event, Anticoagulation-related hospital admission, Anticoagulation-related ED visit, Anticoagulation-related fatality    Comments:   No changes in medications, activity, or diet noted. No concerns with clotting, bleeding, or increased bruising noted. Took warfarin as prescribed.  Patient had 30 mg in the last 7 days, will adjust dose to 30mg by next INR check on Monday.  Patient verbalizes understanding and agrees to plan. No further questions or concerns.           OBJECTIVE    INR Protime   Date Value Ref Range Status   2019 2.2 (A) 0.86 - 1.14 Final       ASSESSMENT / PLAN  INR assessment THER    Recheck INR In: 5 DAYS    INR Location Clinic      Anticoagulation Summary  As of 2019    INR goal:   2.0-3.0   TTR:   31.6 % (2.7 wk)   INR used for dosin.2 (2019)   Warfarin maintenance plan:   2.5 mg (5 mg x 0.5) every Sun, Thu; 5 mg (5 mg x 1) all other days   Full warfarin instructions:   2.5 mg every Sun, Thu; 5 mg all other days   Weekly warfarin total:   30 mg   Plan last modified:   Joann Gurrola RN (2019)   Next INR check:   2019   Priority:   High   Target end date:   2020    Indications    Deep vein thrombosis (DVT) (H) [I82.409]             Anticoagulation Episode Summary     INR check location:       Preferred lab:       Send INR reminders to:   FLAVIO FRANCIS    Comments:   * works overnights. New  consult 11-7-19      Anticoagulation Care Providers     Provider Role Specialty Phone number    Juan Antonio Flanagan MD [facility] [phone]    Halina Barrett APRN CNP  Nurse Practitioner [phone]            See the Encounter Report to view Anticoagulation Flowsheet and Dosing Calendar (Go to Encounters tab in chart review, and find the Anticoagulation Therapy Visit)        Joann Gurrola RN

## 2019-12-02 ENCOUNTER — ANTICOAGULATION THERAPY VISIT (OUTPATIENT)
Dept: ANTICOAGULATION | Facility: CLINIC | Age: 59
End: 2019-12-02
Payer: COMMERCIAL

## 2019-12-02 DIAGNOSIS — I82.411 ACUTE DEEP VEIN THROMBOSIS (DVT) OF FEMORAL VEIN OF RIGHT LOWER EXTREMITY (H): ICD-10-CM

## 2019-12-02 LAB — INR POINT OF CARE: 1.8 (ref 0.86–1.14)

## 2019-12-02 PROCEDURE — 36416 COLLJ CAPILLARY BLOOD SPEC: CPT

## 2019-12-02 PROCEDURE — 85610 PROTHROMBIN TIME: CPT | Mod: QW

## 2019-12-02 PROCEDURE — 99207 ZZC NO CHARGE NURSE ONLY: CPT

## 2019-12-02 NOTE — PROGRESS NOTES
ANTICOAGULATION FOLLOW-UP CLINIC VISIT    Patient Name:  Jordan Barnett  Date:  2019  Contact Type:  Face to Face    SUBJECTIVE:  Patient Findings     Comments:   Patient reports no changes in medication, activity, or diet. Patient reports no changes in health. Patient reports has taken warfarin as instructed. Patient reports no increased bruising or bleeding and no signs or symptoms of a blood clot. Dose adjusted to an anticipated weekly dose of 32.5 mg; after INR check on Thursday could consider extending his recheck date to 1 week to balance his weekly dose and avoid the fluctuations in his INR.         Clinical Outcomes     Negatives:   Major bleeding event, Thromboembolic event, Anticoagulation-related hospital admission, Anticoagulation-related ED visit, Anticoagulation-related fatality    Comments:   Patient reports no changes in medication, activity, or diet. Patient reports no changes in health. Patient reports has taken warfarin as instructed. Patient reports no increased bruising or bleeding and no signs or symptoms of a blood clot. Dose adjusted to an anticipated weekly dose of 32.5 mg; after INR check on Thursday could consider extending his recheck date to 1 week to balance his weekly dose and avoid the fluctuations in his INR.            OBJECTIVE    INR Protime   Date Value Ref Range Status   2019 1.8 (A) 0.86 - 1.14 Final       ASSESSMENT / PLAN  INR assessment SUB    Recheck INR In: 3 DAYS    INR Location Clinic      Anticoagulation Summary  As of 2019    INR goal:   2.0-3.0   TTR:   35.4 % (3.4 wk)   INR used for dosin.8! (2019)   Warfarin maintenance plan:   2.5 mg (5 mg x 0.5) every Sun; 5 mg (5 mg x 1) all other days   Full warfarin instructions:   2.5 mg every Sun; 5 mg all other days   Weekly warfarin total:   32.5 mg   Plan last modified:   Vani Dumont RN (2019)   Next INR check:   2019   Priority:   High   Target end date:   2020     Indications    Deep vein thrombosis (DVT) (H) [I82.409]             Anticoagulation Episode Summary     INR check location:       Preferred lab:       Send INR reminders to:   FLAVIO [place]    Comments:   * works overnights, prefers Thursday appts       Anticoagulation Care Providers     Provider Role Specialty Phone number    Juan Antonio Flanagan MD [facility] [phone]    Halina Barrett APRN CNP  Nurse Practitioner [phone]            See the Encounter Report to view Anticoagulation Flowsheet and Dosing Calendar (Go to Encounters tab in chart review, and find the Anticoagulation Therapy Visit)      Vani Dumont RN

## 2019-12-05 ENCOUNTER — ANTICOAGULATION THERAPY VISIT (OUTPATIENT)
Dept: ANTICOAGULATION | Facility: CLINIC | Age: 59
End: 2019-12-05
Payer: COMMERCIAL

## 2019-12-05 DIAGNOSIS — I82.411 ACUTE DEEP VEIN THROMBOSIS (DVT) OF FEMORAL VEIN OF RIGHT LOWER EXTREMITY (H): ICD-10-CM

## 2019-12-05 LAB — INR POINT OF CARE: 2.3 (ref 0.86–1.14)

## 2019-12-05 PROCEDURE — 99207 ZZC NO CHARGE NURSE ONLY: CPT

## 2019-12-05 PROCEDURE — 36416 COLLJ CAPILLARY BLOOD SPEC: CPT

## 2019-12-05 PROCEDURE — 85610 PROTHROMBIN TIME: CPT | Mod: QW

## 2019-12-05 NOTE — PROGRESS NOTES
ANTICOAGULATION FOLLOW-UP CLINIC VISIT    Patient Name:  Jordan Barnett  Date:  2019  Contact Type:  Face to Face    SUBJECTIVE:  Patient Findings     Comments:   No acute changes in nhi, medications, diet (vitamin K intake), or activity noted. Took warfarin as instructed, denies any missed doses. No issues with bleeding or unusual bruising noted.     Will plan to continue with 32.5mg / week. The 2.5mg dose taken 7 days ago is likely not taking a large effect on the weekly dose today. Would prefer INR to trend on the higher side of his goal range.        Clinical Outcomes     Negatives:   Major bleeding event, Thromboembolic event, Anticoagulation-related hospital admission, Anticoagulation-related ED visit, Anticoagulation-related fatality    Comments:   No acute changes in nhi, medications, diet (vitamin K intake), or activity noted. Took warfarin as instructed, denies any missed doses. No issues with bleeding or unusual bruising noted.     Will plan to continue with 32.5mg / week. The 2.5mg dose taken 7 days ago is likely not taking a large effect on the weekly dose today. Would prefer INR to trend on the higher side of his goal range.           OBJECTIVE    INR Protime   Date Value Ref Range Status   2019 2.3 (A) 0.86 - 1.14 Final       ASSESSMENT / PLAN  No question data found.  Anticoagulation Summary  As of 2019    INR goal:   2.0-3.0   TTR:   38.1 % (3.9 wk)   INR used for dosin.3 (2019)   Warfarin maintenance plan:   2.5 mg (5 mg x 0.5) every Sun; 5 mg (5 mg x 1) all other days   Full warfarin instructions:   2.5 mg every Sun; 5 mg all other days   Weekly warfarin total:   32.5 mg   No change documented:   Amy Renteria RN   Plan last modified:   Vani Dumont RN (2019)   Next INR check:   2019   Priority:   High   Target end date:   2020    Indications    Deep vein thrombosis (DVT) (H) [I82.409]             Anticoagulation Episode Summary      INR check location:       Preferred lab:       Send INR reminders to:   FLAVIO FRANCIS    Comments:   * works overnights, prefers Thursday appts       Anticoagulation Care Providers     Provider Role Specialty Phone number    Juan Antonio Flanagan MD [facility] [phone]    Halina Barrett APRN CNP  Nurse Practitioner [phone]            See the Encounter Report to view Anticoagulation Flowsheet and Dosing Calendar (Go to Encounters tab in chart review, and find the Anticoagulation Therapy Visit)        Amy Renteria RN [facility]

## 2019-12-09 NOTE — PROGRESS NOTES
Per Dr. Whitaker, results from cardiolipins and factor 2 and 5 mutations are negative. No change in plan of care.     Message left for patient that results were released to M[facility]en. Advised to call back with questions or concerns. Direct line provided. Ami Barnett RN, BSN, OCN on 12/9/2019 at 3:26 PM

## 2019-12-12 ENCOUNTER — ANTICOAGULATION THERAPY VISIT (OUTPATIENT)
Dept: ANTICOAGULATION | Facility: CLINIC | Age: 59
End: 2019-12-12
Payer: COMMERCIAL

## 2019-12-12 DIAGNOSIS — I82.411 ACUTE DEEP VEIN THROMBOSIS (DVT) OF FEMORAL VEIN OF RIGHT LOWER EXTREMITY (H): ICD-10-CM

## 2019-12-12 LAB — INR POINT OF CARE: 2.5 (ref 0.86–1.14)

## 2019-12-12 PROCEDURE — 36416 COLLJ CAPILLARY BLOOD SPEC: CPT

## 2019-12-12 PROCEDURE — 99207 ZZC NO CHARGE NURSE ONLY: CPT

## 2019-12-12 PROCEDURE — 85610 PROTHROMBIN TIME: CPT | Mod: QW

## 2019-12-12 NOTE — PROGRESS NOTES
ANTICOAGULATION FOLLOW-UP CLINIC VISIT    Patient Name:  Jordan Barnett  Date:  2019  Contact Type:  Face to Face    SUBJECTIVE:  Patient Findings     Comments:   No changes in medications, activity, or diet noted. No concerns with clotting, bleeding, or increased bruising noted. Took warfarin as prescribed. Pt does feel his leg is less swollen.  Patient is to continue maintenance warfarin plan, and check INR in 1 week.  Patient verbalizes understanding and agrees to plan. No further questions or concerns.        Clinical Outcomes     Negatives:   Major bleeding event, Thromboembolic event, Anticoagulation-related hospital admission, Anticoagulation-related ED visit, Anticoagulation-related fatality    Comments:   No changes in medications, activity, or diet noted. No concerns with clotting, bleeding, or increased bruising noted. Took warfarin as prescribed. Pt does feel his leg is less swollen.  Patient is to continue maintenance warfarin plan, and check INR in 1 week.  Patient verbalizes understanding and agrees to plan. No further questions or concerns.           OBJECTIVE    INR Protime   Date Value Ref Range Status   2019 2.5 (A) 0.86 - 1.14 Final       ASSESSMENT / PLAN  INR assessment THER    Recheck INR In: 1 WEEK    INR Location Clinic      Anticoagulation Summary  As of 2019    INR goal:   2.0-3.0   TTR:   50.9 % (1.1 mo)   INR used for dosin.5 (2019)   Warfarin maintenance plan:   2.5 mg (5 mg x 0.5) every Sun; 5 mg (5 mg x 1) all other days   Full warfarin instructions:   2.5 mg every Sun; 5 mg all other days   Weekly warfarin total:   32.5 mg   No change documented:   Joann Gurrola RN   Plan last modified:   Vani Dumont RN (2019)   Next INR check:   2019   Priority:   High   Target end date:   2020    Indications    Deep vein thrombosis (DVT) (H) [I82.409]             Anticoagulation Episode Summary     INR check location:       Preferred lab:        Send INR reminders to:   FLAVIO FRANCIS    Comments:   * works overnights, prefers Thursday appts       Anticoagulation Care Providers     Provider Role Specialty Phone number    Juan Antonio Flanagan MD [facility] [phone]    Halina Barrett APRN CNP  Nurse Practitioner [phone]            See the Encounter Report to view Anticoagulation Flowsheet and Dosing Calendar (Go to Encounters tab in chart review, and find the Anticoagulation Therapy Visit)        Joann Gurrola RN

## 2019-12-19 ENCOUNTER — ANTICOAGULATION THERAPY VISIT (OUTPATIENT)
Dept: ANTICOAGULATION | Facility: CLINIC | Age: 59
End: 2019-12-19
Payer: COMMERCIAL

## 2019-12-19 DIAGNOSIS — I82.411 ACUTE DEEP VEIN THROMBOSIS (DVT) OF FEMORAL VEIN OF RIGHT LOWER EXTREMITY (H): ICD-10-CM

## 2019-12-19 LAB — INR POINT OF CARE: 2.6 (ref 0.86–1.14)

## 2019-12-19 PROCEDURE — 85610 PROTHROMBIN TIME: CPT | Mod: QW

## 2019-12-19 PROCEDURE — 36416 COLLJ CAPILLARY BLOOD SPEC: CPT

## 2019-12-19 PROCEDURE — 99207 ZZC NO CHARGE NURSE ONLY: CPT

## 2019-12-19 NOTE — PROGRESS NOTES
ANTICOAGULATION FOLLOW-UP CLINIC VISIT    Patient Name:  Jordan Barnett  Date:  2019  Contact Type:  Face to Face    SUBJECTIVE:  Patient Findings     Positives:   Other complaints (Leg is less swollen still but pt did bumped his knee and now that is swollen)    Comments:   No changes in medications, activity, or diet noted. No concerns with clotting, bleeding, or increased bruising noted. Took warfarin as prescribed.  Patient is to continue maintenance warfarin plan, and check INR in one week.  Patient verbalizes understanding and agrees to plan. No further questions or concerns.        Clinical Outcomes     Negatives:   Major bleeding event, Thromboembolic event, Anticoagulation-related hospital admission, Anticoagulation-related ED visit, Anticoagulation-related fatality    Comments:   No changes in medications, activity, or diet noted. No concerns with clotting, bleeding, or increased bruising noted. Took warfarin as prescribed.  Patient is to continue maintenance warfarin plan, and check INR in one week.  Patient verbalizes understanding and agrees to plan. No further questions or concerns.           OBJECTIVE    INR Protime   Date Value Ref Range Status   2019 2.6 (A) 0.86 - 1.14 Final       ASSESSMENT / PLAN  INR assessment THER    Recheck INR In: 1 WEEK    INR Location Clinic      Anticoagulation Summary  As of 2019    INR goal:   2.0-3.0   TTR:   59.3 % (1.4 mo)   INR used for dosin.6 (2019)   Warfarin maintenance plan:   2.5 mg (5 mg x 0.5) every Sun; 5 mg (5 mg x 1) all other days   Full warfarin instructions:   2.5 mg every Sun; 5 mg all other days   Weekly warfarin total:   32.5 mg   No change documented:   Joann Gurrola RN   Plan last modified:   Vani Dumont RN (2019)   Next INR check:   2019   Priority:   High   Target end date:   2020    Indications    Deep vein thrombosis (DVT) (H) [I82.409]             Anticoagulation Episode Summary     INR  check location:       Preferred lab:       Send INR reminders to:   FLAVIO FRANCIS    Comments:   * works overnights, prefers Thursday appts       Anticoagulation Care Providers     Provider Role Specialty Phone number    Juan Antonio Flanagan MD [facility] [phone]    Halina Barrett APRN CNP  Nurse Practitioner [phone]            See the Encounter Report to view Anticoagulation Flowsheet and Dosing Calendar (Go to Encounters tab in chart review, and find the Anticoagulation Therapy Visit)        Joann Gurrola RN

## 2019-12-26 ENCOUNTER — ANTICOAGULATION THERAPY VISIT (OUTPATIENT)
Dept: ANTICOAGULATION | Facility: CLINIC | Age: 59
End: 2019-12-26
Payer: COMMERCIAL

## 2019-12-26 DIAGNOSIS — I82.411 ACUTE DEEP VEIN THROMBOSIS (DVT) OF FEMORAL VEIN OF RIGHT LOWER EXTREMITY (H): ICD-10-CM

## 2019-12-26 LAB — INR POINT OF CARE: 2.6 (ref 0.86–1.14)

## 2019-12-26 PROCEDURE — 36416 COLLJ CAPILLARY BLOOD SPEC: CPT

## 2019-12-26 PROCEDURE — 85610 PROTHROMBIN TIME: CPT | Mod: QW

## 2019-12-26 PROCEDURE — 99207 ZZC NO CHARGE NURSE ONLY: CPT

## 2019-12-26 NOTE — PROGRESS NOTES
ANTICOAGULATION FOLLOW-UP CLINIC VISIT    Patient Name:  Jordan Barnett  Date:  2019  Contact Type:  Face to Face    SUBJECTIVE:  Patient Findings     Comments:   No changes in medications, activity, or diet noted. No concerns with clotting, bleeding, or increased bruising noted. Took warfarin as prescribed.  Pt continues to have pain and inflammation in his knee - advised for pt that he may want to be evaluated for the pain. Advised to be seen next week if Sx continue or worsen for INR and with FP.  Patient educated on the increased risk for bleeding, precautions to take, and when to seek medical attention.  Patient verbalizes understanding and agrees to plan. No further questions or concerns.        Clinical Outcomes     Negatives:   Major bleeding event, Thromboembolic event, Anticoagulation-related hospital admission, Anticoagulation-related ED visit, Anticoagulation-related fatality    Comments:   No changes in medications, activity, or diet noted. No concerns with clotting, bleeding, or increased bruising noted. Took warfarin as prescribed.  Pt continues to have pain and inflammation in his knee - advised for pt that he may want to be evaluated for the pain. Advised to be seen next week if Sx continue or worsen for INR and with FP.  Patient educated on the increased risk for bleeding, precautions to take, and when to seek medical attention.  Patient verbalizes understanding and agrees to plan. No further questions or concerns.           OBJECTIVE    INR Protime   Date Value Ref Range Status   2019 2.6 (A) 0.86 - 1.14 Final       ASSESSMENT / PLAN  INR assessment THER    Recheck INR In: 2 WEEKS    INR Location Clinic      Anticoagulation Summary  As of 2019    INR goal:   2.0-3.0   TTR:   65.2 % (1.6 mo)   INR used for dosin.6 (2019)   Warfarin maintenance plan:   2.5 mg (5 mg x 0.5) every Sun; 5 mg (5 mg x 1) all other days   Full warfarin instructions:   2.5 mg every Sun; 5 mg  all other days   Weekly warfarin total:   32.5 mg   No change documented:   Joann Gurrola RN   Plan last modified:   Vani Dumont RN (12/2/2019)   Next INR check:   1/9/2020   Priority:   Maintenance   Target end date:   1/29/2020    Indications    Deep vein thrombosis (DVT) (H) [I82.409]             Anticoagulation Episode Summary     INR check location:       Preferred lab:       Send INR reminders to:   FLAVIO FRANCIS    Comments:   * works overnights, prefers Thursday appts       Anticoagulation Care Providers     Provider Role Specialty Phone number    Juan Antonio Flanagan MD [facility] [phone]    Halina Barrett APRN CNP  Nurse Practitioner [phone]            See the Encounter Report to view Anticoagulation Flowsheet and Dosing Calendar (Go to Encounters tab in chart review, and find the Anticoagulation Therapy Visit)        Joann Gurrola, LORENA

## 2020-01-10 ENCOUNTER — ANTICOAGULATION THERAPY VISIT (OUTPATIENT)
Dept: ANTICOAGULATION | Facility: CLINIC | Age: 60
End: 2020-01-10
Payer: COMMERCIAL

## 2020-01-10 DIAGNOSIS — I82.411 ACUTE DEEP VEIN THROMBOSIS (DVT) OF FEMORAL VEIN OF RIGHT LOWER EXTREMITY (H): ICD-10-CM

## 2020-01-10 LAB — INR POINT OF CARE: 2.2 (ref 0.86–1.14)

## 2020-01-10 PROCEDURE — 99207 ZZC NO CHARGE NURSE ONLY: CPT

## 2020-01-10 PROCEDURE — 36416 COLLJ CAPILLARY BLOOD SPEC: CPT

## 2020-01-10 PROCEDURE — 85610 PROTHROMBIN TIME: CPT | Mod: QW

## 2020-01-10 NOTE — PROGRESS NOTES
ANTICOAGULATION FOLLOW-UP CLINIC VISIT    Patient Name:  Jordan Barnett  Date:  1/10/2020  Contact Type:  Face to Face    SUBJECTIVE:  Patient Findings     Comments:   No changes in medications, activity, health, or diet noted. No bleeding or increased bruising noted. Took warfarin as prescribed.  Patient will continue weekly maintenance dose. INR is therapeutic.   Recheck in 2 weeks.   Patient verbalizes understanding and agrees to plan. No further questions or concerns.          Clinical Outcomes     Negatives:   Major bleeding event, Thromboembolic event, Anticoagulation-related hospital admission, Anticoagulation-related ED visit, Anticoagulation-related fatality    Comments:   No changes in medications, activity, health, or diet noted. No bleeding or increased bruising noted. Took warfarin as prescribed.  Patient will continue weekly maintenance dose. INR is therapeutic.   Recheck in 2 weeks.   Patient verbalizes understanding and agrees to plan. No further questions or concerns.             OBJECTIVE    INR Protime   Date Value Ref Range Status   01/10/2020 2.2 (A) 0.86 - 1.14 Final       ASSESSMENT / PLAN  INR assessment THER    Recheck INR In: 2 WEEKS    INR Location Clinic      Anticoagulation Summary  As of 1/10/2020    INR goal:   2.0-3.0   TTR:   73.5 % (2.1 mo)   INR used for dosin.2 (1/10/2020)   Warfarin maintenance plan:   2.5 mg (5 mg x 0.5) every Sun; 5 mg (5 mg x 1) all other days   Full warfarin instructions:   2.5 mg every Sun; 5 mg all other days   Weekly warfarin total:   32.5 mg   No change documented:   Lynda Couch RN   Plan last modified:   Vani Dumont RN (2019)   Next INR check:   2020   Priority:   High   Target end date:   2020    Indications    Deep vein thrombosis (DVT) (H) [I82.409]             Anticoagulation Episode Summary     INR check location:       Preferred lab:       Send INR reminders to:   FLAVIO FRANCIS    Comments:   * works  overnights, prefers Thursday appts       Anticoagulation Care Providers     Provider Role Specialty Phone number    Juan Antonio Flanagan MD [facility] [phone]    Halina Barrett APRN CNP  Nurse Practitioner [phone]            See the Encounter Report to view Anticoagulation Flowsheet and Dosing Calendar (Go to Encounters tab in chart review, and find the Anticoagulation Therapy Visit)        Lynda Couch RN

## 2020-01-22 LAB — PHQ9 SCORE: 0

## 2020-01-23 ENCOUNTER — ANTICOAGULATION THERAPY VISIT (OUTPATIENT)
Dept: ANTICOAGULATION | Facility: CLINIC | Age: 60
End: 2020-01-23
Payer: COMMERCIAL

## 2020-01-23 ENCOUNTER — TRANSFERRED RECORDS (OUTPATIENT)
Dept: HEALTH INFORMATION MANAGEMENT | Facility: CLINIC | Age: 60
End: 2020-01-23

## 2020-01-23 DIAGNOSIS — I82.411 ACUTE DEEP VEIN THROMBOSIS (DVT) OF FEMORAL VEIN OF RIGHT LOWER EXTREMITY (H): ICD-10-CM

## 2020-01-23 LAB — INR POINT OF CARE: 2.5 (ref 0.86–1.14)

## 2020-01-23 PROCEDURE — 99207 ZZC NO CHARGE NURSE ONLY: CPT

## 2020-01-23 PROCEDURE — 85610 PROTHROMBIN TIME: CPT | Mod: QW

## 2020-01-23 PROCEDURE — 36416 COLLJ CAPILLARY BLOOD SPEC: CPT

## 2020-01-23 NOTE — PROGRESS NOTES
ANTICOAGULATION FOLLOW-UP CLINIC VISIT    Patient Name:  Jordan Barnett  Date:  2020  Contact Type:  Face to Face    SUBJECTIVE:  Patient Findings     Comments:   No changes in medications, activity, or diet noted. No concerns with clotting, bleeding, or increased bruising noted. Took warfarin as prescribed.  Patient is to continue maintenance warfarin plan, and check INR in 2 weeks.  Patient verbalizes understanding and agrees to plan. No further questions or concerns.        Clinical Outcomes     Negatives:   Major bleeding event, Thromboembolic event, Anticoagulation-related hospital admission, Anticoagulation-related ED visit, Anticoagulation-related fatality    Comments:   No changes in medications, activity, or diet noted. No concerns with clotting, bleeding, or increased bruising noted. Took warfarin as prescribed.  Patient is to continue maintenance warfarin plan, and check INR in 2 weeks.  Patient verbalizes understanding and agrees to plan. No further questions or concerns.           OBJECTIVE    INR Protime   Date Value Ref Range Status   2020 2.5 (A) 0.86 - 1.14 Final       ASSESSMENT / PLAN  INR assessment THER    Recheck INR In: 2 WEEKS    INR Location Clinic      Anticoagulation Summary  As of 2020    INR goal:   2.0-3.0   TTR:   78.0 % (2.5 mo)   INR used for dosin.5 (2020)   Warfarin maintenance plan:   2.5 mg (5 mg x 0.5) every Sun; 5 mg (5 mg x 1) all other days   Full warfarin instructions:   2.5 mg every Sun; 5 mg all other days   Weekly warfarin total:   32.5 mg   No change documented:   Joann Gurrola RN   Plan last modified:   Vani Dumont RN (2019)   Next INR check:   2020   Priority:   Maintenance   Target end date:   2020    Indications    Deep vein thrombosis (DVT) (H) [I82.409]             Anticoagulation Episode Summary     INR check location:       Preferred lab:       Send INR reminders to:   FLAVIO FRANCIS    Comments:   * works  overnights, prefers Thursday appts       Anticoagulation Care Providers     Provider Role Specialty Phone number    Juan Antonio Flanagan MD [facility] [phone]    Halina Barrett APRN CNP  Nurse Practitioner [phone]            See the Encounter Report to view Anticoagulation Flowsheet and Dosing Calendar (Go to Encounters tab in chart review, and find the Anticoagulation Therapy Visit)        Joann Gurrola RN

## 2020-02-06 ENCOUNTER — ANTICOAGULATION THERAPY VISIT (OUTPATIENT)
Dept: ANTICOAGULATION | Facility: CLINIC | Age: 60
End: 2020-02-06
Payer: COMMERCIAL

## 2020-02-06 DIAGNOSIS — I82.411 ACUTE DEEP VEIN THROMBOSIS (DVT) OF FEMORAL VEIN OF RIGHT LOWER EXTREMITY (H): ICD-10-CM

## 2020-02-06 LAB — INR POINT OF CARE: 1.4 (ref 0.86–1.14)

## 2020-02-06 PROCEDURE — 99207 ZZC NO CHARGE NURSE ONLY: CPT

## 2020-02-06 PROCEDURE — 85610 PROTHROMBIN TIME: CPT | Mod: QW

## 2020-02-06 PROCEDURE — 36416 COLLJ CAPILLARY BLOOD SPEC: CPT

## 2020-02-06 NOTE — PROGRESS NOTES
ANTICOAGULATION FOLLOW-UP CLINIC VISIT    Patient Name:  Jordan Barnett  Date:  2020  Contact Type:  Face to Face    SUBJECTIVE:  Patient Findings     Comments:   No changes in medications, activity, or diet noted. No concerns with clotting, bleeding, or increased bruising noted. Pt believes that he took his dosages but is unsure if he missed one.  Pt is to take 10 mg today then resume maintenance dose. Recheck INR on Thur when he is in the lab.  Patient educated on the increased risk for a clot/stroke, precautions to take, S &S of a clot/stroke, and when to seek medical attention. Denies concerns or S&S of a clot.  Patient verbalizes understanding and agrees to plan. No further questions or concerns.        Clinical Outcomes     Negatives:   Major bleeding event, Thromboembolic event, Anticoagulation-related hospital admission, Anticoagulation-related ED visit, Anticoagulation-related fatality    Comments:   No changes in medications, activity, or diet noted. No concerns with clotting, bleeding, or increased bruising noted. Pt believes that he took his dosages but is unsure if he missed one.  Pt is to take 10 mg today then resume maintenance dose. Recheck INR on Thur when he is in the lab.  Patient educated on the increased risk for a clot/stroke, precautions to take, S &S of a clot/stroke, and when to seek medical attention. Denies concerns or S&S of a clot.  Patient verbalizes understanding and agrees to plan. No further questions or concerns.           OBJECTIVE    INR Protime   Date Value Ref Range Status   2020 1.4 (A) 0.86 - 1.14 Final       ASSESSMENT / PLAN  INR assessment SUB    Recheck INR In: 1 WEEK    INR Location Clinic      Anticoagulation Summary  As of 2020    INR goal:   2.0-3.0   TTR:   73.0 % (3 mo)   INR used for dosin.4! (2020)   Warfarin maintenance plan:   2.5 mg (5 mg x 0.5) every Sun; 5 mg (5 mg x 1) all other days   Full warfarin instructions:   : 10 mg;  Otherwise 2.5 mg every Sun; 5 mg all other days   Weekly warfarin total:   32.5 mg   Plan last modified:   Vani Dumont RN (12/2/2019)   Next INR check:   2/13/2020   Priority:   Maintenance   Target end date:   1/29/2020    Indications    Deep vein thrombosis (DVT) (H) [I82.409]             Anticoagulation Episode Summary     INR check location:       Preferred lab:       Send INR reminders to:   [place] PHONE [employer] POOL    Comments:   * works overnights, prefers Thursday appts       Anticoagulation Care Providers     Provider Role Specialty Phone number    Juan Antonio Flanagan MD [facility] [phone]    Halina Barrett APRN CNP  Nurse Practitioner [phone]            See the Encounter Report to view Anticoagulation Flowsheet and Dosing Calendar (Go to Encounters tab in chart review, and find the Anticoagulation Therapy Visit)        Joann Gurrola RN

## 2020-02-13 ENCOUNTER — TELEPHONE (OUTPATIENT)
Dept: FAMILY MEDICINE | Facility: CLINIC | Age: 60
End: 2020-02-13
Payer: COMMERCIAL

## 2020-02-13 ENCOUNTER — HOSPITAL ENCOUNTER (OUTPATIENT)
Dept: ULTRASOUND IMAGING | Facility: CLINIC | Age: 60
Discharge: HOME | End: 2020-02-13
Attending: INTERNAL MEDICINE | Admitting: INTERNAL MEDICINE
Payer: COMMERCIAL

## 2020-02-13 ENCOUNTER — HOSPITAL ENCOUNTER (OUTPATIENT)
Dept: LAB | Facility: CLINIC | Age: 60
End: 2020-02-13
Attending: INTERNAL MEDICINE
Payer: COMMERCIAL

## 2020-02-13 DIAGNOSIS — I82.411 ACUTE DEEP VEIN THROMBOSIS (DVT) OF FEMORAL VEIN OF RIGHT LOWER EXTREMITY (H): ICD-10-CM

## 2020-02-13 LAB
D DIMER PPP FEU-MCNC: 0.6 UG/ML FEU (ref 0–0.5)
INR PPP: 2.27 (ref 0.86–1.14)

## 2020-02-13 PROCEDURE — 85610 PROTHROMBIN TIME: CPT | Performed by: INTERNAL MEDICINE

## 2020-02-13 PROCEDURE — 99207 ZZC NO CHARGE NURSE ONLY: CPT | Performed by: FAMILY MEDICINE

## 2020-02-13 PROCEDURE — 93971 EXTREMITY STUDY: CPT | Mod: RT

## 2020-02-13 PROCEDURE — 85379 FIBRIN DEGRADATION QUANT: CPT | Performed by: INTERNAL MEDICINE

## 2020-02-13 PROCEDURE — 36415 COLL VENOUS BLD VENIPUNCTURE: CPT | Performed by: INTERNAL MEDICINE

## 2020-02-13 NOTE — TELEPHONE ENCOUNTER
ANTICOAGULATION FOLLOW-UP CLINIC VISIT    Patient Name:  Jordan Barnett  Date:  2020  Contact Type:  Telephone    SUBJECTIVE:  Patient Findings     Comments:   The patient was assessed for diet, medication, and activity level changes, missed or extra doses, bruising or bleeding, with no problem findings.    Will continue maintenance and recheck in 1 week to make sure INR does not become sub therapeutic again. No appointments available in clinic. Will go to the lab before OV with Dr. Whitaker.        Clinical Outcomes     Negatives:   Major bleeding event, Thromboembolic event, Anticoagulation-related hospital admission, Anticoagulation-related ED visit, Anticoagulation-related fatality    Comments:   The patient was assessed for diet, medication, and activity level changes, missed or extra doses, bruising or bleeding, with no problem findings.    Will continue maintenance and recheck in 1 week to make sure INR does not become sub therapeutic again. No appointments available in clinic. Will go to the lab before OV with Dr. Whitaker.           OBJECTIVE    INR   Date Value Ref Range Status   2020 2.27 (H) 0.86 - 1.14 Final       ASSESSMENT / PLAN      Anticoagulation Summary  As of 2020    INR goal:   2.0-3.0   TTR:   69.8 % (3.2 mo)   INR used for dosin.27 (2020)   Warfarin maintenance plan:   2.5 mg (5 mg x 0.5) every Sun; 5 mg (5 mg x 1) all other days   Full warfarin instructions:   2.5 mg every Sun; 5 mg all other days   Weekly warfarin total:   32.5 mg   No change documented:   Rosa Montero RN   Plan last modified:   Vani Dumont RN (2019)   Next INR check:   2020   Priority:   Maintenance   Target end date:   2020    Indications    Deep vein thrombosis (DVT) (H) [I82.409]             Anticoagulation Episode Summary     INR check location:       Preferred lab:       Send INR reminders to:   [place] BRAD [employer] POOL    Comments:   * works overnights, prefers  Thursday appts       Anticoagulation Care Providers     Provider Role Specialty Phone number    Juan Antonio Flanagan MD [facility] [phone]    Halina Barrett APRN CNP  Nurse Practitioner [phone]            See the Encounter Report to view Anticoagulation Flowsheet and Dosing Calendar (Go to Encounters tab in chart review, and find the Anticoagulation Therapy Visit)        Rosa Montero RN

## 2020-02-19 ENCOUNTER — MYC MEDICAL ADVICE (OUTPATIENT)
Dept: FAMILY MEDICINE | Facility: CLINIC | Age: 60
End: 2020-02-19

## 2020-02-19 ENCOUNTER — ONCOLOGY VISIT (OUTPATIENT)
Dept: ONCOLOGY | Facility: CLINIC | Age: 60
End: 2020-02-19
Attending: INTERNAL MEDICINE
Payer: COMMERCIAL

## 2020-02-19 VITALS
OXYGEN SATURATION: 98 % | TEMPERATURE: 96.5 F | RESPIRATION RATE: 18 BRPM | DIASTOLIC BLOOD PRESSURE: 87 MMHG | HEART RATE: 83 BPM | WEIGHT: 176.5 LBS | HEIGHT: 70 IN | SYSTOLIC BLOOD PRESSURE: 121 MMHG | BODY MASS INDEX: 25.27 KG/M2

## 2020-02-19 DIAGNOSIS — I82.401 ACUTE DEEP VEIN THROMBOSIS (DVT) OF RIGHT LOWER EXTREMITY, UNSPECIFIED VEIN (H): ICD-10-CM

## 2020-02-19 DIAGNOSIS — I82.411 ACUTE DEEP VEIN THROMBOSIS (DVT) OF FEMORAL VEIN OF RIGHT LOWER EXTREMITY (H): ICD-10-CM

## 2020-02-19 DIAGNOSIS — N18.30 CKD (CHRONIC KIDNEY DISEASE) STAGE 3, GFR 30-59 ML/MIN (H): Primary | ICD-10-CM

## 2020-02-19 DIAGNOSIS — E55.9 VITAMIN D DEFICIENCY: ICD-10-CM

## 2020-02-19 DIAGNOSIS — N18.30 CKD (CHRONIC KIDNEY DISEASE) STAGE 3, GFR 30-59 ML/MIN (H): ICD-10-CM

## 2020-02-19 LAB — INR PPP: 2.21 (ref 0.86–1.14)

## 2020-02-19 PROCEDURE — 36415 COLL VENOUS BLD VENIPUNCTURE: CPT | Performed by: FAMILY MEDICINE

## 2020-02-19 PROCEDURE — 82306 VITAMIN D 25 HYDROXY: CPT | Performed by: FAMILY MEDICINE

## 2020-02-19 PROCEDURE — 99213 OFFICE O/P EST LOW 20 MIN: CPT | Performed by: INTERNAL MEDICINE

## 2020-02-19 PROCEDURE — G0463 HOSPITAL OUTPT CLINIC VISIT: HCPCS

## 2020-02-19 PROCEDURE — 85610 PROTHROMBIN TIME: CPT | Performed by: FAMILY MEDICINE

## 2020-02-19 RX ORDER — WARFARIN SODIUM 5 MG/1
TABLET ORAL
Qty: 80 TABLET | Refills: 0 | Status: SHIPPED | OUTPATIENT
Start: 2020-02-19 | End: 2020-05-26

## 2020-02-19 ASSESSMENT — MIFFLIN-ST. JEOR: SCORE: 1626.85

## 2020-02-19 ASSESSMENT — PAIN SCALES - GENERAL: PAINLEVEL: NO PAIN (0)

## 2020-02-19 NOTE — PROGRESS NOTES
"Oncology Rooming Note    February 19, 2020 3:01 PM   Jordan Barnett is a 59 year old male who presents for:    Chief Complaint   Patient presents with     Hematology     3 month follow up DVT of lower extremity. Review labs and ultrasound results.      Initial Vitals: /87 (BP Location: Right arm, Patient Position: Sitting, Cuff Size: Adult Large)   Pulse 83   Temp 96.5  F (35.8  C) (Tympanic)   Resp 18   Ht 1.778 m (5' 10\")   Wt 80.1 kg (176 lb 8 oz)   SpO2 98%   BMI 25.33 kg/m   Estimated body mass index is 25.33 kg/m  as calculated from the following:    Height as of this encounter: 1.778 m (5' 10\").    Weight as of this encounter: 80.1 kg (176 lb 8 oz). Body surface area is 1.99 meters squared.  No Pain (0) Comment: Data Unavailable   No LMP for male patient.  Allergies reviewed: Yes  Medications reviewed: Yes    Medications: Requesting refill on Warfarin.     Pharmacy name entered into [facility] [place]: [facility] - [place], [place] - [address]    Clinical concerns: 3 month follow up DVT of lower extremity. Review labs and ultrasound results.       Judi Basurto, [facility]            "

## 2020-02-19 NOTE — LETTER
"    2/19/2020         RE: Jordan Barnett  [address]  [place] [address]        Dear Colleague,    Thank you for referring your patient, Jordan Barnett, to the [facility]. Please see a copy of my visit note below.    Oncology Rooming Note    February 19, 2020 3:01 PM   Jordan Barnett is a 59 year old male who presents for:    Chief Complaint   Patient presents with     Hematology     3 month follow up DVT of lower extremity. Review labs and ultrasound results.      Initial Vitals: /87 (BP Location: Right arm, Patient Position: Sitting, Cuff Size: Adult Large)   Pulse 83   Temp 96.5  F (35.8  C) (Tympanic)   Resp 18   Ht 1.778 m (5' 10\")   Wt 80.1 kg (176 lb 8 oz)   SpO2 98%   BMI 25.33 kg/m    Estimated body mass index is 25.33 kg/m  as calculated from the following:    Height as of this encounter: 1.778 m (5' 10\").    Weight as of this encounter: 80.1 kg (176 lb 8 oz). Body surface area is 1.99 meters squared.  No Pain (0) Comment: Data Unavailable   No LMP for male patient.  Allergies reviewed: Yes  Medications reviewed: Yes    Medications: Requesting refill on Warfarin.     Pharmacy name entered into [employer]: [id number] - [place], [place] - [address]    Clinical concerns: 3 month follow up DVT of lower extremity. Review labs and ultrasound results.       Judi Basurto CMA              DATE OF VISIT: Feb 19, 2020    Jordan Barnett is a 59 year old male is seen today for   Chief Complaint   Patient presents with     Hematology     3 month follow up DVT of lower extremity. Review labs and ultrasound results.    .       (I82.401) Acute deep vein thrombosis (DVT) of right lower extremity, unspecified vein (H)  I reviewed with the patient today most recent laboratory tests and imaging studies.  Doppler ultrasound on February 15, 2020 revealed improved deep venous thrombosis in the right lower extremity.  D-dimer is mildly elevated.  The patient remains " asymptomatic.  He is currently on warfarin INR has been monitored through the anticoagulation clinic.  After long discussion we decided the patient will continue on warfarin for 3 more months to keep INR within therapeutic range between 2-3.  We will follow the patient after 3 months with a Doppler ultrasound and d-dimer level.    The patient is ready to learn, no apparent learning barriers were identified.  Diagnosis and treatment plans were explained to the patient. The patient expressed understanding of the content. The patient asked appropriate questions. The patient questions were answered to his satisfaction.  Time spent 15 minutes more than 50% of the time in counseling and coordination of care including discussion of management of deep venous thrombosis, follow-up and prognosis.  Chart documentation with Dragon Voice recognition Software. Although reviewed after completion, some words and grammatical errors may remain.      Again, thank you for allowing me to participate in the care of your patient.        Sincerely,        Olivia Whitaker MD

## 2020-02-19 NOTE — PATIENT INSTRUCTIONS
Continue with warfarin for 3 more months to keep INR 2-3.  Follow-up in 3 months with a repeat Doppler ultrasound and d-dimer.

## 2020-02-19 NOTE — TELEPHONE ENCOUNTER
Dr Flanagan, please see his Mychart note/ request for vitamin D lab. She has lab appt today.   Last done 6/15/2018   Vitamin D Deficiency screening 36   20 - 75 ug/L Final 06/15/2018  7:45 AM 52        Is this ok?   Alma Woods RNC

## 2020-02-20 ENCOUNTER — TELEPHONE (OUTPATIENT)
Dept: FAMILY MEDICINE | Facility: CLINIC | Age: 60
End: 2020-02-20

## 2020-02-20 DIAGNOSIS — I82.411 ACUTE DEEP VEIN THROMBOSIS (DVT) OF FEMORAL VEIN OF RIGHT LOWER EXTREMITY (H): ICD-10-CM

## 2020-02-20 LAB — DEPRECATED CALCIDIOL+CALCIFEROL SERPL-MC: 60 UG/L (ref 20–75)

## 2020-02-20 NOTE — TELEPHONE ENCOUNTER
2020 ADDENDUM: Patient called and left a voicemail for [facility] stating he received the message, confirmed there were no changes and he has his appointment for 2 weeks out for a recheck.     Andi HAINES RN, CACP 2020 at 3:11 PM     ANTICOAGULATION MANAGEMENT     Patient Name:  Jordan Barnett  Date:  2020    ASSESSMENT /SUBJECTIVE:      Yesterday's INR result of 2.21 is therapeutic. Goal INR of 2.0-3.0      Warfarin dose taken:     Diet:     Medication changes/ interactions:     Previous INR: Therapeutic (2.27)    S/S of bleeding or thromboembolism:     New injury or illness:      Upcoming surgery, procedure or cardioversion:      Additional findings: Patient had a lower extremity ultrasound on . Results showed improved DVT in the right lower extremity. Patient saw Dr. Whitaker yesterday who recommended warfarin for another 3 months then re-assess. No changes noted in Epic.           PLAN:    Left detailed message for Jordan regarding INR result and instructed:     Warfarin Dosing Instructions: Continue your current warfarin dose    Instructed patient to follow up no later than: 2 weeks  ACC RN visit scheduled    Education provided: Yes: Left message for patient with results and dosing recommendations. Requested patient to call [facility] back ([phone]) to report any missed doses, falls, signs/symptoms of bleeding or clotting, any changes in health, medications, or diet. Asked patient to call back to confirm the message was received.         Instructed to call the Anticoagulation Clinic for any changes, questions or concerns. (#[phone])        OBJECTIVE:  INR   Date Value Ref Range Status   2020 2.21 (H) 0.86 - 1.14 Final             Anticoagulation Summary  As of 2020    INR goal:   2.0-3.0   TTR:   71.6 % (3.4 mo)   INR used for dosin.21 (2020)   Warfarin maintenance plan:   2.5 mg (5 mg x 0.5) every Sun; 5 mg (5 mg x 1) all other days   Full warfarin instructions:   2.5 mg every  Sun; 5 mg all other days   Weekly warfarin total:   32.5 mg   No change documented:   Amy Renteria RN   Plan last modified:   Vani Dumont RN (12/2/2019)   Next INR check:   3/5/2020   Priority:   Maintenance   Target end date:   5/20/2020    Indications    Deep vein thrombosis (DVT) (H) [I82.409]             Anticoagulation Episode Summary     INR check location:       Preferred lab:       Send INR reminders to:   [place] PHONE [employer]-IO POOL    Comments:   * works overnights, prefers Thursday appts       Anticoagulation Care Providers     Provider Role Specialty Phone number    Juan Antonio Flanagan MD [facility] [phone]    Halina Barrett APRN CNP  Nurse Practitioner [phone]

## 2020-02-28 NOTE — PROGRESS NOTES
DATE OF VISIT: Feb 19, 2020    Jordan Barnett is a 59 year old male is seen today for   Chief Complaint   Patient presents with     Hematology     3 month follow up DVT of lower extremity. Review labs and ultrasound results.    .       (I82.401) Acute deep vein thrombosis (DVT) of right lower extremity, unspecified vein (H)  I reviewed with the patient today most recent laboratory tests and imaging studies.  Doppler ultrasound on February 15, 2020 revealed improved deep venous thrombosis in the right lower extremity.  D-dimer is mildly elevated.  The patient remains asymptomatic.  He is currently on warfarin INR has been monitored through the anticoagulation clinic.  After long discussion we decided the patient will continue on warfarin for 3 more months to keep INR within therapeutic range between 2-3.  We will follow the patient after 3 months with a Doppler ultrasound and d-dimer level.    The patient is ready to learn, no apparent learning barriers were identified.  Diagnosis and treatment plans were explained to the patient. The patient expressed understanding of the content. The patient asked appropriate questions. The patient questions were answered to his satisfaction.  Time spent 15 minutes more than 50% of the time in counseling and coordination of care including discussion of management of deep venous thrombosis, follow-up and prognosis.  Chart documentation with Dragon Voice recognition Software. Although reviewed after completion, some words and grammatical errors may remain.

## 2020-03-05 ENCOUNTER — ANTICOAGULATION THERAPY VISIT (OUTPATIENT)
Dept: ANTICOAGULATION | Facility: CLINIC | Age: 60
End: 2020-03-05
Payer: COMMERCIAL

## 2020-03-05 DIAGNOSIS — I82.411 ACUTE DEEP VEIN THROMBOSIS (DVT) OF FEMORAL VEIN OF RIGHT LOWER EXTREMITY (H): ICD-10-CM

## 2020-03-05 LAB — INR POINT OF CARE: 2.2 (ref 0.86–1.14)

## 2020-03-05 PROCEDURE — 85610 PROTHROMBIN TIME: CPT | Mod: QW

## 2020-03-05 PROCEDURE — 36416 COLLJ CAPILLARY BLOOD SPEC: CPT

## 2020-03-05 PROCEDURE — 99207 ZZC NO CHARGE NURSE ONLY: CPT

## 2020-03-05 NOTE — PROGRESS NOTES
ANTICOAGULATION FOLLOW-UP CLINIC VISIT    Patient Name:  Jordan Barnett  Date:  3/5/2020  Contact Type:  Face to Face    SUBJECTIVE:  Patient Findings     Comments:   No acute changes in nhi, medications, diet (vitamin K intake), or activity noted. Took warfarin as instructed, denies any missed doses. No issues with bleeding or unusual bruising noted.         Clinical Outcomes     Negatives:   Major bleeding event, Thromboembolic event, Anticoagulation-related hospital admission, Anticoagulation-related ED visit, Anticoagulation-related fatality    Comments:   No acute changes in nhi, medications, diet (vitamin K intake), or activity noted. Took warfarin as instructed, denies any missed doses. No issues with bleeding or unusual bruising noted.            OBJECTIVE    INR Protime   Date Value Ref Range Status   2020 2.2 (A) 0.86 - 1.14 Final       ASSESSMENT / PLAN  No question data found.  Anticoagulation Summary  As of 3/5/2020    INR goal:   2.0-3.0   TTR:   75.0 % (3.9 mo)   INR used for dosin.2 (3/5/2020)   Warfarin maintenance plan:   2.5 mg (5 mg x 0.5) every Sun; 5 mg (5 mg x 1) all other days   Full warfarin instructions:   2.5 mg every Sun; 5 mg all other days   Weekly warfarin total:   32.5 mg   No change documented:   Amy Renteria RN   Plan last modified:   Vani Dumont RN (2019)   Next INR check:   3/26/2020   Priority:   Maintenance   Target end date:   2020    Indications    Deep vein thrombosis (DVT) (H) [I82.409]             Anticoagulation Episode Summary     INR check location:       Preferred lab:       Send INR reminders to:   [place] [facility] POOL    Comments:   * works overnights, prefers Thursday appts       Anticoagulation Care Providers     Provider Role Specialty Phone number    Juan Antonio Flanagan MD [facility] [phone]    Halina Barrett APRN CNP  Nurse Practitioner [phone]            See the Encounter Report  to view Anticoagulation Flowsheet and Dosing Calendar (Go to Encounters tab in chart review, and find the Anticoagulation Therapy Visit)        [facility]eria RN [facility]

## 2020-03-11 ENCOUNTER — FOLLOW-UP (OUTPATIENT)
Dept: URBAN - METROPOLITAN AREA CLINIC 17 | Facility: CLINIC | Age: 60
Setting detail: DERMATOLOGY
End: 2020-03-11

## 2020-03-11 DIAGNOSIS — L70.0 ACNE VULGARIS: ICD-10-CM

## 2020-03-11 PROBLEM — L90.0 LICHEN SCLEROSUS ET ATROPHICUS: Status: RESOLVED | Noted: 2020-03-11

## 2020-03-11 PROBLEM — L82.1 OTHER SEBORRHEIC KERATOSIS: Status: RESOLVED | Noted: 2020-03-11

## 2020-03-11 PROCEDURE — 11102 TANGNTL BX SKIN SINGLE LES: CPT

## 2020-03-11 PROCEDURE — 99214 OFFICE O/P EST MOD 30 MIN: CPT

## 2020-03-20 DIAGNOSIS — I82.409 DVT (DEEP VENOUS THROMBOSIS) (H): Primary | ICD-10-CM

## 2020-03-26 ENCOUNTER — ANTICOAGULATION THERAPY VISIT (OUTPATIENT)
Dept: ANTICOAGULATION | Facility: CLINIC | Age: 60
End: 2020-03-26

## 2020-03-26 DIAGNOSIS — I82.411 ACUTE DEEP VEIN THROMBOSIS (DVT) OF FEMORAL VEIN OF RIGHT LOWER EXTREMITY (H): ICD-10-CM

## 2020-03-26 DIAGNOSIS — I82.409 DVT (DEEP VENOUS THROMBOSIS) (H): ICD-10-CM

## 2020-03-26 LAB
CAPILLARY BLOOD COLLECTION: NORMAL
INR PPP: 2.1 (ref 0.86–1.14)

## 2020-03-26 PROCEDURE — 99207 ZZC NO CHARGE NURSE ONLY: CPT

## 2020-03-26 PROCEDURE — 36416 COLLJ CAPILLARY BLOOD SPEC: CPT | Performed by: FAMILY MEDICINE

## 2020-03-26 PROCEDURE — 85610 PROTHROMBIN TIME: CPT | Performed by: FAMILY MEDICINE

## 2020-03-26 NOTE — PROGRESS NOTES
ANTICOAGULATION FOLLOW-UP CLINIC VISIT    Patient Name:  Jordan Barnett  Date:  3/26/2020  Contact Type:  Telephone    SUBJECTIVE:  Patient Findings     Comments:   Patient reports no changes in medication, activity, or diet. Patient reports no changes in health. Patient reports has taken warfarin as instructed. Patient reports no increased bruising or bleeding and no signs or symptoms of a blood clot.   Will plan to continue maintenance dose and recheck INR in 3 weeks again at the lab for POCT INR. Patient to call [facility] with any changes or concerns. Patient verbalized understanding of all instructions, denies questions or concerns at this time.             Clinical Outcomes     Negatives:   Major bleeding event, Thromboembolic event, Anticoagulation-related hospital admission, Anticoagulation-related ED visit, Anticoagulation-related fatality    Comments:   Patient reports no changes in medication, activity, or diet. Patient reports no changes in health. Patient reports has taken warfarin as instructed. Patient reports no increased bruising or bleeding and no signs or symptoms of a blood clot.   Will plan to continue maintenance dose and recheck INR in 3 weeks again at the lab for POCT INR. Patient to call [facility] with any changes or concerns. Patient verbalized understanding of all instructions, denies questions or concerns at this time.                OBJECTIVE    INR   Date Value Ref Range Status   2020 2.10 (H) 0.86 - 1.14 Final     Comment:     This test is intended for monitoring Coumadin therapy.  Results are not   accurate in patients with prolonged INR due to factor deficiency.         ASSESSMENT / PLAN  INR assessment THER    Recheck INR In: 3 WEEKS    INR Location Outside lab      Anticoagulation Summary  As of 3/26/2020    INR goal:   2.0-3.0   TTR:   78.9 % (4.6 mo)   INR used for dosin.10 (3/26/2020)   Warfarin maintenance plan:   2.5 mg (5 mg x 0.5) every Sun; 5 mg (5 mg x 1) all other days    Full warfarin instructions:   2.5 mg every Sun; 5 mg all other days   Weekly warfarin total:   32.5 mg   No change documented:   Vani Dumont, RN   Plan last modified:   Vani Dumont RN (12/2/2019)   Next INR check:   4/16/2020   Priority:   Maintenance   Target end date:   5/20/2020    Indications    Deep vein thrombosis (DVT) (H) [I82.409]             Anticoagulation Episode Summary     INR check location:       Preferred lab:       Send INR reminders to:   [place] PHONE [facility]    Comments:   * works overnights, prefers Thursday appts       Anticoagulation Care Providers     Provider Role Specialty Phone number    Juan Antonio Flanagan MD [facility] [phone]    Halina Barrett APRN CNP  Nurse Practitioner [phone]            See the Encounter Report to view Anticoagulation Flowsheet and Dosing Calendar (Go to Encounters tab in chart review, and find the Anticoagulation Therapy Visit)      Vani Dumont, LORENA

## 2020-04-15 ENCOUNTER — NURSE TRIAGE (OUTPATIENT)
Dept: NURSING | Facility: CLINIC | Age: 60
End: 2020-04-15

## 2020-04-16 ENCOUNTER — TELEPHONE (OUTPATIENT)
Dept: ONCOLOGY | Facility: CLINIC | Age: 60
End: 2020-04-16

## 2020-04-16 ENCOUNTER — ANTICOAGULATION THERAPY VISIT (OUTPATIENT)
Dept: ANTICOAGULATION | Facility: CLINIC | Age: 60
End: 2020-04-16

## 2020-04-16 DIAGNOSIS — I82.409 DEEP VEIN THROMBOSIS (DVT) (H): ICD-10-CM

## 2020-04-16 DIAGNOSIS — I82.409 DVT (DEEP VENOUS THROMBOSIS) (H): ICD-10-CM

## 2020-04-16 LAB
CAPILLARY BLOOD COLLECTION: NORMAL
INR PPP: 1.5 (ref 0.86–1.14)

## 2020-04-16 PROCEDURE — 36416 COLLJ CAPILLARY BLOOD SPEC: CPT | Performed by: FAMILY MEDICINE

## 2020-04-16 PROCEDURE — 85610 PROTHROMBIN TIME: CPT | Performed by: FAMILY MEDICINE

## 2020-04-16 PROCEDURE — 99207 ZZC NO CHARGE NURSE ONLY: CPT

## 2020-04-16 NOTE — PROGRESS NOTES
ANTICOAGULATION FOLLOW-UP CLINIC VISIT    Patient Name:  Jordan Barnett  Date:  2020  Contact Type:  Telephone    SUBJECTIVE:  Patient Findings     Positives:   Missed doses    Comments:   Patient says he has had a lot of changes to his routine and probably missed a dose. He thinks he may have missed yesterday. Writer spoke with Dr. Whitaker on the phone and he said no bridging, just increase the dose and check INR again in a couple days. Since tomorrow is Friday, will recheck again on Monday. Patient denies any clot signs. He will take extra warfarin this morning.         Clinical Outcomes     Comments:   Patient says he has had a lot of changes to his routine and probably missed a dose. He thinks he may have missed yesterday. Writer spoke with Dr. Whitaker on the phone and he said no bridging, just increase the dose and check INR again in a couple days. Since tomorrow is Friday, will recheck again on Monday. Patient denies any clot signs. He will take extra warfarin this morning.            OBJECTIVE    INR   Date Value Ref Range Status   2020 1.50 (H) 0.86 - 1.14 Final     Comment:     This test is intended for monitoring Coumadin therapy.  Results are not   accurate in patients with prolonged INR due to factor deficiency.         ASSESSMENT / PLAN  INR assessment SUB    Recheck INR In: 4 DAYS    INR Location Clinic      Anticoagulation Summary  As of 2020    INR goal:   2.0-3.0   TTR:   70.7 % (5.3 mo)   INR used for dosin.50! (2020)   Warfarin maintenance plan:   2.5 mg (5 mg x 0.5) every Sun; 5 mg (5 mg x 1) all other days   Full warfarin instructions:   : 10 mg; Otherwise 2.5 mg every Sun; 5 mg all other days   Weekly warfarin total:   32.5 mg   Plan last modified:   Vani Dumont RN (2019)   Next INR check:   2020   Priority:   High   Target end date:   2020    Indications    Deep vein thrombosis (DVT) (H) [I82.409]             Anticoagulation Episode  Summary     INR check location:       Preferred lab:       Send INR reminders to:   FLAVIO FRANCIS    Comments:   * works overnights, prefers Thursday appts. Still had DVT in Feb of 2020. Recheck US and d dimer in May.      Anticoagulation Care Providers     Provider Role Specialty Phone number    Juan Antonio Flanagan MD [facility] [phone]    Halina Barrett APRN CNP  Nurse Practitioner [phone]            See the Encounter Report to view Anticoagulation Flowsheet and Dosing Calendar (Go to Encounters tab in chart review, and find the Anticoagulation Therapy Visit)        Maryellen Alberts RN

## 2020-04-16 NOTE — TELEPHONE ENCOUNTER
Pt has lab appointment tomorrow, was told to call as they had questions to ask before the appointment.   Per note on appointment pt to answer COVID 19 questions.   Pt states he gets his temperature taken every night when going to work. States no suspicions of having COVID-19.   Pt denies fever, cough and SOB.   No recent known exposure.     Pt will keep lab appointment as scheduled.     Amy Varela RN   [facility] RN Triage

## 2020-04-16 NOTE — TELEPHONE ENCOUNTER
Writer spoke with Dr. Whitaker on the phone. He said just increase warfarin dose and recheck in a couple of days. No need to use lovenox at this time.    Writer will contact the patient.    Maryellen Alberts RN, BSN, PHN

## 2020-04-16 NOTE — TELEPHONE ENCOUNTER
Dr. Whitaker-    This patient has an existing clot for which you saw him on 2-. His INR is 1.5 today. Do you want him to bridge with lovenox until therapeutic?    Maryellen Alberts RN, BSN, PHN  [phone] or reply to pool [phone]

## 2020-04-16 NOTE — TELEPHONE ENCOUNTER
Reason for Disposition    [1] Follow-up call to recent contact AND [2] information only call, no triage required    Protocols used: INFORMATION ONLY CALL-A-AH

## 2020-04-17 ENCOUNTER — TRANSFERRED RECORDS (OUTPATIENT)
Dept: HEALTH INFORMATION MANAGEMENT | Facility: CLINIC | Age: 60
End: 2020-04-17

## 2020-04-20 ENCOUNTER — ANTICOAGULATION THERAPY VISIT (OUTPATIENT)
Dept: ANTICOAGULATION | Facility: CLINIC | Age: 60
End: 2020-04-20

## 2020-04-20 DIAGNOSIS — I82.409 DVT (DEEP VENOUS THROMBOSIS) (H): ICD-10-CM

## 2020-04-20 DIAGNOSIS — I82.409 DEEP VEIN THROMBOSIS (DVT) (H): ICD-10-CM

## 2020-04-20 LAB
CAPILLARY BLOOD COLLECTION: NORMAL
INR PPP: 2 (ref 0.86–1.14)

## 2020-04-20 PROCEDURE — 85610 PROTHROMBIN TIME: CPT | Performed by: FAMILY MEDICINE

## 2020-04-20 PROCEDURE — 99207 ZZC NO CHARGE NURSE ONLY: CPT

## 2020-04-20 PROCEDURE — 36416 COLLJ CAPILLARY BLOOD SPEC: CPT | Performed by: FAMILY MEDICINE

## 2020-04-20 NOTE — PROGRESS NOTES
ANTICOAGULATION FOLLOW-UP CLINIC VISIT    Patient Name:  Jordan Barnett  Date:  2020  Contact Type:  Telephone    SUBJECTIVE:  Patient Findings     Positives:   Extra doses    Comments:   Patient took booster dosing of warfarin as directed by [facility]. He is barely therapeutic today, which may be from his lower dose yesterday. Will advise 7.5 mg today, then resume prior maintenance dose. Recheck again in 10 days. No other changes or concerns.         Clinical Outcomes     Comments:   Patient took booster dosing of warfarin as directed by [facility]. He is barely therapeutic today, which may be from his lower dose yesterday. Will advise 7.5 mg today, then resume prior maintenance dose. Recheck again in 10 days. No other changes or concerns.            OBJECTIVE    INR   Date Value Ref Range Status   2020 2.00 (H) 0.86 - 1.14 Final     Comment:     This test is intended for monitoring Coumadin therapy.  Results are not   accurate in patients with prolonged INR due to factor deficiency.         ASSESSMENT / PLAN  INR assessment THER    Recheck INR In: 10 DAYS    INR Location Clinic      Anticoagulation Summary  As of 2020    INR goal:   2.0-3.0   TTR:   69.0 % (5.5 mo)   INR used for dosin.00 (2020)   Warfarin maintenance plan:   2.5 mg (5 mg x 0.5) every Sun; 5 mg (5 mg x 1) all other days   Full warfarin instructions:   : 7.5 mg; Otherwise 2.5 mg every Sun; 5 mg all other days   Weekly warfarin total:   32.5 mg   Plan last modified:   Vani Dumont RN (2019)   Next INR check:   2020   Priority:   High   Target end date:   2020    Indications    Deep vein thrombosis (DVT) (H) [I82.409]             Anticoagulation Episode Summary     INR check location:       Preferred lab:       Send INR reminders to:   FLAVIO FRANCIS    Comments:   * works overnights, prefers Thursday appts. Still had DVT in . Recheck US and d dimer in May.      Anticoagulation Care Providers      Provider Role Specialty Phone number    Juan Antonio Flanagan MD [facility] [phone]    Halina Barrett APRN CNP  Nurse Practitioner [phone]            See the Encounter Report to view Anticoagulation Flowsheet and Dosing Calendar (Go to Encounters tab in chart review, and find the Anticoagulation Therapy Visit)        Maryellen Alberts RN

## 2020-04-30 ENCOUNTER — ANTICOAGULATION THERAPY VISIT (OUTPATIENT)
Dept: ANTICOAGULATION | Facility: CLINIC | Age: 60
End: 2020-04-30

## 2020-04-30 DIAGNOSIS — I82.409 DEEP VEIN THROMBOSIS (DVT) (H): ICD-10-CM

## 2020-04-30 DIAGNOSIS — I82.409 DVT (DEEP VENOUS THROMBOSIS) (H): ICD-10-CM

## 2020-04-30 LAB
CAPILLARY BLOOD COLLECTION: NORMAL
INR PPP: 2.1 (ref 0.86–1.14)

## 2020-04-30 PROCEDURE — 85610 PROTHROMBIN TIME: CPT | Performed by: FAMILY MEDICINE

## 2020-04-30 PROCEDURE — 99207 ZZC NO CHARGE NURSE ONLY: CPT

## 2020-04-30 PROCEDURE — 36416 COLLJ CAPILLARY BLOOD SPEC: CPT | Performed by: FAMILY MEDICINE

## 2020-04-30 NOTE — PROGRESS NOTES
ANTICOAGULATION FOLLOW-UP CLINIC VISIT    Patient Name:  Jordan Barnett  Date:  2020  Contact Type:  Telephone    SUBJECTIVE:  Patient Findings     Positives:   Signs/symptoms of bleeding    Comments:   No changes in diet, activity level, medications (including over the counter), or health. No missed doses of warfarin. Patient took dosing as prescribed. Patient did have one bloody nose which lasted only a minute or so. No other bleeding s/s. Will continue maintenance dose and recheck INR in 2 weeks with other labs.         Clinical Outcomes     Negatives:   Major bleeding event, Thromboembolic event, Anticoagulation-related hospital admission, Anticoagulation-related ED visit, Anticoagulation-related fatality    Comments:   No changes in diet, activity level, medications (including over the counter), or health. No missed doses of warfarin. Patient took dosing as prescribed. Patient did have one bloody nose which lasted only a minute or so. No other bleeding s/s. Will continue maintenance dose and recheck INR in 2 weeks with other labs.            OBJECTIVE    INR   Date Value Ref Range Status   2020 2.10 (H) 0.86 - 1.14 Final     Comment:     This test is intended for monitoring Coumadin therapy.  Results are not   accurate in patients with prolonged INR due to factor deficiency.         ASSESSMENT / PLAN  INR assessment THER    Recheck INR In: 2 WEEKS    INR Location Clinic      Anticoagulation Summary  As of 2020    INR goal:   2.0-3.0   TTR:   70.8 % (5.8 mo)   INR used for dosin.10 (2020)   Warfarin maintenance plan:   2.5 mg (5 mg x 0.5) every Sun; 5 mg (5 mg x 1) all other days   Full warfarin instructions:   2.5 mg every Sun; 5 mg all other days   Weekly warfarin total:   32.5 mg   No change documented:   Maryellen Alberts RN   Plan last modified:   Vani Dumont RN (2019)   Next INR check:   2020   Priority:   Maintenance   Target end date:   2020     Indications    Deep vein thrombosis (DVT) (H) [I82.409]             Anticoagulation Episode Summary     INR check location:       Preferred lab:       Send INR reminders to:   FLAVIO FRANCIS    Comments:   * works overnights, prefers Thursday appts. Still had DVT in Feb of 2020. Recheck US and d dimer in May.      Anticoagulation Care Providers     Provider Role Specialty Phone number    Juan Antonio Flanagan MD [facility] [phone]    Halina Barrett APRN CNP  Nurse Practitioner [phone]            See the Encounter Report to view Anticoagulation Flowsheet and Dosing Calendar (Go to Encounters tab in chart review, and find the Anticoagulation Therapy Visit)        Maryellen Alberts RN

## 2020-05-18 ENCOUNTER — MOHS SURGERY (OUTPATIENT)
Dept: URBAN - METROPOLITAN AREA CLINIC 17 | Facility: CLINIC | Age: 60
Setting detail: DERMATOLOGY
End: 2020-05-18

## 2020-05-18 DIAGNOSIS — L72.0 EPIDERMAL CYST: ICD-10-CM

## 2020-05-18 DIAGNOSIS — Z48.02 ENCOUNTER FOR REMOVAL OF SUTURES: ICD-10-CM

## 2020-05-18 PROCEDURE — 13132 CMPLX RPR F/C/C/M/N/AX/G/H/F: CPT

## 2020-05-18 PROCEDURE — 17311 MOHS 1 STAGE H/N/HF/G: CPT

## 2020-05-18 PROCEDURE — 11102 TANGNTL BX SKIN SINGLE LES: CPT

## 2020-05-18 PROCEDURE — 17312 MOHS ADDL STAGE: CPT

## 2020-05-26 DIAGNOSIS — I82.401 ACUTE DEEP VEIN THROMBOSIS (DVT) OF RIGHT LOWER EXTREMITY, UNSPECIFIED VEIN (H): ICD-10-CM

## 2020-05-26 RX ORDER — WARFARIN SODIUM 5 MG/1
TABLET ORAL
Qty: 80 TABLET | Refills: 0 | Status: SHIPPED | OUTPATIENT
Start: 2020-05-26 | End: 2020-06-04

## 2020-05-26 NOTE — PROGRESS NOTES
request received from patient requesting a refill of coumadin on behalf of Dr. Whitaker.  Last refill: 2/19/20  # 80 with 0 refills at above pharmacy.  Last office visit:  2/19/20  Next office visit:  6/3/20    Edelmira Mcclendon RN

## 2020-05-27 ENCOUNTER — HOSPITAL ENCOUNTER (OUTPATIENT)
Dept: LAB | Facility: CLINIC | Age: 60
Discharge: HOME | End: 2020-05-27
Attending: INTERNAL MEDICINE | Admitting: INTERNAL MEDICINE
Payer: COMMERCIAL

## 2020-05-27 DIAGNOSIS — I82.401 ACUTE DEEP VEIN THROMBOSIS (DVT) OF RIGHT LOWER EXTREMITY, UNSPECIFIED VEIN (H): ICD-10-CM

## 2020-05-27 LAB — D DIMER PPP FEU-MCNC: 0.4 UG/ML FEU (ref 0–0.5)

## 2020-05-27 PROCEDURE — 85379 FIBRIN DEGRADATION QUANT: CPT | Performed by: INTERNAL MEDICINE

## 2020-05-27 PROCEDURE — 36415 COLL VENOUS BLD VENIPUNCTURE: CPT | Performed by: INTERNAL MEDICINE

## 2020-05-28 ENCOUNTER — TELEPHONE (OUTPATIENT)
Dept: ANTICOAGULATION | Facility: CLINIC | Age: 60
End: 2020-05-28

## 2020-05-28 NOTE — TELEPHONE ENCOUNTER
Patient was scheduled at the lab on 5/28, however an INR was not drawn. Patient had a follow up d-dimer.     Writer called patient to reschedule his INR appointment. He made the appointment for next Thursday, June 4th. If Dr. Whitaker states he is able to stop the warfarin patient is okay to cancel that appointment.     Andi HAINES RN, CACP

## 2020-06-03 ENCOUNTER — VIRTUAL VISIT (OUTPATIENT)
Dept: ONCOLOGY | Facility: CLINIC | Age: 60
End: 2020-06-03
Attending: INTERNAL MEDICINE
Payer: COMMERCIAL

## 2020-06-03 VITALS — WEIGHT: 172 LBS | HEIGHT: 70 IN | BODY MASS INDEX: 24.62 KG/M2

## 2020-06-03 DIAGNOSIS — I82.401 ACUTE DEEP VEIN THROMBOSIS (DVT) OF RIGHT LOWER EXTREMITY, UNSPECIFIED VEIN (H): Primary | ICD-10-CM

## 2020-06-03 PROCEDURE — 99213 OFFICE O/P EST LOW 20 MIN: CPT | Mod: TEL | Performed by: INTERNAL MEDICINE

## 2020-06-03 ASSESSMENT — MIFFLIN-ST. JEOR: SCORE: 1606.44

## 2020-06-03 ASSESSMENT — PAIN SCALES - GENERAL: PAINLEVEL: NO PAIN (0)

## 2020-06-03 NOTE — PROGRESS NOTES
"Jordan Barnett is a 59 year old male who is being evaluated via a billable telephone visit.      The patient has been notified of following:     \"This telephone visit will be conducted via a call between you and your physician/provider. We have found that certain health care needs can be provided without the need for a physical exam.  This service lets us provide the care you need with a short phone conversation.  If a prescription is necessary we can send it directly to your pharmacy.  If lab work is needed we can place an order for that and you can then stop by our lab to have the test done at a later time.    Telephone visits are billed at different rates depending on your insurance coverage. During this emergency period, for some insurers they may be billed the same as an in-person visit.  Please reach out to your insurance provider with any questions.    If during the course of the call the physician/provider feels a telephone visit is not appropriate, you will not be charged for this service.\"    Patient has given verbal consent for Telephone visit?  Yes    What phone number would you like to be contacted at? [phone]    How would you like to obtain your AVS? Sang Basurto, [facility]    "

## 2020-06-03 NOTE — LETTER
"    6/3/2020         RE: Jordan Barnett  [address]  [place] [facility] [place] [address]        Dear Colleague,    Thank you for referring your patient, Jordan Barnett, to the [facility]. Please see a copy of my visit note below.    Jordan Barnett is a 59 year old male who is being evaluated via a billable telephone visit.      The patient has been notified of following:     \"This telephone visit will be conducted via a call between you and your physician/provider. We have found that certain health care needs can be provided without the need for a physical exam.  This service lets us provide the care you need with a short phone conversation.  If a prescription is necessary we can send it directly to your pharmacy.  If lab work is needed we can place an order for that and you can then stop by our lab to have the test done at a later time.    Telephone visits are billed at different rates depending on your insurance coverage. During this emergency period, for some insurers they may be billed the same as an in-person visit.  Please reach out to your insurance provider with any questions.    If during the course of the call the physician/provider feels a telephone visit is not appropriate, you will not be charged for this service.\"    Patient has given verbal consent for Telephone visit?  Yes    What phone number would you like to be contacted at? [phone]    How would you like to obtain your AVS? Sang Basurto, [facility]      Hematology/ Oncology Telephone Visit:  Bayron 3, 2020    Due to the concerns around COVID-19 and adhering to social distancing we conducted this visit over the telephone.      Reason for Visit:   Chief Complaint   Patient presents with     Hematology     3 month follow up DVT lower extremity. Review labs and ultrasound results.          Interval History:    Patient has been feeling well without any recent complaints of lower extremity pain or swelling or redness.  He " denies any shortness of breath or cough or wheezing.  He continues on anticoagulation with warfarin.  His INR has been managed with the anticoagulation clinic.    Review of systems:  Pertinent positives have been included in HPI; remainder of detailed complete 20-point ROS was negative.    Past medical, social, surgical, and family histories reviewed.    Allergies:  Allergies as of 06/03/2020 - Reviewed 06/03/2020   Allergen Reaction Noted     Lisinopril Nausea 10/04/2011       Current Medications:  Current Outpatient Medications   Medication Sig Dispense Refill     cholecalciferol 5000 units (125 mcg) PO capsule Take by mouth daily       lamoTRIgine (LAMICTAL) 100 MG tablet Take 1 tablet (100 mg) by mouth 2 times daily 60 tablet 0     losartan (COZAAR) 100 MG tablet TAKE 1 TABLET BY MOUTH ONCE DAILY 90 tablet 3     Multiple Vitamins-Minerals (MULTIVITAMIN ADULT PO) Take 1 tablet by mouth At Bedtime        OLANZapine (ZYPREXA) 5 MG tablet Take 5 mg by mouth 2 times daily       Omega-3 Fatty Acids (OMEGA 3 PO) Take 2,400 mg by mouth every evening       acetaminophen (TYLENOL) 500 MG tablet Take 1-2 tablets by mouth every 6 hours as needed.       enoxaparin ANTICOAGULANT (LOVENOX) 80 MG/0.8ML syringe Inject 0.78 mLs (78 mg) Subcutaneous every 12 hours (Patient not taking: Reported on 11/5/2019) 5 Syringe 0     HYDROXYZINE HCL PO Take 50 mg by mouth every 6 hours as needed        warfarin ANTICOAGULANT (COUMADIN) 5 MG tablet Take 5mg daily or as directed by the Anticoagulation Clinic 80 tablet 0        Laboratory/Imaging Studies:  No visits with results within 2 Week(s) from this visit.   Latest known visit with results is:   Orders Only on 04/30/2020   Component Date Value Ref Range Status     INR 04/30/2020 2.10* 0.86 - 1.14 Final    Comment: This test is intended for monitoring Coumadin therapy.  Results are not   accurate in patients with prolonged INR due to factor deficiency.       Capillary Blood Collection  04/30/2020 Capillary collection performed   Final            Assessment and plan:    (I82.401) Acute deep vein thrombosis (DVT) of right lower extremity, unspecified vein (H)  (primary encounter diagnosis)  I reviewed with the patient today the management of thromboembolic disease.  I would like to obtain a Doppler ultrasound of the lower extremity to see if there is any chronic changes.  His d-dimer has been normal.  If the ultrasound shows no abnormalities patient could stop his warfarin.  I talked to him about the risk of recurrence of deep venous thrombosis.  We also talked about predisposing factors for thromboembolic disease and prevention of thromboembolic disease.  I will see him after the Doppler ultrasound to discuss further management plan.    The patient is ready to learn, no apparent learning barriers were identified.  Diagnosis and treatment plans were explained to the patient. The patient expressed understanding of the content. The patient asked appropriate questions. The patient questions were answered to his satisfaction.    Telephone call lasted 15 minutes.    Olivia Whitaker MD    Chart documentation with Dragon Voice recognition Software. Although reviewed after completion, some words and grammatical errors may remain.                                                    Again, thank you for allowing me to participate in the care of your patient.        Sincerely,        Olivia Whitaker MD

## 2020-06-03 NOTE — LETTER
"    6/3/2020         RE: Jordan Barnett  [address]  [facility] [address]        Dear Colleague,    Thank you for referring your patient, Jordan Barnett, to the [facility]. Please see a copy of my visit note below.    Jordan Barnett is a 59 year old male who is being evaluated via a billable telephone visit.      The patient has been notified of following:     \"This telephone visit will be conducted via a call between you and your physician/provider. We have found that certain health care needs can be provided without the need for a physical exam.  This service lets us provide the care you need with a short phone conversation.  If a prescription is necessary we can send it directly to your pharmacy.  If lab work is needed we can place an order for that and you can then stop by our lab to have the test done at a later time.    Telephone visits are billed at different rates depending on your insurance coverage. During this emergency period, for some insurers they may be billed the same as an in-person visit.  Please reach out to your insurance provider with any questions.    If during the course of the call the physician/provider feels a telephone visit is not appropriate, you will not be charged for this service.\"    Patient has given verbal consent for Telephone visit?  Yes    What phone number would you like to be contacted at? [phone]    How would you like to obtain your AVS? Sang Basurto, [facility]      Hematology/ Oncology Telephone Visit:  Bayron 3, 2020    Due to the concerns around COVID-19 and adhering to social distancing we conducted this visit over the telephone.      Reason for Visit:   Chief Complaint   Patient presents with     Hematology     3 month follow up DVT lower extremity. Review labs and ultrasound results.          Interval History:    Patient has been feeling well without any recent complaints of lower extremity pain or swelling or redness.  He " denies any shortness of breath or cough or wheezing.  He continues on anticoagulation with warfarin.  His INR has been managed with the anticoagulation clinic.    Review of systems:  Pertinent positives have been included in HPI; remainder of detailed complete 20-point ROS was negative.    Past medical, social, surgical, and family histories reviewed.    Allergies:  Allergies as of 06/03/2020 - Reviewed 06/03/2020   Allergen Reaction Noted     Lisinopril Nausea 10/04/2011       Current Medications:  Current Outpatient Medications   Medication Sig Dispense Refill     cholecalciferol 5000 units (125 mcg) PO capsule Take by mouth daily       lamoTRIgine (LAMICTAL) 100 MG tablet Take 1 tablet (100 mg) by mouth 2 times daily 60 tablet 0     losartan (COZAAR) 100 MG tablet TAKE 1 TABLET BY MOUTH ONCE DAILY 90 tablet 3     Multiple Vitamins-Minerals (MULTIVITAMIN ADULT PO) Take 1 tablet by mouth At Bedtime        OLANZapine (ZYPREXA) 5 MG tablet Take 5 mg by mouth 2 times daily       Omega-3 Fatty Acids (OMEGA 3 PO) Take 2,400 mg by mouth every evening       acetaminophen (TYLENOL) 500 MG tablet Take 1-2 tablets by mouth every 6 hours as needed.       enoxaparin ANTICOAGULANT (LOVENOX) 80 MG/0.8ML syringe Inject 0.78 mLs (78 mg) Subcutaneous every 12 hours (Patient not taking: Reported on 11/5/2019) 5 Syringe 0     HYDROXYZINE HCL PO Take 50 mg by mouth every 6 hours as needed        warfarin ANTICOAGULANT (COUMADIN) 5 MG tablet Take 5mg daily or as directed by the Anticoagulation Clinic 80 tablet 0        Laboratory/Imaging Studies:  No visits with results within 2 Week(s) from this visit.   Latest known visit with results is:   Orders Only on 04/30/2020   Component Date Value Ref Range Status     INR 04/30/2020 2.10* 0.86 - 1.14 Final    Comment: This test is intended for monitoring Coumadin therapy.  Results are not   accurate in patients with prolonged INR due to factor deficiency.       Capillary Blood Collection  04/30/2020 Capillary collection performed   Final            Assessment and plan:    (I82.401) Acute deep vein thrombosis (DVT) of right lower extremity, unspecified vein (H)  (primary encounter diagnosis)  I reviewed with the patient today the management of thromboembolic disease.  I would like to obtain a Doppler ultrasound of the lower extremity to see if there is any chronic changes.  His d-dimer has been normal.  If the ultrasound shows no abnormalities patient could stop his warfarin.  I talked to him about the risk of recurrence of deep venous thrombosis.  We also talked about predisposing factors for thromboembolic disease and prevention of thromboembolic disease.  I will see him after the Doppler ultrasound to discuss further management plan.    The patient is ready to learn, no apparent learning barriers were identified.  Diagnosis and treatment plans were explained to the patient. The patient expressed understanding of the content. The patient asked appropriate questions. The patient questions were answered to his satisfaction.    Telephone call lasted 15 minutes.    Olivia Whitaker MD    Chart documentation with Dragon Voice recognition Software. Although reviewed after completion, some words and grammatical errors may remain.                                                    Again, thank you for allowing me to participate in the care of your patient.        Sincerely,        Olivia Whitaker MD

## 2020-06-04 ENCOUNTER — ANTICOAGULATION THERAPY VISIT (OUTPATIENT)
Dept: ANTICOAGULATION | Facility: CLINIC | Age: 60
End: 2020-06-04

## 2020-06-04 DIAGNOSIS — I82.409 DVT (DEEP VENOUS THROMBOSIS) (H): ICD-10-CM

## 2020-06-04 DIAGNOSIS — I82.401 ACUTE DEEP VEIN THROMBOSIS (DVT) OF RIGHT LOWER EXTREMITY, UNSPECIFIED VEIN (H): ICD-10-CM

## 2020-06-04 DIAGNOSIS — I82.409 DEEP VEIN THROMBOSIS (DVT) (H): ICD-10-CM

## 2020-06-04 LAB
CAPILLARY BLOOD COLLECTION: NORMAL
INR PPP: 1.5 (ref 0.86–1.14)

## 2020-06-04 PROCEDURE — 36416 COLLJ CAPILLARY BLOOD SPEC: CPT | Performed by: FAMILY MEDICINE

## 2020-06-04 PROCEDURE — 85610 PROTHROMBIN TIME: CPT | Performed by: FAMILY MEDICINE

## 2020-06-04 PROCEDURE — 99207 ZZC NO CHARGE NURSE ONLY: CPT

## 2020-06-04 RX ORDER — WARFARIN SODIUM 5 MG/1
TABLET ORAL
Qty: 80 TABLET | Refills: 0 | COMMUNITY
Start: 2020-06-04 | End: 2020-08-20

## 2020-06-04 NOTE — PROGRESS NOTES
ANTICOAGULATION FOLLOW-UP CLINIC VISIT    Patient Name:  Jordan Barnett  Date:  2020  Contact Type:  Telephone    SUBJECTIVE:  Patient Findings     Positives:   Change in diet/appetite (potentially more vitamin K rich foods)    Comments:   Patient has been taking the same warfarin dose since 2019. In that time he has had only 2 out of range INRs (not including today), that were likely low due to a missed warfarin dose. It has been 5 weeks since his last INR. Patient denies missing any doses this past week. He also states he had some broccoli a few days ago but claims this is not out of the normal for him. Will plan to give booster dose today, then increase maintenance dose to 35mg/week (~7.7% increase).    Patient needs to have a doppler ultrasound completed to determine if he needs to continue warfarin per Dr. Whitaker.         Clinical Outcomes     Comments:   Patient has been taking the same warfarin dose since 2019. In that time he has had only 2 out of range INRs (not including today), that were likely low due to a missed warfarin dose. It has been 5 weeks since his last INR. Patient denies missing any doses this past week. He also states he had some broccoli a few days ago but claims this is not out of the normal for him. Will plan to give booster dose today, then increase maintenance dose to 35mg/week (~7.7% increase).    Patient needs to have a doppler ultrasound completed to determine if he needs to continue warfarin per Dr. Whitaker.            OBJECTIVE    Recent labs: (last 7 days)     20  1325   INR 1.50*       ASSESSMENT / PLAN  INR assessment SUB    Recheck INR In: 1 WEEK    INR Location Clinic      Anticoagulation Summary  As of 2020    INR goal:   2.0-3.0   TTR:   61.7 % (7 mo)   INR used for dosin.50! (2020)   Warfarin maintenance plan:   5 mg (5 mg x 1) every day   Full warfarin instructions:   : 7.5 mg; Otherwise 5 mg every day   Weekly warfarin total:    35 mg   Plan last modified:   Amy Renteria RN (6/4/2020)   Next INR check:   6/11/2020   Priority:   Maintenance   Target end date:   5/20/2020    Indications    Deep vein thrombosis (DVT) (H) [I82.409]             Anticoagulation Episode Summary     INR check location:       Preferred lab:       Send INR reminders to:   FLAVIO FRANCIS    Comments:   * works overnights, prefers Thursday appts. Still had DVT in Feb of 2020. Recheck US and d dimer in May.      Anticoagulation Care Providers     Provider Role Specialty Phone number    Juan Antonio Flanagan MD [facility] [phone]    Halina Barrett APRN CNP  Nurse Practitioner [phone]            See the Encounter Report to view Anticoagulation Flowsheet and Dosing Calendar (Go to Encounters tab in chart review, and find the Anticoagulation Therapy Visit)        Amy Renteria RN CACP

## 2020-06-05 NOTE — PROGRESS NOTES
Hematology/ Oncology Telephone Visit:  Bayron 3, 2020    Due to the concerns around COVID-19 and adhering to social distancing we conducted this visit over the telephone.      Reason for Visit:   Chief Complaint   Patient presents with     Hematology     3 month follow up DVT lower extremity. Review labs and ultrasound results.          Interval History:    Patient has been feeling well without any recent complaints of lower extremity pain or swelling or redness.  He denies any shortness of breath or cough or wheezing.  He continues on anticoagulation with warfarin.  His INR has been managed with the anticoagulation clinic.    Review of systems:  Pertinent positives have been included in HPI; remainder of detailed complete 20-point ROS was negative.    Past medical, social, surgical, and family histories reviewed.    Allergies:  Allergies as of 06/03/2020 - Reviewed 06/03/2020   Allergen Reaction Noted     Lisinopril Nausea 10/04/2011       Current Medications:  Current Outpatient Medications   Medication Sig Dispense Refill     cholecalciferol 5000 units (125 mcg) PO capsule Take by mouth daily       lamoTRIgine (LAMICTAL) 100 MG tablet Take 1 tablet (100 mg) by mouth 2 times daily 60 tablet 0     losartan (COZAAR) 100 MG tablet TAKE 1 TABLET BY MOUTH ONCE DAILY 90 tablet 3     Multiple Vitamins-Minerals (MULTIVITAMIN ADULT PO) Take 1 tablet by mouth At Bedtime        OLANZapine (ZYPREXA) 5 MG tablet Take 5 mg by mouth 2 times daily       Omega-3 Fatty Acids (OMEGA 3 PO) Take 2,400 mg by mouth every evening       acetaminophen (TYLENOL) 500 MG tablet Take 1-2 tablets by mouth every 6 hours as needed.       enoxaparin ANTICOAGULANT (LOVENOX) 80 MG/0.8ML syringe Inject 0.78 mLs (78 mg) Subcutaneous every 12 hours (Patient not taking: Reported on 11/5/2019) 5 Syringe 0     HYDROXYZINE HCL PO Take 50 mg by mouth every 6 hours as needed        warfarin ANTICOAGULANT (COUMADIN) 5 MG tablet Take 5mg daily or as directed by  the Anticoagulation Clinic 80 tablet 0        Laboratory/Imaging Studies:  No visits with results within 2 Week(s) from this visit.   Latest known visit with results is:   Orders Only on 04/30/2020   Component Date Value Ref Range Status     INR 04/30/2020 2.10* 0.86 - 1.14 Final    Comment: This test is intended for monitoring Coumadin therapy.  Results are not   accurate in patients with prolonged INR due to factor deficiency.       Capillary Blood Collection 04/30/2020 Capillary collection performed   Final            Assessment and plan:    (I82.401) Acute deep vein thrombosis (DVT) of right lower extremity, unspecified vein (H)  (primary encounter diagnosis)  I reviewed with the patient today the management of thromboembolic disease.  I would like to obtain a Doppler ultrasound of the lower extremity to see if there is any chronic changes.  His d-dimer has been normal.  If the ultrasound shows no abnormalities patient could stop his warfarin.  I talked to him about the risk of recurrence of deep venous thrombosis.  We also talked about predisposing factors for thromboembolic disease and prevention of thromboembolic disease.  I will see him after the Doppler ultrasound to discuss further management plan.    The patient is ready to learn, no apparent learning barriers were identified.  Diagnosis and treatment plans were explained to the patient. The patient expressed understanding of the content. The patient asked appropriate questions. The patient questions were answered to his satisfaction.    Telephone call lasted 15 minutes.    Olivia Whitaker MD    Chart documentation with Dragon Voice recognition Software. Although reviewed after completion, some words and grammatical errors may remain.                                                   [facility]

## 2020-06-10 ENCOUNTER — HOSPITAL ENCOUNTER (OUTPATIENT)
Dept: ULTRASOUND IMAGING | Facility: CLINIC | Age: 60
Discharge: HOME | End: 2020-06-10
Attending: INTERNAL MEDICINE | Admitting: INTERNAL MEDICINE
Payer: COMMERCIAL

## 2020-06-10 DIAGNOSIS — I82.401 ACUTE DEEP VEIN THROMBOSIS (DVT) OF RIGHT LOWER EXTREMITY, UNSPECIFIED VEIN (H): ICD-10-CM

## 2020-06-10 PROCEDURE — 93971 EXTREMITY STUDY: CPT | Mod: RT

## 2020-06-11 ENCOUNTER — ANTICOAGULATION THERAPY VISIT (OUTPATIENT)
Dept: ANTICOAGULATION | Facility: CLINIC | Age: 60
End: 2020-06-11

## 2020-06-11 DIAGNOSIS — I82.409 DVT (DEEP VENOUS THROMBOSIS) (H): ICD-10-CM

## 2020-06-11 DIAGNOSIS — I82.409 DEEP VEIN THROMBOSIS (DVT) (H): ICD-10-CM

## 2020-06-11 LAB
CAPILLARY BLOOD COLLECTION: NORMAL
INR PPP: 2.4 (ref 0.86–1.14)

## 2020-06-11 PROCEDURE — 36416 COLLJ CAPILLARY BLOOD SPEC: CPT | Performed by: FAMILY MEDICINE

## 2020-06-11 PROCEDURE — 85610 PROTHROMBIN TIME: CPT | Performed by: FAMILY MEDICINE

## 2020-06-11 PROCEDURE — 99207 ZZC NO CHARGE NURSE ONLY: CPT

## 2020-06-11 NOTE — PROGRESS NOTES
ANTICOAGULATION FOLLOW-UP CLINIC VISIT    Patient Name:  Jordan Barnett  Date:  2020  Contact Type:  Telephone    SUBJECTIVE:  Patient Findings     Positives:   Extra doses (booster dose one week ago)    Comments:   Patient had an US done on 6-10 and is seeing Dr. Whitaker to review this on . He will call [facility] if he is taken off warfarin. Otherwise, he will continue 5 mg daily and recheck INR in 2 weeks.         Clinical Outcomes     Comments:   Patient had an US done on 6-10 and is seeing Dr. Whitaker to review this on . He will call [facility] if he is taken off warfarin. Otherwise, he will continue 5 mg daily and recheck INR in 2 weeks.            OBJECTIVE    Recent labs: (last 7 days)     20  1319   INR 2.40*       ASSESSMENT / PLAN  INR assessment THER    Recheck INR In: 2 WEEKS    INR Location Clinic      Anticoagulation Summary  As of 2020    INR goal:   2.0-3.0   TTR:   61.1 % (7.2 mo)   INR used for dosin.40 (2020)   Warfarin maintenance plan:   5 mg (5 mg x 1) every day   Full warfarin instructions:   5 mg every day   Weekly warfarin total:   35 mg   No change documented:   Maryellen Alberts RN   Plan last modified:   Amy Renteria RN (2020)   Next INR check:   2020   Priority:   High   Target end date:   2020    Indications    Deep vein thrombosis (DVT) (H) [I82.409]             Anticoagulation Episode Summary     INR check location:       Preferred lab:       Send INR reminders to:   FLAVIO FRANCIS    Comments:   * works overnights, prefers Thursday appts. Still had DVT in . Recheck US and d dimer in May.      Anticoagulation Care Providers     Provider Role Specialty Phone number    Juan Antonio Flanagan MD [facility] [phone]    Halina Barrett APRN CNP  Nurse Practitioner [phone]            See the Encounter Report to view Anticoagulation Flowsheet and Dosing Calendar (Go to Encounters tab in chart review,  and find the Anticoagulation Therapy Visit)        Maryellen Alberts RN

## 2020-06-18 ENCOUNTER — VIRTUAL VISIT (OUTPATIENT)
Dept: ONCOLOGY | Facility: CLINIC | Age: 60
End: 2020-06-18
Attending: INTERNAL MEDICINE
Payer: COMMERCIAL

## 2020-06-18 ENCOUNTER — TELEPHONE (OUTPATIENT)
Dept: FAMILY MEDICINE | Facility: CLINIC | Age: 60
End: 2020-06-18

## 2020-06-18 VITALS — HEIGHT: 70 IN | BODY MASS INDEX: 24.62 KG/M2 | WEIGHT: 172 LBS

## 2020-06-18 DIAGNOSIS — E78.5 HYPERLIPIDEMIA LDL GOAL <160: ICD-10-CM

## 2020-06-18 DIAGNOSIS — I82.401 ACUTE DEEP VEIN THROMBOSIS (DVT) OF RIGHT LOWER EXTREMITY, UNSPECIFIED VEIN (H): Primary | ICD-10-CM

## 2020-06-18 DIAGNOSIS — I10 BENIGN ESSENTIAL HYPERTENSION: ICD-10-CM

## 2020-06-18 PROCEDURE — 99213 OFFICE O/P EST LOW 20 MIN: CPT | Mod: 95 | Performed by: INTERNAL MEDICINE

## 2020-06-18 ASSESSMENT — PAIN SCALES - GENERAL: PAINLEVEL: NO PAIN (0)

## 2020-06-18 ASSESSMENT — MIFFLIN-ST. JEOR: SCORE: 1601.44

## 2020-06-18 NOTE — LETTER
"    6/18/2020         RE: Jordan Barnett  [address]  [facility] [address]        Dear Colleague,    Thank you for referring your patient, Jordan Barnett, to the [facility]. Please see a copy of my visit note below.    Jordan Barnett is a 60 year old male who is being evaluated via a billable video visit.      The patient has been notified of following:     \"This video visit will be conducted via a call between you and your physician/provider. We have found that certain health care needs can be provided without the need for an in-person physical exam.  This service lets us provide the care you need with a video conversation.  If a prescription is necessary we can send it directly to your pharmacy.  If lab work is needed we can place an order for that and you can then stop by our lab to have the test done at a later time.    Video visits are billed at different rates depending on your insurance coverage.  Please reach out to your insurance provider with any questions.    If during the course of the call the physician/provider feels a video visit is not appropriate, you will not be charged for this service.\"    Patient has given verbal consent for Video visit? Yes    Will anyone else be joining your video visit? No        Video-Visit Details    Type of service:  Video Visit      Originating Location (pt. Location): Home    Distant Location (provider location):  [facility]     Platform used for Video Visit: Bubba Basurto, [facility]          Hematology/ Oncology virtual video visit:  Jun 18, 2020    Due to the concerns around COVID-19 and adhering to social distancing we conducted this visit via video visit.      Reason for Visit:   Chief Complaint   Patient presents with     Hematology     DVT lower extremity.           Interval History:  This visit is to discuss the result of Doppler ultrasound and duration of anti-coagulation.  He has been tolerating anticoagulation " with warfarin without significant side effects or bruising or bleeding.  INR has been monitored through the anticoagulation clinic.    Review of systems:  Pertinent positives have been included in HPI; remainder of detailed complete 20-point ROS was negative.    Past medical, social, surgical, and family histories reviewed.    Allergies:  Allergies as of 06/18/2020 - Reviewed 06/18/2020   Allergen Reaction Noted     Lisinopril Nausea 10/04/2011       Current Medications:  Current Outpatient Medications   Medication Sig Dispense Refill     acetaminophen (TYLENOL) 500 MG tablet Take 1-2 tablets by mouth every 6 hours as needed.       cholecalciferol 5000 units (125 mcg) PO capsule Take by mouth daily       lamoTRIgine (LAMICTAL) 100 MG tablet Take 1 tablet (100 mg) by mouth 2 times daily 60 tablet 0     losartan (COZAAR) 100 MG tablet TAKE 1 TABLET BY MOUTH ONCE DAILY 90 tablet 3     Multiple Vitamins-Minerals (MULTIVITAMIN ADULT PO) Take 1 tablet by mouth At Bedtime        OLANZapine (ZYPREXA) 5 MG tablet Take 5 mg by mouth 2 times daily       Omega-3 Fatty Acids (OMEGA 3 PO) Take 2,400 mg by mouth every evening       warfarin ANTICOAGULANT (COUMADIN) 5 MG tablet Take 5mg daily or as directed by the Anticoagulation Clinic 80 tablet 0     enoxaparin ANTICOAGULANT (LOVENOX) 80 MG/0.8ML syringe Inject 0.78 mLs (78 mg) Subcutaneous every 12 hours (Patient not taking: Reported on 11/5/2019) 5 Syringe 0     HYDROXYZINE HCL PO Take 50 mg by mouth every 6 hours as needed           Physical examination:  Physical Exam as observed via telehealth:         Constitutional - General appearance, and body habitus are within normal range         Eyes -there is no redness, or discharge seen.         Respiratory -there is no cough, or labored breathing observed         Musculoskeletal -full range of motion is observed         Skin -there is no visible discoloration, or visible lesions         Neurological -there is tremors is  observed         Psychiatric -the patient is alert & oriented.    The rest of a comprehensive physical examination is deferred due to PHE (public health emergency) video visit restrictions.    Laboratory/Imaging Studies:  Orders Only on 06/11/2020   Component Date Value Ref Range Status     INR 06/11/2020 2.40* 0.86 - 1.14 Final    Comment: This test is intended for monitoring Coumadin therapy.  Results are not   accurate in patients with prolonged INR due to factor deficiency.       Capillary Blood Collection 06/11/2020 Capillary collection performed   Final        Recent Results (from the past 744 hour(s))   US Lower Extremity Venous Duplex Right    Narrative    VENOUS ULTRASOUND RIGHT LEG  6/10/2020 9:18 AM     HISTORY: Acute deep vein thrombosis (DVT) of right lower extremity,  unspecified vein (H)    COMPARISON: 2/13/2020    FINDINGS:  Examination of the deep veins with graded compression and  color flow Doppler with spectral wave form analysis was performed.   There has been no significant change. Again noted is nonocclusive  chronic appearing DVT in the right proximal superficial femoral vein,  mid superficial femoral vein, distal superficial femoral vein,  popliteal vein, and peroneal veins. There is no evidence for new DVT.      Impression    IMPRESSION: Chronic appearing DVT in the right lower extremity as  above.    SARAH VAUGHAN MD       Assessment and plan:    (I82.401) Acute deep vein thrombosis (DVT) of right lower extremity, unspecified vein (H)  (primary encounter diagnosis)  I reviewed with the patient the results of the Doppler ultrasound showing persistent chronic deep venous thrombosis.  I will recommend to continue his anticoagulation with warfarin for 3 more months to keep INR therapeutic range between 2-3.  I will see the patient again in 3 months time or sooner if there are new developments or concerns.    (I10) Benign essential hypertension  Patient currently on losartan 100 mg orally  daily.      The patient is ready to learn, no apparent learning barriers were identified.  Diagnosis and treatment plans were explained to the patient. The patient expressed understanding of the content. The patient asked appropriate questions. The patient questions were answered to his satisfaction.    This is started 2:00 PM  Visit ended 2:15 PM    Olivia Whitaker MD    Chart documentation with Dragon Voice recognition Software. Although reviewed after completion, some words and grammatical errors may remain.    Again, thank you for allowing me to participate in the care of your patient.        Sincerely,        Olivia Whitaker MD

## 2020-06-18 NOTE — LETTER
"    6/18/2020         RE: Jordan Barnett  [address]  [facility] [address]        Dear Colleague,    Thank you for referring your patient, Jordan Barnett, to the [facility]. Please see a copy of my visit note below.    Jordan Barnett is a 60 year old male who is being evaluated via a billable video visit.      The patient has been notified of following:     \"This video visit will be conducted via a call between you and your physician/provider. We have found that certain health care needs can be provided without the need for an in-person physical exam.  This service lets us provide the care you need with a video conversation.  If a prescription is necessary we can send it directly to your pharmacy.  If lab work is needed we can place an order for that and you can then stop by our lab to have the test done at a later time.    Video visits are billed at different rates depending on your insurance coverage.  Please reach out to your insurance provider with any questions.    If during the course of the call the physician/provider feels a video visit is not appropriate, you will not be charged for this service.\"    Patient has given verbal consent for Video visit? Yes    Will anyone else be joining your video visit? No        Video-Visit Details    Type of service:  Video Visit      Originating Location (pt. Location): Home    Distant Location (provider location):  [facility]     Platform used for Video Visit: Bubba Basurto, [facility]          Hematology/ Oncology virtual video visit:  Jun 18, 2020    Due to the concerns around COVID-19 and adhering to social distancing we conducted this visit via video visit.      Reason for Visit:   Chief Complaint   Patient presents with     Hematology     DVT lower extremity.           Interval History:  This visit is to discuss the result of Doppler ultrasound and duration of anti-coagulation.  He has been tolerating anticoagulation " with warfarin without significant side effects or bruising or bleeding.  INR has been monitored through the anticoagulation clinic.    Review of systems:  Pertinent positives have been included in HPI; remainder of detailed complete 20-point ROS was negative.    Past medical, social, surgical, and family histories reviewed.    Allergies:  Allergies as of 06/18/2020 - Reviewed 06/18/2020   Allergen Reaction Noted     Lisinopril Nausea 10/04/2011       Current Medications:  Current Outpatient Medications   Medication Sig Dispense Refill     acetaminophen (TYLENOL) 500 MG tablet Take 1-2 tablets by mouth every 6 hours as needed.       cholecalciferol 5000 units (125 mcg) PO capsule Take by mouth daily       lamoTRIgine (LAMICTAL) 100 MG tablet Take 1 tablet (100 mg) by mouth 2 times daily 60 tablet 0     losartan (COZAAR) 100 MG tablet TAKE 1 TABLET BY MOUTH ONCE DAILY 90 tablet 3     Multiple Vitamins-Minerals (MULTIVITAMIN ADULT PO) Take 1 tablet by mouth At Bedtime        OLANZapine (ZYPREXA) 5 MG tablet Take 5 mg by mouth 2 times daily       Omega-3 Fatty Acids (OMEGA 3 PO) Take 2,400 mg by mouth every evening       warfarin ANTICOAGULANT (COUMADIN) 5 MG tablet Take 5mg daily or as directed by the Anticoagulation Clinic 80 tablet 0     enoxaparin ANTICOAGULANT (LOVENOX) 80 MG/0.8ML syringe Inject 0.78 mLs (78 mg) Subcutaneous every 12 hours (Patient not taking: Reported on 11/5/2019) 5 Syringe 0     HYDROXYZINE HCL PO Take 50 mg by mouth every 6 hours as needed           Physical examination:  Physical Exam as observed via telehealth:         Constitutional - General appearance, and body habitus are within normal range         Eyes -there is no redness, or discharge seen.         Respiratory -there is no cough, or labored breathing observed         Musculoskeletal -full range of motion is observed         Skin -there is no visible discoloration, or visible lesions         Neurological -there is tremors is  observed         Psychiatric -the patient is alert & oriented.    The rest of a comprehensive physical examination is deferred due to PHE (public health emergency) video visit restrictions.    Laboratory/Imaging Studies:  Orders Only on 06/11/2020   Component Date Value Ref Range Status     INR 06/11/2020 2.40* 0.86 - 1.14 Final    Comment: This test is intended for monitoring Coumadin therapy.  Results are not   accurate in patients with prolonged INR due to factor deficiency.       Capillary Blood Collection 06/11/2020 Capillary collection performed   Final        Recent Results (from the past 744 hour(s))   US Lower Extremity Venous Duplex Right    Narrative    VENOUS ULTRASOUND RIGHT LEG  6/10/2020 9:18 AM     HISTORY: Acute deep vein thrombosis (DVT) of right lower extremity,  unspecified vein (H)    COMPARISON: 2/13/2020    FINDINGS:  Examination of the deep veins with graded compression and  color flow Doppler with spectral wave form analysis was performed.   There has been no significant change. Again noted is nonocclusive  chronic appearing DVT in the right proximal superficial femoral vein,  mid superficial femoral vein, distal superficial femoral vein,  popliteal vein, and peroneal veins. There is no evidence for new DVT.      Impression    IMPRESSION: Chronic appearing DVT in the right lower extremity as  above.    SARAH VAUGHAN MD       Assessment and plan:    (I82.401) Acute deep vein thrombosis (DVT) of right lower extremity, unspecified vein (H)  (primary encounter diagnosis)  I reviewed with the patient the results of the Doppler ultrasound showing persistent chronic deep venous thrombosis.  I will recommend to continue his anticoagulation with warfarin for 3 more months to keep INR therapeutic range between 2-3.  I will see the patient again in 3 months time or sooner if there are new developments or concerns.    (I10) Benign essential hypertension  Patient currently on losartan 100 mg orally  daily.      The patient is ready to learn, no apparent learning barriers were identified.  Diagnosis and treatment plans were explained to the patient. The patient expressed understanding of the content. The patient asked appropriate questions. The patient questions were answered to his satisfaction.    This is started 2:00 PM  Visit ended 2:15 PM    Olivia Whitaker MD    Chart documentation with Dragon Voice recognition Software. Although reviewed after completion, some words and grammatical errors may remain.    Again, thank you for allowing me to participate in the care of your patient.        Sincerely,        Olivia Whitaker MD

## 2020-06-18 NOTE — TELEPHONE ENCOUNTER
Call from the patient who reports that Dr. Whitaker is advising that he continue warfarin therapy with [facility] management.  Per last visit, he should continue current dosing of 5 mg daily and check INR on 6/25/20.  Appointment scheduled. Katty Matute RN June 18, 2020 4:03 PM

## 2020-06-18 NOTE — PATIENT INSTRUCTIONS
Follow-up in 3 months with a repeat Doppler ultrasound of the right lower extremity and blood work

## 2020-06-18 NOTE — PROGRESS NOTES
"Jordan Barnett is a 60 year old male who is being evaluated via a billable video visit.      The patient has been notified of following:     \"This video visit will be conducted via a call between you and your physician/provider. We have found that certain health care needs can be provided without the need for an in-person physical exam.  This service lets us provide the care you need with a video conversation.  If a prescription is necessary we can send it directly to your pharmacy.  If lab work is needed we can place an order for that and you can then stop by our lab to have the test done at a later time.    Video visits are billed at different rates depending on your insurance coverage.  Please reach out to your insurance provider with any questions.    If during the course of the call the physician/provider feels a video visit is not appropriate, you will not be charged for this service.\"    Patient has given verbal consent for Video visit? Yes    Will anyone else be joining your video visit? No        Video-Visit Details    Type of service:  Video Visit      Originating Location (pt. Location): Home    Distant Location (provider location):  [facility]     Platform used for Video Visit: Bubba Basurto, [facility]      "

## 2020-06-19 NOTE — PROGRESS NOTES
Hematology/ Oncology virtual video visit:  Jun 18, 2020    Due to the concerns around COVID-19 and adhering to social distancing we conducted this visit via video visit.      Reason for Visit:   Chief Complaint   Patient presents with     Hematology     DVT lower extremity.           Interval History:  This visit is to discuss the result of Doppler ultrasound and duration of anti-coagulation.  He has been tolerating anticoagulation with warfarin without significant side effects or bruising or bleeding.  INR has been monitored through the anticoagulation clinic.    Review of systems:  Pertinent positives have been included in HPI; remainder of detailed complete 20-point ROS was negative.    Past medical, social, surgical, and family histories reviewed.    Allergies:  Allergies as of 06/18/2020 - Reviewed 06/18/2020   Allergen Reaction Noted     Lisinopril Nausea 10/04/2011       Current Medications:  Current Outpatient Medications   Medication Sig Dispense Refill     acetaminophen (TYLENOL) 500 MG tablet Take 1-2 tablets by mouth every 6 hours as needed.       cholecalciferol 5000 units (125 mcg) PO capsule Take by mouth daily       lamoTRIgine (LAMICTAL) 100 MG tablet Take 1 tablet (100 mg) by mouth 2 times daily 60 tablet 0     losartan (COZAAR) 100 MG tablet TAKE 1 TABLET BY MOUTH ONCE DAILY 90 tablet 3     Multiple Vitamins-Minerals (MULTIVITAMIN ADULT PO) Take 1 tablet by mouth At Bedtime        OLANZapine (ZYPREXA) 5 MG tablet Take 5 mg by mouth 2 times daily       Omega-3 Fatty Acids (OMEGA 3 PO) Take 2,400 mg by mouth every evening       warfarin ANTICOAGULANT (COUMADIN) 5 MG tablet Take 5mg daily or as directed by the Anticoagulation Clinic 80 tablet 0     enoxaparin ANTICOAGULANT (LOVENOX) 80 MG/0.8ML syringe Inject 0.78 mLs (78 mg) Subcutaneous every 12 hours (Patient not taking: Reported on 11/5/2019) 5 Syringe 0     HYDROXYZINE HCL PO Take 50 mg by mouth every 6 hours as needed           Physical  examination:  Physical Exam as observed via telehealth:         Constitutional - General appearance, and body habitus are within normal range         Eyes -there is no redness, or discharge seen.         Respiratory -there is no cough, or labored breathing observed         Musculoskeletal -full range of motion is observed         Skin -there is no visible discoloration, or visible lesions         Neurological -there is tremors is observed         Psychiatric -the patient is alert & oriented.    The rest of a comprehensive physical examination is deferred due to PHE (public health emergency) video visit restrictions.    Laboratory/Imaging Studies:  Orders Only on 06/11/2020   Component Date Value Ref Range Status     INR 06/11/2020 2.40* 0.86 - 1.14 Final    Comment: This test is intended for monitoring Coumadin therapy.  Results are not   accurate in patients with prolonged INR due to factor deficiency.       Capillary Blood Collection 06/11/2020 Capillary collection performed   Final        Recent Results (from the past 744 hour(s))   US Lower Extremity Venous Duplex Right    Narrative    VENOUS ULTRASOUND RIGHT LEG  6/10/2020 9:18 AM     HISTORY: Acute deep vein thrombosis (DVT) of right lower extremity,  unspecified vein (H)    COMPARISON: 2/13/2020    FINDINGS:  Examination of the deep veins with graded compression and  color flow Doppler with spectral wave form analysis was performed.   There has been no significant change. Again noted is nonocclusive  chronic appearing DVT in the right proximal superficial femoral vein,  mid superficial femoral vein, distal superficial femoral vein,  popliteal vein, and peroneal veins. There is no evidence for new DVT.      Impression    IMPRESSION: Chronic appearing DVT in the right lower extremity as  above.    SARAH VAUGHAN MD       Assessment and plan:    (I82.401) Acute deep vein thrombosis (DVT) of right lower extremity, unspecified vein (H)  (primary encounter  diagnosis)  I reviewed with the patient the results of the Doppler ultrasound showing persistent chronic deep venous thrombosis.  I will recommend to continue his anticoagulation with warfarin for 3 more months to keep INR therapeutic range between 2-3.  I will see the patient again in 3 months time or sooner if there are new developments or concerns.    (I10) Benign essential hypertension  Patient currently on losartan 100 mg orally daily.      The patient is ready to learn, no apparent learning barriers were identified.  Diagnosis and treatment plans were explained to the patient. The patient expressed understanding of the content. The patient asked appropriate questions. The patient questions were answered to his satisfaction.    This is started 2:00 PM  Visit ended 2:15 PM    Olivia Whitaker MD    Chart documentation with Dragon Voice recognition Software. Although reviewed after completion, some words and grammatical errors may remain.

## 2020-06-25 ENCOUNTER — ANTICOAGULATION THERAPY VISIT (OUTPATIENT)
Dept: FAMILY MEDICINE | Facility: CLINIC | Age: 60
End: 2020-06-25

## 2020-06-25 DIAGNOSIS — I82.409 DVT (DEEP VENOUS THROMBOSIS) (H): ICD-10-CM

## 2020-06-25 LAB
CAPILLARY BLOOD COLLECTION: NORMAL
INR PPP: 2.4 (ref 0.86–1.14)

## 2020-06-25 PROCEDURE — 36416 COLLJ CAPILLARY BLOOD SPEC: CPT | Performed by: FAMILY MEDICINE

## 2020-06-25 PROCEDURE — 85610 PROTHROMBIN TIME: CPT | Performed by: FAMILY MEDICINE

## 2020-06-25 NOTE — PROGRESS NOTES
ANTICOAGULATION MANAGEMENT     Patient Name:  Jordan Barnett  Date:  2020    ASSESSMENT /SUBJECTIVE:    Today's INR result of 2.4 is therapeutic. Goal INR of 2.0-3.0      Warfarin dose taken: Warfarin taken as previously instructed    Diet: No new diet changes affecting INR    Medication changes/ interactions: No new medications/supplements affecting INR    Previous INR: Therapeutic     S/S of bleeding or thromboembolism: No    New injury or illness: No    Upcoming surgery, procedure or cardioversion: No    Additional findings: Yes he will be continuing warfarin for three more months due to U/S showed chronic clot.       PLAN:    Spoke with Jordan regarding INR result and instructed:     Warfarin Dosing Instructions: Continue your current warfarin dose 5 mg daily.    Instructed patient to follow up no later than: 3 weeks  Lab visit scheduled    Education provided: Importance of therapeutic range and Importance of taking warfarin as instructed      Jordan verbalizes understanding and agrees to warfarin dosing plan.    Instructed to call the Anticoagulation Clinic for any changes, questions or concerns. (#[phone])        OBJECTIVE:  INR   Date Value Ref Range Status   2020 2.40 (H) 0.86 - 1.14 Final     Comment:     This test is intended for monitoring Coumadin therapy.  Results are not   accurate in patients with prolonged INR due to factor deficiency.           No question data found.  Anticoagulation Summary  As of 2020    INR goal:   2.0-3.0   TTR:   63.5 % (7.7 mo)   INR used for dosin.40 (2020)   Warfarin maintenance plan:   5 mg (5 mg x 1) every day   Full warfarin instructions:   5 mg every day   Weekly warfarin total:   35 mg   No change documented:   Aura Santiago RN   Plan last modified:   Amy Renteria RN (2020)   Next INR check:   2020   Priority:   High   Target end date:   2020    Indications    Deep vein thrombosis (DVT) (H) [I82.409]              Anticoagulation Episode Summary     INR check location:       Preferred lab:       Send INR reminders to:   FLAVIO FRANCIS    Comments:   * works overnights, prefers Thursday appts. Still had DVT in Feb of 2020. Recheck US and d dimer in May.      Anticoagulation Care Providers     Provider Role Specialty Phone number    Juan Antonio Flanagan MD [facility] [phone]    Halina Barrett APRN CNP  Nurse Practitioner [phone]

## 2020-07-15 ENCOUNTER — TRANSFERRED RECORDS (OUTPATIENT)
Dept: HEALTH INFORMATION MANAGEMENT | Facility: CLINIC | Age: 60
End: 2020-07-15

## 2020-07-16 ENCOUNTER — ANTICOAGULATION THERAPY VISIT (OUTPATIENT)
Dept: NURSING | Facility: CLINIC | Age: 60
End: 2020-07-16

## 2020-07-16 DIAGNOSIS — I82.409 DVT (DEEP VENOUS THROMBOSIS) (H): ICD-10-CM

## 2020-07-16 DIAGNOSIS — I82.409 DEEP VEIN THROMBOSIS (DVT) (H): ICD-10-CM

## 2020-07-16 LAB
CAPILLARY BLOOD COLLECTION: NORMAL
INR PPP: 2.2 (ref 0.86–1.14)

## 2020-07-16 PROCEDURE — 85610 PROTHROMBIN TIME: CPT | Performed by: FAMILY MEDICINE

## 2020-07-16 PROCEDURE — 36416 COLLJ CAPILLARY BLOOD SPEC: CPT | Performed by: FAMILY MEDICINE

## 2020-07-16 NOTE — PROGRESS NOTES
ANTICOAGULATION MANAGEMENT     Patient Name:  Jordan Barnett  Date:  2020    ASSESSMENT /SUBJECTIVE:    Today's INR result of 2.2 is therapeutic. Goal INR of 2.0-3.0      Warfarin dose taken: Warfarin taken as previously instructed    Diet: No new diet changes affecting INR    Medication changes/ interactions: No new medications/supplements affecting INR    Previous INR: Therapeutic     S/S of bleeding or thromboembolism: No    New injury or illness: No    Upcoming surgery, procedure or cardioversion: No    Additional findings: None      PLAN:    Spoke with Jordan regarding INR result and instructed:     Warfarin Dosing Instructions: Continue your current warfarin dose 5 mg daily    Instructed patient to follow up no later than: 4 weeks  Lab visit scheduled    Education provided: Target INR goal and significance of current INR result      Jordan verbalizes understanding and agrees to warfarin dosing plan.    Instructed to call the Anticoagulation Clinic for any changes, questions or concerns. (#[phone])        Zuleyka Floyd RN      OBJECTIVE:  Recent labs: (last 7 days)     20  1149   INR 2.20*         No question data found.  Anticoagulation Summary  As of 2020    INR goal:   2.0-3.0   TTR:   66.5 % (8.4 mo)   INR used for dosin.20 (2020)   Warfarin maintenance plan:   5 mg (5 mg x 1) every day   Full warfarin instructions:   5 mg every day   Weekly warfarin total:   35 mg   Plan last modified:   Amy Renteria RN (2020)   Next INR check:      Priority:   High   Target end date:   2020    Indications    Deep vein thrombosis (DVT) (H) [I82.409]             Anticoagulation Episode Summary     INR check location:       Preferred lab:       Send INR reminders to:   FLAVIO FRANCIS    Comments:   * works overnights, prefers Thursday appts. Still had DVT in . Recheck US and d dimer in May.      Anticoagulation Care Providers     Provider Role Specialty Phone  number    Juan Antonio Flanagan MD [facility] [phone]    Halina Barrett APRN CNP  Nurse Practitioner [phone]

## 2020-08-13 ENCOUNTER — ANTICOAGULATION THERAPY VISIT (OUTPATIENT)
Dept: ANTICOAGULATION | Facility: CLINIC | Age: 60
End: 2020-08-13

## 2020-08-13 DIAGNOSIS — I82.409 DVT (DEEP VENOUS THROMBOSIS) (H): ICD-10-CM

## 2020-08-13 DIAGNOSIS — I82.409 DEEP VEIN THROMBOSIS (DVT) (H): ICD-10-CM

## 2020-08-13 LAB
CAPILLARY BLOOD COLLECTION: NORMAL
INR PPP: 1.6 (ref 0.86–1.14)

## 2020-08-13 PROCEDURE — 36416 COLLJ CAPILLARY BLOOD SPEC: CPT | Performed by: FAMILY MEDICINE

## 2020-08-13 PROCEDURE — 85610 PROTHROMBIN TIME: CPT | Performed by: FAMILY MEDICINE

## 2020-08-13 NOTE — PROGRESS NOTES
ANTICOAGULATION MANAGEMENT     Patient Name:  Jordan Barnett  Date:  2020    ASSESSMENT /SUBJECTIVE:    Today's INR result of 1.60 is subtherapeutic. Goal INR of 2.0-3.0      Warfarin dose taken: Warfarin taken as previously instructed     Diet: No new diet changes affecting INR    Medication changes/ interactions: No new medications/supplements affecting INR    Previous INR: Therapeutic 1.60    S/S of bleeding or thromboembolism: No    New injury or illness: No    Upcoming surgery, procedure or cardioversion: No    Additional findings: None      PLAN:    Spoke with Jordan regarding INR result and instructed:     Warfarin Dosing Instructions: 10mg today only then continue your current warfarin dose of 5mg daily    Instructed patient to follow up no later than: 2 weeks  Lab visit scheduled    Education provided:  Importance of notifying clinic for changes in medications/health      Jordan verbalizes understanding and agrees to warfarin dosing plan.    Instructed to call the Anticoagulation Clinic for any changes, questions or concerns. (#[phone])      OBJECTIVE:  Recent labs: (last 7 days)     20  0840   INR 1.60*         INR assessment SUB    Recheck INR In: 2 WEEKS    INR Location Clinic      Anticoagulation Summary  As of 2020    INR goal:   2.0-3.0   TTR:   63.2 % (9.3 mo)   INR used for dosin.60! (2020)   Warfarin maintenance plan:   5 mg (5 mg x 1) every day   Full warfarin instructions:   : 10 mg; Otherwise 5 mg every day   Weekly warfarin total:   35 mg   Plan last modified:   Amy Renteria RN (2020)   Next INR check:   2020   Priority:   High   Target end date:   2020    Indications    Deep vein thrombosis (DVT) (H) [I82.409]             Anticoagulation Episode Summary     INR check location:       Preferred lab:       Send INR reminders to:   FLAVIO FRANCIS    Comments:   * works overnights, prefers Thursday appts. 20 - 3 more months tx per   [place]      Anticoagulation Care Providers     Provider Role Specialty Phone number    Juan Antonio Flanagan MD [facility] [phone]    Halina Barrett APRN CNP  Nurse Practitioner [phone]

## 2020-08-19 DIAGNOSIS — I82.401 ACUTE DEEP VEIN THROMBOSIS (DVT) OF RIGHT LOWER EXTREMITY, UNSPECIFIED VEIN (H): ICD-10-CM

## 2020-08-19 NOTE — TELEPHONE ENCOUNTER
"Requested Prescriptions   Pending Prescriptions Disp Refills     warfarin ANTICOAGULANT (COUMADIN) 5 MG tablet 80 tablet 0     Sig: Take 5mg daily or as directed by the Anticoagulation Clinic       Vitamin K Antagonists Failed - 8/19/2020  1:35 PM        Failed - INR is within goal in the past 6 weeks     Confirm INR is within goal in the past 6 weeks.     Recent Labs   Lab Test 08/13/20  0840   INR 1.60*                       Passed - Recent (12 mo) or future (30 days) visit within the authorizing provider's specialty     Patient has had an office visit with the authorizing provider or a provider within the authorizing providers department within the previous 12 mos or has a future within next 30 days. See \"Patient Info\" tab in inbasket, or \"Choose Columns\" in Meds & Orders section of the refill encounter.              Passed - Medication is active on med list        Passed - Patient is 18 years of age or older             "

## 2020-08-20 RX ORDER — WARFARIN SODIUM 5 MG/1
TABLET ORAL
Qty: 90 TABLET | Refills: 0 | Status: SHIPPED | OUTPATIENT
Start: 2020-08-20 | End: 2021-01-13

## 2020-08-20 NOTE — TELEPHONE ENCOUNTER
Current warfarin dose:  Warfarin maintenance plan:   5 mg (5 mg x 1) every day   Full warfarin instructions:   8/13: 10 mg; Otherwise 5 mg every day   Weekly warfarin total:   35 mg     Last INR result:  INR   Date Value Ref Range Status   08/13/2020 1.60 (H) 0.86 - 1.14 Final     Comment:     This test is intended for monitoring Coumadin therapy.  Results are not   accurate in patients with prolonged INR due to factor deficiency.       Last office visit: 11/5/2019     Refill authorized per [facility] protocol.    Andi HAINES RN, CACP

## 2020-08-27 ENCOUNTER — ANTICOAGULATION THERAPY VISIT (OUTPATIENT)
Dept: FAMILY MEDICINE | Facility: CLINIC | Age: 60
End: 2020-08-27

## 2020-08-27 DIAGNOSIS — I82.409 DEEP VEIN THROMBOSIS (DVT) (H): ICD-10-CM

## 2020-08-27 DIAGNOSIS — I82.409 DVT (DEEP VENOUS THROMBOSIS) (H): ICD-10-CM

## 2020-08-27 LAB
CAPILLARY BLOOD COLLECTION: NORMAL
INR PPP: 2.8 (ref 0.86–1.14)

## 2020-08-27 PROCEDURE — 99207 ZZC NO CHARGE NURSE ONLY: CPT

## 2020-08-27 PROCEDURE — 36416 COLLJ CAPILLARY BLOOD SPEC: CPT | Performed by: FAMILY MEDICINE

## 2020-08-27 PROCEDURE — 85610 PROTHROMBIN TIME: CPT | Performed by: FAMILY MEDICINE

## 2020-08-27 NOTE — PROGRESS NOTES
ANTICOAGULATION FOLLOW-UP CLINIC VISIT    Patient Name:  Jordan Barnett  Date:  2020  Contact Type:  Telephone    SUBJECTIVE:  Patient Findings     Comments:   No changes in diet, activity level, medications (including over the counter), or health. No missed doses of warfarin. Patient took dosing as prescribed. No signs of clots or bleeding concerns. Patient will continue maintenance warfarin dosing.  Patient has a d-dimer and US on  for Dr. Whitaker. Will check INR at same time.        Clinical Outcomes     Negatives:   Major bleeding event, Thromboembolic event, Anticoagulation-related hospital admission, Anticoagulation-related ED visit, Anticoagulation-related fatality    Comments:   No changes in diet, activity level, medications (including over the counter), or health. No missed doses of warfarin. Patient took dosing as prescribed. No signs of clots or bleeding concerns. Patient will continue maintenance warfarin dosing.  Patient has a d-dimer and US on  for Dr. Whitaker. Will check INR at same time.           OBJECTIVE    Recent labs: (last 7 days)     20  0840   INR 2.80*       ASSESSMENT / PLAN  INR assessment THER    Recheck INR In: 3 WEEKS    INR Location Clinic      Anticoagulation Summary  As of 2020    INR goal:   2.0-3.0   TTR:   63.4 % (9.8 mo)   INR used for dosin.80 (2020)   Warfarin maintenance plan:   5 mg (5 mg x 1) every day   Full warfarin instructions:   5 mg every day   Weekly warfarin total:   35 mg   No change documented:   Maryellen Alberts RN   Plan last modified:   Amy Renteria RN (2020)   Next INR check:   2020   Priority:   Maintenance   Target end date:   2020    Indications    Deep vein thrombosis (DVT) (H) [I82.409]             Anticoagulation Episode Summary     INR check location:       Preferred lab:       Send INR reminders to:   FLAVIO FRANCIS    Comments:   * works overnights, prefers Thursday appts. 20 - 3 more  months tx per Dr. Whitaker, then see him again      Anticoagulation Care Providers     Provider Role Specialty Phone number    Juan Antonio Flanagan MD [facility] [phone]    Halina Barrett APRN CNP  Nurse Practitioner [phone]            See the Encounter Report to view Anticoagulation Flowsheet and Dosing Calendar (Go to Encounters tab in chart review, and find the Anticoagulation Therapy Visit)        Maryellen Alberts RN

## 2020-09-15 ENCOUNTER — SKIN CANCER FOLLOW-UP (OUTPATIENT)
Dept: URBAN - METROPOLITAN AREA CLINIC 17 | Facility: CLINIC | Age: 60
Setting detail: DERMATOLOGY
End: 2020-09-15

## 2020-09-15 DIAGNOSIS — D22.9 MELANOCYTIC NEVI, UNSPECIFIED: ICD-10-CM

## 2020-09-15 DIAGNOSIS — L40.9 PSORIASIS, UNSPECIFIED: ICD-10-CM

## 2020-09-15 DIAGNOSIS — L57.8 OTHER SKIN CHANGES DUE TO CHRONIC EXPOSURE TO NONIONIZING RADIATION: ICD-10-CM

## 2020-09-15 PROBLEM — L82.1 OTHER SEBORRHEIC KERATOSIS: Status: RESOLVED | Noted: 2020-09-15

## 2020-09-15 PROBLEM — L57.0 ACTINIC KERATOSIS: Status: RESOLVED | Noted: 2020-09-15

## 2020-09-15 PROCEDURE — 17003 DESTRUCT PREMALG LES 2-14: CPT

## 2020-09-15 PROCEDURE — 99214 OFFICE O/P EST MOD 30 MIN: CPT

## 2020-09-15 PROCEDURE — 17000 DESTRUCT PREMALG LESION: CPT

## 2020-09-18 ENCOUNTER — HOSPITAL ENCOUNTER (OUTPATIENT)
Dept: LAB | Facility: CLINIC | Age: 60
End: 2020-09-18
Attending: INTERNAL MEDICINE
Payer: COMMERCIAL

## 2020-09-18 ENCOUNTER — HOSPITAL ENCOUNTER (OUTPATIENT)
Dept: ULTRASOUND IMAGING | Facility: CLINIC | Age: 60
End: 2020-09-18
Attending: INTERNAL MEDICINE
Payer: COMMERCIAL

## 2020-09-18 ENCOUNTER — TELEPHONE (OUTPATIENT)
Dept: ANTICOAGULATION | Facility: CLINIC | Age: 60
End: 2020-09-18

## 2020-09-18 ENCOUNTER — ANTICOAGULATION THERAPY VISIT (OUTPATIENT)
Dept: ANTICOAGULATION | Facility: CLINIC | Age: 60
End: 2020-09-18
Payer: COMMERCIAL

## 2020-09-18 DIAGNOSIS — I82.411 ACUTE DEEP VEIN THROMBOSIS (DVT) OF FEMORAL VEIN OF RIGHT LOWER EXTREMITY (H): ICD-10-CM

## 2020-09-18 DIAGNOSIS — I82.401 ACUTE DEEP VEIN THROMBOSIS (DVT) OF RIGHT LOWER EXTREMITY, UNSPECIFIED VEIN (H): Primary | ICD-10-CM

## 2020-09-18 DIAGNOSIS — I82.401 ACUTE DEEP VEIN THROMBOSIS (DVT) OF RIGHT LOWER EXTREMITY, UNSPECIFIED VEIN (H): ICD-10-CM

## 2020-09-18 DIAGNOSIS — I82.409 DVT (DEEP VENOUS THROMBOSIS) (H): ICD-10-CM

## 2020-09-18 DIAGNOSIS — I82.409 DEEP VEIN THROMBOSIS (DVT) (H): ICD-10-CM

## 2020-09-18 LAB
D DIMER PPP FEU-MCNC: 0.3 UG/ML FEU (ref 0–0.5)
INR PPP: 2.34 (ref 0.86–1.14)

## 2020-09-18 PROCEDURE — 85610 PROTHROMBIN TIME: CPT | Performed by: INTERNAL MEDICINE

## 2020-09-18 PROCEDURE — 36415 COLL VENOUS BLD VENIPUNCTURE: CPT | Performed by: INTERNAL MEDICINE

## 2020-09-18 PROCEDURE — 99207 ZZC NO CHARGE NURSE ONLY: CPT

## 2020-09-18 PROCEDURE — 85379 FIBRIN DEGRADATION QUANT: CPT | Performed by: INTERNAL MEDICINE

## 2020-09-18 PROCEDURE — 93971 EXTREMITY STUDY: CPT | Mod: RT

## 2020-09-18 NOTE — PROGRESS NOTES
ANTICOAGULATION FOLLOW-UP CLINIC VISIT    Patient Name:  Jordan Barnett  Date:  2020  Contact Type:  Telephone    SUBJECTIVE:  Patient Findings     Comments:   Patient will continue weekly maintenance dose. INR is therapeutic.  Patient will meet with Oncology on Wednesday to review US and if he will need to continue on Coumadin.  Patient verbalizes understanding and agrees to plan. No further questions or concerns.          Clinical Outcomes     Negatives:   Major bleeding event, Thromboembolic event, Anticoagulation-related hospital admission, Anticoagulation-related ED visit, Anticoagulation-related fatality    Comments:   Patient will continue weekly maintenance dose. INR is therapeutic.  Patient will meet with Oncology on Wednesday to review US and if he will need to continue on Coumadin.  Patient verbalizes understanding and agrees to plan. No further questions or concerns.             OBJECTIVE    Recent labs: (last 7 days)     20  1500   INR 2.34*       ASSESSMENT / PLAN  INR assessment THER    Recheck INR In: 5 DAYS    INR Location Outside lab      Anticoagulation Summary  As of 2020    INR goal:   2.0-3.0   TTR:   66.0 % (10.5 mo)   INR used for dosin.34 (2020)   Warfarin maintenance plan:   5 mg (5 mg x 1) every day   Full warfarin instructions:   5 mg every day   Weekly warfarin total:   35 mg   Plan last modified:   Amy Renteria RN (2020)   Next INR check:   2020   Priority:   Critical   Target end date:   2020    Indications    Deep vein thrombosis (DVT) (H) [I82.409]             Anticoagulation Episode Summary     INR check location:       Preferred lab:       Send INR reminders to:   FLAVIO FRANCIS    Comments:   * works overnights, prefers Thursday appts. 20 - 3 more months tx per Dr. Whitaker, then see him again      Anticoagulation Care Providers     Provider Role Specialty Phone number    Juan Antonio Flanagan MD Responsible Family  Practice [phone]    Halina Barrett APRN CNP  Nurse Practitioner [phone]            See the Encounter Report to view Anticoagulation Flowsheet and Dosing Calendar (Go to Encounters tab in chart review, and find the Anticoagulation Therapy Visit)        Lynda Couch RN

## 2020-09-18 NOTE — TELEPHONE ENCOUNTER
ANTICOAGULATION MANAGEMENT      Jordan Barnett due for annual renewal of referral to anticoagulation monitoring. Order pended for your review and signature.      ANTICOAGULATION SUMMARY      Warfarin indication(s)     DVT    Heart valve present?  NO       Current goal range   INR: 2.0-3.0     Goal appropriate for indication? Yes, INR 2-3 appropriate for hx of DVT, PE, hypercoagulable state, Afib, LVAD, or bileaflet AVR without risk factors     Current duration of therapy 9/21/2020   Time in Therapeutic Range (TTR)  (Goal > 60%) 63.2 %       Office visit with referring provider's group within last year yes on 11/5/2019       Lynda Couch RN

## 2020-09-21 PROBLEM — I82.401 ACUTE DEEP VEIN THROMBOSIS (DVT) OF RIGHT LOWER EXTREMITY, UNSPECIFIED VEIN (H): Status: ACTIVE | Noted: 2020-09-21

## 2020-09-21 PROBLEM — I82.411 ACUTE DEEP VEIN THROMBOSIS (DVT) OF FEMORAL VEIN OF RIGHT LOWER EXTREMITY (H): Status: ACTIVE | Noted: 2020-09-21

## 2020-09-23 ENCOUNTER — VIRTUAL VISIT (OUTPATIENT)
Dept: ONCOLOGY | Facility: CLINIC | Age: 60
End: 2020-09-23
Attending: INTERNAL MEDICINE
Payer: COMMERCIAL

## 2020-09-23 DIAGNOSIS — I82.411 ACUTE DEEP VEIN THROMBOSIS (DVT) OF FEMORAL VEIN OF RIGHT LOWER EXTREMITY (H): ICD-10-CM

## 2020-09-23 PROCEDURE — 40001009 ZZH VIDEO/TELEPHONE VISIT; NO CHARGE

## 2020-09-23 PROCEDURE — 99214 OFFICE O/P EST MOD 30 MIN: CPT | Mod: 95 | Performed by: INTERNAL MEDICINE

## 2020-09-23 NOTE — ASSESSMENT & PLAN NOTE
Jordan Barnett  presented to the primary care physician with complaint of right lower leg swelling associated with pain swelling and warmth.  Subsequently the patient went on to have Doppler ultrasound which showed extensive deep venous thrombosis throughout the right lower extremity.  Subsequently the patient was started on Lovenox and switch to warfarin.

## 2020-09-23 NOTE — PROGRESS NOTES
Hematology/ Oncology Telephone Visit:  Sep 23, 2020    Due to the concerns around COVID-19 and adhering to social distancing we conducted this visit over the telephone.      Reason for Visit:   Chief Complaint   Patient presents with     Hematology     3 month recheck Acute deep vein thrombosis (DVT) of right lower extremity, unspecified vein, review US & Labs        Oncologic History:    Acute deep vein thrombosis (DVT) of femoral vein of right lower extremity (H)  Jordan Barnett  presented to the primary care physician with complaint of right lower leg swelling associated with pain swelling and warmth.  Subsequently the patient went on to have Doppler ultrasound which showed extensive deep venous thrombosis throughout the right lower extremity.  Subsequently the patient was started on Lovenox and switch to warfarin.       Interval History:  Patient has been feeling well without any swelling of lower extremity.  He denies any nausea vomiting or diarrhea.  He denies any fever or chills.  He denies any shortness of breath or cough or wheezing.  He is currently on warfarin.  INR has been monitored through the anticoagulation clinic    Review of systems:  Pertinent positives have been included in HPI; remainder of detailed complete 20-point ROS was negative.    Past medical, social, surgical, and family histories reviewed.    Allergies:  Allergies as of 09/23/2020 - Reviewed 09/23/2020   Allergen Reaction Noted     Lisinopril Nausea 10/04/2011       Current Medications:  Current Outpatient Medications   Medication Sig Dispense Refill     acetaminophen (TYLENOL) 500 MG tablet Take 1-2 tablets by mouth every 6 hours as needed.       cholecalciferol 5000 units (125 mcg) PO capsule Take by mouth daily       lamoTRIgine (LAMICTAL) 100 MG tablet Take 1 tablet (100 mg) by mouth 2 times daily 60 tablet 0     losartan (COZAAR) 100 MG tablet TAKE 1 TABLET BY MOUTH ONCE DAILY 90 tablet 3     Multiple Vitamins-Minerals  (MULTIVITAMIN ADULT PO) Take 1 tablet by mouth At Bedtime        OLANZapine (ZYPREXA) 5 MG tablet Take 5 mg by mouth 2 times daily       Omega-3 Fatty Acids (OMEGA 3 PO) Take 2,400 mg by mouth every evening       warfarin ANTICOAGULANT (COUMADIN) 5 MG tablet Take 5mg daily or as directed by the Anticoagulation Clinic 90 tablet 0     HYDROXYZINE HCL PO Take 50 mg by mouth every 6 hours as needed           Laboratory/Imaging Studies:  No visits with results within 2 Week(s) from this visit.   Latest known visit with results is:   Orders Only on 08/27/2020   Component Date Value Ref Range Status     INR 08/27/2020 2.80* 0.86 - 1.14 Final    Comment: This test is intended for monitoring Coumadin therapy.  Results are not   accurate in patients with prolonged INR due to factor deficiency.       Capillary Blood Collection 08/27/2020 Capillary collection performed   Final        Recent Results (from the past 744 hour(s))   US Lower Extremity Venous Duplex Right    Narrative    VENOUS ULTRASOUND RIGHT LEG  9/18/2020 3:57 PM     HISTORY: Acute deep vein thrombosis (DVT) of right lower extremity,  unspecified vein (H)    COMPARISON: Ultrasound dated 6/10/2020    FINDINGS:  Examination of the deep veins with graded compression and  color flow Doppler with spectral wave form analysis was performed.   Again identified is chronic appearing nonocclusive thrombus extending  from the right proximal superficial femoral vein into the popliteal  vein and into the peroneal veins. This is not significantly changed.  No new DVT identified.      Impression    IMPRESSION: No significant change in chronic appearing nonocclusive  DVT in the right lower extremity as above.    SARAH VAUGHAN MD       Assessment and plan:    (I82.411) Acute deep vein thrombosis (DVT) of femoral vein of right lower extremity (H)  I reviewed the patient today most recent laboratory test including d-dimer which came back within normal range.  We also discussed  results from Doppler ultrasound which is still showing chronic deep venous thrombosis.  We talked about continuation of anti-coagulation versus stopping monitoring.  Patient would prefer to stop anticoagulation at this time.  We discussed the risk of recurrence and early symptoms of recurrence of the venous thrombosis.  We talked about coverage with anticoagulation during high risk situations including long flights.  I have not scheduled the patient for any further appointments I will see him in the future if there is new developments or concerns      The patient is ready to learn, no apparent learning barriers were identified.  Diagnosis and treatment plans were explained to the patient. The patient expressed understanding of the content. The patient asked appropriate questions. The patient questions were answered to his satisfaction.    Telephone call lasted 25 minutes.    Olivia Whitaker MD    Chart documentation with Dragon Voice recognition Software. Although reviewed after completion, some words and grammatical errors may remain.

## 2020-09-23 NOTE — LETTER
"    9/23/2020         RE: Jordan Barnett  [address]  [facility] [place] [facility] [address]        Dear Colleague,    Thank you for referring your patient, Jordan Barnett, to the [facility]. Please see a copy of my visit note below.    Jordan Barnett is a 60 year old male who is being evaluated via a billable telephone visit.      The patient has been notified of following:     \"This telephone visit will be conducted via a call between you and your physician/provider. We have found that certain health care needs can be provided without the need for a physical exam.  This service lets us provide the care you need with a short phone conversation.  If a prescription is necessary we can send it directly to your pharmacy.  If lab work is needed we can place an order for that and you can then stop by our lab to have the test done at a later time.    Telephone visits are billed at different rates depending on your insurance coverage. During this emergency period, for some insurers they may be billed the same as an in-person visit.  Please reach out to your insurance provider with any questions.    If during the course of the call the physician/provider feels a telephone visit is not appropriate, you will not be charged for this service.\"    Patient has given verbal consent for Telephone visit?  Yes    What phone number would you like to be contacted at?   [phone]    How would you like to obtain your AVS? Sang    Phone call duration: 5  minutes    Genny Avila [facility]          Hematology/ Oncology Telephone Visit:  Sep 23, 2020    Due to the concerns around COVID-19 and adhering to social distancing we conducted this visit over the telephone.      Reason for Visit:   Chief Complaint   Patient presents with     Hematology     3 month recheck Acute deep vein thrombosis (DVT) of right lower extremity, unspecified vein, review US & Labs        Oncologic History:    Acute deep vein thrombosis (DVT) of femoral " vein of right lower extremity (H)  Jordan Barnett  presented to the primary care physician with complaint of right lower leg swelling associated with pain swelling and warmth.  Subsequently the patient went on to have Doppler ultrasound which showed extensive deep venous thrombosis throughout the right lower extremity.  Subsequently the patient was started on Lovenox and switch to warfarin.       Interval History:  Patient has been feeling well without any swelling of lower extremity.  He denies any nausea vomiting or diarrhea.  He denies any fever or chills.  He denies any shortness of breath or cough or wheezing.  He is currently on warfarin.  INR has been monitored through the anticoagulation clinic    Review of systems:  Pertinent positives have been included in HPI; remainder of detailed complete 20-point ROS was negative.    Past medical, social, surgical, and family histories reviewed.    Allergies:  Allergies as of 09/23/2020 - Reviewed 09/23/2020   Allergen Reaction Noted     Lisinopril Nausea 10/04/2011       Current Medications:  Current Outpatient Medications   Medication Sig Dispense Refill     acetaminophen (TYLENOL) 500 MG tablet Take 1-2 tablets by mouth every 6 hours as needed.       cholecalciferol 5000 units (125 mcg) PO capsule Take by mouth daily       lamoTRIgine (LAMICTAL) 100 MG tablet Take 1 tablet (100 mg) by mouth 2 times daily 60 tablet 0     losartan (COZAAR) 100 MG tablet TAKE 1 TABLET BY MOUTH ONCE DAILY 90 tablet 3     Multiple Vitamins-Minerals (MULTIVITAMIN ADULT PO) Take 1 tablet by mouth At Bedtime        OLANZapine (ZYPREXA) 5 MG tablet Take 5 mg by mouth 2 times daily       Omega-3 Fatty Acids (OMEGA 3 PO) Take 2,400 mg by mouth every evening       warfarin ANTICOAGULANT (COUMADIN) 5 MG tablet Take 5mg daily or as directed by the Anticoagulation Clinic 90 tablet 0     HYDROXYZINE HCL PO Take 50 mg by mouth every 6 hours as needed           Laboratory/Imaging Studies:  No visits  with results within 2 Week(s) from this visit.   Latest known visit with results is:   Orders Only on 08/27/2020   Component Date Value Ref Range Status     INR 08/27/2020 2.80* 0.86 - 1.14 Final    Comment: This test is intended for monitoring Coumadin therapy.  Results are not   accurate in patients with prolonged INR due to factor deficiency.       Capillary Blood Collection 08/27/2020 Capillary collection performed   Final        Recent Results (from the past 744 hour(s))   US Lower Extremity Venous Duplex Right    Narrative    VENOUS ULTRASOUND RIGHT LEG  9/18/2020 3:57 PM     HISTORY: Acute deep vein thrombosis (DVT) of right lower extremity,  unspecified vein (H)    COMPARISON: Ultrasound dated 6/10/2020    FINDINGS:  Examination of the deep veins with graded compression and  color flow Doppler with spectral wave form analysis was performed.   Again identified is chronic appearing nonocclusive thrombus extending  from the right proximal superficial femoral vein into the popliteal  vein and into the peroneal veins. This is not significantly changed.  No new DVT identified.      Impression    IMPRESSION: No significant change in chronic appearing nonocclusive  DVT in the right lower extremity as above.    SARAH VAUGHAN MD       Assessment and plan:    (I82.411) Acute deep vein thrombosis (DVT) of femoral vein of right lower extremity (H)  I reviewed the patient today most recent laboratory test including d-dimer which came back within normal range.  We also discussed results from Doppler ultrasound which is still showing chronic deep venous thrombosis.  We talked about continuation of anti-coagulation versus stopping monitoring.  Patient would prefer to stop anticoagulation at this time.  We discussed the risk of recurrence and early symptoms of recurrence of the venous thrombosis.  We talked about coverage with anticoagulation during high risk situations including long flights.  I have not scheduled the  patient for any further appointments I will see him in the future if there is new developments or concerns      The patient is ready to learn, no apparent learning barriers were identified.  Diagnosis and treatment plans were explained to the patient. The patient expressed understanding of the content. The patient asked appropriate questions. The patient questions were answered to his satisfaction.    Telephone call lasted 25 minutes.    Olivia Whitaker MD    Chart documentation with Dragon Voice recognition Software. Although reviewed after completion, some words and grammatical errors may remain.                                                    Again, thank you for allowing me to participate in the care of your patient.        Sincerely,        Olivia Whitaker MD

## 2020-09-23 NOTE — PROGRESS NOTES
"Jordan Barnett is a 60 year old male who is being evaluated via a billable telephone visit.      The patient has been notified of following:     \"This telephone visit will be conducted via a call between you and your physician/provider. We have found that certain health care needs can be provided without the need for a physical exam.  This service lets us provide the care you need with a short phone conversation.  If a prescription is necessary we can send it directly to your pharmacy.  If lab work is needed we can place an order for that and you can then stop by our lab to have the test done at a later time.    Telephone visits are billed at different rates depending on your insurance coverage. During this emergency period, for some insurers they may be billed the same as an in-person visit.  Please reach out to your insurance provider with any questions.    If during the course of the call the physician/provider feels a telephone visit is not appropriate, you will not be charged for this service.\"    Patient has given verbal consent for Telephone visit?  Yes    What phone number would you like to be contacted at?   [phone]    How would you like to obtain your AVS? Sang    Phone call duration: 5  minutes    Genny Avila CMA        "

## 2020-09-23 NOTE — LETTER
"    9/23/2020         RE: Jordan Barnett  [address]  [facility] [facility] [address]        Dear Colleague,    Thank you for referring your patient, Jordan Barnett, to the [facility]. Please see a copy of my visit note below.    Jordan Barnett is a 60 year old male who is being evaluated via a billable telephone visit.      The patient has been notified of following:     \"This telephone visit will be conducted via a call between you and your physician/provider. We have found that certain health care needs can be provided without the need for a physical exam.  This service lets us provide the care you need with a short phone conversation.  If a prescription is necessary we can send it directly to your pharmacy.  If lab work is needed we can place an order for that and you can then stop by our lab to have the test done at a later time.    Telephone visits are billed at different rates depending on your insurance coverage. During this emergency period, for some insurers they may be billed the same as an in-person visit.  Please reach out to your insurance provider with any questions.    If during the course of the call the physician/provider feels a telephone visit is not appropriate, you will not be charged for this service.\"    Patient has given verbal consent for Telephone visit?  Yes    What phone number would you like to be contacted at?   [phone]    How would you like to obtain your AVS? Sang    Phone call duration: 5  minutes    Genny Avila [facility]          Hematology/ Oncology Telephone Visit:  Sep 23, 2020    Due to the concerns around COVID-19 and adhering to social distancing we conducted this visit over the telephone.      Reason for Visit:   Chief Complaint   Patient presents with     Hematology     3 month recheck Acute deep vein thrombosis (DVT) of right lower extremity, unspecified vein, review US & Labs        Oncologic History:    Acute deep vein thrombosis (DVT) of femoral " vein of right lower extremity (H)  Jordan Barnett  presented to the primary care physician with complaint of right lower leg swelling associated with pain swelling and warmth.  Subsequently the patient went on to have Doppler ultrasound which showed extensive deep venous thrombosis throughout the right lower extremity.  Subsequently the patient was started on Lovenox and switch to warfarin.       Interval History:  Patient has been feeling well without any swelling of lower extremity.  He denies any nausea vomiting or diarrhea.  He denies any fever or chills.  He denies any shortness of breath or cough or wheezing.  He is currently on warfarin.  INR has been monitored through the anticoagulation clinic    Review of systems:  Pertinent positives have been included in HPI; remainder of detailed complete 20-point ROS was negative.    Past medical, social, surgical, and family histories reviewed.    Allergies:  Allergies as of 09/23/2020 - Reviewed 09/23/2020   Allergen Reaction Noted     Lisinopril Nausea 10/04/2011       Current Medications:  Current Outpatient Medications   Medication Sig Dispense Refill     acetaminophen (TYLENOL) 500 MG tablet Take 1-2 tablets by mouth every 6 hours as needed.       cholecalciferol 5000 units (125 mcg) PO capsule Take by mouth daily       lamoTRIgine (LAMICTAL) 100 MG tablet Take 1 tablet (100 mg) by mouth 2 times daily 60 tablet 0     losartan (COZAAR) 100 MG tablet TAKE 1 TABLET BY MOUTH ONCE DAILY 90 tablet 3     Multiple Vitamins-Minerals (MULTIVITAMIN ADULT PO) Take 1 tablet by mouth At Bedtime        OLANZapine (ZYPREXA) 5 MG tablet Take 5 mg by mouth 2 times daily       Omega-3 Fatty Acids (OMEGA 3 PO) Take 2,400 mg by mouth every evening       warfarin ANTICOAGULANT (COUMADIN) 5 MG tablet Take 5mg daily or as directed by the Anticoagulation Clinic 90 tablet 0     HYDROXYZINE HCL PO Take 50 mg by mouth every 6 hours as needed           Laboratory/Imaging Studies:  No visits  with results within 2 Week(s) from this visit.   Latest known visit with results is:   Orders Only on 08/27/2020   Component Date Value Ref Range Status     INR 08/27/2020 2.80* 0.86 - 1.14 Final    Comment: This test is intended for monitoring Coumadin therapy.  Results are not   accurate in patients with prolonged INR due to factor deficiency.       Capillary Blood Collection 08/27/2020 Capillary collection performed   Final        Recent Results (from the past 744 hour(s))   US Lower Extremity Venous Duplex Right    Narrative    VENOUS ULTRASOUND RIGHT LEG  9/18/2020 3:57 PM     HISTORY: Acute deep vein thrombosis (DVT) of right lower extremity,  unspecified vein (H)    COMPARISON: Ultrasound dated 6/10/2020    FINDINGS:  Examination of the deep veins with graded compression and  color flow Doppler with spectral wave form analysis was performed.   Again identified is chronic appearing nonocclusive thrombus extending  from the right proximal superficial femoral vein into the popliteal  vein and into the peroneal veins. This is not significantly changed.  No new DVT identified.      Impression    IMPRESSION: No significant change in chronic appearing nonocclusive  DVT in the right lower extremity as above.    SARAH VAUGHAN MD       Assessment and plan:    (I82.411) Acute deep vein thrombosis (DVT) of femoral vein of right lower extremity (H)  I reviewed the patient today most recent laboratory test including d-dimer which came back within normal range.  We also discussed results from Doppler ultrasound which is still showing chronic deep venous thrombosis.  We talked about continuation of anti-coagulation versus stopping monitoring.  Patient would prefer to stop anticoagulation at this time.  We discussed the risk of recurrence and early symptoms of recurrence of the venous thrombosis.  We talked about coverage with anticoagulation during high risk situations including long flights.  I have not scheduled the  patient for any further appointments I will see him in the future if there is new developments or concerns      The patient is ready to learn, no apparent learning barriers were identified.  Diagnosis and treatment plans were explained to the patient. The patient expressed understanding of the content. The patient asked appropriate questions. The patient questions were answered to his satisfaction.    Telephone call lasted 25 minutes.    Olivia Whitaker MD    Chart documentation with Dragon Voice recognition Software. Although reviewed after completion, some words and grammatical errors may remain.                                                    Again, thank you for allowing me to participate in the care of your patient.        Sincerely,        Olivia Whitaker MD

## 2020-10-22 ENCOUNTER — TRANSFERRED RECORDS (OUTPATIENT)
Dept: HEALTH INFORMATION MANAGEMENT | Facility: CLINIC | Age: 60
End: 2020-10-22

## 2020-11-08 ENCOUNTER — HEALTH MAINTENANCE LETTER (OUTPATIENT)
Age: 60
End: 2020-11-08

## 2020-11-28 ENCOUNTER — HOSPITAL ENCOUNTER (EMERGENCY)
Facility: CLINIC | Age: 60
Discharge: HOME | End: 2020-11-28
Attending: EMERGENCY MEDICINE | Admitting: EMERGENCY MEDICINE
Payer: COMMERCIAL

## 2020-11-28 ENCOUNTER — NURSE TRIAGE (OUTPATIENT)
Dept: NURSING | Facility: CLINIC | Age: 60
End: 2020-11-28

## 2020-11-28 ENCOUNTER — APPOINTMENT (OUTPATIENT)
Dept: ULTRASOUND IMAGING | Facility: CLINIC | Age: 60
End: 2020-11-28
Attending: EMERGENCY MEDICINE
Payer: COMMERCIAL

## 2020-11-28 VITALS
HEART RATE: 72 BPM | WEIGHT: 170 LBS | BODY MASS INDEX: 24.34 KG/M2 | RESPIRATION RATE: 14 BRPM | OXYGEN SATURATION: 98 % | TEMPERATURE: 98.2 F | SYSTOLIC BLOOD PRESSURE: 103 MMHG | DIASTOLIC BLOOD PRESSURE: 68 MMHG | HEIGHT: 70 IN

## 2020-11-28 DIAGNOSIS — I82.491 ACUTE DEEP VEIN THROMBOSIS (DVT) OF OTHER SPECIFIED VEIN OF RIGHT LOWER EXTREMITY (H): ICD-10-CM

## 2020-11-28 LAB
ANION GAP SERPL CALCULATED.3IONS-SCNC: 8 MMOL/L (ref 3–14)
APTT PPP: 28 SEC (ref 22–37)
BASOPHILS # BLD AUTO: 0.1 10E9/L (ref 0–0.2)
BASOPHILS NFR BLD AUTO: 0.5 %
BUN SERPL-MCNC: 44 MG/DL (ref 7–30)
CALCIUM SERPL-MCNC: 9.2 MG/DL (ref 8.5–10.1)
CHLORIDE SERPL-SCNC: 110 MMOL/L (ref 94–109)
CO2 SERPL-SCNC: 23 MMOL/L (ref 20–32)
CREAT SERPL-MCNC: 1.66 MG/DL (ref 0.66–1.25)
DIFFERENTIAL METHOD BLD: ABNORMAL
EOSINOPHIL # BLD AUTO: 0.5 10E9/L (ref 0–0.7)
EOSINOPHIL NFR BLD AUTO: 4.4 %
ERYTHROCYTE [DISTWIDTH] IN BLOOD BY AUTOMATED COUNT: 12.2 % (ref 10–15)
GFR SERPL CREATININE-BSD FRML MDRD: 44 ML/MIN/{1.73_M2}
GLUCOSE SERPL-MCNC: 178 MG/DL (ref 70–99)
HCT VFR BLD AUTO: 34.2 % (ref 40–53)
HGB BLD-MCNC: 11.8 G/DL (ref 13.3–17.7)
IMM GRANULOCYTES # BLD: 0.1 10E9/L (ref 0–0.4)
IMM GRANULOCYTES NFR BLD: 0.5 %
INR PPP: 1.2 (ref 0.86–1.14)
LYMPHOCYTES # BLD AUTO: 2.2 10E9/L (ref 0.8–5.3)
LYMPHOCYTES NFR BLD AUTO: 17.9 %
MCH RBC QN AUTO: 31.6 PG (ref 26.5–33)
MCHC RBC AUTO-ENTMCNC: 34.5 G/DL (ref 31.5–36.5)
MCV RBC AUTO: 92 FL (ref 78–100)
MONOCYTES # BLD AUTO: 1.1 10E9/L (ref 0–1.3)
MONOCYTES NFR BLD AUTO: 9.1 %
NEUTROPHILS # BLD AUTO: 8.2 10E9/L (ref 1.6–8.3)
NEUTROPHILS NFR BLD AUTO: 67.6 %
NRBC # BLD AUTO: 0 10*3/UL
NRBC BLD AUTO-RTO: 0 /100
PLATELET # BLD AUTO: 223 10E9/L (ref 150–450)
POTASSIUM SERPL-SCNC: 3.9 MMOL/L (ref 3.4–5.3)
RBC # BLD AUTO: 3.73 10E12/L (ref 4.4–5.9)
SODIUM SERPL-SCNC: 141 MMOL/L (ref 133–144)
WBC # BLD AUTO: 12.2 10E9/L (ref 4–11)

## 2020-11-28 PROCEDURE — 99284 EMERGENCY DEPT VISIT MOD MDM: CPT | Performed by: EMERGENCY MEDICINE

## 2020-11-28 PROCEDURE — 99284 EMERGENCY DEPT VISIT MOD MDM: CPT | Mod: 25 | Performed by: EMERGENCY MEDICINE

## 2020-11-28 PROCEDURE — 93971 EXTREMITY STUDY: CPT | Mod: RT

## 2020-11-28 PROCEDURE — 85025 COMPLETE CBC W/AUTO DIFF WBC: CPT | Performed by: EMERGENCY MEDICINE

## 2020-11-28 PROCEDURE — 85730 THROMBOPLASTIN TIME PARTIAL: CPT | Performed by: EMERGENCY MEDICINE

## 2020-11-28 PROCEDURE — 85610 PROTHROMBIN TIME: CPT | Performed by: EMERGENCY MEDICINE

## 2020-11-28 PROCEDURE — 80048 BASIC METABOLIC PNL TOTAL CA: CPT | Performed by: EMERGENCY MEDICINE

## 2020-11-28 ASSESSMENT — MIFFLIN-ST. JEOR: SCORE: 1592.36

## 2020-11-28 NOTE — DISCHARGE INSTRUCTIONS
Lovenox twice daily for the next 7 days, start warfarin as well.    Follow-up Coumadin clinic for further recommendations surrounding dosing of warfarin    Follow-up Dr. Hightower hematology    Return here for shortness of air, chest pain, fainting or any other concern.    Elevate right lower extremity when you are at rest, warm packs are reasonable as well.

## 2020-11-28 NOTE — ED AVS SNAPSHOT
[facility]  [address] [facility] [address]  Phone: [phone]  Fax: [phone]                                    Jordan Barnett   MRN: [id number]    Department: [facility]   Date of Visit: 11/28/2020           After Visit Summary Signature Page    I have received my discharge instructions, and my questions have been answered. I have discussed any challenges I see with this plan with the nurse or doctor.    ..........................................................................................................................................  Patient/Patient Representative Signature      ..........................................................................................................................................  Patient Representative Print Name and Relationship to Patient    ..................................................               ................................................  Date                                   Time    ..........................................................................................................................................  Reviewed by Signature/Title    ...................................................              ..............................................  Date                                               Time          22EPIC Rev 08/18

## 2020-11-28 NOTE — ED PROVIDER NOTES
History     Chief Complaint   Patient presents with     Leg Pain     RLE.  h/o DVT in this leg last yr.  was on coumadin and that stopped 3 mo ago     HPI  Jordan Barnett is a 60 year old male who presents with aching of the right calf mild/moderate began yesterday, no inciting trauma or strain.  Known history DVT right lower extremity, anticoagulated on warfarin until late September this year.  Diagnosed with clot nonprovoked 10/29/2019.  Denies chest pain shortness of air or near syncope.    Allergies:  Allergies   Allergen Reactions     Lisinopril Nausea     Felt weakness nausea, couldn't sleep       Problem List:    Patient Active Problem List    Diagnosis Date Noted     Acute deep vein thrombosis (DVT) of other specified vein of right lower extremity (H) 11/28/2020     Priority: Medium     Acute deep vein thrombosis (DVT) of femoral vein of right lower extremity (H) 09/21/2020     Priority: Medium     Acute deep vein thrombosis (DVT) of right lower extremity, unspecified vein (H) 09/21/2020     Priority: Medium     Deep vein thrombosis (DVT) (H) 10/29/2019     Priority: Medium     Former smoker, stopped smoking in distant past 07/28/2016     Priority: Medium     Benign essential hypertension 03/12/2013     Priority: Medium     Former smoker 02/25/2013     Priority: Medium     Tubular adenoma of colon 09/25/2012     Priority: Medium     On colonoscopy 2009       Hyperlipidemia LDL goal <160 08/29/2012     Priority: Medium     Schizoaffective disorder (H) 04/22/2011     Priority: Medium     Sebaceous cyst 04/21/2011     Priority: Medium     On scrotum.       CKD (chronic kidney disease) stage 3, GFR 30-59 ml/min 04/21/2011     Priority: Medium     secondary to medication  6/1/2011 Referral OK, though he probably does not need one now.   I would recommend starting lisinopril 10 mg daily, and recheck renal panel and microalbumin in 2 months.       Severe bipolar I disorder, most recent episode mixed, with  psychotic features (H) 2008     Priority: Medium     SCHIZOPHRENIC TENDENCIES  Problem list name updated by automated process. Provider to review          Past Medical History:    Past Medical History:   Diagnosis Date     CKD (chronic kidney disease)      Depressive disorder, not elsewhere classified      HTN (hypertension)      Unspecified schizophrenia, unspecified condition        Past Surgical History:    Past Surgical History:   Procedure Laterality Date     COLONOSCOPY      normal. has fam hx colon cancer     COLONOSCOPY N/A 10/13/2014    Procedure: COLONOSCOPY;  Surgeon: Santy Constantino MD;  Location: [facility]     HERNIA REPAIR, INGUINAL RT/LT  2004     SURGICAL HISTORY OF -       Incised & Drained - ? Perirectal Abscess        Family History:    Family History   Problem Relation Age of Onset     Thyroid Disease Mother      Coronary Artery Disease Mother         pacemaker     Cancer Paternal Grandfather      Cancer Sister      Cancer - colorectal Sister      Mental Illness Nephew         committed suicide at age 27       Social History:  Marital Status:  Single [1]  Social History     Tobacco Use     Smoking status: Former Smoker     Packs/day: 1.00     Years: 30.00     Pack years: 30.00     Types: Cigarettes     Quit date: 2012     Years since quittin.6     Smokeless tobacco: Former User     Quit date: 2012     Tobacco comment: 2 PPD   Substance Use Topics     Alcohol use: Yes     Comment: RARE     Drug use: No     Comment: 30 years ago        Medications:         enoxaparin ANTICOAGULANT (LOVENOX) 80 MG/0.8ML syringe       enoxaparin ANTICOAGULANT (LOVENOX) 80 MG/0.8ML syringe       acetaminophen (TYLENOL) 500 MG tablet       cholecalciferol 5000 units (125 mcg) PO capsule       HYDROXYZINE HCL PO       lamoTRIgine (LAMICTAL) 100 MG tablet       losartan (COZAAR) 100 MG tablet       Multiple Vitamins-Minerals (MULTIVITAMIN ADULT PO)       OLANZapine (ZYPREXA) 5 MG tablet        "Omega-3 Fatty Acids (OMEGA 3 PO)       warfarin ANTICOAGULANT (COUMADIN) 5 MG tablet          Review of Systems  Problem focused review of systems otherwise negative    Physical Exam   BP: (unable to obtain BP in triage)  Pulse: 93  Temp: 98.2  F (36.8  C)  Resp: 18  Height: 177.8 cm (5' 10\")  Weight: 77.1 kg (170 lb)  SpO2: 97 %      Physical Exam  Nontoxic-appearing no respiratory distress alert and oriented  Skin pink warm and dry  Strength intact throughout all extremities  Head atraumatic normocephalic, extraocular motions intact, conjunctiva noninjected, sclera nonicteric  Right calf moderate swelling mild tenderness, pulses intact in the right foot sensation intact, no redness or warmth  ED Course        Procedures               Critical Care time:  none               Results for orders placed or performed during the hospital encounter of 11/28/20 (from the past 24 hour(s))   CBC with platelets differential   Result Value Ref Range    WBC 12.2 (H) 4.0 - 11.0 10e9/L    RBC Count 3.73 (L) 4.4 - 5.9 10e12/L    Hemoglobin 11.8 (L) 13.3 - 17.7 g/dL    Hematocrit 34.2 (L) 40.0 - 53.0 %    MCV 92 78 - 100 fl    MCH 31.6 26.5 - 33.0 pg    MCHC 34.5 31.5 - 36.5 g/dL    RDW 12.2 10.0 - 15.0 %    Platelet Count 223 150 - 450 10e9/L    Diff Method Automated Method     % Neutrophils 67.6 %    % Lymphocytes 17.9 %    % Monocytes 9.1 %    % Eosinophils 4.4 %    % Basophils 0.5 %    % Immature Granulocytes 0.5 %    Nucleated RBCs 0 0 /100    Absolute Neutrophil 8.2 1.6 - 8.3 10e9/L    Absolute Lymphocytes 2.2 0.8 - 5.3 10e9/L    Absolute Monocytes 1.1 0.0 - 1.3 10e9/L    Absolute Eosinophils 0.5 0.0 - 0.7 10e9/L    Absolute Basophils 0.1 0.0 - 0.2 10e9/L    Abs Immature Granulocytes 0.1 0 - 0.4 10e9/L    Absolute Nucleated RBC 0.0    Basic metabolic panel   Result Value Ref Range    Sodium 141 133 - 144 mmol/L    Potassium 3.9 3.4 - 5.3 mmol/L    Chloride 110 (H) 94 - 109 mmol/L    Carbon Dioxide 23 20 - 32 mmol/L    Anion " Gap 8 3 - 14 mmol/L    Glucose 178 (H) 70 - 99 mg/dL    Urea Nitrogen 44 (H) 7 - 30 mg/dL    Creatinine 1.66 (H) 0.66 - 1.25 mg/dL    GFR Estimate 44 (L) >60 mL/min/[1.73_m2]    GFR Estimate If Black 51 (L) >60 mL/min/[1.73_m2]    Calcium 9.2 8.5 - 10.1 mg/dL   INR   Result Value Ref Range    INR 1.20 (H) 0.86 - 1.14   Partial thromboplastin time   Result Value Ref Range    PTT 28 22 - 37 sec   US Lower Extremity Venous Duplex Right    Narrative    ULTRASOUND VENOUS LOWER EXTREMITY UNILATERAL RIGHT November 28, 2020  11:00 AM     HISTORY: Swelling with history of deep vein thrombosis, off  anticoagulation for three months.    COMPARISON: September 18, 2020.    TECHNIQUE: Ultrasound gray scale, Color Doppler flow, and spectral  Doppler waveform analysis performed.      Impression    IMPRESSION: Positive study for deep vein thrombus in the right  femoral, popliteal, and peroneal veins. For comparison the left common  femoral vein was evaluated and was unremarkable.    GABRIEL GARCIA MD       Medications - No data to display    Assessments & Plan (with Medical Decision Making)  60-year-old right leg swelling calf swelling, history DVT, off anticoagulation, previously unprovoked clot, normal hypercoagulability work-up, no chest pain or shortness of air no near syncope.  Right lower extremity duplex venous ultrasound as above positive for DVT femoral, popliteal and peroneal veins.  Restart Lovenox 80 mg twice daily, warfarin 5 mg daily, not a candidate for DOAC secondary to renal function.  Follow-up primary care as scheduled, follow-up hematology.  Return here chest pain, shortness of air, passing out, progressive pain swelling or any other concern.     I have reviewed the nursing notes.    I have reviewed the findings, diagnosis, plan and need for follow up with the patient.          Discharge Medication List as of 11/28/2020 11:41 AM      START taking these medications    Details   enoxaparin ANTICOAGULANT (LOVENOX) 80  MG/0.8ML syringe Inject 0.8 mLs (80 mg) Subcutaneous 2 times daily for 7 days, Disp-11.2 mL, R-0, E-Prescribe             Final diagnoses:   Acute deep vein thrombosis (DVT) of other specified vein of right lower extremity (H)       11/28/2020   [facility] EMERGENCY DEPT     Kaleb Barnett MD  11/29/20 0737

## 2020-11-28 NOTE — TELEPHONE ENCOUNTER
"Pain in his calf, and and calf is swollen , right and is warm to touch, and is painful . He  Has  Had a history of prior clot .    Additional Information    Negative: Severe difficulty breathing (e.g., struggling for each breath, speaks in single words)    Negative: Looks like a broken bone or dislocated joint (e.g., crooked or deformed)    Negative: Sounds like a life-threatening emergency to the triager    Negative: Chest pain    Negative: Followed a leg injury    Negative: [1] Small area of swelling AND [2] followed an insect bite to the area    Negative: Swelling of one ankle joint    Negative: Swelling of knee is main symptom    Negative: Pregnant    Negative: Postpartum (from 0 to 6 weeks after delivery)    Negative: Difficulty breathing at rest    Negative: Entire foot is cool or blue in comparison to other side    Negative: [1] Can't walk or can barely walk AND [2] new onset    Negative: [1] Difficulty breathing with exertion (e.g., walking) AND [2] new onset or worsening    Negative: [1] Red area or streak AND [2] fever    Negative: [1] Swelling is painful to touch AND [2] fever    Negative: Patient sounds very sick or weak to the triager    Negative: [1] Cast on leg or ankle AND [2] now increased pain    Negative: SEVERE leg swelling (e.g., swelling extends above knee, entire leg is swollen, weeping fluid)    Negative: [1] Red area or streak [2] large (> 2 in. or 5 cm)    [1] Thigh or calf pain AND [2] only 1 side AND [3] present > 1 hour    Answer Assessment - Initial Assessment Questions  1. ONSET: \"When did the swelling start?\" (e.g., minutes, hours, days)      In last 8 hours   2. LOCATION: \"What part of the leg is swollen?\"  \"Are both legs swollen or just one leg?\"      One leg   3. SEVERITY: \"How bad is the swelling?\" (e.g., localized; mild, moderate, severe)   - Localized - small area of swelling localized to one leg   - MILD pedal edema - swelling limited to foot and ankle, pitting edema < 1/4 inch " "(6 mm) deep, rest and elevation eliminate most or all swelling   - MODERATE edema - swelling of lower leg to knee, pitting edema > 1/4 inch (6 mm) deep, rest and elevation only partially reduce swelling   - SEVERE edema - swelling extends above knee, facial or hand swelling present       moderate  4. REDNESS: \"Does the swelling look red or infected?\"      It is red   5. PAIN: \"Is the swelling painful to touch?\" If so, ask: \"How painful is it?\"   (Scale 1-10; mild, moderate or severe)      moderate  6. FEVER: \"Do you have a fever?\" If so, ask: \"What is it, how was it measured, and when did it start?\"       Not feverish   7. CAUSE: \"What do you think is causing the leg swelling?\"      Prior history   8. MEDICAL HISTORY: \"Do you have a history of heart failure, kidney disease, liver failure, or cancer?\"      No   9. RECURRENT SYMPTOM: \"Have you had leg swelling before?\" If so, ask: \"When was the last time?\" \"What happened that time?\"     Had a clot, and was on Warfarin   10. OTHER SYMPTOMS: \"Do you have any other symptoms?\" (e.g., chest pain, difficulty breathing)        Denies   11. PREGNANCY: \"Is there any chance you are pregnant?\" \"When was your last menstrual period?\"        male    Protocols used: LEG SWELLING AND EDEMA-A-AH      "

## 2020-11-30 ENCOUNTER — TELEPHONE (OUTPATIENT)
Dept: ANTICOAGULATION | Facility: CLINIC | Age: 60
End: 2020-11-30

## 2020-11-30 DIAGNOSIS — I82.491 ACUTE DEEP VEIN THROMBOSIS (DVT) OF OTHER SPECIFIED VEIN OF RIGHT LOWER EXTREMITY (H): ICD-10-CM

## 2020-11-30 NOTE — TELEPHONE ENCOUNTER
ANTICOAGULATION  MANAGEMENT: Discharge Review    Jordan Barnett chart reviewed for anticoagulation continuity of care    Emergency room visit on 11/28/2020 for Acute DVT/was off anticoagulation.    Discharge disposition: Home    Results:    Recent labs: (last 7 days)     11/28/20  1021   INR 1.20*     Anticoagulation inpatient management:     not applicable     Anticoagulation discharge instructions:     Warfarin dosing: Was off anticoagulation - restart warfarin 5 mg daily   Bridging: bridging with enoxaparin (Lovenox)   INR goal change: No      Medication changes affecting anticoagulation: No    Additional factors affecting anticoagulation: Yes: DVT    Plan     No adjustment to anticoagulation plan needed    Patient not contacted - okay with Chey Philip to check INR on 12/3 as scheduled. Previous dose was 5 mg daily - within the last year.    Anticoagulation Calendar updated    Joann Gurrola RN

## 2020-12-03 ENCOUNTER — TELEPHONE (OUTPATIENT)
Dept: FAMILY MEDICINE | Facility: CLINIC | Age: 60
End: 2020-12-03

## 2020-12-03 ENCOUNTER — OFFICE VISIT (OUTPATIENT)
Dept: FAMILY MEDICINE | Facility: CLINIC | Age: 60
End: 2020-12-03
Payer: COMMERCIAL

## 2020-12-03 ENCOUNTER — ANTICOAGULATION THERAPY VISIT (OUTPATIENT)
Dept: ANTICOAGULATION | Facility: CLINIC | Age: 60
End: 2020-12-03

## 2020-12-03 VITALS
BODY MASS INDEX: 24.91 KG/M2 | OXYGEN SATURATION: 97 % | RESPIRATION RATE: 14 BRPM | HEIGHT: 70 IN | HEART RATE: 67 BPM | WEIGHT: 174 LBS | SYSTOLIC BLOOD PRESSURE: 116 MMHG | TEMPERATURE: 96.6 F | DIASTOLIC BLOOD PRESSURE: 84 MMHG

## 2020-12-03 DIAGNOSIS — I82.411 ACUTE DEEP VEIN THROMBOSIS (DVT) OF FEMORAL VEIN OF RIGHT LOWER EXTREMITY (H): ICD-10-CM

## 2020-12-03 DIAGNOSIS — I82.491 ACUTE DEEP VEIN THROMBOSIS (DVT) OF OTHER SPECIFIED VEIN OF RIGHT LOWER EXTREMITY (H): ICD-10-CM

## 2020-12-03 DIAGNOSIS — N18.30 STAGE 3 CHRONIC KIDNEY DISEASE, UNSPECIFIED WHETHER STAGE 3A OR 3B CKD (H): Primary | ICD-10-CM

## 2020-12-03 DIAGNOSIS — I82.491 ACUTE DEEP VEIN THROMBOSIS (DVT) OF OTHER SPECIFIED VEIN OF RIGHT LOWER EXTREMITY (H): Primary | ICD-10-CM

## 2020-12-03 DIAGNOSIS — I10 ESSENTIAL HYPERTENSION WITH GOAL BLOOD PRESSURE LESS THAN 140/90: ICD-10-CM

## 2020-12-03 DIAGNOSIS — N18.30 STAGE 3 CHRONIC KIDNEY DISEASE, UNSPECIFIED WHETHER STAGE 3A OR 3B CKD (H): ICD-10-CM

## 2020-12-03 DIAGNOSIS — E78.2 MIXED HYPERLIPIDEMIA: ICD-10-CM

## 2020-12-03 DIAGNOSIS — Z87.891 PERSONAL HISTORY OF TOBACCO USE: ICD-10-CM

## 2020-12-03 DIAGNOSIS — Z12.11 SCREENING FOR MALIGNANT NEOPLASM OF COLON: ICD-10-CM

## 2020-12-03 DIAGNOSIS — Z00.00 ROUTINE GENERAL MEDICAL EXAMINATION AT A HEALTH CARE FACILITY: Primary | ICD-10-CM

## 2020-12-03 DIAGNOSIS — Z23 NEED FOR PROPHYLACTIC VACCINATION AND INOCULATION AGAINST INFLUENZA: ICD-10-CM

## 2020-12-03 LAB
CAPILLARY BLOOD COLLECTION: NORMAL
CREAT UR-MCNC: 20 MG/DL
INR PPP: 1.9 (ref 0.86–1.14)
MICROALBUMIN UR-MCNC: 31 MG/L
MICROALBUMIN/CREAT UR: 160.51 MG/G CR (ref 0–17)

## 2020-12-03 PROCEDURE — 82043 UR ALBUMIN QUANTITATIVE: CPT | Performed by: FAMILY MEDICINE

## 2020-12-03 PROCEDURE — 36416 COLLJ CAPILLARY BLOOD SPEC: CPT | Performed by: FAMILY MEDICINE

## 2020-12-03 PROCEDURE — 99396 PREV VISIT EST AGE 40-64: CPT | Mod: 25 | Performed by: FAMILY MEDICINE

## 2020-12-03 PROCEDURE — 90471 IMMUNIZATION ADMIN: CPT | Performed by: FAMILY MEDICINE

## 2020-12-03 PROCEDURE — 99213 OFFICE O/P EST LOW 20 MIN: CPT | Mod: 25 | Performed by: FAMILY MEDICINE

## 2020-12-03 PROCEDURE — G0296 VISIT TO DETERM LDCT ELIG: HCPCS | Performed by: FAMILY MEDICINE

## 2020-12-03 PROCEDURE — 99207 PR NO CHARGE NURSE ONLY: CPT

## 2020-12-03 PROCEDURE — 90682 RIV4 VACC RECOMBINANT DNA IM: CPT | Performed by: FAMILY MEDICINE

## 2020-12-03 PROCEDURE — 85610 PROTHROMBIN TIME: CPT | Performed by: FAMILY MEDICINE

## 2020-12-03 RX ORDER — LOSARTAN POTASSIUM 100 MG/1
100 TABLET ORAL DAILY
Qty: 90 TABLET | Refills: 3 | Status: SHIPPED | OUTPATIENT
Start: 2020-12-03 | End: 2021-12-06

## 2020-12-03 ASSESSMENT — MIFFLIN-ST. JEOR: SCORE: 1610.51

## 2020-12-03 NOTE — PATIENT INSTRUCTIONS
You will be contacted in24 hours for results of your lab tests.  INR result to be routed to the INR clinic.    Schedule fasting lab appointment at your convenience.    Schedule colonoscopy February or March of 2021.    To schedule the low dose CT scan of chest for lung cancer screening, call [phone].    Be consistent with low trans fat and saturated fat diet.  Eat food rich in omega-3-fatty acids as you tolerate. (salmon, olive oil)  Eat 5 cups of vegetables, fruits and whole grains per day.  Limit starchy food (white rice, white bread, white pasta, white potatoes) to less than a cup per meal.  Minimize sweets, junk food and fastfood. Limit soda beverages to one serving per day; best to avoid it altogether though.  Exercise: moderate intensity sustained for at least 30 mins per episode, goal of 150 mins per week at least  Combine cardiovascular and resistance exercises.  These exercise recommendations are in addition to your daily activity at work or home.  Work on losing weight if you are above your goal body mass index.    Preventative Care Visits include: Yearly physicals, Well-child visits, Welcome to Medicare visits, & Medicare yearly wellness exams.    The purpose of these visits is to discuss your medical history and prevent health problems before you are sick.  You may need to pay a copay, coinsurance or deductible if your visit today includes testing or treating for a new or existing condition.    Additional charges may be incurred for today's visit. If you have questions about what your insurance plan covers, please contact your Insurance Company's member service department.  If you have questions specific to a bill you have already received from [facility] [facility], please contact the [facility] Billing Office at [phone].     Preventive Health Recommendations  Male Ages 50 - 64    Yearly exam:             See your health care provider every year in order to  o   Review health changes.   o   Discuss  preventive care.    o   Review your medicines if your doctor has prescribed any.     Have a cholesterol test every 5 years, or more frequently if you are at risk for high cholesterol/heart disease.     Have a diabetes test (fasting glucose) every three years. If you are at risk for diabetes, you should have this test more often.     Have a colonoscopy at age 50, or have a yearly FIT test (stool test). These exams will check for colon cancer.      Talk with your health care provider about whether or not a prostate cancer screening test (PSA) is right for you.    You should be tested each year for STDs (sexually transmitted diseases), if you re at risk.     Shots: Get a flu shot each year. Get a tetanus shot every 10 years.     Nutrition:    Eat at least 5 servings of fruits and vegetables daily.     Eat whole-grain bread, whole-wheat pasta and brown rice instead of white grains and rice.     Get adequate Calcium and Vitamin D.     Lifestyle    Exercise for at least 150 minutes a week (30 minutes a day, 5 days a week). This will help you control your weight and prevent disease.     Limit alcohol to one drink per day.     No smoking.     Wear sunscreen to prevent skin cancer.     See your dentist every six months for an exam and cleaning.     See your eye doctor every 1 to 2 years.    Lung Cancer Screening   Frequently Asked Questions  If you are at high-risk for lung cancer, getting screened with low-dose computed tomography (LDCT) every year can help save your life. This handout offers answers to some of the most common questions about lung cancer screening. If you have other questions, please call [phone] ([phone]).     What is it?  Lung cancer screening uses special X-ray technology to create an image of your lung tissue. The exam is quick and easy and takes less than 10 seconds. We don t give you any medicine or use any needles. You can eat before and after the exam. You don t need to change your  clothes as long as the clothing on your chest doesn t contain metal. But, you do need to be able to hold your breath for at least 6 seconds during the exam.    What is the goal of lung cancer screening?  The goal of lung cancer screening is to save lives. Many times, lung cancer is not found until a person starts having physical symptoms. Lung cancer screening can help detect lung cancer in the earliest stages when it may be easier to treat.    Who should be screened for lung cancer?  We suggest lung cancer screening for anyone who is at high-risk for lung cancer. You are in the high-risk group if you:      are between the ages of 55 and 79, and    have smoked at least 1 pack of cigarettes a day for 30 or more years, and    still smoke or have quit within the past 15 years.    However, if you have a new cough or shortness of breath, you should talk to your doctor before being screened.    Some national lung health advocacy groups also recommend screening for people ages 50 to 79 who have smoked an average of 1 pack of cigarettes a day for 20 years. They must also have at least 1 other risk factor for lung cancer, not including exposure to secondhand smoke. Other risk factors are having had cancer in the past, emphysema, pulmonary fibrosis, COPD, a family history of lung cancer, or exposure to certain materials such as arsenic, asbestos, beryllium, cadmium, chromium, diesel fumes, nickel, radon or silica. Your care team can help you know if you have one of these risk factors.     Why does it matter if I have symptoms?  Certain symptoms can be a sign that you have a condition in your lungs that should be checked and treated by your doctor. These symptoms include fever, chest pain, a new or changing cough, shortness of breath that you have never felt before, coughing up blood or unexplained weight loss. Having any of these symptoms can greatly affect the results of lung cancer screening.       Should all smokers get an  LDCT lung cancer screening exam?  It depends. Lung cancer screening is for a very specific group of men and women who have a history of heavy smoking over a long period of time (see  Who should be screened for lung cancer  above).  I am in the high-risk group, but have been diagnosed with cancer in the past. Is LDCT lung cancer screening right for me?  In some cases, you should not have LDCT lung screening, such as when your doctor is already following your cancer with CT scan studies. Your doctor will help you decide if LDCT lung screening is right for you.  Do I need to have a screening exam every year?  Yes. If you are in the high-risk group described earlier, you should get an LDCT lung cancer screening exam every year until you are 79, or are no longer willing or able to undergo screening and possible procedures to diagnose and treat lung cancer.  How effective is LDCT at preventing death from lung cancer?  Studies have shown that LDCT lung cancer screening can lower the risk of death from lung cancer by 20 percent in people who are at high-risk.  What are the risks?  There are some risks and limitations of LDCT lung cancer screening. We want to make sure you understand the risks and benefits, so please let us know if you have any questions. Your doctor may want to talk with you more about these risks.    Radiation exposure: As with any exam that uses radiation, there is a very small increased risk of cancer. The amount of radiation in LDCT is small--about the same amount a person would get from a mammogram. Your doctor orders the exam when he or she feels the potential benefits outweigh the risks.    False negatives: No test is perfect, including LDCT. It is possible that you may have a medical condition, including lung cancer, that is not found during your exam. This is called a false negative result.    False positives and more testing: LDCT very often finds something in the lung that could be cancer, but in  fact is not. This is called a false positive result. False positive tests often cause anxiety. To make sure these findings are not cancer, you may need to have more tests. These tests will be done only if you give us permission. Sometimes patients need a treatment that can have side effects, such as a biopsy. For more information on false positives, see  What can I expect from the results?     Findings not related to lung cancer: Your LDCT exam also takes pictures of areas of your body next to your lungs. In a very small number of cases, the CT scan will show an abnormal finding in one of these areas, such as your kidneys, adrenal glands, liver or thyroid. This finding may not be serious, but you may need more tests. Your doctor can help you decide what other tests you may need, if any.  What can I expect from the results?  About 1 out of 4 LDCT exams will find something that may need more tests. Most of the time, these findings are lung nodules. Lung nodules are very small collections of tissue in the lung. These nodules are very common, and the vast majority--more than 97 percent--are not cancer (benign). Most are normal lymph nodes or small areas of scarring from past infections.  But, if a small lung nodule is found to be cancer, the cancer can be cured more than 90 percent of the time. To know if the nodule is cancer, we may need to get more images before your next yearly screening exam. If the nodule has suspicious features (for example, it is large, has an odd shape or grows over time), we will refer you to a specialist for further testing.  Will my doctor also get the results?  Yes. Your doctor will get a copy of your results.  Is it okay to keep smoking now that there s a cancer screening exam?  No. Tobacco is one of the strongest cancer-causing agents. It causes not only lung cancer, but other cancers and cardiovascular (heart) diseases as well. The damage caused by smoking builds over time. This means  that the longer you smoke, the higher your risk of disease. While it is never too late to quit, the sooner you quit, the better.  Where can I find help to quit smoking?  The best way to prevent lung cancer is to stop smoking. If you have already quit smoking, congratulations and keep it up! For help on quitting smoking, please call [facility] at 7-622-109-FZTV ([phone]) or the [employer] at 1-906.499.5559 to find local resources near you.  One-on-one health coaching:  If you d prefer to work individually with a health care provider on tobacco cessation, we offer:      Medication Therapy Management:  Our specially trained pharmacists work closely with you and your doctor to help you quit smoking.  Call [phone] or [phone] (toll free).     Can Do: Health coaching offered by [facility].  [email or web address]

## 2020-12-03 NOTE — PROGRESS NOTES
Anticoagulation Management    Unable to reach Jordan today.    Today's INR result of 1.9 is subtherapeutic (goal INR of 2.0-3.0).  Result received from: Clinic Lab    Follow up required to confirm warfarin dose taken and assess for changes    VM left to call [facility] back. This patient is a resumption of care from Sept. He went off his warfarin and had another DVT so is now back on and bridging with lovenox. If doses were taken correctly, will continue with 5 mg daily as he was on this dose in Sept and in range. Recheck INR on  and continue lovenox.      Anticoagulation clinic to follow up    Maryellen Alberts RN  ANTICOAGULATION FOLLOW-UP CLINIC VISIT    Patient Name:  Jordan Barnett  Date:  12/3/2020  Contact Type:  Telephone    SUBJECTIVE:  Patient Findings     Comments:  Writer confirmed patient has been taking warfarin and Lovenox. He will need a refill of Lovenox so will send to [facility] in [place] per pt request. See notes from earlier today. Recheck INR on .        Clinical Outcomes     Negatives:  Major bleeding event, Thromboembolic event, Anticoagulation-related hospital admission, Anticoagulation-related ED visit, Anticoagulation-related fatality    Comments:  Writer confirmed patient has been taking warfarin and Lovenox. He will need a refill of Lovenox so will send to [facility] in [place] per pt request. See notes from earlier today. Recheck INR on .           OBJECTIVE    Recent labs: (last 7 days)     20  1111   INR 1.90*       ASSESSMENT / PLAN  INR assessment SUB    Recheck INR In: 4 DAYS    INR Location Clinic      Anticoagulation Summary  As of 12/3/2020    INR goal:  2.0-3.0   TTR:  --   INR used for dosin.90 (12/3/2020)   Warfarin maintenance plan:  5 mg (5 mg x 1) every day   Full warfarin instructions:  5 mg every day   Weekly warfarin total:  35 mg   Plan last modified:  Maryellen Alberts RN (12/3/2020)   Next INR check:  2020   Priority:  High   Target end date:   Indefinite    Indications    Acute deep vein thrombosis (DVT) of other specified vein of right lower extremity (H) [I82.491]             Anticoagulation Episode Summary     INR check location:      Preferred lab:      Send INR reminders to:  FLAVIO FRANCIS    Comments:  * recurrent dvts      Anticoagulation Care Providers     Provider Role Specialty Phone number    Kaleb Barnett MD Referring Emergency Medicine [phone]    Juan Antonio Flanagan MD Referring Family Medicine [phone]            See the Encounter Report to view Anticoagulation Flowsheet and Dosing Calendar (Go to Encounters tab in chart review, and find the Anticoagulation Therapy Visit)        Maryellen Alberts RN

## 2020-12-03 NOTE — PROGRESS NOTES
3  SUBJECTIVE:   CC: Jordan Barnett is an 60 year old male who presents for preventive health visit.       Patient has been advised of split billing requirements and indicates understanding: Yes  Healthy Habits:    Do you get at least three servings of calcium containing foods daily (dairy, green leafy vegetables, etc.)? yes    Amount of exercise or daily activities, outside of work: pt active at work, sometimes walks before work    Problems taking medications regularly No    Medication side effects: No    Have you had an eye exam in the past two years? yes    Do you see a dentist twice per year? yes    Do you have sleep apnea, excessive snoring or daytime drowsiness?no      ED/UC Followup:    Facility:  [facility]  Date of visit: 20  Reason for visit: right lower leg pain and swelling, DVT  Current Status: Pt feeling pretty good, some achiness at times with leg.  Patient is already with INR clinic. Has seen hematology for DVT in the past.         Today's PHQ-2 Score:   PHQ-2 (  [employer]) 12/3/2020 10/29/2019   Q1: Little interest or pleasure in doing things 0 0   Q2: Feeling down, depressed or hopeless 0 0   PHQ-2 Score 0 0   Q1: Little interest or pleasure in doing things - -   Q2: Feeling down, depressed or hopeless - -   PHQ-2 Score - -       Abuse: Current or Past(Physical, Sexual or Emotional)- No  Do you feel safe in your environment? Yes        Social History     Tobacco Use     Smoking status: Former Smoker     Packs/day: 1.00     Years: 30.00     Pack years: 30.00     Types: Cigarettes     Quit date: 2012     Years since quittin.6     Smokeless tobacco: Former User     Quit date: 2012     Tobacco comment: 2 PPD   Substance Use Topics     Alcohol use: Yes     Comment: RARE     If you drink alcohol do you typically have >3 drinks per day or >7 drinks per week? No                      Last PSA:   PSA   Date Value Ref Range Status   2019 2.47 0 - 4 ug/L Final     Comment:     Assay  Method:  Chemiluminescence using Siemens Vista analyzer       Reviewed orders with patient. Reviewed health maintenance and updated orders accordingly - Yes  Patient Active Problem List   Diagnosis     Severe bipolar I disorder, most recent episode mixed, with psychotic features (H)     Sebaceous cyst     CKD (chronic kidney disease) stage 3, GFR 30-59 ml/min     Schizoaffective disorder (H)     Hyperlipidemia LDL goal <160     Tubular adenoma of colon     Former smoker     Benign essential hypertension     Former smoker, stopped smoking in distant past     Deep vein thrombosis (DVT) (H)     Acute deep vein thrombosis (DVT) of femoral vein of right lower extremity (H)     Acute deep vein thrombosis (DVT) of right lower extremity, unspecified vein (H)     Acute deep vein thrombosis (DVT) of other specified vein of right lower extremity (H)     Past Surgical History:   Procedure Laterality Date     COLONOSCOPY      normal. has fam hx colon cancer     COLONOSCOPY N/A 10/13/2014    Procedure: COLONOSCOPY;  Surgeon: Santy Constantino MD;  Location: [facility]     HERNIA REPAIR, INGUINAL RT/LT  2004     SURGICAL HISTORY OF -       Incised & Drained - ? Perirectal Abscess        Social History     Tobacco Use     Smoking status: Former Smoker     Packs/day: 1.00     Years: 30.00     Pack years: 30.00     Types: Cigarettes     Quit date: 2012     Years since quittin.6     Smokeless tobacco: Former User     Quit date: 2012     Tobacco comment: 2 PPD   Substance Use Topics     Alcohol use: Yes     Comment: RARE     Family History   Problem Relation Age of Onset     Thyroid Disease Mother      Coronary Artery Disease Mother         pacemaker     Cancer Paternal Grandfather      Cancer Sister      Cancer - colorectal Sister      Mental Illness Nephew         committed suicide at age 27         Current Outpatient Medications   Medication Sig Dispense Refill     cholecalciferol 5000 units (125 mcg) PO capsule Take  by mouth daily       enoxaparin ANTICOAGULANT (LOVENOX) 80 MG/0.8ML syringe Inject 0.8 mLs (80 mg) Subcutaneous 2 times daily for 7 days 11.2 mL 0     lamoTRIgine (LAMICTAL) 100 MG tablet Take 1 tablet (100 mg) by mouth 2 times daily 60 tablet 0     losartan (COZAAR) 100 MG tablet TAKE 1 TABLET BY MOUTH ONCE DAILY 90 tablet 3     Multiple Vitamins-Minerals (MULTIVITAMIN ADULT PO) Take 1 tablet by mouth At Bedtime        OLANZapine (ZYPREXA) 5 MG tablet Take 5 mg by mouth 2 times daily       Omega-3 Fatty Acids (OMEGA 3 PO) Take 2,400 mg by mouth every evening       warfarin ANTICOAGULANT (COUMADIN) 5 MG tablet Take 5mg daily or as directed by the Anticoagulation Clinic 90 tablet 0     Allergies   Allergen Reactions     Lisinopril Nausea     Felt weakness nausea, couldn't sleep       Reviewed and updated as needed this visit by clinical staff  Tobacco  Allergies  Meds  Problems  Med Hx  Surg Hx  Fam Hx  Soc Hx          Reviewed and updated as needed this visit by Provider  Tobacco  Allergies  Meds  Problems  Med Hx  Surg Hx  Fam Hx         Past Medical History:   Diagnosis Date     CKD (chronic kidney disease)     stage 3     Depressive disorder, not elsewhere classified     Manic      HTN (hypertension)      Unspecified schizophrenia, unspecified condition       Past Surgical History:   Procedure Laterality Date     COLONOSCOPY      normal. has fam hx colon cancer     COLONOSCOPY N/A 10/13/2014    Procedure: COLONOSCOPY;  Surgeon: Santy Constantino MD;  Location: [facility]     HERNIA REPAIR, INGUINAL RT/LT  02/12/2004     SURGICAL HISTORY OF -       Incised & Drained - ? Perirectal Abscess        ROS:  CONSTITUTIONAL: NEGATIVE for fever, chills, change in weight  INTEGUMENTARY/SKIN: NEGATIVE for worrisome rashes, moles or lesions  EYES: NEGATIVE for vision changes or irritation  ENT: NEGATIVE for ear, mouth and throat problems  RESP: NEGATIVE for significant cough or SOB  CV: NEGATIVE for chest pain,  "palpitations or peripheral edema  GI: NEGATIVE for nausea, abdominal pain, heartburn, or change in bowel habits   male: negative for dysuria, hematuria, decreased urinary stream, erectile dysfunction, urethral discharge  MUSCULOSKELETAL: NEGATIVE for significant arthralgias or myalgia  NEURO: NEGATIVE for weakness, dizziness or paresthesias  ENDOCRINE: NEGATIVE for temperature intolerance, skin/hair changes  HEME/ALLERGY/IMMUNE: NEGATIVE for bleeding problems  PSYCHIATRIC: NEGATIVE for changes in mood or affect    OBJECTIVE:   /84   Pulse 67   Temp 96.6  F (35.9  C) (Tympanic)   Resp 14   Ht 1.778 m (5' 10\")   Wt 78.9 kg (174 lb)   SpO2 97%   BMI 24.97 kg/m    EXAM:  GENERAL APPEARANCE: well-nourished, alert and no distress  EYES: pink conj, no icterus, PERRL, EOMI  HENT: ear canals and TM's normal, nose and mouth without ulcers or lesions, oropharynx clear and oral mucous membranes moist  NECK: no adenopathy, no asymmetry, masses, or scars and thyroid normal to palpation  RESP: lungs clear to auscultation - no rales, rhonchi or wheezes  CV: regular rates and rhythm, normal S1 S2, no S3 or S4, no murmur, click or rub  ABDOMEN: soft, nontender, no hepatosplenomegaly, no masses and bowel sounds normal  RECTAL: deferred  MS: no musculoskeletal defects are noted and gait is age appropriate without ataxia; mild right lower leg swelling but no TTP and no skin ulcer.  SKIN: no suspicious lesions or rashes  NEURO: Normal strength and tone, sensory exam grossly normal, mentation intact and speech normal    Diagnostic Test Results:  none     ASSESSMENT/PLAN:   Jordan was seen today for physical, er f/u, flu shot and imm/inj.    Diagnoses and all orders for this visit:    Routine general medical examination at a health care facility  Patient was advised on recommended screening and preventive health recommendations.  He verbalized understanding and agreed to the plans below.    Acute deep vein thrombosis (DVT) of " "femoral vein of right lower extremity (H)  -     INR  -     OFFICE/OUTPT VISIT,EST,LEVL III  Recurrent.  Patient back on anticoagulation.  Will route note to hematology for follow up - patient last saw them 3 months ago.    Essential hypertension with goal blood pressure less than 140/90  -     OFFICE/OUTPT VISIT,EST,LEVL III  Controlled.  Low salt, low fat diet.   Exercise as tolerated.  Take meds as prescribed; call if with side effects.     Stage 3 chronic kidney disease, unspecified whether stage 3a or 3b CKD  -     Albumin Random Urine Quantitative with Creat Ratio  Monitor renal function yearly.    Need for prophylactic vaccination and inoculation against influenza  -     INFLUENZA QUAD, RECOMBINANT, P-FREE (RIV4) (FLUBLOCK) [76688]    Mixed hyperlipidemia  -     Lipid panel reflex to direct LDL Fasting; Future  Reinforced heart healthy lifestyle.    Screening for malignant neoplasm of colon  -     GASTROENTEROLOGY ADULT REF PROCEDURE ONLY; Future  Will defer for at least 2-3 months as he recently restarted anticoagulation and had a recurrence of DVT.    Personal history of tobacco use  -     Prof Fee: Shared Decision Making Visit for Lung Cancer Screening  -     CT Chest Lung Cancer Scrn Low Dose wo; Future    In addition to preventive health visit, spent another 12 minutes in counseling and coordination of care as above.        Patient has been advised of split billing requirements and indicates understanding: Yes  COUNSELING:  Reviewed preventive health counseling, as reflected in patient instructions    Estimated body mass index is 24.97 kg/m  as calculated from the following:    Height as of this encounter: 1.778 m (5' 10\").    Weight as of this encounter: 78.9 kg (174 lb).        He reports that he quit smoking about 8 years ago. His smoking use included cigarettes. He has a 30.00 pack-year smoking history. He quit smokeless tobacco use about 8 years ago.      Counseling Resources:  ATP IV " Guidelines  Pooled Cohorts Equation Calculator  FRAX Risk Assessment  ICSI Preventive Guidelines  Dietary Guidelines for Americans, 2010  [email or web address]'s MyPlate  ASA Prophylaxis  Lung CA Screening    Juan Antonio Flanagan MD  [facility]  Lung Cancer Screening Shared Decision Making Visit     Jordan Barnett is eligible for lung cancer screening on the basis of the information provided in my signed lung cancer screening order.     I have discussed with patient the risks and benefits of screening for lung cancer with low-dose CT.     The risks include:  radiation exposure: one low dose chest CT has as much ionizing radiation as about 15 chest x-rays or 6 months of background radiation living in [place]    false positives: 96% of positive findings/nodules are NOT cancer, but some might still require additional diagnostic evaluation, including biopsy  over-diagnosis: some slow growing cancers that might never have been clinically significant will be detected and treated unnecessarily     The benefit of early detection of lung cancer is contingent upon adherence to annual screening or more frequent follow up if indicated.     Furthermore, reaping the benefits of screening requires Jordan Barnett to be willing and physically able to undergo diagnostic procedures, if indicated. Although no specific guide is available for determining severity of comorbidities, it is reasonable to withhold screening in patients who have greater mortality risk from other diseases.     We did discuss that the only way to prevent lung cancer is to not smoke. Smoking cessation counseling was not given.      I did offer risk estimation using a calculator such as this one:    ShouldIScreen

## 2020-12-03 NOTE — TELEPHONE ENCOUNTER
Dr. Flanagan-    Could you please complete a new anticoagulation clinic referral? The current one we have is from a hospitalist and we need one from PCP. It is pended in this encounter.    Thank you,  Maryellen Alberts RN, BSN, PHN

## 2020-12-07 ENCOUNTER — ANTICOAGULATION THERAPY VISIT (OUTPATIENT)
Dept: ANTICOAGULATION | Facility: CLINIC | Age: 60
End: 2020-12-07

## 2020-12-07 DIAGNOSIS — N18.30 STAGE 3 CHRONIC KIDNEY DISEASE, UNSPECIFIED WHETHER STAGE 3A OR 3B CKD (H): ICD-10-CM

## 2020-12-07 DIAGNOSIS — E78.2 MIXED HYPERLIPIDEMIA: ICD-10-CM

## 2020-12-07 DIAGNOSIS — I82.491 ACUTE DEEP VEIN THROMBOSIS (DVT) OF OTHER SPECIFIED VEIN OF RIGHT LOWER EXTREMITY (H): ICD-10-CM

## 2020-12-07 LAB
CAPILLARY BLOOD COLLECTION: NORMAL
INR PPP: 2.4 (ref 0.86–1.14)

## 2020-12-07 PROCEDURE — 36416 COLLJ CAPILLARY BLOOD SPEC: CPT | Performed by: FAMILY MEDICINE

## 2020-12-07 PROCEDURE — 99207 PR NO CHARGE NURSE ONLY: CPT

## 2020-12-07 PROCEDURE — 85610 PROTHROMBIN TIME: CPT | Performed by: FAMILY MEDICINE

## 2020-12-07 NOTE — PROGRESS NOTES
Addendum: Patient read mychart.    Maryellen Alberts RN, BSN, PHN  20    ANTICOAGULATION FOLLOW-UP CLINIC VISIT    Patient Name:  Jordan Barnett  Date:  2020  Contact Type:  Telephone/ VM left and mychart sent    SUBJECTIVE:  Patient Findings     Comments:  Unable to reach patient. Mychart message sent with instructions to reply or call [facility] back. Patient may stop lovenox. Next INR on 12-10 after CT scan.        Clinical Outcomes     Comments:  Unable to reach patient. Mychart message sent with instructions to reply or call [facility] back. Patient may stop lovenox. Next INR on 12-10 after CT scan.           OBJECTIVE    Recent labs: (last 7 days)     20  0835   INR 2.40*       ASSESSMENT / PLAN  INR assessment THER    Recheck INR In: 3 DAYS    INR Location Clinic      Anticoagulation Summary  As of 2020    INR goal:  2.0-3.0   TTR:  --   INR used for dosin.40 (2020)   Warfarin maintenance plan:  5 mg (5 mg x 1) every day   Full warfarin instructions:  5 mg every day   Weekly warfarin total:  35 mg   No change documented:  Maryellen Alberts RN   Plan last modified:  Maryellen Alberts RN (12/3/2020)   Next INR check:  12/10/2020   Priority:  High   Target end date:  Indefinite    Indications    Acute deep vein thrombosis (DVT) of other specified vein of right lower extremity (H) [I82.491]             Anticoagulation Episode Summary     INR check location:      Preferred lab:      Send INR reminders to:  FLAVIO FRANCIS    Comments:  * recurrent dvts      Anticoagulation Care Providers     Provider Role Specialty Phone number    Kaleb Barnett MD Referring Emergency Medicine [phone]    Juan Antonio Flanagan MD Referring Family Medicine [phone]            See the Encounter Report to view Anticoagulation Flowsheet and Dosing Calendar (Go to Encounters tab in chart review, and find the Anticoagulation Therapy Visit)        Maryellen Alberts RN

## 2020-12-10 ENCOUNTER — HOSPITAL ENCOUNTER (OUTPATIENT)
Dept: ULTRASOUND IMAGING | Facility: CLINIC | Age: 60
End: 2020-12-10
Attending: FAMILY MEDICINE
Payer: COMMERCIAL

## 2020-12-10 ENCOUNTER — ANTICOAGULATION THERAPY VISIT (OUTPATIENT)
Dept: ANTICOAGULATION | Facility: CLINIC | Age: 60
End: 2020-12-10

## 2020-12-10 DIAGNOSIS — I82.491 ACUTE DEEP VEIN THROMBOSIS (DVT) OF OTHER SPECIFIED VEIN OF RIGHT LOWER EXTREMITY (H): ICD-10-CM

## 2020-12-10 DIAGNOSIS — N18.30 STAGE 3 CHRONIC KIDNEY DISEASE, UNSPECIFIED WHETHER STAGE 3A OR 3B CKD (H): Primary | ICD-10-CM

## 2020-12-10 DIAGNOSIS — E78.2 MIXED HYPERLIPIDEMIA: ICD-10-CM

## 2020-12-10 DIAGNOSIS — F17.200 SMOKER: ICD-10-CM

## 2020-12-10 LAB
ANION GAP SERPL CALCULATED.3IONS-SCNC: 5 MMOL/L (ref 3–14)
BUN SERPL-MCNC: 32 MG/DL (ref 7–30)
CALCIUM SERPL-MCNC: 9.3 MG/DL (ref 8.5–10.1)
CHLORIDE SERPL-SCNC: 105 MMOL/L (ref 94–109)
CHOLEST SERPL-MCNC: 174 MG/DL
CO2 SERPL-SCNC: 26 MMOL/L (ref 20–32)
CREAT SERPL-MCNC: 1.52 MG/DL (ref 0.66–1.25)
GFR SERPL CREATININE-BSD FRML MDRD: 49 ML/MIN/{1.73_M2}
GLUCOSE SERPL-MCNC: 90 MG/DL (ref 70–99)
HDLC SERPL-MCNC: 77 MG/DL
INR PPP: 2.3 (ref 0.86–1.14)
LDLC SERPL CALC-MCNC: 77 MG/DL
NONHDLC SERPL-MCNC: 97 MG/DL
POTASSIUM SERPL-SCNC: 4.2 MMOL/L (ref 3.4–5.3)
SODIUM SERPL-SCNC: 136 MMOL/L (ref 133–144)
TRIGL SERPL-MCNC: 101 MG/DL

## 2020-12-10 PROCEDURE — 85610 PROTHROMBIN TIME: CPT | Performed by: FAMILY MEDICINE

## 2020-12-10 PROCEDURE — 80048 BASIC METABOLIC PNL TOTAL CA: CPT | Performed by: FAMILY MEDICINE

## 2020-12-10 PROCEDURE — 76775 US EXAM ABDO BACK WALL LIM: CPT

## 2020-12-10 PROCEDURE — 36415 COLL VENOUS BLD VENIPUNCTURE: CPT | Performed by: FAMILY MEDICINE

## 2020-12-10 PROCEDURE — 80061 LIPID PANEL: CPT | Performed by: FAMILY MEDICINE

## 2020-12-10 NOTE — PROGRESS NOTES
ANTICOAGULATION MANAGEMENT     Patient Name:  Jordan Barnett  Date:  12/10/2020    ASSESSMENT /SUBJECTIVE:    Today's INR result of 2.30 is therapeutic. Goal INR of 2.0-3.0      Warfarin dose taken: Warfarin taken as instructed    Diet: No new diet changes affecting INR    Medication changes/ interactions: No new medications/supplements affecting INR    Previous INR: Therapeutic 2.40    S/S of bleeding or thromboembolism: No    New injury or illness: No    Upcoming surgery, procedure or cardioversion: No    Additional findings: Patient states he has been very busy, no other changes noted.      PLAN:    Telephone call with Jordan regarding INR result and instructed:     Warfarin Dosing Instructions: Continue your current warfarin dose 5mg daily    Instructed patient to follow up no later than: 1 week  Lab visit scheduled    Education provided: Contact the anticoagulation clinic with any changes, questions or concerns at #[phone]       Jordan verbalizes understanding and agrees to warfarin dosing plan.    Instructed to call the Anticoagulation Clinic for any changes, questions or concerns. (#[phone])        Amy Renteria RN CACP       OBJECTIVE:  Recent labs: (last 7 days)     20  1111 20  0835 12/10/20  0741   INR 1.90* 2.40* 2.30*         INR assessment THER    Recheck INR In: 1 WEEK    INR Location Clinic      Anticoagulation Summary  As of 12/10/2020    INR goal:  2.0-3.0   TTR:  100.0 % (2 d)   INR used for dosin.30 (12/10/2020)   Warfarin maintenance plan:  5 mg (5 mg x 1) every day   Full warfarin instructions:  5 mg every day   Weekly warfarin total:  35 mg   No change documented:  Amy Renteria RN   Plan last modified:  Maryellen Alberts RN (12/3/2020)   Next INR check:  2020   Priority:  High   Target end date:  Indefinite    Indications    Acute deep vein thrombosis (DVT) of other specified vein of right lower extremity (H) [I82.491]             Anticoagulation  Episode Summary     INR check location:      Preferred lab:      Send INR reminders to:  FLAVIO FRANCIS    Comments:  * recurrent dvts      Anticoagulation Care Providers     Provider Role Specialty Phone number    Kaleb Barnett MD Referring Emergency Medicine [phone]    Juan Antonio Flanagan MD Referring Family Medicine [phone]

## 2020-12-17 ENCOUNTER — HOSPITAL ENCOUNTER (OUTPATIENT)
Dept: CT IMAGING | Facility: CLINIC | Age: 60
Discharge: HOME | End: 2020-12-17
Attending: FAMILY MEDICINE | Admitting: FAMILY MEDICINE
Payer: COMMERCIAL

## 2020-12-17 ENCOUNTER — ANTICOAGULATION THERAPY VISIT (OUTPATIENT)
Dept: ANTICOAGULATION | Facility: CLINIC | Age: 60
End: 2020-12-17

## 2020-12-17 DIAGNOSIS — I82.491 ACUTE DEEP VEIN THROMBOSIS (DVT) OF OTHER SPECIFIED VEIN OF RIGHT LOWER EXTREMITY (H): ICD-10-CM

## 2020-12-17 LAB
CAPILLARY BLOOD COLLECTION: NORMAL
INR PPP: 3.1 (ref 0.86–1.14)

## 2020-12-17 PROCEDURE — 36416 COLLJ CAPILLARY BLOOD SPEC: CPT | Performed by: FAMILY MEDICINE

## 2020-12-17 PROCEDURE — G0297 LDCT FOR LUNG CA SCREEN: HCPCS

## 2020-12-17 PROCEDURE — 85610 PROTHROMBIN TIME: CPT | Performed by: FAMILY MEDICINE

## 2020-12-23 ENCOUNTER — ANTICOAGULATION THERAPY VISIT (OUTPATIENT)
Dept: ANTICOAGULATION | Facility: CLINIC | Age: 60
End: 2020-12-23

## 2020-12-23 DIAGNOSIS — I82.491 ACUTE DEEP VEIN THROMBOSIS (DVT) OF OTHER SPECIFIED VEIN OF RIGHT LOWER EXTREMITY (H): ICD-10-CM

## 2020-12-23 LAB
CAPILLARY BLOOD COLLECTION: NORMAL
INR PPP: 2.7 (ref 0.86–1.14)

## 2020-12-23 PROCEDURE — 85610 PROTHROMBIN TIME: CPT | Performed by: FAMILY MEDICINE

## 2020-12-23 PROCEDURE — 99207 PR NO CHARGE NURSE ONLY: CPT

## 2020-12-23 PROCEDURE — 36416 COLLJ CAPILLARY BLOOD SPEC: CPT | Performed by: FAMILY MEDICINE

## 2020-12-23 NOTE — PROGRESS NOTES
ANTICOAGULATION FOLLOW-UP CLINIC VISIT    Patient Name:  Jordan Barnett  Date:  2020  Contact Type:  Telephone    SUBJECTIVE:  Patient Findings     Comments:  Patient will continue weekly maintenance dose. INR is therapeutic.   Recheck in 1 week.  Patient verbalizes understanding and agrees to plan. No further questions or concerns.          Clinical Outcomes     Negatives:  Major bleeding event, Thromboembolic event, Anticoagulation-related hospital admission, Anticoagulation-related ED visit, Anticoagulation-related fatality    Comments:  Patient will continue weekly maintenance dose. INR is therapeutic.   Recheck in 1 week.  Patient verbalizes understanding and agrees to plan. No further questions or concerns.             OBJECTIVE    Recent labs: (last 7 days)     20  0818   INR 2.70*       ASSESSMENT / PLAN  INR assessment THER    Recheck INR In: 1 WEEK    INR Location Outside lab      Anticoagulation Summary  As of 2020    INR goal:  2.0-3.0   TTR:  84.5 % (2.1 wk)   INR used for dosin.70 (2020)   Warfarin maintenance plan:  5 mg (5 mg x 1) every day   Full warfarin instructions:  5 mg every day   Weekly warfarin total:  35 mg   No change documented:  Lynda Couch RN   Plan last modified:  Maryellen Alberts RN (12/3/2020)   Next INR check:  2020   Priority:  High   Target end date:  Indefinite    Indications    Acute deep vein thrombosis (DVT) of other specified vein of right lower extremity (H) [I82.491]             Anticoagulation Episode Summary     INR check location:      Preferred lab:      Send INR reminders to:  FLAVIO FRANCIS    Comments:  * recurrent dvts      Anticoagulation Care Providers     Provider Role Specialty Phone number    Kaleb Barnett MD Referring Emergency Medicine [phone]    Juan Antonio Flanagan MD Referring Family Medicine [phone]            See the Encounter Report to view Anticoagulation Flowsheet and Dosing Calendar  (Go to Encounters tab in chart review, and find the Anticoagulation Therapy Visit)        Lynda Couch RN

## 2020-12-31 ENCOUNTER — ANTICOAGULATION THERAPY VISIT (OUTPATIENT)
Dept: ANTICOAGULATION | Facility: CLINIC | Age: 60
End: 2020-12-31

## 2020-12-31 DIAGNOSIS — I82.491 ACUTE DEEP VEIN THROMBOSIS (DVT) OF OTHER SPECIFIED VEIN OF RIGHT LOWER EXTREMITY (H): ICD-10-CM

## 2020-12-31 LAB
CAPILLARY BLOOD COLLECTION: NORMAL
INR PPP: 3.2 (ref 0.86–1.14)

## 2020-12-31 PROCEDURE — 85610 PROTHROMBIN TIME: CPT | Performed by: FAMILY MEDICINE

## 2020-12-31 PROCEDURE — 36416 COLLJ CAPILLARY BLOOD SPEC: CPT | Performed by: FAMILY MEDICINE

## 2020-12-31 NOTE — PROGRESS NOTES
ANTICOAGULATION MANAGEMENT     Patient Name:  Jordan Barnett  Date:  12/31/2020    ASSESSMENT /SUBJECTIVE:    Today's INR result of 3.20 is therapeutic. Goal INR of 2.0-3.0      Warfarin dose taken: Warfarin taken as instructed    Diet: No new diet changes affecting INR    Medication changes/ interactions: No new medications/supplements affecting INR    Previous INR: Therapeutic 2.70    S/S of bleeding or thromboembolism: No    New injury or illness: No    Upcoming surgery, procedure or cardioversion: No    Additional findings: None      PLAN:    Telephone call with Jordan regarding INR result and instructed:     Warfarin Dosing Instructions: Change your warfarin dose to 2.5mg Thurs; 5mg all other days  . (7 % change)    Instructed patient to follow up no later than: 2 weeks  Lab visit scheduled    Education provided: None required      Jordan verbalizes understanding and agrees to warfarin dosing plan.    Instructed to call the Anticoagulation Clinic for any changes, questions or concerns. (#[phone])        Amy Renteria RN CACP       OBJECTIVE:  Recent labs: (last 7 days)     12/31/20  0808   INR 3.20*         INR assessment SUPRA    Recheck INR In: 2 WEEKS    INR Location Clinic      Anticoagulation Summary  As of 12/31/2020    INR goal:  2.0-3.0   TTR:  76.1 % (3.3 wk)   INR used for dosing:  3.20 (12/31/2020)   Warfarin maintenance plan:  2.5 mg (5 mg x 0.5) every Thu; 5 mg (5 mg x 1) all other days   Full warfarin instructions:  2.5 mg every Thu; 5 mg all other days   Weekly warfarin total:  32.5 mg   Plan last modified:  Amy Renteria RN (12/31/2020)   Next INR check:  1/13/2021   Priority:  High   Target end date:  Indefinite    Indications    Acute deep vein thrombosis (DVT) of other specified vein of right lower extremity (H) [I82.491]             Anticoagulation Episode Summary     INR check location:      Preferred lab:      Send INR reminders to:  FLAVIO FRANCIS    Comments:  *  recurrent dvts      Anticoagulation Care Providers     Provider Role Specialty Phone number    Kaleb Barnett MD Referring Emergency Medicine [phone]    Juan Antonio Flanagan MD Referring Family Medicine [phone]

## 2021-01-13 ENCOUNTER — ANTICOAGULATION THERAPY VISIT (OUTPATIENT)
Dept: ANTICOAGULATION | Facility: CLINIC | Age: 61
End: 2021-01-13

## 2021-01-13 DIAGNOSIS — I82.491 ACUTE DEEP VEIN THROMBOSIS (DVT) OF OTHER SPECIFIED VEIN OF RIGHT LOWER EXTREMITY (H): ICD-10-CM

## 2021-01-13 DIAGNOSIS — I82.401 ACUTE DEEP VEIN THROMBOSIS (DVT) OF RIGHT LOWER EXTREMITY, UNSPECIFIED VEIN (H): ICD-10-CM

## 2021-01-13 LAB
CAPILLARY BLOOD COLLECTION: NORMAL
INR PPP: 3.2 (ref 0.86–1.14)

## 2021-01-13 PROCEDURE — 99207 PR NO CHARGE NURSE ONLY: CPT

## 2021-01-13 PROCEDURE — 36416 COLLJ CAPILLARY BLOOD SPEC: CPT | Performed by: FAMILY MEDICINE

## 2021-01-13 PROCEDURE — 85610 PROTHROMBIN TIME: CPT | Performed by: FAMILY MEDICINE

## 2021-01-13 RX ORDER — WARFARIN SODIUM 5 MG/1
TABLET ORAL
Qty: 90 TABLET | Refills: 0 | COMMUNITY
Start: 2021-01-13 | End: 2021-01-26

## 2021-01-13 NOTE — PROGRESS NOTES
ANTICOAGULATION FOLLOW-UP CLINIC VISIT    Patient Name:  Jordan Barnett  Date:  1/13/2021  Contact Type:  Telephone    SUBJECTIVE:  Patient Findings     Comments:  Maintenance dose adjusted to 30 mg for a 7.7% decrease.   Recheck in 10 days.   Patient verbalizes understanding and agrees to plan. No further questions or concerns.  Patient denies signs or symptoms of bleeding. Writer educated patient regarding increased bleed risk and when to seek immediate medical attention. Patient verbalized understanding.          Clinical Outcomes     Negatives:  Major bleeding event, Thromboembolic event, Anticoagulation-related hospital admission, Anticoagulation-related ED visit, Anticoagulation-related fatality    Comments:  Maintenance dose adjusted to 30 mg for a 7.7% decrease.   Recheck in 10 days.   Patient verbalizes understanding and agrees to plan. No further questions or concerns.  Patient denies signs or symptoms of bleeding. Writer educated patient regarding increased bleed risk and when to seek immediate medical attention. Patient verbalized understanding.             OBJECTIVE    Recent labs: (last 7 days)     01/13/21  0827   INR 3.20*       ASSESSMENT / PLAN  INR assessment SUPRA    Recheck INR In: 10 DAYS    INR Location Outside lab      Anticoagulation Summary  As of 1/13/2021    INR goal:  2.0-3.0   TTR:  48.9 % (1.2 mo)   INR used for dosing:  3.20 (1/13/2021)   Warfarin maintenance plan:  2.5 mg (5 mg x 0.5) every Wed, Sat; 5 mg (5 mg x 1) all other days   Full warfarin instructions:  2.5 mg every Wed, Sat; 5 mg all other days   Weekly warfarin total:  30 mg   Plan last modified:  Lynda Couch RN (1/13/2021)   Next INR check:  1/22/2021   Priority:  High   Target end date:  Indefinite    Indications    Acute deep vein thrombosis (DVT) of other specified vein of right lower extremity (H) [I82.491]             Anticoagulation Episode Summary     INR check location:      Preferred lab:      Send INR  reminders to:  FLAVIO FRANCIS    Comments:  * recurrent dvts      Anticoagulation Care Providers     Provider Role Specialty Phone number    Kaleb Barnett MD Referring Emergency Medicine [phone]    Juan Antonio Flanagan MD Referring Family Medicine [phone]            See the Encounter Report to view Anticoagulation Flowsheet and Dosing Calendar (Go to Encounters tab in chart review, and find the Anticoagulation Therapy Visit)        Lynda Couch RN

## 2021-01-22 ENCOUNTER — ANTICOAGULATION THERAPY VISIT (OUTPATIENT)
Dept: ANTICOAGULATION | Facility: CLINIC | Age: 61
End: 2021-01-22

## 2021-01-22 DIAGNOSIS — I82.491 ACUTE DEEP VEIN THROMBOSIS (DVT) OF OTHER SPECIFIED VEIN OF RIGHT LOWER EXTREMITY (H): ICD-10-CM

## 2021-01-22 LAB
CAPILLARY BLOOD COLLECTION: NORMAL
INR PPP: 1.9 (ref 0.86–1.14)

## 2021-01-22 PROCEDURE — 36416 COLLJ CAPILLARY BLOOD SPEC: CPT | Performed by: FAMILY MEDICINE

## 2021-01-22 PROCEDURE — 85610 PROTHROMBIN TIME: CPT | Performed by: FAMILY MEDICINE

## 2021-01-22 NOTE — PROGRESS NOTES
ANTICOAGULATION MANAGEMENT     Patient Name:  Jordan Barnett  Date:  2021    ASSESSMENT /SUBJECTIVE:    Today's INR result of 1.90 is subtherapeutic. Goal INR of 2.0-3.0      Warfarin dose taken: Warfarin taken differently, but did not change total weekly dose    Diet: No new diet changes affecting INR, had some broccoli but this is usual for him.    Medication changes/ interactions: No new medications/supplements affecting INR    Previous INR: Supratherapeutic 3.20    S/S of bleeding or thromboembolism: No    New injury or illness: No    Upcoming surgery, procedure or cardioversion: No    Additional findings: None      PLAN:    Telephone call with Jordan regarding INR result and instructed:     Warfarin Dosing Instructions: 7.5mg today then change your warfarin dose to 2.5mg Thurs; 5mg all other days  . (8.3 % change)    Instructed patient to follow up no later than: 2 weeks  Lab visit scheduled    Education provided: None required      Jordan verbalizes understanding and agrees to warfarin dosing plan.    Instructed to call the Anticoagulation Clinic for any changes, questions or concerns. (#[phone])        Amy Renteria RN [facility]       OBJECTIVE:  Recent labs: (last 7 days)     21  0837   INR 1.90*         INR assessment SUB    Recheck INR In: 2 WEEKS    INR Location Clinic      Anticoagulation Summary  As of 2021    INR goal:  2.0-3.0   TTR:  54.4 % (1.5 mo)   INR used for dosin.90 (2021)   Warfarin maintenance plan:  2.5 mg (5 mg x 0.5) every Thu; 5 mg (5 mg x 1) all other days   Full warfarin instructions:  : 7.5 mg; Otherwise 2.5 mg every Thu; 5 mg all other days   Weekly warfarin total:  32.5 mg   Plan last modified:  Amy Renteria RN (2021)   Next INR check:  2/3/2021   Priority:  High   Target end date:  Indefinite    Indications    Acute deep vein thrombosis (DVT) of other specified vein of right lower extremity (H) [I82.491]             Anticoagulation  Episode Summary     INR check location:      Preferred lab:      Send INR reminders to:  FLAVIO FRANCIS    Comments:  * recurrent dvts      Anticoagulation Care Providers     Provider Role Specialty Phone number    Kaleb Barnett MD Referring Emergency Medicine [phone]    Juan Antonio Flanagan MD Referring Family Medicine [phone]

## 2021-02-03 ENCOUNTER — ANTICOAGULATION THERAPY VISIT (OUTPATIENT)
Dept: ANTICOAGULATION | Facility: CLINIC | Age: 61
End: 2021-02-03
Payer: COMMERCIAL

## 2021-02-03 DIAGNOSIS — I82.491 ACUTE DEEP VEIN THROMBOSIS (DVT) OF OTHER SPECIFIED VEIN OF RIGHT LOWER EXTREMITY (H): ICD-10-CM

## 2021-02-03 DIAGNOSIS — N18.30 STAGE 3 CHRONIC KIDNEY DISEASE, UNSPECIFIED WHETHER STAGE 3A OR 3B CKD (H): ICD-10-CM

## 2021-02-03 LAB
ANION GAP SERPL CALCULATED.3IONS-SCNC: 4 MMOL/L (ref 3–14)
BUN SERPL-MCNC: 45 MG/DL (ref 7–30)
CALCIUM SERPL-MCNC: 9 MG/DL (ref 8.5–10.1)
CAPILLARY BLOOD COLLECTION: NORMAL
CHLORIDE SERPL-SCNC: 108 MMOL/L (ref 94–109)
CO2 SERPL-SCNC: 26 MMOL/L (ref 20–32)
CREAT SERPL-MCNC: 1.63 MG/DL (ref 0.66–1.25)
GFR SERPL CREATININE-BSD FRML MDRD: 45 ML/MIN/{1.73_M2}
GLUCOSE SERPL-MCNC: 94 MG/DL (ref 70–99)
INR PPP: 2.4 (ref 0.86–1.14)
POTASSIUM SERPL-SCNC: 4.2 MMOL/L (ref 3.4–5.3)
SODIUM SERPL-SCNC: 138 MMOL/L (ref 133–144)

## 2021-02-03 PROCEDURE — 85610 PROTHROMBIN TIME: CPT | Performed by: FAMILY MEDICINE

## 2021-02-03 PROCEDURE — 99207 PR NO CHARGE NURSE ONLY: CPT

## 2021-02-03 PROCEDURE — 80048 BASIC METABOLIC PNL TOTAL CA: CPT | Performed by: FAMILY MEDICINE

## 2021-02-03 PROCEDURE — 36416 COLLJ CAPILLARY BLOOD SPEC: CPT | Performed by: FAMILY MEDICINE

## 2021-02-03 NOTE — PROGRESS NOTES
ADDENDUM: Patient read and replied to [employer] message on 2/3/21 at 8:41 PM.     Maryellen  I received your message. I'll try to make another appointment for an INR in three weeks. Thank you.  Jordan GUEVARA          ANTICOAGULATION FOLLOW-UP CLINIC VISIT    Patient Name:  Jordan Barnett  Date:  2/3/2021  Contact Type:  Telephone/ NDVM left and [employer] sent    SUBJECTIVE:         OBJECTIVE    Recent labs: (last 7 days)     21  0841   INR 2.40*       ASSESSMENT / PLAN  INR assessment THER    Recheck INR In: 3 WEEKS    INR Location Clinic      Anticoagulation Summary  As of 2/3/2021    INR goal:  2.0-3.0   TTR:  59.8 % (1.9 mo)   INR used for dosin.40 (2/3/2021)   Warfarin maintenance plan:  2.5 mg (5 mg x 0.5) every Thu; 5 mg (5 mg x 1) all other days   Full warfarin instructions:  2.5 mg every Thu; 5 mg all other days   Weekly warfarin total:  32.5 mg   No change documented:  Maryellen Alberts RN   Plan last modified:  Amy Renteria RN (2021)   Next INR check:  2021   Priority:  Maintenance   Target end date:  Indefinite    Indications    Acute deep vein thrombosis (DVT) of other specified vein of right lower extremity (H) [I82.491]             Anticoagulation Episode Summary     INR check location:      Preferred lab:      Send INR reminders to:  FLAVIO FRANCIS    Comments:  * recurrent dvts      Anticoagulation Care Providers     Provider Role Specialty Phone number    Kaleb Barnett MD Referring Emergency Medicine [phone]    Juan Antonio Flanagan MD Referring Family Medicine [phone]            See the Encounter Report to view Anticoagulation Flowsheet and Dosing Calendar (Go to Encounters tab in chart review, and find the Anticoagulation Therapy Visit)        Maryellen Alberts RN

## 2021-02-18 LAB — PHQ9 SCORE: 3

## 2021-02-24 ENCOUNTER — ANTICOAGULATION THERAPY VISIT (OUTPATIENT)
Dept: FAMILY MEDICINE | Facility: CLINIC | Age: 61
End: 2021-02-24

## 2021-02-24 DIAGNOSIS — I82.491 ACUTE DEEP VEIN THROMBOSIS (DVT) OF OTHER SPECIFIED VEIN OF RIGHT LOWER EXTREMITY (H): ICD-10-CM

## 2021-02-24 LAB
CAPILLARY BLOOD COLLECTION: NORMAL
INR PPP: 2.2 (ref 0.86–1.14)

## 2021-02-24 PROCEDURE — 36416 COLLJ CAPILLARY BLOOD SPEC: CPT | Performed by: FAMILY MEDICINE

## 2021-02-24 PROCEDURE — 85610 PROTHROMBIN TIME: CPT | Performed by: FAMILY MEDICINE

## 2021-02-24 NOTE — PROGRESS NOTES
ANTICOAGULATION MANAGEMENT     Patient Name:  Jordan Barnett  Date:  2/24/2021    ASSESSMENT /SUBJECTIVE:    Today's INR result of 2.20 is therapeutic. Goal INR of 2.0-3.0      Warfarin dose taken: Warfarin taken as instructed    Diet: No new diet changes affecting INR    Medication changes/ interactions: No new medications/supplements affecting INR    Previous INR: Therapeutic     S/S of bleeding or thromboembolism: No    New injury or illness: No    Upcoming surgery, procedure or cardioversion: No    Additional findings: None      PLAN:    Telephone call with Jordan regarding INR result and instructed:     Warfarin Dosing Instructions: Continue your current warfarin dose 2.5mg every Thu; 5mg all other days of week.     Instructed patient to follow up no later than: 4 weeks  Lab visit scheduled    Education provided: Target INR goal and significance of current INR result, Importance of therapeutic range and Importance of notifying clinic of upcoming surgeries and procedures 2 weeks in advance      Jordan verbalizes understanding and agrees to warfarin dosing plan.    Instructed to call the Anticoagulation Clinic for any changes, questions or concerns. (#[phone])        Zackary Glalagher RN      OBJECTIVE:  Recent labs: (last 7 days)     02/24/21  0845   INR 2.20*         No question data found.  Anticoagulation Summary  As of 2/24/2021    INR goal:  2.0-3.0   TTR:  70.6 % (2.6 mo)   INR used for dosing:     Plan last modified:  Amy Renteria RN (1/22/2021)   Next INR check:     Target end date:  Indefinite    Indications    Acute deep vein thrombosis (DVT) of other specified vein of right lower extremity (H) [I82.491]             Anticoagulation Episode Summary     INR check location:      Preferred lab:      Send INR reminders to:  FLAVIO FRANCIS    Comments:  * recurrent dvts      Anticoagulation Care Providers     Provider Role Specialty Phone number    Kaleb Barnett MD Referring Emergency Medicine  [phone]    Juan Antonio Flanagan MD [facility] [phone]

## 2021-03-31 ENCOUNTER — ANTICOAGULATION THERAPY VISIT (OUTPATIENT)
Dept: ANTICOAGULATION | Facility: CLINIC | Age: 61
End: 2021-03-31

## 2021-03-31 DIAGNOSIS — I82.491 ACUTE DEEP VEIN THROMBOSIS (DVT) OF OTHER SPECIFIED VEIN OF RIGHT LOWER EXTREMITY (H): ICD-10-CM

## 2021-03-31 LAB
CAPILLARY BLOOD COLLECTION: NORMAL
INR PPP: 1.5 (ref 0.86–1.14)

## 2021-03-31 PROCEDURE — 85610 PROTHROMBIN TIME: CPT | Performed by: FAMILY MEDICINE

## 2021-03-31 PROCEDURE — 36416 COLLJ CAPILLARY BLOOD SPEC: CPT | Performed by: FAMILY MEDICINE

## 2021-03-31 NOTE — PROGRESS NOTES
ANTICOAGULATION MANAGEMENT     Patient Name:  Jordan Barnett  Date:  3/31/2021    ASSESSMENT /SUBJECTIVE:    Today's INR result of 1.50 is subtherapeutic. Goal INR of 2.0-3.0      Warfarin dose taken: Warfarin taken as instructed    Diet: No new diet changes affecting INR    Medication changes/ interactions: No new medications/supplements affecting INR    Previous INR: Therapeutic     S/S of bleeding or thromboembolism: No    New injury or illness: No    Upcoming surgery, procedure or cardioversion: No    Additional findings: None      PLAN:    Telephone call with Jordan regarding INR result and instructed:     Warfarin Dosing Instructions: Take 10 mg today then continue your current warfarin dose of 2.5 mg Thurs; 5 mg ROW    Instructed patient to follow up no later than: 1 week  Lab visit scheduled    Education provided: Monitoring for clotting signs and symptoms      Jordan verbalizes understanding and agrees to warfarin dosing plan.    Instructed to call the Anticoagulation Clinic for any changes, questions or concerns. (#[phone])        Lynda Couch RN      OBJECTIVE:  Recent labs: (last 7 days)     21  1543   INR 1.50*         No question data found.  Anticoagulation Summary  As of 3/31/2021    INR goal:  2.0-3.0   TTR:  57.5 % (3.8 mo)   INR used for dosin.50 (3/31/2021)   Warfarin maintenance plan:  2.5 mg (5 mg x 0.5) every Thu; 5 mg (5 mg x 1) all other days   Full warfarin instructions:  3/31: 10 mg; Otherwise 2.5 mg every Thu; 5 mg all other days   Weekly warfarin total:  32.5 mg   Plan last modified:  Amy Renteria RN (2021)   Next INR check:  2021   Priority:  High   Target end date:  Indefinite    Indications    Acute deep vein thrombosis (DVT) of other specified vein of right lower extremity (H) [I82.491]             Anticoagulation Episode Summary     INR check location:      Preferred lab:      Send INR reminders to:  FLAVIO FRANCIS    Comments:  * recurrent  dvts      Anticoagulation Care Providers     Provider Role Specialty Phone number    Kaleb Barnett MD Referring Emergency Medicine [phone]    Juan Antonio Flanagan MD Referring Family Medicine [phone]

## 2021-04-15 ENCOUNTER — ANTICOAGULATION THERAPY VISIT (OUTPATIENT)
Dept: ANTICOAGULATION | Facility: CLINIC | Age: 61
End: 2021-04-15

## 2021-04-15 DIAGNOSIS — I82.401 ACUTE DEEP VEIN THROMBOSIS (DVT) OF RIGHT LOWER EXTREMITY, UNSPECIFIED VEIN (H): ICD-10-CM

## 2021-04-15 DIAGNOSIS — I82.491 ACUTE DEEP VEIN THROMBOSIS (DVT) OF OTHER SPECIFIED VEIN OF RIGHT LOWER EXTREMITY (H): ICD-10-CM

## 2021-04-15 LAB
CAPILLARY BLOOD COLLECTION: NORMAL
INR PPP: 1.8 (ref 0.86–1.14)
PHQ9 SCORE: 6

## 2021-04-15 PROCEDURE — 36416 COLLJ CAPILLARY BLOOD SPEC: CPT | Performed by: FAMILY MEDICINE

## 2021-04-15 PROCEDURE — 85610 PROTHROMBIN TIME: CPT | Performed by: FAMILY MEDICINE

## 2021-04-15 RX ORDER — WARFARIN SODIUM 5 MG/1
TABLET ORAL
Qty: 100 TABLET | Refills: 0
Start: 2021-04-15 | End: 2021-04-19

## 2021-04-15 NOTE — PROGRESS NOTES
ANTICOAGULATION MANAGEMENT     Patient Name:  Jordan Barnett  Date:  4/15/2021    ASSESSMENT /SUBJECTIVE:    Today's INR result of 1.80 is subtherapeutic. Goal INR of 2.0-3.0      Warfarin dose taken: Warfarin taken as instructed    Diet: No new diet changes affecting INR    Medication changes/ interactions: No new medications/supplements affecting INR    Previous INR: Subtherapeutic 1.50    S/S of bleeding or thromboembolism: No    New injury or illness: No    Upcoming surgery, procedure or cardioversion: No    Additional findings: Subtherapeutic INR x 2, will adjust maintenance dose       PLAN:    Telephone call with Jordan regarding INR result and instructed:     Warfarin Dosing Instructions: Change your warfarin dose to 5 mg every day  . (7.7 % change)    Instructed patient to follow up no later than: 2 weeks  Lab visit scheduled    Education provided: Target INR goal and significance of current INR result and Monitoring for clotting signs and symptoms      Jordan verbalizes understanding and agrees to warfarin dosing plan.    Instructed to call the Anticoagulation Clinic for any changes, questions or concerns. (#[phone])        Vani Dumont RN      OBJECTIVE:  Recent labs: (last 7 days)     04/15/21  0659   INR 1.80*         INR assessment SUB    Recheck INR In: 2 WEEKS    INR Location Clinic      Anticoagulation Summary  As of 4/15/2021    INR goal:  2.0-3.0   TTR:  51.0 % (4.3 mo)   INR used for dosin.80 (4/15/2021)   Warfarin maintenance plan:  5 mg (5 mg x 1) every day   Full warfarin instructions:  5 mg every day   Weekly warfarin total:  35 mg   Plan last modified:  Vani Dumont RN (4/15/2021)   Next INR check:  2021   Priority:  Maintenance   Target end date:  Indefinite    Indications    Acute deep vein thrombosis (DVT) of other specified vein of right lower extremity (H) [I82.491]             Anticoagulation Episode Summary     INR check location:      Preferred lab:       Send INR reminders to:  FLAVIO FRANCIS    Comments:  * recurrent dvts      Anticoagulation Care Providers     Provider Role Specialty Phone number    Kaleb Barnett MD Referring Emergency Medicine [phone]    Juan Antonio Flanagan MD Referring Family Medicine [phone]

## 2021-04-19 RX ORDER — WARFARIN SODIUM 5 MG/1
TABLET ORAL
Qty: 90 TABLET | Refills: 0 | Status: SHIPPED | OUTPATIENT
Start: 2021-04-19 | End: 2021-07-15

## 2021-04-29 ENCOUNTER — ANTICOAGULATION THERAPY VISIT (OUTPATIENT)
Dept: ANTICOAGULATION | Facility: CLINIC | Age: 61
End: 2021-04-29

## 2021-04-29 DIAGNOSIS — I82.491 ACUTE DEEP VEIN THROMBOSIS (DVT) OF OTHER SPECIFIED VEIN OF RIGHT LOWER EXTREMITY (H): ICD-10-CM

## 2021-04-29 LAB
CAPILLARY BLOOD COLLECTION: NORMAL
INR PPP: 2.6 (ref 0.86–1.14)

## 2021-04-29 PROCEDURE — 36416 COLLJ CAPILLARY BLOOD SPEC: CPT | Performed by: FAMILY MEDICINE

## 2021-04-29 PROCEDURE — 85610 PROTHROMBIN TIME: CPT | Performed by: FAMILY MEDICINE

## 2021-04-29 NOTE — PROGRESS NOTES
ANTICOAGULATION MANAGEMENT     Patient Name:  Jordan Barnett  Date:  2021    ASSESSMENT /SUBJECTIVE:    Today's INR result of 2.60 is therapeutic. Goal INR of 2.0-3.0      Warfarin dose taken: Warfarin taken as instructed    Diet: No new diet changes affecting INR    Medication changes/ interactions: No new medications/supplements affecting INR    Previous INR: Therapeutic     S/S of bleeding or thromboembolism: No    New injury or illness: No    Upcoming surgery, procedure or cardioversion: Yes: colonoscopy. He is going to see his blood specialist first (per patient) and he has not scheduled it yet. Pt informed to notify [facility] of plan.    Additional findings: None      PLAN:    Telephone call with Jordan regarding INR result and instructed:     Warfarin Dosing Instructions: Continue your current warfarin dose 5 mg every day    Instructed patient to follow up no later than: 3 weeks  Lab visit scheduled    Education provided: Please call back if any changes to your diet, medications or how you've been taking warfarin      Pt verbalizes understanding and agrees to warfarin dosing plan.    Instructed to call the Anticoagulation Clinic for any changes, questions or concerns. (#[phone])        Joann Gurrola RN      OBJECTIVE:  Recent labs: (last 7 days)     21  0651   INR 2.60*         INR assessment THER    Recheck INR In: 3 WEEKS    INR Location Clinic      Anticoagulation Summary  As of 2021    INR goal:  2.0-3.0   TTR:  53.3 % (4.7 mo)   INR used for dosin.60 (2021)   Warfarin maintenance plan:  5 mg (5 mg x 1) every day   Full warfarin instructions:  5 mg every day   Weekly warfarin total:  35 mg   No change documented:  Joann Gurrola RN   Plan last modified:  Vnai Dumont RN (4/15/2021)   Next INR check:  2021   Priority:  Maintenance   Target end date:  Indefinite    Indications    Acute deep vein thrombosis (DVT) of other specified vein of right lower extremity (H)  [I82.491]             Anticoagulation Episode Summary     INR check location:      Preferred lab:      Send INR reminders to:  FLAVIO FRANCIS    Comments:  * recurrent dvts      Anticoagulation Care Providers     Provider Role Specialty Phone number    Kaleb Barnett MD Referring Emergency Medicine [phone]    Juan Antonio Flanagan MD Referring Family Medicine [phone]

## 2021-05-20 ENCOUNTER — ANTICOAGULATION THERAPY VISIT (OUTPATIENT)
Dept: ANTICOAGULATION | Facility: CLINIC | Age: 61
End: 2021-05-20

## 2021-05-20 DIAGNOSIS — I82.491 ACUTE DEEP VEIN THROMBOSIS (DVT) OF OTHER SPECIFIED VEIN OF RIGHT LOWER EXTREMITY (H): ICD-10-CM

## 2021-05-20 LAB
CAPILLARY BLOOD COLLECTION: NORMAL
INR PPP: 2.2 (ref 0.86–1.14)

## 2021-05-20 PROCEDURE — 36416 COLLJ CAPILLARY BLOOD SPEC: CPT | Performed by: FAMILY MEDICINE

## 2021-05-20 PROCEDURE — 85610 PROTHROMBIN TIME: CPT | Performed by: FAMILY MEDICINE

## 2021-05-20 NOTE — PROGRESS NOTES
ANTICOAGULATION MANAGEMENT     Patient Name:  Jordan Barnett  Date:  2021    ASSESSMENT /SUBJECTIVE:    Today's INR result of 2.20 is therapeutic. Goal INR of 2.0-3.0      Warfarin dose taken: Warfarin taken as instructed    Diet: No new diet changes affecting INR    Medication changes/ interactions: No new medications/supplements affecting INR    Previous INR: Therapeutic 2.60    S/S of bleeding or thromboembolism: No    New injury or illness: No    Upcoming surgery, procedure or cardioversion: No    Additional findings: None      PLAN:    Telephone call with Jordan regarding INR result and instructed:     Warfarin Dosing Instructions: Continue your current warfarin dose 5mg daily    Instructed patient to follow up no later than: 3 weeks  Lab visit scheduled    Education provided: Importance of notifying clinic for changes in medications or health; a sooner lab recheck maybe needed       Jordan verbalizes understanding and agrees to warfarin dosing plan.    Instructed to call the Anticoagulation Clinic for any changes, questions or concerns. (#[phone])        Amy Renteria RN CACP       OBJECTIVE:  Recent labs: (last 7 days)     21  0654   INR 2.20*         INR assessment THER    Recheck INR In: 3 WEEKS    INR Location Clinic      Anticoagulation Summary  As of 2021    INR goal:  2.0-3.0   TTR:  59.3 % (5.4 mo)   INR used for dosin.20 (2021)   Warfarin maintenance plan:  5 mg (5 mg x 1) every day   Full warfarin instructions:  5 mg every day   Weekly warfarin total:  35 mg   No change documented:  Amy Renteria RN   Plan last modified:  Vani Dumont RN (4/15/2021)   Next INR check:  6/10/2021   Priority:  Maintenance   Target end date:  Indefinite    Indications    Acute deep vein thrombosis (DVT) of other specified vein of right lower extremity (H) [I82.491]             Anticoagulation Episode Summary     INR check location:      Preferred lab:      Send INR  reminders to:  FLAVIO FRANCIS    Comments:  * recurrent dvts      Anticoagulation Care Providers     Provider Role Specialty Phone number    Kaleb Barnett MD Referring Emergency Medicine [phone]    Juan Antonio Flanagan MD Referring Family Medicine [phone]

## 2021-06-10 ENCOUNTER — ANTICOAGULATION THERAPY VISIT (OUTPATIENT)
Dept: FAMILY MEDICINE | Facility: CLINIC | Age: 61
End: 2021-06-10

## 2021-06-10 DIAGNOSIS — I82.491 ACUTE DEEP VEIN THROMBOSIS (DVT) OF OTHER SPECIFIED VEIN OF RIGHT LOWER EXTREMITY (H): ICD-10-CM

## 2021-06-10 LAB
CAPILLARY BLOOD COLLECTION: NORMAL
INR PPP: 2.4 (ref 0.86–1.14)

## 2021-06-10 PROCEDURE — 85610 PROTHROMBIN TIME: CPT | Performed by: FAMILY MEDICINE

## 2021-06-10 PROCEDURE — 36416 COLLJ CAPILLARY BLOOD SPEC: CPT | Performed by: FAMILY MEDICINE

## 2021-06-10 NOTE — PROGRESS NOTES
ANTICOAGULATION MANAGEMENT     Patient Name:  Jordan Barnett  Date:  6/10/2021    ASSESSMENT /SUBJECTIVE:    Today's INR result of 2.4 is therapeutic. Goal INR of 2.0-3.0      Warfarin dose taken: Warfarin taken as instructed    Diet: No new diet changes affecting INR    Medication changes/ interactions: No new medications/supplements affecting INR    Previous INR: Therapeutic     S/S of bleeding or thromboembolism: No    New injury or illness: No    Upcoming surgery, procedure or cardioversion: No    Additional findings: None      PLAN:    Warfarin Dosing Instructions: Continue your current warfarin dose 5 mg every day    Instructed patient to follow up no later than: 4 weeks  Lab visit scheduled    Education provided: Please call back if any changes to your diet, medications or how you've been taking warfarin    Telephone call with Jordan whom verbalizes understanding and agrees to plan    Instructed to call the Anticoagulation Clinic for any changes, questions or concerns. (#[phone])        Joann Gurrola RN      OBJECTIVE:  Recent labs: (last 7 days)     06/10/21  0652   INR 2.40*         No question data found.  Anticoagulation Summary  As of 6/10/2021    INR goal:  2.0-3.0   TTR:  64.0 % (6.1 mo)   INR used for dosin.40 (6/10/2021)   Warfarin maintenance plan:  5 mg (5 mg x 1) every day   Full warfarin instructions:  5 mg every day   Weekly warfarin total:  35 mg   No change documented:  Joann Gurrola RN   Plan last modified:  Vani Dumont RN (4/15/2021)   Next INR check:  2021   Priority:  Maintenance   Target end date:  Indefinite    Indications    Acute deep vein thrombosis (DVT) of other specified vein of right lower extremity (H) [I82.491]             Anticoagulation Episode Summary     INR check location:      Preferred lab:      Send INR reminders to:  FLAVIO FRANCIS    Comments:  * recurrent dvts      Anticoagulation Care Providers     Provider Role Specialty Phone number     Kaleb Barnett MD Referring Emergency Medicine [phone]    Juan Antonio Flanagan MD Referring Family Medicine [phone]

## 2021-07-08 ENCOUNTER — ANTICOAGULATION THERAPY VISIT (OUTPATIENT)
Dept: ANTICOAGULATION | Facility: CLINIC | Age: 61
End: 2021-07-08

## 2021-07-08 DIAGNOSIS — I82.491 ACUTE DEEP VEIN THROMBOSIS (DVT) OF OTHER SPECIFIED VEIN OF RIGHT LOWER EXTREMITY (H): ICD-10-CM

## 2021-07-08 LAB
CAPILLARY BLOOD COLLECTION: NORMAL
INR PPP: 2.3 (ref 0.86–1.14)

## 2021-07-08 PROCEDURE — 36416 COLLJ CAPILLARY BLOOD SPEC: CPT | Performed by: FAMILY MEDICINE

## 2021-07-08 PROCEDURE — 85610 PROTHROMBIN TIME: CPT | Performed by: FAMILY MEDICINE

## 2021-07-08 NOTE — PROGRESS NOTES
ANTICOAGULATION MANAGEMENT     Jordan Barnett 61 year old male is on warfarin with therapeutic INR result. (Goal INR 2.0-3.0)    Recent labs: (last 7 days)     07/08/21  0646   INR 2.30*       ASSESSMENT     Source(s): Patient/Caregiver Call       Warfarin doses taken: Warfarin taken as instructed    Diet: No new diet changes identified    New illness, injury, or hospitalization: No    Medication/supplement changes: None noted    Signs or symptoms of bleeding or clotting: No    Previous INR: Therapeutic last 2(+) visits    Additional findings: Patient states he plans to schedule an appt with his Provider and will be scheduling a colonoscopy in the future; he will notify [facility] of the date     PLAN     Recommended plan for no diet, medication or health factor changes affecting INR     Dosing Instructions: Continue your current warfarin dose with next INR in 4 weeks       Summary  As of 7/8/2021    Full warfarin instructions:  5 mg every day   Next INR check:  8/5/2021             Telephone call with Jordan who verbalizes understanding and agrees to plan    Lab visit scheduled    Education provided: Importance of notifying clinic of upcoming surgeries and procedures 2 weeks in advance and Contact [phone]  with any changes, questions or concerns.     Plan made per [facility] anticoagulation protocol    Vani Dumont RN  Anticoagulation Clinic  7/8/2021    _______________________________________________________________________     Anticoagulation Episode Summary     Current INR goal:  2.0-3.0   TTR:  68.7 % (7.1 mo)   Target end date:  Indefinite   Send INR reminders to:  [facility]    Indications    Acute deep vein thrombosis (DVT) of other specified vein of right lower extremity (H) [I82.491]           Comments:  * recurrent dvts         Anticoagulation Care Providers     Provider Role Specialty Phone number    Kaleb Barnett MD Referring Emergency Medicine [phone]    Juan Antonio Flanagan MD  [facility] Family Medicine [phone]

## 2021-07-12 DIAGNOSIS — I82.401 ACUTE DEEP VEIN THROMBOSIS (DVT) OF RIGHT LOWER EXTREMITY, UNSPECIFIED VEIN (H): ICD-10-CM

## 2021-07-14 ENCOUNTER — TRANSFERRED RECORDS (OUTPATIENT)
Dept: HEALTH INFORMATION MANAGEMENT | Facility: CLINIC | Age: 61
End: 2021-07-14

## 2021-07-15 RX ORDER — WARFARIN SODIUM 5 MG/1
TABLET ORAL
Qty: 90 TABLET | Refills: 0 | Status: SHIPPED | OUTPATIENT
Start: 2021-07-15 | End: 2021-10-14

## 2021-07-15 NOTE — TELEPHONE ENCOUNTER
Current warfarin dose:  Warfarin maintenance plan:  5 mg (5 mg x 1) every day   Weekly warfarin total:  35 mg   Next INR check:  8/5/2021     Last INR result:  INR   Date Value Ref Range Status   07/08/2021 2.30 (H) 0.86 - 1.14 Final     Comment:     This test is intended for monitoring Coumadin therapy.  Results are not   accurate in patients with prolonged INR due to factor deficiency.       Last office visit: 12/3/2020     Refill authorized per [facility] protocol.    Andi HAINES RN

## 2021-08-05 ENCOUNTER — LAB (OUTPATIENT)
Dept: LAB | Facility: CLINIC | Age: 61
End: 2021-08-05
Payer: COMMERCIAL

## 2021-08-05 ENCOUNTER — ANTICOAGULATION THERAPY VISIT (OUTPATIENT)
Dept: ANTICOAGULATION | Facility: CLINIC | Age: 61
End: 2021-08-05

## 2021-08-05 DIAGNOSIS — I82.491 ACUTE DEEP VEIN THROMBOSIS (DVT) OF OTHER SPECIFIED VEIN OF RIGHT LOWER EXTREMITY (H): Primary | ICD-10-CM

## 2021-08-05 DIAGNOSIS — I82.491 ACUTE DEEP VEIN THROMBOSIS (DVT) OF OTHER SPECIFIED VEIN OF RIGHT LOWER EXTREMITY (H): ICD-10-CM

## 2021-08-05 LAB — INR BLD: 2.3 (ref 0.9–1.1)

## 2021-08-05 PROCEDURE — 36416 COLLJ CAPILLARY BLOOD SPEC: CPT

## 2021-08-05 PROCEDURE — 85610 PROTHROMBIN TIME: CPT

## 2021-08-05 NOTE — PROGRESS NOTES
ANTICOAGULATION MANAGEMENT     Jordan Barnett 61 year old male is on warfarin with therapeutic INR result. (Goal INR 2.0-3.0)    Recent labs: (last 7 days)     08/05/21  0659   INR 2.3*       ASSESSMENT     Source(s): Chart Review and Patient/Caregiver Call       Warfarin doses taken: Warfarin taken as instructed    Diet: No new diet changes identified    New illness, injury, or hospitalization: No    Medication/supplement changes: None noted    Signs or symptoms of bleeding or clotting: No    Previous INR: Therapeutic last 2(+) visits    Additional findings: None     PLAN     Recommended plan for no diet, medication or health factor changes affecting INR     Dosing Instructions: Continue your current warfarin dose with next INR in 5 weeks       Summary  As of 8/5/2021    Full warfarin instructions:  5 mg every day   Next INR check:  9/9/2021             Telephone call with Jordan who verbalizes understanding and agrees to plan    Lab visit scheduled    Education provided: Please call back if any changes to your diet, medications or how you've been taking warfarin    Plan made per [facility] anticoagulation protocol    Joann Gurrola RN  Anticoagulation Clinic  8/5/2021    _______________________________________________________________________     Anticoagulation Episode Summary     Current INR goal:  2.0-3.0   TTR:  72.4 % (8 mo)   Target end date:  Indefinite   Send INR reminders to:  [facility]    Indications    Acute deep vein thrombosis (DVT) of other specified vein of right lower extremity (H) [I82.491]           Comments:  * recurrent dvts         Anticoagulation Care Providers     Provider Role Specialty Phone number    Kaleb Barnett MD Referring Emergency Medicine [phone]    Juan Antonio Flanagan MD Referring Family Medicine [phone]

## 2021-08-16 ENCOUNTER — RX ONLY (RX ONLY)
Age: 61
End: 2021-08-16

## 2021-08-16 ENCOUNTER — COMPLETE SKIN EXAM (OUTPATIENT)
Dept: URBAN - METROPOLITAN AREA CLINIC 17 | Facility: CLINIC | Age: 61
Setting detail: DERMATOLOGY
End: 2021-08-16

## 2021-08-16 DIAGNOSIS — L57.0 ACTINIC KERATOSIS: ICD-10-CM

## 2021-08-16 PROBLEM — D18.01 HEMANGIOMA OF SKIN AND SUBCUTANEOUS TISSUE: Status: RESOLVED | Noted: 2021-08-16

## 2021-08-16 PROBLEM — L82.1 OTHER SEBORRHEIC KERATOSIS: Status: RESOLVED | Noted: 2021-08-16

## 2021-08-16 PROCEDURE — 99214 OFFICE O/P EST MOD 30 MIN: CPT

## 2021-08-16 PROCEDURE — 17003 DESTRUCT PREMALG LES 2-14: CPT

## 2021-08-16 PROCEDURE — 17000 DESTRUCT PREMALG LESION: CPT

## 2021-08-16 RX ORDER — FLUOROURACIL 50 MG/G
CREAM TOPICAL
Qty: 40 | Refills: 0
Start: 2021-08-16

## 2021-08-16 RX ORDER — TACROLIMUS 1 MG/G
OINTMENT TOPICAL
Qty: 30 | Refills: 3
Start: 2021-08-16

## 2021-09-09 ENCOUNTER — LAB (OUTPATIENT)
Dept: LAB | Facility: CLINIC | Age: 61
End: 2021-09-09
Payer: COMMERCIAL

## 2021-09-09 ENCOUNTER — ANTICOAGULATION THERAPY VISIT (OUTPATIENT)
Dept: FAMILY MEDICINE | Facility: CLINIC | Age: 61
End: 2021-09-09

## 2021-09-09 DIAGNOSIS — I82.491 ACUTE DEEP VEIN THROMBOSIS (DVT) OF OTHER SPECIFIED VEIN OF RIGHT LOWER EXTREMITY (H): ICD-10-CM

## 2021-09-09 DIAGNOSIS — I82.491 ACUTE DEEP VEIN THROMBOSIS (DVT) OF OTHER SPECIFIED VEIN OF RIGHT LOWER EXTREMITY (H): Primary | ICD-10-CM

## 2021-09-09 LAB — INR BLD: 2 (ref 0.9–1.1)

## 2021-09-09 PROCEDURE — 36416 COLLJ CAPILLARY BLOOD SPEC: CPT

## 2021-09-09 PROCEDURE — 85610 PROTHROMBIN TIME: CPT

## 2021-09-09 NOTE — PROGRESS NOTES
ANTICOAGULATION MANAGEMENT     Jordan Barnett 61 year old male is on warfarin with therapeutic INR result. (Goal INR 2.0-3.0)    Recent labs: (last 7 days)     09/09/21  0639   INR 2.0*       ASSESSMENT     Source(s): Chart Review and Patient/Caregiver Call       Warfarin doses taken: Warfarin taken as instructed    Diet: No new diet changes identified    New illness, injury, or hospitalization: No    Medication/supplement changes: None noted    Signs or symptoms of bleeding or clotting: Yes: pt did cut himself on his thumb - he was able to control the bleeding. No concerns.    Previous INR: Therapeutic last 2(+) visits    Additional findings: None     PLAN     Recommended plan for no diet, medication or health factor changes affecting INR     Dosing Instructions: Continue your current warfarin dose with next INR in 6 weeks       Summary  As of 9/9/2021    Full warfarin instructions:  5 mg every day   Next INR check:  10/21/2021             Telephone call with  pt who verbalizes understanding and agrees to plan    Lab visit scheduled    Education provided: Please call back if any changes to your diet, medications or how you've been taking warfarin    Plan made per [facility] anticoagulation protocol    Joann Gurrola RN  Anticoagulation Clinic  9/9/2021    _______________________________________________________________________     Anticoagulation Episode Summary     Current INR goal:  2.0-3.0   TTR:  75.9 % (9.2 mo)   Target end date:  Indefinite   Send INR reminders to:  [facility]    Indications    Acute deep vein thrombosis (DVT) of other specified vein of right lower extremity (H) [I82.491]           Comments:  * recurrent dvts         Anticoagulation Care Providers     Provider Role Specialty Phone number    Kaleb Barnett MD Referring Emergency Medicine [phone]    Juan Antonio Flanagan MD Referring Family Medicine [phone]

## 2021-09-11 ENCOUNTER — HEALTH MAINTENANCE LETTER (OUTPATIENT)
Age: 61
End: 2021-09-11

## 2021-10-14 ENCOUNTER — RX ONLY (RX ONLY)
Age: 61
End: 2021-10-14

## 2021-10-14 LAB — PHQ9 SCORE: 4

## 2021-10-14 RX ORDER — MOMETASONE FUROATE 1 MG/G
1 GRAM OINTMENT TOPICAL
Qty: 45 | Refills: 1
Start: 2021-10-14

## 2021-10-21 ENCOUNTER — LAB (OUTPATIENT)
Dept: LAB | Facility: CLINIC | Age: 61
End: 2021-10-21
Payer: COMMERCIAL

## 2021-10-21 ENCOUNTER — DOCUMENTATION ONLY (OUTPATIENT)
Dept: FAMILY MEDICINE | Facility: CLINIC | Age: 61
End: 2021-10-21

## 2021-10-21 ENCOUNTER — ANTICOAGULATION THERAPY VISIT (OUTPATIENT)
Dept: ANTICOAGULATION | Facility: CLINIC | Age: 61
End: 2021-10-21

## 2021-10-21 DIAGNOSIS — Z79.01 LONG TERM CURRENT USE OF ANTICOAGULANT THERAPY: ICD-10-CM

## 2021-10-21 DIAGNOSIS — I82.491 ACUTE DEEP VEIN THROMBOSIS (DVT) OF OTHER SPECIFIED VEIN OF RIGHT LOWER EXTREMITY (H): ICD-10-CM

## 2021-10-21 DIAGNOSIS — I82.491 ACUTE DEEP VEIN THROMBOSIS (DVT) OF OTHER SPECIFIED VEIN OF RIGHT LOWER EXTREMITY (H): Primary | ICD-10-CM

## 2021-10-21 LAB — INR BLD: 3.1 (ref 0.9–1.1)

## 2021-10-21 PROCEDURE — 36416 COLLJ CAPILLARY BLOOD SPEC: CPT

## 2021-10-21 PROCEDURE — 85610 PROTHROMBIN TIME: CPT

## 2021-10-21 NOTE — PROGRESS NOTES
ANTICOAGULATION MANAGEMENT     Jordan Barnett 61 year old male is on warfarin with supratherapeutic INR result. (Goal INR 2.0-3.0)    Recent labs: (last 7 days)     10/21/21  0650   INR 3.1*       ASSESSMENT     Source(s): Chart Review and Patient/Caregiver Call       Warfarin doses taken: Warfarin taken as instructed    Diet: No new diet changes identified    New illness, injury, or hospitalization: No    Medication/supplement changes: None noted    Signs or symptoms of bleeding or clotting: No    Previous INR: Therapeutic last 2(+) visits    Additional findings: None     PLAN     Recommended plan for no diet, medication or health factor changes affecting INR     Dosing Instructions: Continue your current warfarin dose with next INR in 2 weeks       Summary  As of 10/21/2021    Full warfarin instructions:  5 mg every day   Next INR check:  11/4/2021             Telephone call with Jordan who verbalizes understanding and agrees to plan    Lab visit scheduled    Education provided: Importance of consistent vitamin K intake and Contact [phone]  with any changes, questions or concerns.     Plan made per [facility] anticoagulation protocol    Vani Dumont RN  Anticoagulation Clinic  10/21/2021    _______________________________________________________________________     Anticoagulation Episode Summary     Current INR goal:  2.0-3.0   TTR:  77.9 % (10.6 mo)   Target end date:  Indefinite   Send INR reminders to:  [facility]    Indications    Acute deep vein thrombosis (DVT) of other specified vein of right lower extremity (H) [I82.491]           Comments:  * recurrent dvts         Anticoagulation Care Providers     Provider Role Specialty Phone number    Kaleb Barnett MD Referring Emergency Medicine [phone]    Juan Antonio Flanagan MD Referring Family Medicine [phone]

## 2021-10-21 NOTE — PROGRESS NOTES
ANTICOAGULATION MANAGEMENT      Jordan Barnett due for annual renewal of referral to anticoagulation monitoring. Order pended for your review and signature.      ANTICOAGULATION SUMMARY      Warfarin indication(s)     DVT    Heart valve present?  NO       Current goal range   INR: 2.0-3.0     Goal appropriate for indication? Yes, INR 2-3 appropriate for hx of DVT, PE, hypercoagulable state, Afib, LVAD, or bileaflet AVR without risk factors     Current duration of therapy Indefinite/long term therapy   Time in Therapeutic Range (TTR)  (Goal > 60%) 77.9%       Office visit with referring provider's group within last year yes on 12/03/2020       Vani Dumont RN

## 2021-11-04 ENCOUNTER — ANTICOAGULATION THERAPY VISIT (OUTPATIENT)
Dept: ANTICOAGULATION | Facility: CLINIC | Age: 61
End: 2021-11-04

## 2021-11-04 ENCOUNTER — LAB (OUTPATIENT)
Dept: LAB | Facility: CLINIC | Age: 61
End: 2021-11-04
Payer: COMMERCIAL

## 2021-11-04 DIAGNOSIS — I82.491 ACUTE DEEP VEIN THROMBOSIS (DVT) OF OTHER SPECIFIED VEIN OF RIGHT LOWER EXTREMITY (H): Primary | ICD-10-CM

## 2021-11-04 DIAGNOSIS — I82.491 ACUTE DEEP VEIN THROMBOSIS (DVT) OF OTHER SPECIFIED VEIN OF RIGHT LOWER EXTREMITY (H): ICD-10-CM

## 2021-11-04 DIAGNOSIS — Z79.01 LONG TERM CURRENT USE OF ANTICOAGULANT THERAPY: ICD-10-CM

## 2021-11-04 LAB — INR BLD: 2.8 (ref 0.9–1.1)

## 2021-11-04 PROCEDURE — 85610 PROTHROMBIN TIME: CPT

## 2021-11-04 PROCEDURE — 36416 COLLJ CAPILLARY BLOOD SPEC: CPT

## 2021-11-04 NOTE — PROGRESS NOTES
ANTICOAGULATION MANAGEMENT     Jordan Barnett 61 year old male is on warfarin with therapeutic INR result. (Goal INR 2.0-3.0)    Recent labs: (last 7 days)     11/04/21  0657   INR 2.8*       ASSESSMENT     Source(s): Chart Review and Patient/Caregiver Call       Warfarin doses taken: Warfarin taken as instructed    Diet: No new diet changes identified    New illness, injury, or hospitalization: No    Medication/supplement changes: None noted    Signs or symptoms of bleeding or clotting: No    Previous INR: Supratherapeutic    Additional findings: None     PLAN     Recommended plan for no diet, medication or health factor changes affecting INR     Dosing Instructions: Continue your current warfarin dose with next INR in 3 weeks       Summary  As of 11/4/2021    Full warfarin instructions:  5 mg every day   Next INR check:  12/2/2021             Telephone call with Jordan who verbalizes understanding and agrees to plan    Patient elected to schedule next visit in 4 weeks, lab visit scheduled    Education provided: Contact [phone]  with any changes, questions or concerns.     Plan made per [facility] anticoagulation protocol    Vani Dumont RN  Anticoagulation Clinic  11/4/2021    _______________________________________________________________________     Anticoagulation Episode Summary     Current INR goal:  2.0-3.0   TTR:  77.4 % (11 mo)   Target end date:  Indefinite   Send INR reminders to:  [facility]    Indications    Acute deep vein thrombosis (DVT) of other specified vein of right lower extremity (H) [I82.491]  Long term current use of anticoagulant therapy [Z79.01]           Comments:  * recurrent dvts         Anticoagulation Care Providers     Provider Role Specialty Phone number    Kaleb Barnett MD Referring Emergency Medicine [phone]    Juan Antonio Flanagan MD Referring Family Medicine [phone]

## 2021-11-30 DIAGNOSIS — I10 ESSENTIAL HYPERTENSION WITH GOAL BLOOD PRESSURE LESS THAN 140/90: ICD-10-CM

## 2021-12-02 ENCOUNTER — LAB (OUTPATIENT)
Dept: LAB | Facility: CLINIC | Age: 61
End: 2021-12-02
Payer: COMMERCIAL

## 2021-12-02 ENCOUNTER — ANTICOAGULATION THERAPY VISIT (OUTPATIENT)
Dept: ANTICOAGULATION | Facility: CLINIC | Age: 61
End: 2021-12-02

## 2021-12-02 DIAGNOSIS — I82.491 ACUTE DEEP VEIN THROMBOSIS (DVT) OF OTHER SPECIFIED VEIN OF RIGHT LOWER EXTREMITY (H): ICD-10-CM

## 2021-12-02 DIAGNOSIS — I82.491 ACUTE DEEP VEIN THROMBOSIS (DVT) OF OTHER SPECIFIED VEIN OF RIGHT LOWER EXTREMITY (H): Primary | ICD-10-CM

## 2021-12-02 DIAGNOSIS — Z79.01 LONG TERM CURRENT USE OF ANTICOAGULANT THERAPY: ICD-10-CM

## 2021-12-02 LAB — INR BLD: 2.3 (ref 0.9–1.1)

## 2021-12-02 PROCEDURE — 36416 COLLJ CAPILLARY BLOOD SPEC: CPT

## 2021-12-02 PROCEDURE — 85610 PROTHROMBIN TIME: CPT

## 2021-12-02 NOTE — PROGRESS NOTES
ANTICOAGULATION MANAGEMENT     Jordan Barnett 61 year old male is on warfarin with therapeutic INR result. (Goal INR 2.0-3.0)    Recent labs: (last 7 days)     12/02/21  0650   INR 2.3*       ASSESSMENT     Source(s): Chart Review and Patient/Caregiver Call       Warfarin doses taken: Warfarin taken as instructed    Diet: No new diet changes identified    New illness, injury, or hospitalization: No    Medication/supplement changes: None noted    Signs or symptoms of bleeding or clotting: No    Previous INR: Therapeutic last visit; previously outside of goal range    Additional findings: None     PLAN     Recommended plan for no diet, medication or health factor changes affecting INR     Dosing Instructions: Continue your current warfarin dose with next INR in 5 weeks       Summary  As of 12/2/2021    Full warfarin instructions:  5 mg every day   Next INR check:  1/6/2022             Telephone call with Jordan who verbalizes understanding and agrees to plan    Lab visit scheduled    Education provided: Contact [phone]  with any changes, questions or concerns.     Plan made per [facility] anticoagulation protocol    Vani Dumont RN  Anticoagulation Clinic  12/2/2021    _______________________________________________________________________     Anticoagulation Episode Summary     Current INR goal:  2.0-3.0   TTR:  79.2 % (12 mo)   Target end date:  Indefinite   Send INR reminders to:  [facility]    Indications    Acute deep vein thrombosis (DVT) of other specified vein of right lower extremity (H) [I82.491]  Long term current use of anticoagulant therapy [Z79.01]           Comments:  *         Anticoagulation Care Providers     Provider Role Specialty Phone number    Kaleb Barnett MD Referring Emergency Medicine [phone]    Juan Antonio Flanagan MD Referring Family Medicine [phone]

## 2021-12-03 NOTE — TELEPHONE ENCOUNTER
"Requested Prescriptions   Pending Prescriptions Disp Refills     losartan (COZAAR) 100 MG tablet [Pharmacy Med Name: Losartan Potassium 100 MG Oral Tablet] 90 tablet 0     Sig: Take 1 tablet by mouth once daily       Angiotensin-II Receptors Failed - 11/30/2021  7:25 PM        Failed - Normal serum creatinine on file in past 12 months     Recent Labs   Lab Test 02/03/21  0843   CR 1.63*       Ok to refill medication if creatinine is low          Passed - Last blood pressure under 140/90 in past 12 months     BP Readings from Last 3 Encounters:   12/03/20 116/84   11/28/20 103/68   02/19/20 121/87                 Passed - Recent (12 mo) or future (30 days) visit within the authorizing provider's specialty     Patient has had an office visit with the authorizing provider or a provider within the authorizing providers department within the previous 12 mos or has a future within next 30 days. See \"Patient Info\" tab in inbasket, or \"Choose Columns\" in Meds & Orders section of the refill encounter.              Passed - Medication is active on med list        Passed - Patient is age 18 or older        Passed - Normal serum potassium on file in past 12 months     Recent Labs   Lab Test 02/03/21  0843   POTASSIUM 4.2                         "

## 2021-12-06 DIAGNOSIS — Z87.891 PERSONAL HISTORY OF TOBACCO USE: Primary | ICD-10-CM

## 2021-12-06 DIAGNOSIS — I10 ESSENTIAL HYPERTENSION WITH GOAL BLOOD PRESSURE LESS THAN 140/90: ICD-10-CM

## 2021-12-06 DIAGNOSIS — Z12.11 SCREENING FOR MALIGNANT NEOPLASM OF COLON: ICD-10-CM

## 2021-12-06 PROCEDURE — 99207 PR VISIT TO DETERM LDCT ELIG: CPT | Performed by: FAMILY MEDICINE

## 2021-12-06 RX ORDER — LOSARTAN POTASSIUM 100 MG/1
100 TABLET ORAL DAILY
Qty: 90 TABLET | Refills: 0 | Status: SHIPPED | OUTPATIENT
Start: 2021-12-06 | End: 2022-01-06

## 2021-12-06 RX ORDER — LOSARTAN POTASSIUM 100 MG/1
TABLET ORAL
Qty: 90 TABLET | Refills: 0 | OUTPATIENT
Start: 2021-12-06

## 2021-12-06 NOTE — PROGRESS NOTES
Lung Cancer Screening Shared Decision Making Visit     Jordan Barnett is eligible for lung cancer screening on the basis of the information provided in my signed lung cancer screening order.     I have discussed with patient the risks and benefits of screening for lung cancer with low-dose CT.     The risks include:  radiation exposure: one low dose chest CT has as much ionizing radiation as about 15 chest x-rays or 6 months of background radiation living in [place]    false positives: 96% of positive findings/nodules are NOT cancer, but some might still require additional diagnostic evaluation, including biopsy  over-diagnosis: some slow growing cancers that might never have been clinically significant will be detected and treated unnecessarily     The benefit of early detection of lung cancer is contingent upon adherence to annual screening or more frequent follow up if indicated.     Furthermore, reaping the benefits of screening requires Jordan Barnett to be willing and physically able to undergo diagnostic procedures, if indicated. Although no specific guide is available for determining severity of comorbidities, it is reasonable to withhold screening in patients who have greater mortality risk from other diseases.     We did discuss that the only way to prevent lung cancer is to not smoke. Smoking cessation counseling was not given.      I did not offer risk estimation using a calculator such as this one:    ShouldIScreen

## 2021-12-06 NOTE — PATIENT INSTRUCTIONS

## 2022-01-06 ENCOUNTER — LAB (OUTPATIENT)
Dept: LAB | Facility: CLINIC | Age: 62
End: 2022-01-06

## 2022-01-06 ENCOUNTER — OFFICE VISIT (OUTPATIENT)
Dept: FAMILY MEDICINE | Facility: CLINIC | Age: 62
End: 2022-01-06
Payer: COMMERCIAL

## 2022-01-06 ENCOUNTER — ANTICOAGULATION THERAPY VISIT (OUTPATIENT)
Dept: ANTICOAGULATION | Facility: CLINIC | Age: 62
End: 2022-01-06

## 2022-01-06 VITALS
HEART RATE: 98 BPM | WEIGHT: 172.2 LBS | BODY MASS INDEX: 24.65 KG/M2 | DIASTOLIC BLOOD PRESSURE: 80 MMHG | TEMPERATURE: 97.6 F | HEIGHT: 70 IN | OXYGEN SATURATION: 98 % | RESPIRATION RATE: 16 BRPM | SYSTOLIC BLOOD PRESSURE: 120 MMHG

## 2022-01-06 DIAGNOSIS — I82.491 ACUTE DEEP VEIN THROMBOSIS (DVT) OF OTHER SPECIFIED VEIN OF RIGHT LOWER EXTREMITY (H): Primary | ICD-10-CM

## 2022-01-06 DIAGNOSIS — Z79.01 LONG TERM CURRENT USE OF ANTICOAGULANT THERAPY: ICD-10-CM

## 2022-01-06 DIAGNOSIS — Z00.00 ROUTINE GENERAL MEDICAL EXAMINATION AT A HEALTH CARE FACILITY: Primary | ICD-10-CM

## 2022-01-06 DIAGNOSIS — Z12.11 SCREENING FOR MALIGNANT NEOPLASM OF COLON: ICD-10-CM

## 2022-01-06 DIAGNOSIS — I82.401 ACUTE DEEP VEIN THROMBOSIS (DVT) OF RIGHT LOWER EXTREMITY, UNSPECIFIED VEIN (H): ICD-10-CM

## 2022-01-06 DIAGNOSIS — Z51.81 ENCOUNTER FOR THERAPEUTIC DRUG MONITORING: ICD-10-CM

## 2022-01-06 DIAGNOSIS — N18.30 STAGE 3 CHRONIC KIDNEY DISEASE, UNSPECIFIED WHETHER STAGE 3A OR 3B CKD (H): ICD-10-CM

## 2022-01-06 DIAGNOSIS — Z87.891 PERSONAL HISTORY OF TOBACCO USE: ICD-10-CM

## 2022-01-06 DIAGNOSIS — I82.491 ACUTE DEEP VEIN THROMBOSIS (DVT) OF OTHER SPECIFIED VEIN OF RIGHT LOWER EXTREMITY (H): ICD-10-CM

## 2022-01-06 DIAGNOSIS — I10 ESSENTIAL HYPERTENSION WITH GOAL BLOOD PRESSURE LESS THAN 140/90: ICD-10-CM

## 2022-01-06 LAB
CREAT UR-MCNC: 17 MG/DL
INR BLD: 3.5 (ref 0.9–1.1)
MICROALBUMIN UR-MCNC: 28 MG/L
MICROALBUMIN/CREAT UR: 164.71 MG/G CR (ref 0–17)

## 2022-01-06 PROCEDURE — 99396 PREV VISIT EST AGE 40-64: CPT | Performed by: FAMILY MEDICINE

## 2022-01-06 PROCEDURE — 36416 COLLJ CAPILLARY BLOOD SPEC: CPT

## 2022-01-06 PROCEDURE — G0296 VISIT TO DETERM LDCT ELIG: HCPCS | Performed by: FAMILY MEDICINE

## 2022-01-06 PROCEDURE — 85610 PROTHROMBIN TIME: CPT

## 2022-01-06 PROCEDURE — 82043 UR ALBUMIN QUANTITATIVE: CPT | Performed by: FAMILY MEDICINE

## 2022-01-06 PROCEDURE — 99214 OFFICE O/P EST MOD 30 MIN: CPT | Mod: 25 | Performed by: FAMILY MEDICINE

## 2022-01-06 RX ORDER — WARFARIN SODIUM 5 MG/1
TABLET ORAL
Qty: 90 TABLET | Refills: 1 | Status: SHIPPED | OUTPATIENT
Start: 2022-01-06 | End: 2022-04-28

## 2022-01-06 RX ORDER — LOSARTAN POTASSIUM 100 MG/1
100 TABLET ORAL DAILY
Qty: 90 TABLET | Refills: 3 | Status: SHIPPED | OUTPATIENT
Start: 2022-01-06 | End: 2023-01-12

## 2022-01-06 ASSESSMENT — MIFFLIN-ST. JEOR: SCORE: 1597.34

## 2022-01-06 NOTE — PROGRESS NOTES
ANTICOAGULATION MANAGEMENT     Jordan Barnett 61 year old male is on warfarin with supratherapeutic INR result. (Goal INR 2.0-3.0)    Recent labs: (last 7 days)     01/06/22  0703   INR 3.5*       ASSESSMENT     Source(s): Chart Review and Patient/Caregiver Call       Warfarin doses taken: Warfarin taken as instructed    Diet: No new diet changes identified    New illness, injury, or hospitalization: Yes: Cold about 1.5 weeks ago. Pt has been feeling better.    Medication/supplement changes: None noted    Signs or symptoms of bleeding or clotting: No    Previous INR: Therapeutic last 2(+) visits    Additional findings: Yes - COVID booster shot yesterday     PLAN     Recommended plan for temporary change(s) affecting INR     Dosing Instructions: Hold dose then continue your current warfarin dose with next INR in 2 weeks       Summary  As of 1/6/2022    Full warfarin instructions:  1/6: Hold; Otherwise 5 mg every day   Next INR check:  1/20/2022             Telephone call with Jordan who verbalizes understanding and agrees to plan    Lab visit scheduled    Education provided: Please call back if any changes to your diet, medications or how you've been taking warfarin and Monitoring for bleeding signs and symptoms    Plan made per [facility] anticoagulation protocol    Joann Gurrola RN  Anticoagulation Clinic  1/6/2022    _______________________________________________________________________     Anticoagulation Episode Summary     Current INR goal:  2.0-3.0   TTR:  78.6 % (1 y)   Target end date:  Indefinite   Send INR reminders to:  [facility]    Indications    Acute deep vein thrombosis (DVT) of other specified vein of right lower extremity (H) [I82.491]  Long term current use of anticoagulant therapy [Z79.01]           Comments:           Anticoagulation Care Providers     Provider Role Specialty Phone number    Kaleb Barnett MD Referring Emergency Medicine [phone]    Juan Antonio Flanagan MD  [facility] Family Medicine [phone]

## 2022-01-06 NOTE — PROGRESS NOTES
SUBJECTIVE:   CC: Jordan Barnett is an 61 year old male who presents for preventative health visit.       Patient has been advised of split billing requirements and indicates understanding: Yes  Healthy Habits:    Getting at least 3 servings of Calcium per day:  Yes    Bi-annual eye exam:  Yes    Dental care twice a year:  Yes    Sleep apnea or symptoms of sleep apnea:  None    Diet:  Regular (no restrictions) (tries to eat healthy)    Frequency of exercise:: stretching, physical labor at work.    Duration of exercise:: active.    Taking medications regularly:  Yes    Barriers to taking medications:  None    Medication side effects:  None    PHQ-2 Total Score:    Additional concerns today:  No       Patient said he will need to precertify the lung cancer screening CT scan.      Medication Followup of warfarin and losartan    Taking Medication as prescribed: yes    Side Effects:  None    Medication Helping Symptoms:  Yes    Patient states his warfarin dose is 5 mg daily.    Sees [facility] for warfarin management.    Denies chest pain, dyspnea, HA, BOV, dizziness or urinary changes.       Today's PHQ-2 Score:   PHQ-2 ( 1999 Pfizer) 1/6/2022   Q1: Little interest or pleasure in doing things 0   Q2: Feeling down, depressed or hopeless 0   PHQ-2 Score 0   PHQ-2 Total Score (12-17 Years)- Positive if 3 or more points; Administer PHQ-A if positive -   Q1: Little interest or pleasure in doing things -   Q2: Feeling down, depressed or hopeless -   PHQ-2 Score -       Abuse: Current or Past(Physical, Sexual or Emotional)- No  Do you feel safe in your environment? Yes    Have you ever done Advance Care Planning? (For example, a Health Directive, POLST, or a discussion with a medical provider or your loved ones about your wishes): No, advance care planning information given to patient to review.  Patient plans to discuss their wishes with loved ones or provider.      Social History     Tobacco Use     Smoking status: Former Smoker      Packs/day: 1.00     Years: 30.00     Pack years: 30.00     Types: Cigarettes     Quit date: 2012     Years since quittin.7     Smokeless tobacco: Former User     Quit date: 2012     Tobacco comment: 2 PPD   Substance Use Topics     Alcohol use: Yes     Comment: RARE     If you drink alcohol do you typically have >3 drinks per day or >7 drinks per week? No      Last PSA:   PSA   Date Value Ref Range Status   2019 2.47 0 - 4 ug/L Final     Comment:     Assay Method:  Chemiluminescence using Siemens Vista analyzer       Reviewed orders with patient. Reviewed health maintenance and updated orders accordingly - Yes  Patient Active Problem List   Diagnosis     Severe bipolar I disorder, most recent episode mixed, with psychotic features (H)     Sebaceous cyst     CKD (chronic kidney disease) stage 3, GFR 30-59 ml/min (H)     Schizoaffective disorder (H)     Hyperlipidemia LDL goal <160     Tubular adenoma of colon     Former smoker     Benign essential hypertension     Former smoker, stopped smoking in distant past     Deep vein thrombosis (DVT) (H)     Acute deep vein thrombosis (DVT) of femoral vein of right lower extremity (H)     Acute deep vein thrombosis (DVT) of right lower extremity, unspecified vein (H)     Acute deep vein thrombosis (DVT) of other specified vein of right lower extremity (H)     Long term current use of anticoagulant therapy     Past Surgical History:   Procedure Laterality Date     COLONOSCOPY      normal. has fam hx colon cancer     COLONOSCOPY N/A 10/13/2014    Procedure: COLONOSCOPY;  Surgeon: Santy Constantino MD;  Location: [facility]     HERNIA REPAIR, INGUINAL RT/LT  2004     SURGICAL HISTORY OF -       Incised & Drained - ? Perirectal Abscess        Social History     Tobacco Use     Smoking status: Former Smoker     Packs/day: 1.00     Years: 30.00     Pack years: 30.00     Types: Cigarettes     Quit date: 2012     Years since quittin.7     Smokeless  tobacco: Former User     Quit date: 4/2/2012     Tobacco comment: 2 PPD   Substance Use Topics     Alcohol use: Yes     Comment: RARE     Family History   Problem Relation Age of Onset     Thyroid Disease Mother      Coronary Artery Disease Mother         pacemaker     Cancer Paternal Grandfather      Cancer Sister      Cancer - colorectal Sister      Mental Illness Nephew         committed suicide at age 27         Current Outpatient Medications   Medication Sig Dispense Refill     cholecalciferol 5000 units (125 mcg) PO capsule Take by mouth daily       lamoTRIgine (LAMICTAL) 100 MG tablet Take 1 tablet (100 mg) by mouth 2 times daily 60 tablet 0     losartan (COZAAR) 100 MG tablet Take 1 tablet (100 mg) by mouth daily 90 tablet 0     Multiple Vitamins-Minerals (MULTIVITAMIN ADULT PO) Take 1 tablet by mouth At Bedtime        OLANZapine (ZYPREXA) 5 MG tablet Take 5 mg by mouth 2 times daily       Omega-3 Fatty Acids (OMEGA 3 PO) Take 2,400 mg by mouth every evening       warfarin ANTICOAGULANT (COUMADIN) 5 MG tablet TAKE 1 TABLET BY MOUTH ONCE DAILY OR  AS  DIRECTED  BY  THE  ANTICOAGULATION  CLINIC 90 tablet 1     Allergies   Allergen Reactions     Lisinopril Nausea     Felt weakness nausea, couldn't sleep       Reviewed and updated as needed this visit by clinical staff  Tobacco  Allergies  Meds   Med Hx  Surg Hx  Fam Hx  Soc Hx       Reviewed and updated as needed this visit by Provider               Past Medical History:   Diagnosis Date     CKD (chronic kidney disease)     stage 3     Depressive disorder, not elsewhere classified     Manic      HTN (hypertension)      Unspecified schizophrenia, unspecified condition       Past Surgical History:   Procedure Laterality Date     COLONOSCOPY      normal. has fam hx colon cancer     COLONOSCOPY N/A 10/13/2014    Procedure: COLONOSCOPY;  Surgeon: Santy Constantino MD;  Location: [facility]     HERNIA REPAIR, INGUINAL RT/LT  02/12/2004     SURGICAL HISTORY OF -     "   Incised & Drained - ? Perirectal Abscess        Review of Systems  CONSTITUTIONAL: NEGATIVE for fever, chills, change in weight  INTEGUMENTARY/SKIN: NEGATIVE for worrisome rashes, moles or lesions  EYES: NEGATIVE for vision changes or irritation  ENT: NEGATIVE for ear, mouth and throat problems  RESP: NEGATIVE for significant cough or SOB  CV: NEGATIVE for chest pain, palpitations or peripheral edema  GI: NEGATIVE for nausea, abdominal pain, heartburn, or change in bowel habits   male: negative for dysuria, hematuria, decreased urinary stream, erectile dysfunction, urethral discharge  MUSCULOSKELETAL: NEGATIVE for significant arthralgias or myalgia  NEURO: NEGATIVE for weakness, dizziness or paresthesias  PSYCHIATRIC: NEGATIVE for changes in mood or affect    OBJECTIVE:   /80   Pulse 98   Temp 97.6  F (36.4  C) (Tympanic)   Resp 16   Ht 1.778 m (5' 10\")   Wt 78.1 kg (172 lb 3.2 oz)   SpO2 98%   BMI 24.71 kg/m      Physical Exam  GENERAL APPEARANCE: healthy, alert and no distress  EYES: pink conj, no icterus, PERRL, EOMI  HENT: ear canals and TM's normal, nose and mouth without ulcers or lesions, oropharynx clear and oral mucous membranes moist  NECK: no adenopathy, no asymmetry, masses, or scars and thyroid normal to palpation  RESP: lungs clear to auscultation - no rales, rhonchi or wheezes  CV: regular rates and rhythm, normal S1 S2, no S3 or S4, no murmur, click or rub, no peripheral edema and peripheral pulses strong  ABDOMEN: soft, nontender, no hepatosplenomegaly, no masses and bowel sounds normal  RECTAL: deferred  MS: no musculoskeletal defects are noted and gait is age appropriate without ataxia  SKIN: no suspicious lesions or rashes  NEURO: Normal strength and tone, sensory exam grossly normal, mentation intact and speech normal    Diagnostic Test Results:  Labs reviewed in Epic    ASSESSMENT/PLAN:   Jordan was seen today for orders and physical.    Diagnoses and all orders for this " visit:    Routine general medical examination at a health care facility  -     Lipid panel reflex to direct LDL Fasting; Future  -     Lipid panel reflex to direct LDL Fasting  Patient was advised on recommended screening and preventive health recommendations.  He verbalized understanding and agreed to the plans below and above.    Essential hypertension with goal blood pressure less than 140/90  -     losartan (COZAAR) 100 MG tablet; Take 1 tablet (100 mg) by mouth daily  -     Comprehensive metabolic panel; Future  -     OFFICE/OUTPT VISIT,EST,LEVL IV  -     Comprehensive metabolic panel  Controlled.  Low salt, low fat diet.   Exercise as tolerated.  Take meds as prescribed; call if with side effects.     Acute deep vein thrombosis (DVT) of right lower extremity, unspecified vein (H)  -     warfarin ANTICOAGULANT (COUMADIN) 5 MG tablet; Take 5 mg orally once a day or as directed by anticoagulation clinic team  -     Comprehensive metabolic panel; Future  -     OFFICE/OUTPT VISIT,EST,LEVL IV  -     Comprehensive metabolic panel  Seeing anticoag clinic. Verified with patient his current dose.    Screening for malignant neoplasm of colon  -     Adult Gastro Ref - Procedure Only; Future    Personal history of tobacco use  -     Prof Fee: Shared Decision Making Visit for Lung Cancer Screening  -     CT Chest Lung Cancer Scrn Low Dose wo; Future    Encounter for therapeutic drug monitoring  -     CBC with Platelets & Differential; Future  -     Comprehensive metabolic panel; Future  -     CBC with Platelets & Differential  -     Comprehensive metabolic panel    Long term current use of anticoagulant therapy  -     CBC with Platelets & Differential; Future  -     CBC with Platelets & Differential    Stage 3 chronic kidney disease, unspecified whether stage 3a or 3b CKD (H)  -     CBC with Platelets & Differential; Future  -     Albumin Random Urine Quantitative with Creat Ratio; Future  -     CBC with Platelets &  "Differential  -     Albumin Random Urine Quantitative with Creat Ratio    Other orders  -     REVIEW OF HEALTH MAINTENANCE PROTOCOL ORDERS        Patient has been advised of split billing requirements and indicates understanding: Yes  COUNSELING:   Reviewed preventive health counseling, as reflected in patient instructions    Estimated body mass index is 24.71 kg/m  as calculated from the following:    Height as of this encounter: 1.778 m (5' 10\").    Weight as of this encounter: 78.1 kg (172 lb 3.2 oz).         He reports that he quit smoking about 9 years ago. His smoking use included cigarettes. He has a 30.00 pack-year smoking history. He quit smokeless tobacco use about 9 years ago.      Counseling Resources:  ATP IV Guidelines  Pooled Cohorts Equation Calculator  FRAX Risk Assessment  ICSI Preventive Guidelines  Dietary Guidelines for Americans, 2010  [employer] MyPlate  ASA Prophylaxis  Lung CA Screening    Juan Antonio Flanagan MD  [facility]  Lung Cancer Screening Shared Decision Making Visit     Jordan Barnett is eligible for lung cancer screening on the basis of the information provided in my signed lung cancer screening order.     I have discussed with patient the risks and benefits of screening for lung cancer with low-dose CT.     The risks include:  radiation exposure: one low dose chest CT has as much ionizing radiation as about 15 chest x-rays or 6 months of background radiation living in [place]    false positives: 96% of positive findings/nodules are NOT cancer, but some might still require additional diagnostic evaluation, including biopsy  over-diagnosis: some slow growing cancers that might never have been clinically significant will be detected and treated unnecessarily     The benefit of early detection of lung cancer is contingent upon adherence to annual screening or more frequent follow up if indicated.     Furthermore, reaping the benefits of screening requires Jordan WATSON" Anibal to be willing and physically able to undergo diagnostic procedures, if indicated. Although no specific guide is available for determining severity of comorbidities, it is reasonable to withhold screening in patients who have greater mortality risk from other diseases.     We did discuss that the only way to prevent lung cancer is to not smoke. Smoking cessation counseling was not given.      I did offer risk estimation using a calculator such as this one:    ShouldIScreen

## 2022-01-06 NOTE — PATIENT INSTRUCTIONS
Urine test today to monitor kidney function.    Schedule fasting lab tests.    Schedule colonoscopy for screening for colon cancer at your soonest convenience.    To schedule the low dose cT scan of the chest to screen for lung cancer, call [phone].    Be consistent with low salt, low trans fat and low saturated fat diet.  Eat food rich in omega-3-fatty acids as you tolerate. (salmon, olive oil)  Eat 5 cups of vegetables, fruits and whole grains per day.  Limit starchy food (white rice, white bread, white pasta, white potatoes) to less than a cup per meal.  Minimize sweets, junk food and fastfood. Limit soda beverages to one serving per day; best to avoid it altogether though.    Exercise: moderate intensity sustained for at least 30 mins per episode, goal of 150 mins per week at least  Combine cardiovascular and resistance exercises.  These exercise recommendations are in addition to your daily activity at work or home.    Preventative Care Visits include: Yearly physicals, Well-child visits, Welcome to Medicare visits, & Medicare yearly wellness exams.    The purpose of these visits is to discuss your medical history and prevent health problems before you are sick.  You may need to pay a copay, coinsurance or deductible if your visit today includes testing or treating for a new or existing condition.    Additional charges may be incurred for today's visit. If you have questions about what your insurance plan covers, please contact your Insurance Company's member service department.  If you have questions specific to a bill you have already received from [facility], please contact the [facility] Billing Office at [phone].     Preventive Health Recommendations  Male Ages 50 - 64    Yearly exam:             See your health care provider every year in order to  o   Review health changes.   o   Discuss preventive care.    o   Review your medicines if your doctor has prescribed any.     Have a cholesterol test  every 5 years, or more frequently if you are at risk for high cholesterol/heart disease.     Have a diabetes test (fasting glucose) every three years. If you are at risk for diabetes, you should have this test more often.     Have a colonoscopy at age 50, or have a yearly FIT test (stool test). These exams will check for colon cancer.      Talk with your health care provider about whether or not a prostate cancer screening test (PSA) is right for you.    You should be tested each year for STDs (sexually transmitted diseases), if you re at risk.     Shots: Get a flu shot each year. Get a tetanus shot every 10 years.     Nutrition:    Eat at least 5 servings of fruits and vegetables daily.     Eat whole-grain bread, whole-wheat pasta and brown rice instead of white grains and rice.     Get adequate Calcium and Vitamin D.     Lifestyle    Exercise for at least 150 minutes a week (30 minutes a day, 5 days a week). This will help you control your weight and prevent disease.     Limit alcohol to one drink per day.     No smoking.     Wear sunscreen to prevent skin cancer.     See your dentist every six months for an exam and cleaning.     See your eye doctor every 1 to 2 years.    Lung Cancer Screening   Frequently Asked Questions  If you are at high-risk for lung cancer, getting screened with low-dose computed tomography (LDCT) every year can help save your life. This handout offers answers to some of the most common questions about lung cancer screening. If you have other questions, please call [phone] ([phone]).     What is it?  Lung cancer screening uses special X-ray technology to create an image of your lung tissue. The exam is quick and easy and takes less than 10 seconds. We don t give you any medicine or use any needles. You can eat before and after the exam. You don t need to change your clothes as long as the clothing on your chest doesn t contain metal. But, you do need to be able to hold your  breath for at least 6 seconds during the exam.    What is the goal of lung cancer screening?  The goal of lung cancer screening is to save lives. Many times, lung cancer is not found until a person starts having physical symptoms. Lung cancer screening can help detect lung cancer in the earliest stages when it may be easier to treat.    Who should be screened for lung cancer?  We suggest lung cancer screening for anyone who is at high-risk for lung cancer. You are in the high-risk group if you:      are between the ages of 55 and 79, and    have smoked at least 1 pack of cigarettes a day for 30 or more years, and    still smoke or have quit within the past 15 years.    However, if you have a new cough or shortness of breath, you should talk to your doctor before being screened.    Some national lung health advocacy groups also recommend screening for people ages 50 to 79 who have smoked an average of 1 pack of cigarettes a day for 20 years. They must also have at least 1 other risk factor for lung cancer, not including exposure to secondhand smoke. Other risk factors are having had cancer in the past, emphysema, pulmonary fibrosis, COPD, a family history of lung cancer, or exposure to certain materials such as arsenic, asbestos, beryllium, cadmium, chromium, diesel fumes, nickel, radon or silica. Your care team can help you know if you have one of these risk factors.     Why does it matter if I have symptoms?  Certain symptoms can be a sign that you have a condition in your lungs that should be checked and treated by your doctor. These symptoms include fever, chest pain, a new or changing cough, shortness of breath that you have never felt before, coughing up blood or unexplained weight loss. Having any of these symptoms can greatly affect the results of lung cancer screening.       Should all smokers get an LDCT lung cancer screening exam?  It depends. Lung cancer screening is for a very specific group of men and  women who have a history of heavy smoking over a long period of time (see  Who should be screened for lung cancer  above).  I am in the high-risk group, but have been diagnosed with cancer in the past. Is LDCT lung cancer screening right for me?  In some cases, you should not have LDCT lung screening, such as when your doctor is already following your cancer with CT scan studies. Your doctor will help you decide if LDCT lung screening is right for you.  Do I need to have a screening exam every year?  Yes. If you are in the high-risk group described earlier, you should get an LDCT lung cancer screening exam every year until you are 79, or are no longer willing or able to undergo screening and possible procedures to diagnose and treat lung cancer.  How effective is LDCT at preventing death from lung cancer?  Studies have shown that LDCT lung cancer screening can lower the risk of death from lung cancer by 20 percent in people who are at high-risk.  What are the risks?  There are some risks and limitations of LDCT lung cancer screening. We want to make sure you understand the risks and benefits, so please let us know if you have any questions. Your doctor may want to talk with you more about these risks.    Radiation exposure: As with any exam that uses radiation, there is a very small increased risk of cancer. The amount of radiation in LDCT is small--about the same amount a person would get from a mammogram. Your doctor orders the exam when he or she feels the potential benefits outweigh the risks.    False negatives: No test is perfect, including LDCT. It is possible that you may have a medical condition, including lung cancer, that is not found during your exam. This is called a false negative result.    False positives and more testing: LDCT very often finds something in the lung that could be cancer, but in fact is not. This is called a false positive result. False positive tests often cause anxiety. To make sure  these findings are not cancer, you may need to have more tests. These tests will be done only if you give us permission. Sometimes patients need a treatment that can have side effects, such as a biopsy. For more information on false positives, see  What can I expect from the results?     Findings not related to lung cancer: Your LDCT exam also takes pictures of areas of your body next to your lungs. In a very small number of cases, the CT scan will show an abnormal finding in one of these areas, such as your kidneys, adrenal glands, liver or thyroid. This finding may not be serious, but you may need more tests. Your doctor can help you decide what other tests you may need, if any.  What can I expect from the results?  About 1 out of 4 LDCT exams will find something that may need more tests. Most of the time, these findings are lung nodules. Lung nodules are very small collections of tissue in the lung. These nodules are very common, and the vast majority--more than 97 percent--are not cancer (benign). Most are normal lymph nodes or small areas of scarring from past infections.  But, if a small lung nodule is found to be cancer, the cancer can be cured more than 90 percent of the time. To know if the nodule is cancer, we may need to get more images before your next yearly screening exam. If the nodule has suspicious features (for example, it is large, has an odd shape or grows over time), we will refer you to a specialist for further testing.  Will my doctor also get the results?  Yes. Your doctor will get a copy of your results.  Is it okay to keep smoking now that there s a cancer screening exam?  No. Tobacco is one of the strongest cancer-causing agents. It causes not only lung cancer, but other cancers and cardiovascular (heart) diseases as well. The damage caused by smoking builds over time. This means that the longer you smoke, the higher your risk of disease. While it is never too late to quit, the sooner you  quit, the better.  Where can I find help to quit smoking?  The best way to prevent lung cancer is to stop smoking. If you have already quit smoking, congratulations and keep it up! For help on quitting smoking, please call [facility] at 6-254-215-OWVL ([phone]) or the American Cancer Society at 1-676.871.3690 to find local resources near you.  One-on-one health coaching:  If you d prefer to work individually with a health care provider on tobacco cessation, we offer:      Medication Therapy Management:  Our specially trained pharmacists work closely with you and your doctor to help you quit smoking.  Call [phone] or [phone] (toll free).     Can Do: Health coaching offered by [facility].  [email or web address]

## 2022-01-13 ENCOUNTER — LAB (OUTPATIENT)
Dept: LAB | Facility: CLINIC | Age: 62
End: 2022-01-13
Payer: COMMERCIAL

## 2022-01-13 ENCOUNTER — MYC MEDICAL ADVICE (OUTPATIENT)
Dept: FAMILY MEDICINE | Facility: CLINIC | Age: 62
End: 2022-01-13

## 2022-01-13 ENCOUNTER — ANTICOAGULATION THERAPY VISIT (OUTPATIENT)
Dept: ANTICOAGULATION | Facility: CLINIC | Age: 62
End: 2022-01-13

## 2022-01-13 ENCOUNTER — TELEPHONE (OUTPATIENT)
Dept: FAMILY MEDICINE | Facility: CLINIC | Age: 62
End: 2022-01-13

## 2022-01-13 DIAGNOSIS — I82.491 ACUTE DEEP VEIN THROMBOSIS (DVT) OF OTHER SPECIFIED VEIN OF RIGHT LOWER EXTREMITY (H): ICD-10-CM

## 2022-01-13 DIAGNOSIS — Z51.81 ENCOUNTER FOR THERAPEUTIC DRUG MONITORING: ICD-10-CM

## 2022-01-13 DIAGNOSIS — I82.491 ACUTE DEEP VEIN THROMBOSIS (DVT) OF OTHER SPECIFIED VEIN OF RIGHT LOWER EXTREMITY (H): Primary | ICD-10-CM

## 2022-01-13 DIAGNOSIS — Z00.00 ROUTINE GENERAL MEDICAL EXAMINATION AT A HEALTH CARE FACILITY: ICD-10-CM

## 2022-01-13 DIAGNOSIS — Z79.01 LONG TERM CURRENT USE OF ANTICOAGULANT THERAPY: ICD-10-CM

## 2022-01-13 DIAGNOSIS — I82.401 ACUTE DEEP VEIN THROMBOSIS (DVT) OF RIGHT LOWER EXTREMITY, UNSPECIFIED VEIN (H): ICD-10-CM

## 2022-01-13 DIAGNOSIS — N18.30 STAGE 3 CHRONIC KIDNEY DISEASE, UNSPECIFIED WHETHER STAGE 3A OR 3B CKD (H): ICD-10-CM

## 2022-01-13 DIAGNOSIS — I10 ESSENTIAL HYPERTENSION WITH GOAL BLOOD PRESSURE LESS THAN 140/90: ICD-10-CM

## 2022-01-13 DIAGNOSIS — N18.30 CKD (CHRONIC KIDNEY DISEASE) STAGE 3, GFR 30-59 ML/MIN (H): Primary | ICD-10-CM

## 2022-01-13 LAB
ALBUMIN SERPL-MCNC: 3.4 G/DL (ref 3.4–5)
ALP SERPL-CCNC: 76 U/L (ref 40–150)
ALT SERPL W P-5'-P-CCNC: 36 U/L (ref 0–70)
ANION GAP SERPL CALCULATED.3IONS-SCNC: 7 MMOL/L (ref 3–14)
AST SERPL W P-5'-P-CCNC: 18 U/L (ref 0–45)
BASOPHILS # BLD AUTO: 0 10E3/UL (ref 0–0.2)
BASOPHILS NFR BLD AUTO: 0 %
BILIRUB SERPL-MCNC: 0.4 MG/DL (ref 0.2–1.3)
BUN SERPL-MCNC: 32 MG/DL (ref 7–30)
CALCIUM SERPL-MCNC: 11.4 MG/DL (ref 8.5–10.1)
CHLORIDE BLD-SCNC: 106 MMOL/L (ref 94–109)
CHOLEST SERPL-MCNC: 164 MG/DL
CO2 SERPL-SCNC: 24 MMOL/L (ref 20–32)
CREAT SERPL-MCNC: 1.55 MG/DL (ref 0.66–1.25)
EOSINOPHIL # BLD AUTO: 0.4 10E3/UL (ref 0–0.7)
EOSINOPHIL NFR BLD AUTO: 6 %
ERYTHROCYTE [DISTWIDTH] IN BLOOD BY AUTOMATED COUNT: 12.5 % (ref 10–15)
FASTING STATUS PATIENT QL REPORTED: YES
GFR SERPL CREATININE-BSD FRML MDRD: 51 ML/MIN/1.73M2
GLUCOSE BLD-MCNC: 87 MG/DL (ref 70–99)
HCT VFR BLD AUTO: 37.1 % (ref 40–53)
HDLC SERPL-MCNC: 67 MG/DL
HGB BLD-MCNC: 12.2 G/DL (ref 13.3–17.7)
INR BLD: 2.5 (ref 0.9–1.1)
LDLC SERPL CALC-MCNC: 82 MG/DL
LYMPHOCYTES # BLD AUTO: 2.2 10E3/UL (ref 0.8–5.3)
LYMPHOCYTES NFR BLD AUTO: 34 %
MCH RBC QN AUTO: 31.4 PG (ref 26.5–33)
MCHC RBC AUTO-ENTMCNC: 32.9 G/DL (ref 31.5–36.5)
MCV RBC AUTO: 96 FL (ref 78–100)
MONOCYTES # BLD AUTO: 0.7 10E3/UL (ref 0–1.3)
MONOCYTES NFR BLD AUTO: 11 %
NEUTROPHILS # BLD AUTO: 3.1 10E3/UL (ref 1.6–8.3)
NEUTROPHILS NFR BLD AUTO: 49 %
NONHDLC SERPL-MCNC: 97 MG/DL
PLATELET # BLD AUTO: 257 10E3/UL (ref 150–450)
POTASSIUM BLD-SCNC: 4.2 MMOL/L (ref 3.4–5.3)
PROT SERPL-MCNC: 7.7 G/DL (ref 6.8–8.8)
RBC # BLD AUTO: 3.88 10E6/UL (ref 4.4–5.9)
SODIUM SERPL-SCNC: 137 MMOL/L (ref 133–144)
TRIGL SERPL-MCNC: 77 MG/DL
WBC # BLD AUTO: 6.3 10E3/UL (ref 4–11)

## 2022-01-13 PROCEDURE — 80061 LIPID PANEL: CPT

## 2022-01-13 PROCEDURE — 80053 COMPREHEN METABOLIC PANEL: CPT

## 2022-01-13 PROCEDURE — 36415 COLL VENOUS BLD VENIPUNCTURE: CPT

## 2022-01-13 PROCEDURE — 85025 COMPLETE CBC W/AUTO DIFF WBC: CPT

## 2022-01-13 PROCEDURE — 85610 PROTHROMBIN TIME: CPT

## 2022-01-13 NOTE — PROGRESS NOTES
ANTICOAGULATION MANAGEMENT     Jordan Barnett 61 year old male is on warfarin with therapeutic INR result. (Goal INR 2.0-3.0)    Recent labs: (last 7 days)     01/13/22  0708   INR 2.5*       ASSESSMENT     Source(s): Chart Review and Patient/Caregiver Call       Warfarin doses taken: Warfarin taken as instructed    Diet: No new diet changes identified    New illness, injury, or hospitalization: No    Medication/supplement changes: None noted    Signs or symptoms of bleeding or clotting: No    Previous INR: Supratherapeutic    Additional findings: None     PLAN     Recommended plan for no diet, medication or health factor changes affecting INR     Dosing Instructions: Continue your current warfarin dose with next INR in 3 weeks       Summary  As of 1/13/2022    Full warfarin instructions:  5 mg every day   Next INR check:  2/10/2022             Telephone call with Jordan who verbalizes understanding and agrees to plan    Patient elected to schedule next visit in 4 weeks, lab visit scheduled    Education provided: Contact [phone]  with any changes, questions or concerns.     Plan made per [facility] anticoagulation protocol    Vani Dumont RN  Anticoagulation Clinic  1/13/2022    _______________________________________________________________________     Anticoagulation Episode Summary     Current INR goal:  2.0-3.0   TTR:  79.6 % (1 y)   Target end date:  Indefinite   Send INR reminders to:  [facility]    Indications    Acute deep vein thrombosis (DVT) of other specified vein of right lower extremity (H) [I82.491]  Long term current use of anticoagulant therapy [Z79.01]           Comments:           Anticoagulation Care Providers     Provider Role Specialty Phone number    Kaleb Barnett MD Referring Emergency Medicine [phone]    Juan Antonio Flanagan MD Referring Family Medicine [phone]

## 2022-01-13 NOTE — TELEPHONE ENCOUNTER
"Ailin, [facility], ph [phone], called to do phone authorization for CT of lungs for lung cancer screening.    Ailin shares that she has auto approved this order.  Use \"Ailin, 1/13/22, case #[id number].\"  Good from 1/13/22 to 2/13/22.    Can be accessed via [employer] or fax [phone] too.    Ailin Pradhan RN    "

## 2022-01-17 ENCOUNTER — TRANSFERRED RECORDS (OUTPATIENT)
Dept: HEALTH INFORMATION MANAGEMENT | Facility: CLINIC | Age: 62
End: 2022-01-17

## 2022-01-17 ENCOUNTER — HOSPITAL ENCOUNTER (OUTPATIENT)
Dept: CT IMAGING | Facility: CLINIC | Age: 62
Discharge: HOME | End: 2022-01-17
Attending: FAMILY MEDICINE | Admitting: FAMILY MEDICINE
Payer: COMMERCIAL

## 2022-01-17 DIAGNOSIS — Z87.891 PERSONAL HISTORY OF TOBACCO USE: ICD-10-CM

## 2022-01-17 PROCEDURE — 71271 CT THORAX LUNG CANCER SCR C-: CPT

## 2022-01-24 ENCOUNTER — TELEPHONE (OUTPATIENT)
Dept: FAMILY MEDICINE | Facility: CLINIC | Age: 62
End: 2022-01-24
Payer: COMMERCIAL

## 2022-01-24 DIAGNOSIS — Z79.01 LONG TERM CURRENT USE OF ANTICOAGULANT THERAPY: ICD-10-CM

## 2022-01-24 DIAGNOSIS — Z11.59 ENCOUNTER FOR SCREENING FOR OTHER VIRAL DISEASES: Primary | ICD-10-CM

## 2022-01-24 DIAGNOSIS — I82.491 ACUTE DEEP VEIN THROMBOSIS (DVT) OF OTHER SPECIFIED VEIN OF RIGHT LOWER EXTREMITY (H): Primary | ICD-10-CM

## 2022-01-24 NOTE — TELEPHONE ENCOUNTER
Dr Flanagan  Pt is having a colonoscopy on 2/3 and is wondering about instructions on holding the coumadin for the procedure.  Please advise    Celestino Mendoza RN

## 2022-01-26 NOTE — TELEPHONE ENCOUNTER
"DIOGO-PROCEDURAL ANTICOAGULATION  MANAGEMENT    ASSESSMENT     Warfarin interruption plan for colonoscopy on 2022.    Indication for Anticoagulation: Recurrent DVT      DVT 10/2019     Recurrent DVT 2020 after stopped warfarin 2020    Negative hematology testing 2019      Diogo-Procedure Risk stratification for thromboembolism: moderate (2018 American Society of Hematology guideline)    VTE: 2018 American Society of Hematology recommends against bridging in low and moderate risk VTE patients holding warfarin     RECOMMENDATION       Pre-Procedure:  o Hold warfarin for 4 days, until after procedure startin2022   o No Bridge      Post-Procedure:  o Resume warfarin dose if okay with provider doing procedure on night of procedure, 2022 PM: 10mg  o Recheck INR ~ 7 days after resuming warfarin         Plan routed to referring provider for approval  ?   Lizzette Barnett [facility]    SUBJECTIVE/OBJECTIVE     Jordan Barnett, a 61 year old male    Goal INR Range: 2.0-3.0     Patient bridged in past: Yes: with acute VTE    Pertinent History: most recent VTE >1 year ago    Wt Readings from Last 3 Encounters:   22 78.1 kg (172 lb 3.2 oz)   20 78.9 kg (174 lb)   20 77.1 kg (170 lb)      Ideal body weight: 73 kg (160 lb 15 oz)  Adjusted ideal body weight: 75 kg (165 lb 7.1 oz)     Estimated body mass index is 24.71 kg/m  as calculated from the following:    Height as of 22: 1.778 m (5' 10\").    Weight as of 22: 78.1 kg (172 lb 3.2 oz).    Lab Results   Component Value Date    INR 2.5 (H) 2022    INR 3.5 (H) 2022    INR 2.3 (H) 2021     Lab Results   Component Value Date    HGB 12.2 2022    HGB 11.8 2020    HCT 37.1 2022    HCT 34.2 2020     2022     2020     Lab Results   Component Value Date    CR 1.55 (H) 2022    CR 1.63 (H) 2021    CR 1.52 (H) 12/10/2020     Estimated Creatinine Clearance: 56 " mL/min (A) (based on SCr of 1.55 mg/dL (H)).

## 2022-01-26 NOTE — TELEPHONE ENCOUNTER
Routing to [facility] to review and advise.    Maryellen Alberts RN, BSN, PHN

## 2022-02-01 ENCOUNTER — LAB (OUTPATIENT)
Dept: LAB | Facility: CLINIC | Age: 62
End: 2022-02-01
Payer: COMMERCIAL

## 2022-02-01 DIAGNOSIS — Z11.59 ENCOUNTER FOR SCREENING FOR OTHER VIRAL DISEASES: ICD-10-CM

## 2022-02-01 PROCEDURE — U0005 INFEC AGEN DETEC AMPLI PROBE: HCPCS

## 2022-02-01 PROCEDURE — U0003 INFECTIOUS AGENT DETECTION BY NUCLEIC ACID (DNA OR RNA); SEVERE ACUTE RESPIRATORY SYNDROME CORONAVIRUS 2 (SARS-COV-2) (CORONAVIRUS DISEASE [COVID-19]), AMPLIFIED PROBE TECHNIQUE, MAKING USE OF HIGH THROUGHPUT TECHNOLOGIES AS DESCRIBED BY CMS-2020-01-R: HCPCS

## 2022-02-02 ENCOUNTER — ANESTHESIA EVENT (OUTPATIENT)
Dept: GASTROENTEROLOGY | Facility: CLINIC | Age: 62
End: 2022-02-02
Payer: COMMERCIAL

## 2022-02-02 LAB — SARS-COV-2 RNA RESP QL NAA+PROBE: NEGATIVE

## 2022-02-02 NOTE — ANESTHESIA PREPROCEDURE EVALUATION
Anesthesia Pre-Procedure Evaluation    Patient: Jordan Barnett   MRN: [id number] : 1960        Preoperative Diagnosis: Screening for malignant neoplasm of colon [Z12.11]    Procedure : Procedure(s):  COLONOSCOPY          Past Medical History:   Diagnosis Date     CKD (chronic kidney disease)     stage 3     Depressive disorder, not elsewhere classified     Manic      HTN (hypertension)      Unspecified schizophrenia, unspecified condition       Past Surgical History:   Procedure Laterality Date     COLONOSCOPY      normal. has fam hx colon cancer     COLONOSCOPY N/A 10/13/2014    Procedure: COLONOSCOPY;  Surgeon: Santy Constantino MD;  Location: [facility]     HERNIA REPAIR, INGUINAL RT/LT  2004     SURGICAL HISTORY OF -       Incised & Drained - ? Perirectal Abscess       Allergies   Allergen Reactions     Lisinopril Nausea     Felt weakness nausea, couldn't sleep      Social History     Tobacco Use     Smoking status: Former Smoker     Packs/day: 1.00     Years: 30.00     Pack years: 30.00     Types: Cigarettes     Quit date: 2012     Years since quittin.8     Smokeless tobacco: Former User     Quit date: 2012     Tobacco comment: 2 PPD   Substance Use Topics     Alcohol use: Yes     Comment: RARE      Wt Readings from Last 1 Encounters:   22 78.1 kg (172 lb 3.2 oz)        Anesthesia Evaluation            ROS/MED HX  ENT/Pulmonary:     (+) tobacco use, Past use,     Neurologic:  - neg neurologic ROS     Cardiovascular:     (+) hypertension-----    METS/Exercise Tolerance:     Hematologic:     (+) History of blood clots, pt is anticoagulated,     Musculoskeletal:  - neg musculoskeletal ROS     GI/Hepatic:  - neg GI/hepatic ROS     Renal/Genitourinary:     (+) renal disease, type: CRI,     Endo:  - neg endo ROS     Psychiatric/Substance Use:     (+) psychiatric history bipolar and schizophrenia     Infectious Disease:  - neg infectious disease ROS     Malignancy:  - neg malignancy ROS      Other:  - neg other ROS          Physical Exam    Airway  airway exam normal      Mallampati: II   TM distance: > 3 FB   Neck ROM: full   Mouth opening: > 3 cm    Respiratory Devices and Support         Dental  no notable dental history         Cardiovascular   cardiovascular exam normal          Pulmonary   pulmonary exam normal                OUTSIDE LABS:  CBC:   Lab Results   Component Value Date    WBC 6.3 01/13/2022    WBC 12.2 (H) 11/28/2020    HGB 12.2 (L) 01/13/2022    HGB 11.8 (L) 11/28/2020    HCT 37.1 (L) 01/13/2022    HCT 34.2 (L) 11/28/2020     01/13/2022     11/28/2020     BMP:   Lab Results   Component Value Date     01/13/2022     02/03/2021    POTASSIUM 4.2 01/13/2022    POTASSIUM 4.2 02/03/2021    CHLORIDE 106 01/13/2022    CHLORIDE 108 02/03/2021    CO2 24 01/13/2022    CO2 26 02/03/2021    BUN 32 (H) 01/13/2022    BUN 45 (H) 02/03/2021    CR 1.55 (H) 01/13/2022    CR 1.63 (H) 02/03/2021    GLC 87 01/13/2022    GLC 94 02/03/2021     COAGS:   Lab Results   Component Value Date    PTT 28 11/28/2020    INR 2.5 (H) 01/13/2022     POC: No results found for: BGM, HCG, HCGS  HEPATIC:   Lab Results   Component Value Date    ALBUMIN 3.4 01/13/2022    PROTTOTAL 7.7 01/13/2022    ALT 36 01/13/2022    AST 18 01/13/2022    ALKPHOS 76 01/13/2022    BILITOTAL 0.4 01/13/2022     OTHER:   Lab Results   Component Value Date    A1C 5.5 07/28/2016    BENNY 11.4 (H) 01/13/2022    PHOS 3.3 06/09/2011    TSH 3.70 06/15/2018    T4 7.1 01/08/2008    SED 16 (H) 01/08/2008       Anesthesia Plan    ASA Status:  3   NPO Status:  NPO Appropriate    Anesthesia Type: General.   Induction: Intravenous.   Maintenance: TIVA.        Consents    Anesthesia Plan(s) and associated risks, benefits, and realistic alternatives discussed. Questions answered and patient/representative(s) expressed understanding.    - Discussed:     - Discussed with:  Patient         Postoperative Care            Comments:                 Leland Carrillo, APRN CRNA

## 2022-02-03 ENCOUNTER — ANESTHESIA (OUTPATIENT)
Dept: GASTROENTEROLOGY | Facility: CLINIC | Age: 62
End: 2022-02-03
Payer: COMMERCIAL

## 2022-02-03 ENCOUNTER — DOCUMENTATION ONLY (OUTPATIENT)
Dept: ANTICOAGULATION | Facility: CLINIC | Age: 62
End: 2022-02-03

## 2022-02-03 ENCOUNTER — HOSPITAL ENCOUNTER (OUTPATIENT)
Facility: CLINIC | Age: 62
Discharge: HOME | End: 2022-02-03
Attending: SURGERY | Admitting: SURGERY
Payer: COMMERCIAL

## 2022-02-03 VITALS
RESPIRATION RATE: 14 BRPM | HEART RATE: 72 BPM | HEIGHT: 70 IN | TEMPERATURE: 97.5 F | SYSTOLIC BLOOD PRESSURE: 97 MMHG | WEIGHT: 172 LBS | OXYGEN SATURATION: 95 % | DIASTOLIC BLOOD PRESSURE: 72 MMHG | BODY MASS INDEX: 24.62 KG/M2

## 2022-02-03 LAB — COLONOSCOPY: NORMAL

## 2022-02-03 PROCEDURE — 250N000011 HC RX IP 250 OP 636: Performed by: NURSE ANESTHETIST, CERTIFIED REGISTERED

## 2022-02-03 PROCEDURE — G0121 COLON CA SCRN NOT HI RSK IND: HCPCS | Performed by: SURGERY

## 2022-02-03 PROCEDURE — 250N000009 HC RX 250: Performed by: NURSE ANESTHETIST, CERTIFIED REGISTERED

## 2022-02-03 PROCEDURE — 45378 DIAGNOSTIC COLONOSCOPY: CPT | Performed by: SURGERY

## 2022-02-03 PROCEDURE — 250N000009 HC RX 250: Performed by: SURGERY

## 2022-02-03 PROCEDURE — 258N000003 HC RX IP 258 OP 636: Performed by: SURGERY

## 2022-02-03 PROCEDURE — 370N000017 HC ANESTHESIA TECHNICAL FEE, PER MIN: Performed by: SURGERY

## 2022-02-03 RX ORDER — LIDOCAINE 40 MG/G
CREAM TOPICAL
Status: DISCONTINUED | OUTPATIENT
Start: 2022-02-03 | End: 2022-02-03 | Stop reason: HOSPADM

## 2022-02-03 RX ORDER — ONDANSETRON 2 MG/ML
4 INJECTION INTRAMUSCULAR; INTRAVENOUS
Status: DISCONTINUED | OUTPATIENT
Start: 2022-02-03 | End: 2022-02-03 | Stop reason: HOSPADM

## 2022-02-03 RX ORDER — PROPOFOL 10 MG/ML
INJECTION, EMULSION INTRAVENOUS CONTINUOUS PRN
Status: DISCONTINUED | OUTPATIENT
Start: 2022-02-03 | End: 2022-02-03

## 2022-02-03 RX ORDER — SODIUM CHLORIDE, SODIUM LACTATE, POTASSIUM CHLORIDE, CALCIUM CHLORIDE 600; 310; 30; 20 MG/100ML; MG/100ML; MG/100ML; MG/100ML
INJECTION, SOLUTION INTRAVENOUS CONTINUOUS
Status: DISCONTINUED | OUTPATIENT
Start: 2022-02-03 | End: 2022-02-03 | Stop reason: HOSPADM

## 2022-02-03 RX ORDER — GLYCOPYRROLATE 0.2 MG/ML
INJECTION, SOLUTION INTRAMUSCULAR; INTRAVENOUS PRN
Status: DISCONTINUED | OUTPATIENT
Start: 2022-02-03 | End: 2022-02-03

## 2022-02-03 RX ADMIN — PROPOFOL 200 MCG/KG/MIN: 10 INJECTION, EMULSION INTRAVENOUS at 10:09

## 2022-02-03 RX ADMIN — SODIUM CHLORIDE, POTASSIUM CHLORIDE, SODIUM LACTATE AND CALCIUM CHLORIDE: 600; 310; 30; 20 INJECTION, SOLUTION INTRAVENOUS at 10:02

## 2022-02-03 RX ADMIN — LIDOCAINE HYDROCHLORIDE 50 MG: 10 INJECTION, SOLUTION EPIDURAL; INFILTRATION; INTRACAUDAL; PERINEURAL at 10:09

## 2022-02-03 RX ADMIN — LIDOCAINE HYDROCHLORIDE 0.2 ML: 10 INJECTION, SOLUTION EPIDURAL; INFILTRATION; INTRACAUDAL; PERINEURAL at 10:01

## 2022-02-03 RX ADMIN — GLYCOPYRROLATE 0.2 MG: 0.2 INJECTION, SOLUTION INTRAMUSCULAR; INTRAVENOUS at 10:09

## 2022-02-03 ASSESSMENT — LIFESTYLE VARIABLES: TOBACCO_USE: 1

## 2022-02-03 ASSESSMENT — MIFFLIN-ST. JEOR: SCORE: 1596.44

## 2022-02-03 NOTE — PROGRESS NOTES
ANTICOAGULATION  MANAGEMENT: Discharge Review    AbranWALTER Barnett chart reviewed for anticoagulation continuity of care    Outpatient surgery/procedure on 2/3/22 for Colonoscopy.    Discharge disposition: Home    Results:    No results for input(s): INR, [id number], F2, ALMWH, AAUFH in the last 168 hours.  Anticoagulation inpatient management:     not applicable     Anticoagulation discharge instructions:     Warfarin dosing: home regimen continued   Bridging: No   INR goal change: No      Medication changes affecting anticoagulation: No    Additional factors affecting anticoagulation: No    Plan     No adjustment to anticoagulation plan needed    Recommended follow up is scheduled  Patient not contacted    No adjustment to Anticoagulation Calendar was required    Lynda Couch RN

## 2022-02-03 NOTE — ANESTHESIA CARE TRANSFER NOTE
Patient: Jordan Barnett    Procedure: Procedure(s):  COLONOSCOPY       Diagnosis: Screening for malignant neoplasm of colon [Z12.11]  Diagnosis Additional Information: No value filed.    Anesthesia Type:   General     Note:    Oropharynx: spontaneously breathing  Level of Consciousness: drowsy  Oxygen Supplementation: room air    Independent Airway: airway patency satisfactory and stable  Dentition: dentition unchanged  Vital Signs Stable: post-procedure vital signs reviewed and stable  Report to RN Given: handoff report given  Patient transferred to: Phase II    Handoff Report: Identifed the Patient, Identified the Reponsible Provider, Reviewed the pertinent medical history, Discussed the surgical course, Reviewed Intra-OP anesthesia mangement and issues during anesthesia, Set expectations for post-procedure period and Allowed opportunity for questions and acknowledgement of understanding      Vitals:  Vitals Value Taken Time   BP 97/72 02/03/22 1025   Temp     Pulse 72 02/03/22 1025   Resp     SpO2 96 % 02/03/22 1026   Vitals shown include unvalidated device data.    Electronically Signed By: AUGUSTUS Tobias CRNA  February 3, 2022  10:28 AM

## 2022-02-03 NOTE — ANESTHESIA POSTPROCEDURE EVALUATION
Patient: Jordan Barnett    Procedure: Procedure(s):  COLONOSCOPY       Diagnosis:Screening for malignant neoplasm of colon [Z12.11]  Diagnosis Additional Information: No value filed.    Anesthesia Type:  General    Note:  Disposition: Outpatient   Postop Pain Control: Uneventful            Sign Out: Well controlled pain   PONV: No   Neuro/Psych: Uneventful            Sign Out: Acceptable/Baseline neuro status   Airway/Respiratory: Uneventful            Sign Out: Acceptable/Baseline resp. status   CV/Hemodynamics: Uneventful            Sign Out: Acceptable CV status; No obvious hypovolemia; No obvious fluid overload   Other NRE: NONE   DID A NON-ROUTINE EVENT OCCUR? No           Last vitals:  Vitals Value Taken Time   BP 97/72 02/03/22 1025   Temp     Pulse 72 02/03/22 1025   Resp     SpO2 96 % 02/03/22 1027   Vitals shown include unvalidated device data.    Electronically Signed By: AUGUSTUS Tobias CRNA  February 3, 2022  10:28 AM

## 2022-02-03 NOTE — H&P
61 year old year old male here for colonoscopy for screening.    Patient Active Problem List   Diagnosis     Severe bipolar I disorder, most recent episode mixed, with psychotic features (H)     Sebaceous cyst     CKD (chronic kidney disease) stage 3, GFR 30-59 ml/min (H)     Schizoaffective disorder (H)     Hyperlipidemia LDL goal <160     Tubular adenoma of colon     Former smoker     Benign essential hypertension     Former smoker, stopped smoking in distant past     Deep vein thrombosis (DVT) (H)     Acute deep vein thrombosis (DVT) of femoral vein of right lower extremity (H)     Acute deep vein thrombosis (DVT) of right lower extremity, unspecified vein (H)     Acute deep vein thrombosis (DVT) of other specified vein of right lower extremity (H)     Long term current use of anticoagulant therapy       Past Medical History:   Diagnosis Date     CKD (chronic kidney disease)     stage 3     Depressive disorder, not elsewhere classified     Manic      HTN (hypertension)      Unspecified schizophrenia, unspecified condition        Past Surgical History:   Procedure Laterality Date     COLONOSCOPY      normal. has fam hx colon cancer     COLONOSCOPY N/A 10/13/2014    Procedure: COLONOSCOPY;  Surgeon: Santy Constantino MD;  Location: [facility]     HERNIA REPAIR, INGUINAL RT/LT  02/12/2004     SURGICAL HISTORY OF -       Incised & Drained - ? Perirectal Abscess        @[facility]@    No current outpatient medications on file.       Allergies   Allergen Reactions     Lisinopril Nausea     Felt weakness nausea, couldn't sleep       Pt reports that he quit smoking about 9 years ago. His smoking use included cigarettes. He has a 30.00 pack-year smoking history. He quit smokeless tobacco use about 9 years ago. He reports current alcohol use. He reports that he does not use drugs.    Exam:  There were no vitals taken for this visit.    Awake, Alert OX3  Lungs - CTA bilaterally  CV - RRR, no murmurs, distal pulses intact  Abd -  soft, non-distended, non-tender, +BS  Extr - No cyanosis or edema    A/P 61 year old year old male in need of colonoscopy for screening. Risks, benefits, alternatives, and complications were discussed including the possibility of perforation and the patient agreed to proceed    Sherif Duarte MD

## 2022-02-10 ENCOUNTER — LAB (OUTPATIENT)
Dept: LAB | Facility: CLINIC | Age: 62
End: 2022-02-10
Payer: COMMERCIAL

## 2022-02-10 ENCOUNTER — ANTICOAGULATION THERAPY VISIT (OUTPATIENT)
Dept: ANTICOAGULATION | Facility: CLINIC | Age: 62
End: 2022-02-10

## 2022-02-10 DIAGNOSIS — I82.491 ACUTE DEEP VEIN THROMBOSIS (DVT) OF OTHER SPECIFIED VEIN OF RIGHT LOWER EXTREMITY (H): ICD-10-CM

## 2022-02-10 DIAGNOSIS — Z79.01 LONG TERM CURRENT USE OF ANTICOAGULANT THERAPY: ICD-10-CM

## 2022-02-10 DIAGNOSIS — I82.491 ACUTE DEEP VEIN THROMBOSIS (DVT) OF OTHER SPECIFIED VEIN OF RIGHT LOWER EXTREMITY (H): Primary | ICD-10-CM

## 2022-02-10 LAB — INR BLD: 3 (ref 0.9–1.1)

## 2022-02-10 PROCEDURE — 85610 PROTHROMBIN TIME: CPT

## 2022-02-10 PROCEDURE — 36416 COLLJ CAPILLARY BLOOD SPEC: CPT

## 2022-02-10 NOTE — PROGRESS NOTES
ANTICOAGULATION MANAGEMENT     Jordan Barnett 61 year old male is on warfarin with therapeutic INR result. (Goal INR 2.0-3.0)    Recent labs: (last 7 days)     02/10/22  0700   INR 3.0*       ASSESSMENT     Source(s): Chart Review and Patient/Caregiver Call       Warfarin doses taken: Warfarin taken as instructed    Diet: No new diet changes identified    New illness, injury, or hospitalization: Yes: colonoscopy 2/3    Medication/supplement changes: None noted    Signs or symptoms of bleeding or clotting: No    Previous INR: Therapeutic last visit; previously outside of goal range    Additional findings: None     PLAN     Recommended plan for no diet, medication or health factor changes affecting INR     Dosing Instructions: Continue your current warfarin dose with next INR in 2 weeks       Summary  As of 2/10/2022    Full warfarin instructions:  5 mg every day   Next INR check:  2/24/2022             Telephone call with Jordan who verbalizes understanding and agrees to plan    Lab visit scheduled    Education provided: Please call back if any changes to your diet, medications or how you've been taking warfarin    Plan made per [facility] anticoagulation protocol    Joann Gurrola RN  Anticoagulation Clinic  2/10/2022    _______________________________________________________________________     Anticoagulation Episode Summary     Current INR goal:  2.0-3.0   TTR:  80.9 % (1 y)   Target end date:  Indefinite   Send INR reminders to:  [facility]    Indications    Acute deep vein thrombosis (DVT) of other specified vein of right lower extremity (H) [I82.491]  Long term current use of anticoagulant therapy [Z79.01]           Comments:           Anticoagulation Care Providers     Provider Role Specialty Phone number    Kaleb Barnett MD Referring Emergency Medicine [phone]    Juan Antonio Flanagan MD Referring Family Medicine [phone]

## 2022-02-21 ENCOUNTER — RX ONLY (RX ONLY)
Age: 62
End: 2022-02-21

## 2022-02-21 ENCOUNTER — OTHER (OUTPATIENT)
Dept: URBAN - METROPOLITAN AREA CLINIC 17 | Facility: CLINIC | Age: 62
Setting detail: DERMATOLOGY
End: 2022-02-21

## 2022-02-21 PROBLEM — L82.1 OTHER SEBORRHEIC KERATOSIS: Status: RESOLVED | Noted: 2022-02-21

## 2022-02-21 PROBLEM — L71.8 OTHER ROSACEA: Status: RESOLVED | Noted: 2022-02-21

## 2022-02-21 PROBLEM — L57.0 ACTINIC KERATOSIS: Status: RESOLVED | Noted: 2022-02-21

## 2022-02-21 PROCEDURE — 17000 DESTRUCT PREMALG LESION: CPT

## 2022-02-21 PROCEDURE — 99214 OFFICE O/P EST MOD 30 MIN: CPT

## 2022-02-21 PROCEDURE — 17003 DESTRUCT PREMALG LES 2-14: CPT

## 2022-02-21 PROCEDURE — OTHER BTD - COSMETIC PROCEDURE: OTHER

## 2022-02-21 RX ORDER — DOXYCYCLINE 100 MG/1
CAPSULE ORAL
Qty: 60 | Refills: 1
Start: 2022-02-21

## 2022-02-21 RX ORDER — MUPIROCIN 20 MG/G
OINTMENT TOPICAL TWICE A DAY
Qty: 22 | Refills: 3
Start: 2022-02-21

## 2022-02-21 RX ORDER — TRIAMCINOLONE ACETONIDE 1 MG/G
CREAM TOPICAL
Qty: 80 | Refills: 2
Start: 2022-02-21

## 2022-02-21 RX ORDER — METRONIDAZOLE 7.5 MG/G
GEL TOPICAL TWICE A DAY
Qty: 45 | Refills: 3
Start: 2022-02-21

## 2022-02-24 ENCOUNTER — ANTICOAGULATION THERAPY VISIT (OUTPATIENT)
Dept: ANTICOAGULATION | Facility: CLINIC | Age: 62
End: 2022-02-24

## 2022-02-24 ENCOUNTER — LAB (OUTPATIENT)
Dept: LAB | Facility: CLINIC | Age: 62
End: 2022-02-24
Payer: COMMERCIAL

## 2022-02-24 DIAGNOSIS — Z79.01 LONG TERM CURRENT USE OF ANTICOAGULANT THERAPY: ICD-10-CM

## 2022-02-24 DIAGNOSIS — I82.491 ACUTE DEEP VEIN THROMBOSIS (DVT) OF OTHER SPECIFIED VEIN OF RIGHT LOWER EXTREMITY (H): ICD-10-CM

## 2022-02-24 DIAGNOSIS — I82.491 ACUTE DEEP VEIN THROMBOSIS (DVT) OF OTHER SPECIFIED VEIN OF RIGHT LOWER EXTREMITY (H): Primary | ICD-10-CM

## 2022-02-24 LAB — INR BLD: 3.1 (ref 0.9–1.1)

## 2022-02-24 PROCEDURE — 85610 PROTHROMBIN TIME: CPT

## 2022-02-24 PROCEDURE — 36416 COLLJ CAPILLARY BLOOD SPEC: CPT

## 2022-02-24 NOTE — PROGRESS NOTES
ANTICOAGULATION MANAGEMENT     Jordan Barnett 61 year old male is on warfarin with supratherapeutic INR result. (Goal INR 2.0-3.0)    Recent labs: (last 7 days)     02/24/22  0706   INR 3.1*       ASSESSMENT     Source(s): Chart Review and Patient/Caregiver Call       Warfarin doses taken: Warfarin taken as instructed    Diet: No new diet changes identified    New illness, injury, or hospitalization: No    Medication/supplement changes: None noted    Signs or symptoms of bleeding or clotting: No    Previous INR: Therapeutic last 2(+) visits    Additional findings: None     PLAN     Recommended plan for no diet, medication or health factor changes affecting INR     Dosing Instructions: Continue your current warfarin dose with next INR in 2 weeks       Summary  As of 2/24/2022    Full warfarin instructions:  5 mg every day   Next INR check:  3/10/2022             Telephone call with Jordan who verbalizes understanding and agrees to plan    Lab visit scheduled    Education provided: Please call back if any changes to your diet, medications or how you've been taking warfarin and Monitoring for bleeding signs and symptoms    Plan made per [facility] anticoagulation protocol    Joann Gurrola RN  Anticoagulation Clinic  2/24/2022    _______________________________________________________________________     Anticoagulation Episode Summary     Current INR goal:  2.0-3.0   TTR:  77.0 % (1 y)   Target end date:  Indefinite   Send INR reminders to:  [facility]    Indications    Acute deep vein thrombosis (DVT) of other specified vein of right lower extremity (H) [I82.491]  Long term current use of anticoagulant therapy [Z79.01]           Comments:           Anticoagulation Care Providers     Provider Role Specialty Phone number    Kaleb Barnett MD Referring Emergency Medicine [phone]    Juan Antonio Flanagan MD Referring Family Medicine [phone]

## 2022-03-10 ENCOUNTER — ANTICOAGULATION THERAPY VISIT (OUTPATIENT)
Dept: ANTICOAGULATION | Facility: CLINIC | Age: 62
End: 2022-03-10

## 2022-03-10 ENCOUNTER — LAB (OUTPATIENT)
Dept: LAB | Facility: CLINIC | Age: 62
End: 2022-03-10
Payer: COMMERCIAL

## 2022-03-10 DIAGNOSIS — Z79.01 LONG TERM CURRENT USE OF ANTICOAGULANT THERAPY: ICD-10-CM

## 2022-03-10 DIAGNOSIS — I82.491 ACUTE DEEP VEIN THROMBOSIS (DVT) OF OTHER SPECIFIED VEIN OF RIGHT LOWER EXTREMITY (H): ICD-10-CM

## 2022-03-10 DIAGNOSIS — I82.491 ACUTE DEEP VEIN THROMBOSIS (DVT) OF OTHER SPECIFIED VEIN OF RIGHT LOWER EXTREMITY (H): Primary | ICD-10-CM

## 2022-03-10 LAB — INR BLD: 2.6 (ref 0.9–1.1)

## 2022-03-10 PROCEDURE — 85610 PROTHROMBIN TIME: CPT

## 2022-03-10 PROCEDURE — 36416 COLLJ CAPILLARY BLOOD SPEC: CPT

## 2022-03-10 NOTE — PROGRESS NOTES
ANTICOAGULATION MANAGEMENT     Jordan Barnett 61 year old male is on warfarin with therapeutic INR result. (Goal INR 2.0-3.0)    Recent labs: (last 7 days)     03/10/22  0702   INR 2.6*       ASSESSMENT       Source(s): Chart Review and Patient/Caregiver Call       Warfarin doses taken: Warfarin taken as instructed    Diet: No new diet changes identified    New illness, injury, or hospitalization: No    Medication/supplement changes: None noted    Signs or symptoms of bleeding or clotting: No    Previous INR: Supratherapeutic    Additional findings: None     PLAN     Recommended plan for no diet, medication or health factor changes affecting INR     Dosing Instructions: Continue your current warfarin dose with next INR in 3 weeks       Summary  As of 3/10/2022    Full warfarin instructions:  5 mg every day   Next INR check:  3/31/2022             Telephone call with Jordan who verbalizes understanding and agrees to plan    Lab visit scheduled    Education provided: Please call back if any changes to your diet, medications or how you've been taking warfarin    Plan made per [facility] anticoagulation protocol    Joann Gurrola RN  Anticoagulation Clinic  3/10/2022    _______________________________________________________________________     Anticoagulation Episode Summary     Current INR goal:  2.0-3.0   TTR:  77.2 % (1 y)   Target end date:  Indefinite   Send INR reminders to:  [facility] [place]    Indications    Acute deep vein thrombosis (DVT) of other specified vein of right lower extremity (H) [I82.491]  Long term current use of anticoagulant therapy [Z79.01]           Comments:           Anticoagulation Care Providers     Provider Role Specialty Phone number    Kaleb Barnett MD Referring Emergency Medicine [phone]    Juan Antonio Flanagan MD Referring Family Medicine [phone]

## 2022-03-31 ENCOUNTER — ANTICOAGULATION THERAPY VISIT (OUTPATIENT)
Dept: ANTICOAGULATION | Facility: CLINIC | Age: 62
End: 2022-03-31

## 2022-03-31 ENCOUNTER — LAB (OUTPATIENT)
Dept: LAB | Facility: CLINIC | Age: 62
End: 2022-03-31
Payer: COMMERCIAL

## 2022-03-31 DIAGNOSIS — Z79.01 LONG TERM CURRENT USE OF ANTICOAGULANT THERAPY: ICD-10-CM

## 2022-03-31 DIAGNOSIS — I82.491 ACUTE DEEP VEIN THROMBOSIS (DVT) OF OTHER SPECIFIED VEIN OF RIGHT LOWER EXTREMITY (H): ICD-10-CM

## 2022-03-31 DIAGNOSIS — I82.491 ACUTE DEEP VEIN THROMBOSIS (DVT) OF OTHER SPECIFIED VEIN OF RIGHT LOWER EXTREMITY (H): Primary | ICD-10-CM

## 2022-03-31 LAB — INR BLD: 3 (ref 0.9–1.1)

## 2022-03-31 PROCEDURE — 36416 COLLJ CAPILLARY BLOOD SPEC: CPT

## 2022-03-31 PROCEDURE — 85610 PROTHROMBIN TIME: CPT

## 2022-03-31 NOTE — PROGRESS NOTES
ANTICOAGULATION MANAGEMENT     Jordan Barnett 61 year old male is on warfarin with therapeutic INR result. (Goal INR 2.0-3.0)    Recent labs: (last 7 days)     03/31/22  0657   INR 3.0*       ASSESSMENT       Source(s): Chart Review and Patient/Caregiver Call       Warfarin doses taken: Warfarin taken as instructed    Diet: No new diet changes identified    New illness, injury, or hospitalization: No    Medication/supplement changes: None noted    Signs or symptoms of bleeding or clotting: No    Previous INR: Therapeutic last visit; previously outside of goal range    Additional findings: None       PLAN     Recommended plan for no diet, medication or health factor changes affecting INR     Dosing Instructions: continue your current warfarin dose with next INR in 4 weeks       Summary  As of 3/31/2022    Full warfarin instructions:  5 mg every day   Next INR check:  4/28/2022             Telephone call with Jordan who verbalizes understanding and agrees to plan    Lab visit scheduled    Education provided: Contact [phone]  with any changes, questions or concerns.     Plan made per [facility] anticoagulation protocol    Vani Dumont RN  Anticoagulation Clinic  3/31/2022    _______________________________________________________________________     Anticoagulation Episode Summary     Current INR goal:  2.0-3.0   TTR:  83.0 % (1 y)   Target end date:  Indefinite   Send INR reminders to:  [facility]    Indications    Acute deep vein thrombosis (DVT) of other specified vein of right lower extremity (H) [I82.491]  Long term current use of anticoagulant therapy [Z79.01]           Comments:           Anticoagulation Care Providers     Provider Role Specialty Phone number    Kaleb Barnett MD Referring Emergency Medicine [phone]    Juan Antonio Flanagan MD Referring Family Medicine [phone]

## 2022-04-28 ENCOUNTER — LAB (OUTPATIENT)
Dept: LAB | Facility: CLINIC | Age: 62
End: 2022-04-28
Payer: COMMERCIAL

## 2022-04-28 ENCOUNTER — ANTICOAGULATION THERAPY VISIT (OUTPATIENT)
Dept: ANTICOAGULATION | Facility: CLINIC | Age: 62
End: 2022-04-28

## 2022-04-28 DIAGNOSIS — I82.491 ACUTE DEEP VEIN THROMBOSIS (DVT) OF OTHER SPECIFIED VEIN OF RIGHT LOWER EXTREMITY (H): Primary | ICD-10-CM

## 2022-04-28 DIAGNOSIS — Z79.01 LONG TERM CURRENT USE OF ANTICOAGULANT THERAPY: ICD-10-CM

## 2022-04-28 DIAGNOSIS — I82.401 ACUTE DEEP VEIN THROMBOSIS (DVT) OF RIGHT LOWER EXTREMITY, UNSPECIFIED VEIN (H): ICD-10-CM

## 2022-04-28 DIAGNOSIS — I82.491 ACUTE DEEP VEIN THROMBOSIS (DVT) OF OTHER SPECIFIED VEIN OF RIGHT LOWER EXTREMITY (H): ICD-10-CM

## 2022-04-28 LAB — INR BLD: 3.5 (ref 0.9–1.1)

## 2022-04-28 PROCEDURE — 36416 COLLJ CAPILLARY BLOOD SPEC: CPT

## 2022-04-28 PROCEDURE — 85610 PROTHROMBIN TIME: CPT

## 2022-04-28 RX ORDER — WARFARIN SODIUM 5 MG/1
TABLET ORAL
Qty: 90 TABLET | Refills: 1
Start: 2022-04-28 | End: 2022-07-14

## 2022-04-28 NOTE — PROGRESS NOTES
ANTICOAGULATION MANAGEMENT     Jordan Barnett 61 year old male is on warfarin with supratherapeutic INR result. (Goal INR 2.0-3.0)    Recent labs: (last 7 days)     04/28/22  0700   INR 3.5*       ASSESSMENT       Source(s): Chart Review and Patient/Caregiver Call       Warfarin doses taken: Warfarin taken as instructed    Diet: No new diet changes identified    New illness, injury, or hospitalization: No    Medication/supplement changes: None noted    Signs or symptoms of bleeding or clotting: No    Previous INR: Therapeutic last 2(+) visits    Additional findings: None       PLAN     Recommended plan for no diet, medication or health factor changes affecting INR     Dosing Instructions: decrease your warfarin dose (7% change) with next INR in 2 weeks       Summary  As of 4/28/2022    Full warfarin instructions:  2.5 mg every Thu; 5 mg all other days   Next INR check:  5/12/2022             Telephone call with Jordan who verbalizes understanding and agrees to plan    Lab visit scheduled    Education provided: Please call back if any changes to your diet, medications or how you've been taking warfarin, Importance of notifying clinic for diarrhea, nausea/vomiting, reduced intake, and/or illness; a sooner lab recheck maybe needed. and Contact [phone]  with any changes, questions or concerns.     Plan made per [facility] anticoagulation protocol    Maryellen Alberts RN  Anticoagulation Clinic  4/28/2022    _______________________________________________________________________     Anticoagulation Episode Summary     Current INR goal:  2.0-3.0   TTR:  80.5 % (1 y)   Target end date:  Indefinite   Send INR reminders to:  [facility] [place]    Indications    Acute deep vein thrombosis (DVT) of other specified vein of right lower extremity (H) [I82.491]  Long term current use of anticoagulant therapy [Z79.01]           Comments:           Anticoagulation Care Providers     Provider Role Specialty Phone number    Kaleb Barnett  MD NICK Referring Emergency Medicine [phone]    Juan Antonio Flanagan MD Referring Family Medicine [phone]

## 2022-05-12 ENCOUNTER — ANTICOAGULATION THERAPY VISIT (OUTPATIENT)
Dept: ANTICOAGULATION | Facility: CLINIC | Age: 62
End: 2022-05-12

## 2022-05-12 ENCOUNTER — LAB (OUTPATIENT)
Dept: LAB | Facility: CLINIC | Age: 62
End: 2022-05-12
Payer: COMMERCIAL

## 2022-05-12 DIAGNOSIS — I82.491 ACUTE DEEP VEIN THROMBOSIS (DVT) OF OTHER SPECIFIED VEIN OF RIGHT LOWER EXTREMITY (H): ICD-10-CM

## 2022-05-12 DIAGNOSIS — I82.491 ACUTE DEEP VEIN THROMBOSIS (DVT) OF OTHER SPECIFIED VEIN OF RIGHT LOWER EXTREMITY (H): Primary | ICD-10-CM

## 2022-05-12 DIAGNOSIS — Z79.01 LONG TERM CURRENT USE OF ANTICOAGULANT THERAPY: ICD-10-CM

## 2022-05-12 LAB — INR BLD: 2.8 (ref 0.9–1.1)

## 2022-05-12 PROCEDURE — 36416 COLLJ CAPILLARY BLOOD SPEC: CPT

## 2022-05-12 PROCEDURE — 85610 PROTHROMBIN TIME: CPT

## 2022-05-12 NOTE — PROGRESS NOTES
ANTICOAGULATION MANAGEMENT     Jordan Barnett 61 year old male is on warfarin with therapeutic INR result. (Goal INR 2.0-3.0)    Recent labs: (last 7 days)     05/12/22  0734   INR 2.8*       ASSESSMENT       Source(s): Chart Review and Patient/Caregiver Call       Warfarin doses taken: Warfarin taken as instructed    Diet:     New illness, injury, or hospitalization:     Medication/supplement changes:     Signs or symptoms of bleeding or clotting:     Previous INR: Supratherapeutic resulting in a dose decrease    Additional findings: Patient was in a hurry so unable to complete a full assessment but did confirm dose taken       PLAN     Recommended plan for no diet, medication or health factor changes affecting INR     Dosing Instructions: continue your current warfarin dose with next INR in 4 weeks       Summary  As of 5/12/2022    Full warfarin instructions:  2.5 mg every Thu; 5 mg all other days   Next INR check:  6/9/2022             Telephone call with Jordan who verbalizes understanding and agrees to plan    Lab visit scheduled    Education provided: Please call back if any changes to your diet, medications or how you've been taking warfarin and Contact [phone]  with any changes, questions or concerns.     Plan made per [facility] anticoagulation protocol    Maryellen Alberts RN  Anticoagulation Clinic  5/12/2022    _______________________________________________________________________     Anticoagulation Episode Summary     Current INR goal:  2.0-3.0   TTR:  77.7 % (1 y)   Target end date:  Indefinite   Send INR reminders to:  [facility]    Indications    Acute deep vein thrombosis (DVT) of other specified vein of right lower extremity (H) [I82.491]  Long term current use of anticoagulant therapy [Z79.01]           Comments:           Anticoagulation Care Providers     Provider Role Specialty Phone number    Kaleb Barnett MD Referring Emergency Medicine [phone]    Juan Antonio Flanagan MD  [facility] Family Medicine [phone]

## 2022-06-09 ENCOUNTER — LAB (OUTPATIENT)
Dept: LAB | Facility: CLINIC | Age: 62
End: 2022-06-09
Payer: COMMERCIAL

## 2022-06-09 ENCOUNTER — ANTICOAGULATION THERAPY VISIT (OUTPATIENT)
Dept: ANTICOAGULATION | Facility: CLINIC | Age: 62
End: 2022-06-09

## 2022-06-09 DIAGNOSIS — Z79.01 LONG TERM CURRENT USE OF ANTICOAGULANT THERAPY: ICD-10-CM

## 2022-06-09 DIAGNOSIS — I82.491 ACUTE DEEP VEIN THROMBOSIS (DVT) OF OTHER SPECIFIED VEIN OF RIGHT LOWER EXTREMITY (H): ICD-10-CM

## 2022-06-09 DIAGNOSIS — I82.491 ACUTE DEEP VEIN THROMBOSIS (DVT) OF OTHER SPECIFIED VEIN OF RIGHT LOWER EXTREMITY (H): Primary | ICD-10-CM

## 2022-06-09 LAB — INR BLD: 3 (ref 0.9–1.1)

## 2022-06-09 PROCEDURE — 36416 COLLJ CAPILLARY BLOOD SPEC: CPT

## 2022-06-09 PROCEDURE — 85610 PROTHROMBIN TIME: CPT

## 2022-06-09 NOTE — PROGRESS NOTES
ANTICOAGULATION MANAGEMENT     Jordan Barnett 62 year old male is on warfarin with therapeutic INR result. (Goal INR 2.0-3.0)    Recent labs: (last 7 days)     06/09/22  0656   INR 3.0*       ASSESSMENT       Source(s): Chart Review and Patient/Caregiver Call       Warfarin doses taken: Warfarin taken as instructed    Diet: No new diet changes identified    New illness, injury, or hospitalization: No    Medication/supplement changes: None noted    Signs or symptoms of bleeding or clotting: No    Previous INR: Therapeutic last visit; previously outside of goal range    Additional findings: None       PLAN     Recommended plan for no diet, medication or health factor changes affecting INR     Dosing Instructions: continue your current warfarin dose with next INR in 5 weeks       Summary  As of 6/9/2022    Full warfarin instructions:  2.5 mg every Thu; 5 mg all other days   Next INR check:  7/14/2022             Telephone call with Jordan who verbalizes understanding and agrees to plan    Lab visit scheduled    Education provided: Please call back if any changes to your diet, medications or how you've been taking warfarin    Plan made per [facility] anticoagulation protocol    Maryellen Alberts RN  Anticoagulation Clinic  6/9/2022    _______________________________________________________________________     Anticoagulation Episode Summary     Current INR goal:  2.0-3.0   TTR:  77.7 % (1 y)   Target end date:  Indefinite   Send INR reminders to:  [facility]    Indications    Acute deep vein thrombosis (DVT) of other specified vein of right lower extremity (H) [I82.491]  Long term current use of anticoagulant therapy [Z79.01]           Comments:           Anticoagulation Care Providers     Provider Role Specialty Phone number    Kaleb Barnett MD Referring Emergency Medicine [phone]    Juan Antonio Flanagan MD Referring Family Medicine [phone]

## 2022-07-14 ENCOUNTER — ANTICOAGULATION THERAPY VISIT (OUTPATIENT)
Dept: ANTICOAGULATION | Facility: CLINIC | Age: 62
End: 2022-07-14

## 2022-07-14 ENCOUNTER — LAB (OUTPATIENT)
Dept: LAB | Facility: CLINIC | Age: 62
End: 2022-07-14
Payer: COMMERCIAL

## 2022-07-14 DIAGNOSIS — Z79.01 LONG TERM CURRENT USE OF ANTICOAGULANT THERAPY: ICD-10-CM

## 2022-07-14 DIAGNOSIS — I82.491 ACUTE DEEP VEIN THROMBOSIS (DVT) OF OTHER SPECIFIED VEIN OF RIGHT LOWER EXTREMITY (H): ICD-10-CM

## 2022-07-14 DIAGNOSIS — I82.401 ACUTE DEEP VEIN THROMBOSIS (DVT) OF RIGHT LOWER EXTREMITY, UNSPECIFIED VEIN (H): ICD-10-CM

## 2022-07-14 DIAGNOSIS — I82.491 ACUTE DEEP VEIN THROMBOSIS (DVT) OF OTHER SPECIFIED VEIN OF RIGHT LOWER EXTREMITY (H): Primary | ICD-10-CM

## 2022-07-14 LAB — INR BLD: 4.6 (ref 0.9–1.1)

## 2022-07-14 PROCEDURE — 36416 COLLJ CAPILLARY BLOOD SPEC: CPT

## 2022-07-14 PROCEDURE — 85610 PROTHROMBIN TIME: CPT

## 2022-07-14 RX ORDER — WARFARIN SODIUM 5 MG/1
TABLET ORAL
Qty: 90 TABLET | Refills: 1 | COMMUNITY
Start: 2022-07-14 | End: 2022-07-19

## 2022-07-14 NOTE — PROGRESS NOTES
ANTICOAGULATION MANAGEMENT     Jordan Barnett 62 year old male is on warfarin with supratherapeutic INR result. (Goal INR 2.0-3.0)    Recent labs: (last 7 days)     07/14/22  0655   INR 4.6*       ASSESSMENT       Source(s): Chart Review and Patient/Caregiver Call       Warfarin doses taken: Warfarin taken as instructed    Diet: No new diet changes identified    New illness, injury, or hospitalization: No. Pt has had diarrhea but pt states this is normal and not any different for him    Medication/supplement changes: None noted    Signs or symptoms of bleeding or clotting: No    Previous INR: Therapeutic last 2(+) visits    Additional findings: None     PLAN     Recommended plan for no diet, medication or health factor changes affecting INR     Dosing Instructions: hold dose then decrease your warfarin dose (7.7% change) with next INR in 1 week       Summary  As of 7/14/2022    Full warfarin instructions:  7/14: Hold; Otherwise 2.5 mg every Sun, Thu; 5 mg all other days   Next INR check:  7/22/2022             Telephone call with Jordan who verbalizes understanding and agrees to plan    Lab visit scheduled    Education provided: Please call back if any changes to your diet, medications or how you've been taking warfarin and Monitoring for bleeding signs and symptoms    Plan made per [facility] anticoagulation protocol    Joann Gurrola RN  Anticoagulation Clinic  7/14/2022    _______________________________________________________________________     Anticoagulation Episode Summary     Current INR goal:  2.0-3.0   TTR:  68.1 % (1 y)   Target end date:  Indefinite   Send INR reminders to:  [facility]    Indications    Acute deep vein thrombosis (DVT) of other specified vein of right lower extremity (H) [I82.491]  Long term current use of anticoagulant therapy [Z79.01]           Comments:           Anticoagulation Care Providers     Provider Role Specialty Phone number    Kaleb Barnett MD Referring Emergency  Medicine [phone]    Juan Antonio Flanagan MD Referring Family Medicine [phone]

## 2022-07-21 ENCOUNTER — ANTICOAGULATION THERAPY VISIT (OUTPATIENT)
Dept: ANTICOAGULATION | Facility: CLINIC | Age: 62
End: 2022-07-21

## 2022-07-21 ENCOUNTER — LAB (OUTPATIENT)
Dept: LAB | Facility: CLINIC | Age: 62
End: 2022-07-21
Payer: COMMERCIAL

## 2022-07-21 DIAGNOSIS — I82.491 ACUTE DEEP VEIN THROMBOSIS (DVT) OF OTHER SPECIFIED VEIN OF RIGHT LOWER EXTREMITY (H): Primary | ICD-10-CM

## 2022-07-21 DIAGNOSIS — Z79.01 LONG TERM CURRENT USE OF ANTICOAGULANT THERAPY: ICD-10-CM

## 2022-07-21 DIAGNOSIS — I82.491 ACUTE DEEP VEIN THROMBOSIS (DVT) OF OTHER SPECIFIED VEIN OF RIGHT LOWER EXTREMITY (H): ICD-10-CM

## 2022-07-21 LAB — INR BLD: 2.6 (ref 0.9–1.1)

## 2022-07-21 PROCEDURE — 85610 PROTHROMBIN TIME: CPT

## 2022-07-21 PROCEDURE — 36416 COLLJ CAPILLARY BLOOD SPEC: CPT

## 2022-07-21 NOTE — PROGRESS NOTES
ANTICOAGULATION MANAGEMENT     Jordan Barnett 62 year old male is on warfarin with therapeutic INR result. (Goal INR 2.0-3.0)    Recent labs: (last 7 days)     07/21/22  0941   INR 2.6*       ASSESSMENT       Source(s): Chart Review and Patient/Caregiver Call       Warfarin doses taken: Warfarin taken as instructed    Diet: No new diet changes identified    New illness, injury, or hospitalization: No    Medication/supplement changes: None noted    Signs or symptoms of bleeding or clotting: No    Previous INR: Supratherapeutic resulting in a dose decrease    Additional findings: None       PLAN     Recommended plan for no diet, medication or health factor changes affecting INR     Dosing Instructions: continue your current warfarin dose with next INR in 2 weeks       Summary  As of 7/21/2022    Full warfarin instructions:  2.5 mg every Sun, Thu; 5 mg all other days   Next INR check:  8/4/2022             Telephone call with Jordan who verbalizes understanding and agrees to plan    Lab visit scheduled    Education provided: Please call back if any changes to your diet, medications or how you've been taking warfarin and Contact [phone]  with any changes, questions or concerns.     Plan made per [facility] anticoagulation protocol    Maryellen Alberts RN  Anticoagulation Clinic  7/21/2022    _______________________________________________________________________     Anticoagulation Episode Summary     Current INR goal:  2.0-3.0   TTR:  66.6 % (1 y)   Target end date:  Indefinite   Send INR reminders to:  [facility] [place]    Indications    Acute deep vein thrombosis (DVT) of other specified vein of right lower extremity (H) [I82.491]  Long term current use of anticoagulant therapy [Z79.01]           Comments:           Anticoagulation Care Providers     Provider Role Specialty Phone number    Kaleb Barnett MD Referring Emergency Medicine [phone]    Juan Antonio Flanagan MD Referring Family Medicine [phone]

## 2022-08-04 ENCOUNTER — LAB (OUTPATIENT)
Dept: LAB | Facility: CLINIC | Age: 62
End: 2022-08-04
Payer: COMMERCIAL

## 2022-08-04 ENCOUNTER — ANTICOAGULATION THERAPY VISIT (OUTPATIENT)
Dept: ANTICOAGULATION | Facility: CLINIC | Age: 62
End: 2022-08-04

## 2022-08-04 DIAGNOSIS — I82.491 ACUTE DEEP VEIN THROMBOSIS (DVT) OF OTHER SPECIFIED VEIN OF RIGHT LOWER EXTREMITY (H): ICD-10-CM

## 2022-08-04 DIAGNOSIS — I82.491 ACUTE DEEP VEIN THROMBOSIS (DVT) OF OTHER SPECIFIED VEIN OF RIGHT LOWER EXTREMITY (H): Primary | ICD-10-CM

## 2022-08-04 DIAGNOSIS — Z79.01 LONG TERM CURRENT USE OF ANTICOAGULANT THERAPY: ICD-10-CM

## 2022-08-04 LAB — INR BLD: 2 (ref 0.9–1.1)

## 2022-08-04 PROCEDURE — 85610 PROTHROMBIN TIME: CPT

## 2022-08-04 PROCEDURE — 36416 COLLJ CAPILLARY BLOOD SPEC: CPT

## 2022-08-04 NOTE — PROGRESS NOTES
ANTICOAGULATION MANAGEMENT     Jordan Barnett 62 year old male is on warfarin with therapeutic INR result. (Goal INR 2.0-3.0)    Recent labs: (last 7 days)     08/04/22  0647   INR 2.0*       ASSESSMENT       Source(s): Chart Review and Patient/Caregiver Call       Warfarin doses taken: Warfarin taken as instructed    Diet: No new diet changes identified    New illness, injury, or hospitalization: No    Medication/supplement changes: None noted    Signs or symptoms of bleeding or clotting: No    Previous INR: Therapeutic last visit; previously outside of goal range    Additional findings: None       PLAN     Recommended plan for no diet, medication or health factor changes affecting INR     Dosing Instructions: Continue your current warfarin dose with next INR in 3 weeks       Summary  As of 8/4/2022    Full warfarin instructions:  2.5 mg every Sun, Thu; 5 mg all other days   Next INR check:  8/25/2022             Telephone call with Jordan who verbalizes understanding and agrees to plan    Lab visit scheduled    Education provided: Contact [phone]  with any changes, questions or concerns.     Plan made per [facility] anticoagulation protocol    Vani Dumont RN  Anticoagulation Clinic  8/4/2022    _______________________________________________________________________     Anticoagulation Episode Summary     Current INR goal:  2.0-3.0   TTR:  66.6 % (1 y)   Target end date:  Indefinite   Send INR reminders to:  [facility]    Indications    Acute deep vein thrombosis (DVT) of other specified vein of right lower extremity (H) [I82.491]  Long term current use of anticoagulant therapy [Z79.01]           Comments:           Anticoagulation Care Providers     Provider Role Specialty Phone number    Kaleb Barnett MD Referring Emergency Medicine [phone]    Juan Antonio Flanagan MD Referring Family Medicine [phone]

## 2022-08-25 ENCOUNTER — LAB (OUTPATIENT)
Dept: LAB | Facility: CLINIC | Age: 62
End: 2022-08-25
Payer: COMMERCIAL

## 2022-08-25 ENCOUNTER — ANTICOAGULATION THERAPY VISIT (OUTPATIENT)
Dept: ANTICOAGULATION | Facility: CLINIC | Age: 62
End: 2022-08-25

## 2022-08-25 DIAGNOSIS — I82.491 ACUTE DEEP VEIN THROMBOSIS (DVT) OF OTHER SPECIFIED VEIN OF RIGHT LOWER EXTREMITY (H): ICD-10-CM

## 2022-08-25 DIAGNOSIS — Z79.01 LONG TERM CURRENT USE OF ANTICOAGULANT THERAPY: ICD-10-CM

## 2022-08-25 DIAGNOSIS — I82.491 ACUTE DEEP VEIN THROMBOSIS (DVT) OF OTHER SPECIFIED VEIN OF RIGHT LOWER EXTREMITY (H): Primary | ICD-10-CM

## 2022-08-25 LAB — INR BLD: 1.9 (ref 0.9–1.1)

## 2022-08-25 PROCEDURE — 36416 COLLJ CAPILLARY BLOOD SPEC: CPT

## 2022-08-25 PROCEDURE — 85610 PROTHROMBIN TIME: CPT

## 2022-08-25 NOTE — PROGRESS NOTES
ANTICOAGULATION MANAGEMENT     Jordan Barnett 62 year old male is on warfarin with subtherapeutic INR result. (Goal INR 2.0-3.0)    Recent labs: (last 7 days)     08/25/22  0648   INR 1.9*       ASSESSMENT       Source(s): Chart Review and Patient/Caregiver Call       Warfarin doses taken: Warfarin taken as instructed    Diet: No new diet changes identified    New illness, injury, or hospitalization: No    Medication/supplement changes: None noted    Signs or symptoms of bleeding or clotting: No    Previous INR: Therapeutic last 2(+) visits    Additional findings: None     PLAN     Recommended plan for no diet, medication or health factor changes affecting INR     Dosing Instructions: Continue your current warfarin dose with next INR in 2 weeks       Summary  As of 8/25/2022    Full warfarin instructions:  2.5 mg every Sun, Thu; 5 mg all other days   Next INR check:  9/8/2022             Telephone call with Jordan who verbalizes understanding and agrees to plan    Lab visit scheduled    Education provided: Please call back if any changes to your diet, medications or how you've been taking warfarin and Monitoring for clotting signs and symptoms    Plan made per [facility] anticoagulation protocol    Joann Gurrola RN  Anticoagulation Clinic  8/25/2022    _______________________________________________________________________     Anticoagulation Episode Summary     Current INR goal:  2.0-3.0   TTR:  60.8 % (1 y)   Target end date:  Indefinite   Send INR reminders to:  [facility]    Indications    Acute deep vein thrombosis (DVT) of other specified vein of right lower extremity (H) [I82.491]  Long term current use of anticoagulant therapy [Z79.01]           Comments:           Anticoagulation Care Providers     Provider Role Specialty Phone number    Kaleb Barnett MD Referring Emergency Medicine [phone]    Juan Antonio Flanagan MD Referring Family Medicine [phone]

## 2022-09-08 ENCOUNTER — ANTICOAGULATION THERAPY VISIT (OUTPATIENT)
Dept: ANTICOAGULATION | Facility: CLINIC | Age: 62
End: 2022-09-08

## 2022-09-08 ENCOUNTER — DOCUMENTATION ONLY (OUTPATIENT)
Dept: ANTICOAGULATION | Facility: CLINIC | Age: 62
End: 2022-09-08

## 2022-09-08 ENCOUNTER — LAB (OUTPATIENT)
Dept: LAB | Facility: CLINIC | Age: 62
End: 2022-09-08
Payer: COMMERCIAL

## 2022-09-08 DIAGNOSIS — I82.491 ACUTE DEEP VEIN THROMBOSIS (DVT) OF OTHER SPECIFIED VEIN OF RIGHT LOWER EXTREMITY (H): Primary | ICD-10-CM

## 2022-09-08 DIAGNOSIS — Z79.01 LONG TERM CURRENT USE OF ANTICOAGULANT THERAPY: ICD-10-CM

## 2022-09-08 DIAGNOSIS — I82.491 ACUTE DEEP VEIN THROMBOSIS (DVT) OF OTHER SPECIFIED VEIN OF RIGHT LOWER EXTREMITY (H): ICD-10-CM

## 2022-09-08 LAB — INR BLD: 2.4 (ref 0.9–1.1)

## 2022-09-08 PROCEDURE — 85610 PROTHROMBIN TIME: CPT

## 2022-09-08 PROCEDURE — 36416 COLLJ CAPILLARY BLOOD SPEC: CPT

## 2022-09-08 NOTE — PROGRESS NOTES
ANTICOAGULATION MANAGEMENT     Jordan Barnett 62 year old male is on warfarin with therapeutic INR result. (Goal INR 2.0-3.0)    Recent labs: (last 7 days)     09/08/22  0742   INR 2.4*       ASSESSMENT       Source(s): Chart Review and Patient/Caregiver Call       Warfarin doses taken: Warfarin taken as instructed    Diet: No new diet changes identified    New illness, injury, or hospitalization: No    Medication/supplement changes: None noted    Signs or symptoms of bleeding or clotting: No    Previous INR: Subtherapeutic    Additional findings: None       PLAN     Recommended plan for no diet, medication or health factor changes affecting INR     Dosing Instructions: Continue your current warfarin dose with next INR in 3 weeks       Summary  As of 9/8/2022    Full warfarin instructions:  2.5 mg every Sun, Thu; 5 mg all other days   Next INR check:  9/29/2022             Telephone call with Jordan who verbalizes understanding and agrees to plan    Lab visit scheduled    Education provided: Contact [phone]  with any changes, questions or concerns.     Plan made per [facility] anticoagulation protocol    Vani Dumont RN  Anticoagulation Clinic  9/8/2022    _______________________________________________________________________     Anticoagulation Episode Summary     Current INR goal:  2.0-3.0   TTR:  60.0 % (1 y)   Target end date:  Indefinite   Send INR reminders to:  [facility]    Indications    Acute deep vein thrombosis (DVT) of other specified vein of right lower extremity (H) [I82.491]  Long term current use of anticoagulant therapy [Z79.01]           Comments:           Anticoagulation Care Providers     Provider Role Specialty Phone number    Kaleb Barnett MD Referring Emergency Medicine [phone]    Juan Antonio Flanagan MD Referring Family Medicine [phone]

## 2022-09-08 NOTE — PROGRESS NOTES
ANTICOAGULATION CLINIC REFERRAL RENEWAL REQUEST       An annual renewal order is required for all patients referred to [facility].?  Please review and sign the pended referral order for Jordan Barnett.       ANTICOAGULATION SUMMARY      Warfarin indication(s)   DVT    Mechanical heart valve present?  NO       Current goal range   INR: 2.0-3.0     Goal appropriate for indication? Goal INR 2-3, standard for indication(s) above     Time in Therapeutic Range (TTR)  (Goal > 60%) 60.0%       Office visit with referring provider's group within last year yes on 01/03/2022       Vani Dumont, LORENA  [facility]

## 2022-09-29 ENCOUNTER — ANTICOAGULATION THERAPY VISIT (OUTPATIENT)
Dept: ANTICOAGULATION | Facility: CLINIC | Age: 62
End: 2022-09-29

## 2022-09-29 ENCOUNTER — LAB (OUTPATIENT)
Dept: LAB | Facility: CLINIC | Age: 62
End: 2022-09-29
Payer: COMMERCIAL

## 2022-09-29 DIAGNOSIS — I82.491 ACUTE DEEP VEIN THROMBOSIS (DVT) OF OTHER SPECIFIED VEIN OF RIGHT LOWER EXTREMITY (H): Primary | ICD-10-CM

## 2022-09-29 DIAGNOSIS — Z79.01 LONG TERM CURRENT USE OF ANTICOAGULANT THERAPY: ICD-10-CM

## 2022-09-29 DIAGNOSIS — I82.491 ACUTE DEEP VEIN THROMBOSIS (DVT) OF OTHER SPECIFIED VEIN OF RIGHT LOWER EXTREMITY (H): ICD-10-CM

## 2022-09-29 LAB — INR BLD: 2.9 (ref 0.9–1.1)

## 2022-09-29 PROCEDURE — 36416 COLLJ CAPILLARY BLOOD SPEC: CPT

## 2022-09-29 PROCEDURE — 85610 PROTHROMBIN TIME: CPT

## 2022-09-29 NOTE — PROGRESS NOTES
ANTICOAGULATION MANAGEMENT     Jordan Barnett 62 year old male is on warfarin with therapeutic INR result. (Goal INR 2.0-3.0)    Recent labs: (last 7 days)     09/29/22  0716   INR 2.9*       ASSESSMENT       Source(s): Chart Review and Patient/Caregiver Call       Warfarin doses taken: Warfarin taken as instructed    Diet: No new diet changes identified    New illness, injury, or hospitalization: No    Medication/supplement changes: None noted    Signs or symptoms of bleeding or clotting: No    Previous INR: Therapeutic last visit; previously outside of goal range    Additional findings: None       PLAN     Recommended plan for no diet, medication or health factor changes affecting INR     Dosing Instructions: Continue your current warfarin dose with next INR in 4 weeks       Summary  As of 9/29/2022    Full warfarin instructions:  2.5 mg every Sun, Thu; 5 mg all other days   Next INR check:  10/27/2022             Telephone call with Jordan who verbalizes understanding and agrees to plan    Lab visit scheduled    Education provided: Please call back if any changes to your diet, medications or how you've been taking warfarin and Contact [phone]  with any changes, questions or concerns.     Plan made per [facility] anticoagulation protocol    Maryellen Alberts RN  Anticoagulation Clinic  9/29/2022    _______________________________________________________________________     Anticoagulation Episode Summary     Current INR goal:  2.0-3.0   TTR:  60.0 % (1 y)   Target end date:  Indefinite   Send INR reminders to:  [facility]    Indications    Acute deep vein thrombosis (DVT) of other specified vein of right lower extremity (H) [I82.491]  Long term current use of anticoagulant therapy [Z79.01]           Comments:           Anticoagulation Care Providers     Provider Role Specialty Phone number    Kaleb Barnett MD Referring Emergency Medicine [phone]    Juan Antonio Flanagan MD Referring Family Medicine  [phone]

## 2022-10-26 ENCOUNTER — TRANSFERRED RECORDS (OUTPATIENT)
Dept: HEALTH INFORMATION MANAGEMENT | Facility: CLINIC | Age: 62
End: 2022-10-26

## 2022-10-27 ENCOUNTER — ANTICOAGULATION THERAPY VISIT (OUTPATIENT)
Dept: ANTICOAGULATION | Facility: CLINIC | Age: 62
End: 2022-10-27

## 2022-10-27 ENCOUNTER — LAB (OUTPATIENT)
Dept: LAB | Facility: CLINIC | Age: 62
End: 2022-10-27
Payer: COMMERCIAL

## 2022-10-27 DIAGNOSIS — Z79.01 LONG TERM CURRENT USE OF ANTICOAGULANT THERAPY: ICD-10-CM

## 2022-10-27 DIAGNOSIS — I82.491 ACUTE DEEP VEIN THROMBOSIS (DVT) OF OTHER SPECIFIED VEIN OF RIGHT LOWER EXTREMITY (H): Primary | ICD-10-CM

## 2022-10-27 DIAGNOSIS — I82.491 ACUTE DEEP VEIN THROMBOSIS (DVT) OF OTHER SPECIFIED VEIN OF RIGHT LOWER EXTREMITY (H): ICD-10-CM

## 2022-10-27 LAB — INR BLD: 2.8 (ref 0.9–1.1)

## 2022-10-27 PROCEDURE — 85610 PROTHROMBIN TIME: CPT

## 2022-10-27 PROCEDURE — 36416 COLLJ CAPILLARY BLOOD SPEC: CPT

## 2022-10-27 NOTE — PROGRESS NOTES
ANTICOAGULATION MANAGEMENT     Jordan Barnett 62 year old male is on warfarin with therapeutic INR result. (Goal INR 2.0-3.0)    Recent labs: (last 7 days)     10/27/22  0716   INR 2.8*       ASSESSMENT       Source(s): Chart Review and Patient/Caregiver Call       Warfarin doses taken: Warfarin taken as instructed    Diet: No new diet changes identified    New illness, injury, or hospitalization: No    Medication/supplement changes: None noted    Signs or symptoms of bleeding or clotting: No    Previous INR: Therapeutic last 2(+) visits    Additional findings: None       PLAN     Recommended plan for no diet, medication or health factor changes affecting INR     Dosing Instructions: Continue your current warfarin dose with next INR in 5 weeks       Summary  As of 10/27/2022    Full warfarin instructions:  2.5 mg every Sun, Thu; 5 mg all other days; Starting 10/27/2022   Next INR check:  12/1/2022             Telephone call with Jordan who verbalizes understanding and agrees to plan    Lab visit scheduled    Education provided:     Contact [phone]  with any changes, questions or concerns.     Plan made per [facility] anticoagulation protocol    Vani Dumont RN  Anticoagulation Clinic  10/27/2022    _______________________________________________________________________     Anticoagulation Episode Summary     Current INR goal:  2.0-3.0   TTR:  62.4 % (1 y)   Target end date:  Indefinite   Send INR reminders to:  [facility]    Indications    Acute deep vein thrombosis (DVT) of other specified vein of right lower extremity (H) [I82.491]  Long term current use of anticoagulant therapy [Z79.01]           Comments:           Anticoagulation Care Providers     Provider Role Specialty Phone number    Kaleb Barnett MD Referring Emergency Medicine [phone]    Juan Antonio Flanagan MD Referring Family Medicine [phone]

## 2022-10-30 ENCOUNTER — HEALTH MAINTENANCE LETTER (OUTPATIENT)
Age: 62
End: 2022-10-30

## 2022-12-01 ENCOUNTER — LAB (OUTPATIENT)
Dept: LAB | Facility: CLINIC | Age: 62
End: 2022-12-01
Payer: COMMERCIAL

## 2022-12-01 ENCOUNTER — ANTICOAGULATION THERAPY VISIT (OUTPATIENT)
Dept: ANTICOAGULATION | Facility: CLINIC | Age: 62
End: 2022-12-01

## 2022-12-01 DIAGNOSIS — Z79.01 LONG TERM CURRENT USE OF ANTICOAGULANT THERAPY: ICD-10-CM

## 2022-12-01 DIAGNOSIS — N18.31 ANEMIA DUE TO STAGE 3A CHRONIC KIDNEY DISEASE (H): ICD-10-CM

## 2022-12-01 DIAGNOSIS — I82.491 ACUTE DEEP VEIN THROMBOSIS (DVT) OF OTHER SPECIFIED VEIN OF RIGHT LOWER EXTREMITY (H): ICD-10-CM

## 2022-12-01 DIAGNOSIS — N18.30 STAGE 3 CHRONIC KIDNEY DISEASE, UNSPECIFIED WHETHER STAGE 3A OR 3B CKD (H): Primary | ICD-10-CM

## 2022-12-01 DIAGNOSIS — I82.491 ACUTE DEEP VEIN THROMBOSIS (DVT) OF OTHER SPECIFIED VEIN OF RIGHT LOWER EXTREMITY (H): Primary | ICD-10-CM

## 2022-12-01 DIAGNOSIS — D63.1 ANEMIA DUE TO STAGE 3A CHRONIC KIDNEY DISEASE (H): ICD-10-CM

## 2022-12-01 DIAGNOSIS — N18.30 CKD (CHRONIC KIDNEY DISEASE) STAGE 3, GFR 30-59 ML/MIN (H): Primary | ICD-10-CM

## 2022-12-01 DIAGNOSIS — E78.2 MIXED HYPERLIPIDEMIA: ICD-10-CM

## 2022-12-01 LAB
HGB BLD-MCNC: 12.3 G/DL (ref 13.3–17.7)
INR BLD: 2.9 (ref 0.9–1.1)

## 2022-12-01 PROCEDURE — 85610 PROTHROMBIN TIME: CPT

## 2022-12-01 PROCEDURE — 85018 HEMOGLOBIN: CPT

## 2022-12-01 PROCEDURE — 36416 COLLJ CAPILLARY BLOOD SPEC: CPT

## 2022-12-01 NOTE — PROGRESS NOTES
ANTICOAGULATION MANAGEMENT     Jordan Barnett 62 year old male is on warfarin with therapeutic INR result. (Goal INR 2.0-3.0)    Recent labs: (last 7 days)     12/01/22  0653   INR 2.9*       ASSESSMENT       Source(s): Chart Review and Patient/Caregiver Call       Warfarin doses taken: Warfarin taken as instructed    Diet: No new diet changes identified    New illness, injury, or hospitalization: No    Medication/supplement changes: new med by outside provider - risperidone - no interactions anticipated     Signs or symptoms of bleeding or clotting: Yes: nose bleeds - no concerns. Patient believes they may be from the cold weather. Pt is able to control the bleeding between about 30 seconds - 1 minute.    Previous INR: Therapeutic last 2(+) visits    Additional findings: None     PLAN     Recommended plan for no diet, medication or health factor changes affecting INR     Dosing Instructions: Continue your current warfarin dose with next INR in 6 weeks       Continue to monitor nose bleeds - pt is to be seen or discuss with FP if they increase, worsen, or if any concerns arise    Summary  As of 12/1/2022    Full warfarin instructions:  2.5 mg every Sun, Thu; 5 mg all other days; Starting 12/1/2022   Next INR check:  1/12/2023             Telephone call with Jordan who verbalizes understanding and agrees to plan    Lab visit scheduled    Education provided:     Please call back if any changes to your diet, medications or how you've been taking warfarin    Symptom monitoring: monitoring for bleeding signs and symptoms    Plan made per [facility] anticoagulation protocol    Joann Gurrola RN  Anticoagulation Clinic  12/1/2022    _______________________________________________________________________     Anticoagulation Episode Summary     Current INR goal:  2.0-3.0   TTR:  62.4 % (1 y)   Target end date:  Indefinite   Send INR reminders to:  [facility]    Indications    Acute deep vein thrombosis (DVT) of other  specified vein of right lower extremity (H) [I82.491]  Long term current use of anticoagulant therapy [Z79.01]           Comments:           Anticoagulation Care Providers     Provider Role Specialty Phone number    Kaleb Barnett MD Referring Emergency Medicine [phone]    Juan Antonio Flanagan MD Referring Family Medicine [phone]

## 2022-12-06 NOTE — TELEPHONE ENCOUNTER
FMLA for shoulder restrictions form completed and routed to Dr. Herr for review and signature.    Patient accompanied by mother.They may be reached at [phone]  Home, Cell.  Ok to leave detailed message.

## 2022-12-07 DIAGNOSIS — I82.409 DVT (DEEP VENOUS THROMBOSIS) (H): Primary | ICD-10-CM

## 2023-01-05 ASSESSMENT — ENCOUNTER SYMPTOMS
PARESTHESIAS: 0
DIARRHEA: 1
ABDOMINAL PAIN: 0
SORE THROAT: 0
HEMATURIA: 0
EYE PAIN: 0
NERVOUS/ANXIOUS: 0
MYALGIAS: 0
WEAKNESS: 0
CHILLS: 0
COUGH: 0
CONSTIPATION: 0
HEADACHES: 0
HEARTBURN: 0
ARTHRALGIAS: 0
NAUSEA: 0
FREQUENCY: 0
PALPITATIONS: 0
SHORTNESS OF BREATH: 0
FEVER: 0
HEMATOCHEZIA: 0
DYSURIA: 0
DIZZINESS: 0
JOINT SWELLING: 0

## 2023-01-12 ENCOUNTER — ANTICOAGULATION THERAPY VISIT (OUTPATIENT)
Dept: ANTICOAGULATION | Facility: CLINIC | Age: 63
End: 2023-01-12

## 2023-01-12 ENCOUNTER — LAB (OUTPATIENT)
Dept: LAB | Facility: CLINIC | Age: 63
End: 2023-01-12
Payer: COMMERCIAL

## 2023-01-12 ENCOUNTER — OFFICE VISIT (OUTPATIENT)
Dept: FAMILY MEDICINE | Facility: CLINIC | Age: 63
End: 2023-01-12
Payer: COMMERCIAL

## 2023-01-12 VITALS
DIASTOLIC BLOOD PRESSURE: 70 MMHG | HEART RATE: 72 BPM | TEMPERATURE: 96.9 F | RESPIRATION RATE: 18 BRPM | BODY MASS INDEX: 25.28 KG/M2 | OXYGEN SATURATION: 98 % | WEIGHT: 176.6 LBS | HEIGHT: 70 IN | SYSTOLIC BLOOD PRESSURE: 100 MMHG

## 2023-01-12 DIAGNOSIS — I10 ESSENTIAL HYPERTENSION WITH GOAL BLOOD PRESSURE LESS THAN 140/90: ICD-10-CM

## 2023-01-12 DIAGNOSIS — R73.01 IMPAIRED FASTING GLUCOSE: ICD-10-CM

## 2023-01-12 DIAGNOSIS — D64.9 ANEMIA, UNSPECIFIED TYPE: ICD-10-CM

## 2023-01-12 DIAGNOSIS — D63.1 ANEMIA DUE TO STAGE 3A CHRONIC KIDNEY DISEASE (H): ICD-10-CM

## 2023-01-12 DIAGNOSIS — E78.2 MIXED HYPERLIPIDEMIA: ICD-10-CM

## 2023-01-12 DIAGNOSIS — Z00.00 ROUTINE GENERAL MEDICAL EXAMINATION AT A HEALTH CARE FACILITY: Primary | ICD-10-CM

## 2023-01-12 DIAGNOSIS — Z79.01 LONG TERM CURRENT USE OF ANTICOAGULANT THERAPY: ICD-10-CM

## 2023-01-12 DIAGNOSIS — N18.30 STAGE 3 CHRONIC KIDNEY DISEASE, UNSPECIFIED WHETHER STAGE 3A OR 3B CKD (H): ICD-10-CM

## 2023-01-12 DIAGNOSIS — I82.409 DVT (DEEP VENOUS THROMBOSIS) (H): ICD-10-CM

## 2023-01-12 DIAGNOSIS — N18.31 STAGE 3A CHRONIC KIDNEY DISEASE (H): ICD-10-CM

## 2023-01-12 DIAGNOSIS — N18.31 ANEMIA DUE TO STAGE 3A CHRONIC KIDNEY DISEASE (H): ICD-10-CM

## 2023-01-12 DIAGNOSIS — D64.9 ANEMIA, UNSPECIFIED TYPE: Primary | ICD-10-CM

## 2023-01-12 DIAGNOSIS — I82.491 ACUTE DEEP VEIN THROMBOSIS (DVT) OF OTHER SPECIFIED VEIN OF RIGHT LOWER EXTREMITY (H): Primary | ICD-10-CM

## 2023-01-12 LAB
ALBUMIN SERPL BCG-MCNC: 4 G/DL (ref 3.5–5.2)
ALP SERPL-CCNC: 81 U/L (ref 40–129)
ALT SERPL W P-5'-P-CCNC: 24 U/L (ref 10–50)
ANION GAP SERPL CALCULATED.3IONS-SCNC: 10 MMOL/L (ref 7–15)
AST SERPL W P-5'-P-CCNC: 19 U/L (ref 10–50)
BILIRUB SERPL-MCNC: 0.2 MG/DL
BUN SERPL-MCNC: 53.4 MG/DL (ref 8–23)
CALCIUM SERPL-MCNC: 9.6 MG/DL (ref 8.8–10.2)
CHLORIDE SERPL-SCNC: 102 MMOL/L (ref 98–107)
CHOLEST SERPL-MCNC: 154 MG/DL
CREAT SERPL-MCNC: 1.74 MG/DL (ref 0.67–1.17)
CREAT UR-MCNC: 9.3 MG/DL
DEPRECATED HCO3 PLAS-SCNC: 25 MMOL/L (ref 22–29)
ERYTHROCYTE [DISTWIDTH] IN BLOOD BY AUTOMATED COUNT: 12 % (ref 10–15)
FERRITIN SERPL-MCNC: 92 NG/ML (ref 31–409)
GFR SERPL CREATININE-BSD FRML MDRD: 44 ML/MIN/1.73M2
GLUCOSE SERPL-MCNC: 116 MG/DL (ref 70–99)
HBA1C MFR BLD: 5.8 % (ref 0–5.6)
HCT VFR BLD AUTO: 36.4 % (ref 40–53)
HDLC SERPL-MCNC: 54 MG/DL
HGB BLD-MCNC: 11.9 G/DL (ref 13.3–17.7)
INR BLD: 2.4 (ref 0.9–1.1)
IRON BINDING CAPACITY (ROCHE): 307 UG/DL (ref 240–430)
IRON SATN MFR SERPL: 27 % (ref 15–46)
IRON SERPL-MCNC: 84 UG/DL (ref 61–157)
LDLC SERPL CALC-MCNC: 64 MG/DL
MCH RBC QN AUTO: 31.3 PG (ref 26.5–33)
MCHC RBC AUTO-ENTMCNC: 32.7 G/DL (ref 31.5–36.5)
MCV RBC AUTO: 96 FL (ref 78–100)
MICROALBUMIN UR-MCNC: 18.4 MG/L
MICROALBUMIN/CREAT UR: 197.85 MG/G CR (ref 0–17)
NONHDLC SERPL-MCNC: 100 MG/DL
PLATELET # BLD AUTO: 226 10E3/UL (ref 150–450)
POTASSIUM SERPL-SCNC: 4.9 MMOL/L (ref 3.4–5.3)
PROT SERPL-MCNC: 7.1 G/DL (ref 6.4–8.3)
RBC # BLD AUTO: 3.8 10E6/UL (ref 4.4–5.9)
SODIUM SERPL-SCNC: 137 MMOL/L (ref 136–145)
TRIGL SERPL-MCNC: 182 MG/DL
WBC # BLD AUTO: 6.8 10E3/UL (ref 4–11)

## 2023-01-12 PROCEDURE — 85027 COMPLETE CBC AUTOMATED: CPT

## 2023-01-12 PROCEDURE — 36416 COLLJ CAPILLARY BLOOD SPEC: CPT

## 2023-01-12 PROCEDURE — 83036 HEMOGLOBIN GLYCOSYLATED A1C: CPT

## 2023-01-12 PROCEDURE — 82043 UR ALBUMIN QUANTITATIVE: CPT | Performed by: FAMILY MEDICINE

## 2023-01-12 PROCEDURE — 82728 ASSAY OF FERRITIN: CPT

## 2023-01-12 PROCEDURE — 36415 COLL VENOUS BLD VENIPUNCTURE: CPT

## 2023-01-12 PROCEDURE — 99396 PREV VISIT EST AGE 40-64: CPT | Performed by: FAMILY MEDICINE

## 2023-01-12 PROCEDURE — 80061 LIPID PANEL: CPT

## 2023-01-12 PROCEDURE — 82570 ASSAY OF URINE CREATININE: CPT | Performed by: FAMILY MEDICINE

## 2023-01-12 PROCEDURE — 85610 PROTHROMBIN TIME: CPT

## 2023-01-12 PROCEDURE — 99213 OFFICE O/P EST LOW 20 MIN: CPT | Mod: 25 | Performed by: FAMILY MEDICINE

## 2023-01-12 PROCEDURE — 83540 ASSAY OF IRON: CPT

## 2023-01-12 PROCEDURE — 80053 COMPREHEN METABOLIC PANEL: CPT

## 2023-01-12 PROCEDURE — 83550 IRON BINDING TEST: CPT

## 2023-01-12 RX ORDER — LOSARTAN POTASSIUM 100 MG/1
100 TABLET ORAL DAILY
Qty: 90 TABLET | Refills: 3 | Status: SHIPPED | OUTPATIENT
Start: 2023-01-12 | End: 2024-02-13

## 2023-01-12 RX ORDER — RISPERIDONE 0.25 MG/1
TABLET ORAL
COMMUNITY

## 2023-01-12 RX ORDER — OLANZAPINE 5 MG/1
TABLET ORAL
COMMUNITY
Start: 2022-12-26

## 2023-01-12 ASSESSMENT — ENCOUNTER SYMPTOMS
SORE THROAT: 0
COUGH: 0
ARTHRALGIAS: 0
WEAKNESS: 0
CONSTIPATION: 0
MYALGIAS: 0
CHILLS: 0
PALPITATIONS: 0
DIZZINESS: 0
HEMATOCHEZIA: 0
FEVER: 0
SHORTNESS OF BREATH: 0
HEARTBURN: 0
NAUSEA: 0
DYSURIA: 0
NERVOUS/ANXIOUS: 0
HEADACHES: 0
DIARRHEA: 1
FREQUENCY: 0
JOINT SWELLING: 0
PARESTHESIAS: 0
EYE PAIN: 0
ABDOMINAL PAIN: 0
HEMATURIA: 0

## 2023-01-12 NOTE — PROGRESS NOTES
SUBJECTIVE:   CC: Jordan is an 62 year old who presents for preventative health visit.   Patient has been advised of split billing requirements and indicates understanding: Yes  Healthy Habits:     Getting at least 3 servings of Calcium per day:  Yes    Bi-annual eye exam:  Yes    Dental care twice a year:  Yes    Sleep apnea or symptoms of sleep apnea:  None    Diet:  Regular (no restrictions)    Frequency of exercise:  4-5 days/week    Duration of exercise:  15-30 minutes    Taking medications regularly:  Yes    Medication side effects:  Not applicable    PHQ-2 Total Score: 0      Hypertension Follow-up      Do you check your blood pressure regularly outside of the clinic? No     Are you following a low salt diet? Yes    Are your blood pressures ever more than 140 on the top number (systolic) OR more   than 90 on the bottom number (diastolic), for example 140/90? No    States he feels he is doing well.  States he has been admitted to [facility] for college continuing course.    Has had blood drawn today.    States he lost the information to schedule low dose CT chest for lung cancer screening. Received a reminder message but he lost the info.    Today's PHQ-2 Score:   PHQ-2 ( 1999 [employer]) 1/5/2023   Q1: Little interest or pleasure in doing things 0   Q2: Feeling down, depressed or hopeless 0   PHQ-2 Score 0   PHQ-2 Total Score (12-17 Years)- Positive if 3 or more points; Administer PHQ-A if positive -   Q1: Little interest or pleasure in doing things Not at all   Q2: Feeling down, depressed or hopeless Not at all   PHQ-2 Score 0       Social History     Tobacco Use     Smoking status: Former     Packs/day: 1.00     Years: 30.00     Pack years: 30.00     Types: Cigarettes     Quit date: 4/2/2012     Years since quitting: 10.7     Smokeless tobacco: Former     Quit date: 4/2/2012     Tobacco comments:     2 PPD   Substance Use Topics     Alcohol use: Yes     Comment: RARE     If you drink alcohol do you typically have  >3 drinks per day or >7 drinks per week? Not applicable    Alcohol Use 1/12/2023   Prescreen: >3 drinks/day or >7 drinks/week? -   Prescreen: >3 drinks/day or >7 drinks/week? Not Applicable       Last PSA:   PSA   Date Value Ref Range Status   09/26/2019 2.47 0 - 4 ug/L Final     Comment:     Assay Method:  Chemiluminescence using Siemens Vista analyzer       Reviewed orders with patient. Reviewed health maintenance and updated orders accordingly - Yes  Patient Active Problem List   Diagnosis     Severe bipolar I disorder, most recent episode mixed, with psychotic features (H)     Sebaceous cyst     CKD (chronic kidney disease) stage 3, GFR 30-59 ml/min (H)     Schizoaffective disorder (H)     Hyperlipidemia LDL goal <160     Tubular adenoma of colon     Former smoker     Benign essential hypertension     Former smoker, stopped smoking in distant past     Deep vein thrombosis (DVT) (H)     Acute deep vein thrombosis (DVT) of femoral vein of right lower extremity (H)     Acute deep vein thrombosis (DVT) of right lower extremity, unspecified vein (H)     Acute deep vein thrombosis (DVT) of other specified vein of right lower extremity (H)     Long term current use of anticoagulant therapy     Past Surgical History:   Procedure Laterality Date     COLONOSCOPY      normal. has fam hx colon cancer     COLONOSCOPY N/A 10/13/2014    Procedure: COLONOSCOPY;  Surgeon: Santy Constantino MD;  Location: [facility]     COLONOSCOPY N/A 2/3/2022    Procedure: COLONOSCOPY;  Surgeon: Sherif Duarte MD;  Location: [facility]     HERNIA REPAIR, INGUINAL RT/LT  02/12/2004     SURGICAL HISTORY OF -       Incised & Drained - ? Perirectal Abscess        Social History     Tobacco Use     Smoking status: Former     Packs/day: 1.00     Years: 30.00     Pack years: 30.00     Types: Cigarettes     Quit date: 4/2/2012     Years since quitting: 10.7     Smokeless tobacco: Former     Quit date: 4/2/2012     Tobacco comments:     2 PPD   Substance Use  Topics     Alcohol use: Yes     Comment: RARE     Family History   Problem Relation Age of Onset     Thyroid Disease Mother      Coronary Artery Disease Mother         pacemaker     Cancer Paternal Grandfather      Cancer Sister      Cancer - colorectal Sister      Colon Cancer Sister      Mental Illness Nephew         committed suicide at age 27         Current Outpatient Medications   Medication Sig Dispense Refill     cholecalciferol 5000 units (125 mcg) PO capsule Take by mouth daily       lamoTRIgine (LAMICTAL) 100 MG tablet Take 1 tablet (100 mg) by mouth 2 times daily 60 tablet 0     losartan (COZAAR) 100 MG tablet Take 1 tablet (100 mg) by mouth daily 90 tablet 3     Multiple Vitamins-Minerals (MULTIVITAMIN ADULT PO) Take 1 tablet by mouth At Bedtime        OLANZapine (ZYPREXA) 5 MG tablet TAKE 1 TABLET BY MOUTH ONCE DAILY AT BEDTIME AND 1/2 (ONE-HALF) AS NEEDED AS DIRECTED       Omega-3 Fatty Acids (OMEGA 3 PO) Take 2,400 mg by mouth every evening       risperiDONE (RISPERDAL) 0.25 MG tablet        warfarin ANTICOAGULANT (COUMADIN) 5 MG tablet Take 2.5 mg every Sun, Thu; 5 mg all other days or as directed by INR clinic 72 tablet 1     Allergies   Allergen Reactions     Lisinopril Nausea     Felt weakness nausea, couldn't sleep       Reviewed and updated as needed this visit by clinical staff   Tobacco  Allergies  Meds              Reviewed and updated as needed this visit by Provider                 Past Medical History:   Diagnosis Date     CKD (chronic kidney disease)     stage 3     Depressive disorder     Schizoaffective - manic depressive with schizophrenic tendencies     Depressive disorder, not elsewhere classified     Manic      HTN (hypertension)      Unspecified schizophrenia, unspecified condition       Past Surgical History:   Procedure Laterality Date     COLONOSCOPY      normal. has fam hx colon cancer     COLONOSCOPY N/A 10/13/2014    Procedure: COLONOSCOPY;  Surgeon: Santy Constantino,  "MD;  Location: [facility]     COLONOSCOPY N/A 2/3/2022    Procedure: COLONOSCOPY;  Surgeon: Sherif Duarte MD;  Location: [facility]     HERNIA REPAIR, INGUINAL RT/LT  02/12/2004     SURGICAL HISTORY OF -       Incised & Drained - ? Perirectal Abscess        Review of Systems   Constitutional: Negative for chills and fever.   HENT: Negative for congestion, ear pain, hearing loss and sore throat.    Eyes: Negative for pain and visual disturbance.   Respiratory: Negative for cough and shortness of breath.    Cardiovascular: Negative for chest pain, palpitations and peripheral edema.   Gastrointestinal: Positive for diarrhea. Negative for abdominal pain, constipation, heartburn, hematochezia and nausea.   Genitourinary: Negative for dysuria, frequency, genital sores, hematuria, impotence, penile discharge and urgency.   Musculoskeletal: Negative for arthralgias, joint swelling and myalgias.   Skin: Negative for rash.   Neurological: Negative for dizziness, weakness, headaches and paresthesias.   Psychiatric/Behavioral: Positive for mood changes. The patient is not nervous/anxious.      States the diarrhea is intermittent but usually associated with coffe consumption. Denies serious concern about it.    OBJECTIVE:   /70   Pulse 72   Temp 96.9  F (36.1  C) (Tympanic)   Resp 18   Ht 1.778 m (5' 10\")   Wt 80.1 kg (176 lb 9.6 oz)   SpO2 98%   BMI 25.34 kg/m      Physical Exam  GENERAL APPEARANCE: ovrweight, ambulatory w/o assist,  alert and no distress  EYES: pink conj, no icterus, PERRL, EOMI  HENT: ear canals and TM's normal, nose and mouth without ulcers or lesions, oropharynx clear and oral mucous membranes moist  NECK: no adenopathy, no asymmetry, masses, or scars and thyroid normal to palpation  RESP: lungs clear to auscultation - no rales, rhonchi or wheezes  CV: regular rates and rhythm, normal S1 S2, no S3 or S4, no murmur, click or rub, no peripheral edema and peripheral pulses strong  ABDOMEN: soft, nontender, " no hepatosplenomegaly, no masses and bowel sounds normal  RECTAL: deferred  MS: no musculoskeletal defects are noted and gait is age appropriate without ataxia  SKIN: no suspicious lesions or rashes  NEURO: Normal strength and tone, sensory exam grossly normal, mentation intact and speech normal  PSYCH: well-dressed, ormal speech, very slight tangentiality of thought process but not disorganized, normal mood, appropriate affect    Diagnostic Test Results:  Labs pending    ASSESSMENT/PLAN:   Jordan was seen today for physical.    Diagnoses and all orders for this visit:    Routine general medical examination at a health care facility  Patient gave info re: his immunizations.    Stage 3a chronic kidney disease (H)  -     Albumin Random Urine Quantitative with Creat Ratio  Stable renal function on last lab test. Waiting for today's labs.  Maintain hydration. Avoid nephrotoxins.  Continue ARB.    Essential hypertension with goal blood pressure less than 140/90  -     losartan (COZAAR) 100 MG tablet; Take 1 tablet (100 mg) by mouth daily  -     OFFICE/OUTPT VISIT,EST,LEVL III  Controlled.  Low salt, low fat diet.   Exercise as tolerated.  Take meds as prescribed; call if with side effects.     Other orders  -     REVIEW OF HEALTH MAINTENANCE PROTOCOL ORDERS        Patient has been advised of split billing requirements and indicates understanding: Yes      COUNSELING:   Reviewed preventive health counseling, as reflected in patient instructions        He reports that he quit smoking about 10 years ago. His smoking use included cigarettes. He has a 30.00 pack-year smoking history. He quit smokeless tobacco use about 10 years ago.        Juan Antonio Flanagan MD  [facility]

## 2023-01-12 NOTE — PROGRESS NOTES
ANTICOAGULATION MANAGEMENT     Jordan Barnett 62 year old male is on warfarin with therapeutic INR result. (Goal INR 2.0-3.0)    Recent labs: (last 7 days)     01/12/23  0712   INR 2.4*       ASSESSMENT       Source(s): Chart Review and Patient/Caregiver Call       Warfarin doses taken: Warfarin taken as instructed    Diet: No new diet changes identified    New illness, injury, or hospitalization: No    Medication/supplement changes: risperidone    Signs or symptoms of bleeding or clotting: No    Previous INR: Therapeutic last 2(+) visits    Additional findings: None     PLAN     Recommended plan for no diet, medication or health factor changes affecting INR     Dosing Instructions: Continue your current warfarin dose with next INR in 6 weeks       Summary  As of 1/12/2023    Full warfarin instructions:  2.5 mg every Sun, Thu; 5 mg all other days   Next INR check:  2/23/2023             Telephone call with Jordan who verbalizes understanding and agrees to plan    Lab visit scheduled    Education provided:     Please call back if any changes to your diet, medications or how you've been taking warfarin    Plan made per [facility] anticoagulation protocol    Joann Gurrola RN  Anticoagulation Clinic  1/12/2023    _______________________________________________________________________     Anticoagulation Episode Summary     Current INR goal:  2.0-3.0   TTR:  67.3 % (1 y)   Target end date:  Indefinite   Send INR reminders to:  [facility]    Indications    Acute deep vein thrombosis (DVT) of other specified vein of right lower extremity (H) [I82.491]  Long term current use of anticoagulant therapy [Z79.01]           Comments:           Anticoagulation Care Providers     Provider Role Specialty Phone number    Kaleb Barnett MD Referring Emergency Medicine [phone]    Juan Antonio Flanagan MD Referring Family Medicine [phone]

## 2023-01-12 NOTE — PATIENT INSTRUCTIONS
To schedule the low dose CT scan of the chest for lung cancer screening, call [phone].     Be consistent with low salt, low trans fat and low saturated fat diet.  Eat food rich in omega-3-fatty acids as you tolerate. (salmon, olive oil)  Eat 5 cups of vegetables, fruits and whole grains per day.  Limit starchy food (white rice, white bread, white pasta, white potatoes) to less than a cup per meal.  Minimize sweets, junk food and fastfood. Limit soda beverages to one serving per day; best to avoid it altogether though.    Exercise: moderate intensity sustained for at least 30 mins per episode, goal of 150 mins per week at least  Combine cardiovascular and resistance exercises.  These exercise recommendations are in addition to your daily activity at work or home.  Work on losing weight.      Blood tests: You will be contacted in 1-2 days for results of your lab tests.     Preventative Care Visits include: Yearly physicals, Well-child visits, Welcome to Medicare visits, & Medicare yearly wellness exams.    The purpose of these visits is to discuss your medical history and prevent health problems before you are sick.  You may need to pay a copay, coinsurance or deductible if your visit today includes testing or treating for a new or existing condition.    Additional charges may be incurred for today's visit. If you have questions about what your insurance plan covers, please contact your Insurance Company's member service department.  If you have questions specific to a bill you have already received from [employer], please contact the [employer] Billing Office at [phone].       Preventive Health Recommendations  Male Ages 50 - 64    Yearly exam:             See your health care provider every year in order to  o   Review health changes.   o   Discuss preventive care.    o   Review your medicines if your doctor has prescribed any.   Have a cholesterol test every 5 years, or more frequently if you are at risk for  high cholesterol/heart disease.   Have a diabetes test (fasting glucose) every three years. If you are at risk for diabetes, you should have this test more often.   Have a colonoscopy at age 50, or have a yearly FIT test (stool test). These exams will check for colon cancer.    Talk with your health care provider about whether or not a prostate cancer screening test (PSA) is right for you.  You should be tested each year for STDs (sexually transmitted diseases), if you re at risk.     Shots: Get a flu shot each year. Get a tetanus shot every 10 years.     Nutrition:  Eat at least 5 servings of fruits and vegetables daily.   Eat whole-grain bread, whole-wheat pasta and brown rice instead of white grains and rice.   Get adequate Calcium and Vitamin D.     Lifestyle  Exercise for at least 150 minutes a week (30 minutes a day, 5 days a week). This will help you control your weight and prevent disease.   Limit alcohol to one drink per day.   No smoking.   Wear sunscreen to prevent skin cancer.   See your dentist every six months for an exam and cleaning.   See your eye doctor every 1 to 2 years.

## 2023-03-02 ENCOUNTER — LAB (OUTPATIENT)
Dept: LAB | Facility: CLINIC | Age: 63
End: 2023-03-02
Payer: COMMERCIAL

## 2023-03-02 ENCOUNTER — ANTICOAGULATION THERAPY VISIT (OUTPATIENT)
Dept: ANTICOAGULATION | Facility: CLINIC | Age: 63
End: 2023-03-02

## 2023-03-02 DIAGNOSIS — I82.491 ACUTE DEEP VEIN THROMBOSIS (DVT) OF OTHER SPECIFIED VEIN OF RIGHT LOWER EXTREMITY (H): Primary | ICD-10-CM

## 2023-03-02 DIAGNOSIS — Z79.01 LONG TERM CURRENT USE OF ANTICOAGULANT THERAPY: ICD-10-CM

## 2023-03-02 DIAGNOSIS — I82.409 DVT (DEEP VENOUS THROMBOSIS) (H): ICD-10-CM

## 2023-03-02 LAB — INR BLD: 2 (ref 0.9–1.1)

## 2023-03-02 PROCEDURE — 36416 COLLJ CAPILLARY BLOOD SPEC: CPT

## 2023-03-02 PROCEDURE — 85610 PROTHROMBIN TIME: CPT

## 2023-03-02 NOTE — PROGRESS NOTES
ANTICOAGULATION MANAGEMENT     Jordan Barnett 62 year old male is on warfarin with therapeutic INR result. (Goal INR 2.0-3.0)    Recent labs: (last 7 days)     03/02/23  0656   INR 2.0*       ASSESSMENT     Warfarin Lab Questionnaire    Dose in Tablet or Mg 3/2/2023   TAB or MG? tablet (tab)     Pt Rptd Dose BAR MONDAY TUESDAY WEDNESDAY THURSDAY FRIDAY SATURDAY   3/2/2023 2.5 mg 5 mg 5 mg 5 mg 2.5 mg 5 mg 5 mg     Warfarin Lab Questionnaire 3/2/2023   Missed doses? No   Medication changes? No   Abnormal bleeding? Yes   If yes, please explain: Bloody nose once   Shortness of breath? No   Injuries or illness since last INR? No   Changes in diet or alcohol? No   Upcoming surgery, procedure? No   Best number to call with results? [phone]       Additional findings: epistaxis was discussed. It was one episode and didn't last long. Pt aware of how to stop a nose bleed and when to go to UC/ER.       PLAN     Recommended plan for no diet, medication or health factor changes affecting INR     Dosing Instructions: Continue your current warfarin dose with next INR in 6 weeks       Summary  As of 3/2/2023    Full warfarin instructions:  2.5 mg every Sun, Thu; 5 mg all other days   Next INR check:  4/13/2023             Telephone call with Jordan who verbalizes understanding and agrees to plan    Lab visit scheduled    Education provided:     Please call back if any changes to your diet, medications or how you've been taking warfarin    Contact [phone]  with any changes, questions or concerns.     Plan made per [facility] anticoagulation protocol    Maryellen Alberts RN  Anticoagulation Clinic  3/2/2023    _______________________________________________________________________     Anticoagulation Episode Summary     Current INR goal:  2.0-3.0   TTR:  71.9 % (1 y)   Target end date:  Indefinite   Send INR reminders to:  [facility]    Indications    Acute deep vein thrombosis (DVT) of other specified vein of right lower  extremity (H) [I82.491]  Long term current use of anticoagulant therapy [Z79.01]           Comments:           Anticoagulation Care Providers     Provider Role Specialty Phone number    Kaleb Barnett MD Referring Emergency Medicine [phone]    Juan Antonio Flanagan MD Referring Family Medicine [phone]

## 2023-04-13 ENCOUNTER — ANTICOAGULATION THERAPY VISIT (OUTPATIENT)
Dept: ANTICOAGULATION | Facility: CLINIC | Age: 63
End: 2023-04-13

## 2023-04-13 ENCOUNTER — LAB (OUTPATIENT)
Dept: LAB | Facility: CLINIC | Age: 63
End: 2023-04-13
Payer: COMMERCIAL

## 2023-04-13 DIAGNOSIS — I82.491 ACUTE DEEP VEIN THROMBOSIS (DVT) OF OTHER SPECIFIED VEIN OF RIGHT LOWER EXTREMITY (H): Primary | ICD-10-CM

## 2023-04-13 DIAGNOSIS — Z79.01 LONG TERM CURRENT USE OF ANTICOAGULANT THERAPY: ICD-10-CM

## 2023-04-13 DIAGNOSIS — I82.409 DVT (DEEP VENOUS THROMBOSIS) (H): ICD-10-CM

## 2023-04-13 LAB — INR BLD: 2.1 (ref 0.9–1.1)

## 2023-04-13 PROCEDURE — 36416 COLLJ CAPILLARY BLOOD SPEC: CPT

## 2023-04-13 PROCEDURE — 85610 PROTHROMBIN TIME: CPT

## 2023-04-13 NOTE — PROGRESS NOTES
ANTICOAGULATION MANAGEMENT     Jordan Barnett 62 year old male is on warfarin with therapeutic INR result. (Goal INR 2.0-3.0)    Recent labs: (last 7 days)     04/13/23  0656   INR 2.1*       ASSESSMENT     Warfarin Lab Questionnaire    Warfarin Doses Last 7 Days      4/12/2023     2:02 PM   Dose in Tablet or Mg   TAB or MG? milligram (mg)     Pt Rptd Dose SUNDAY MONDAY TUESDAY WED THURS FRIDAY SATURDAY 4/12/2023   2:02 PM 2.5 5 5 5 2.5 5 5         4/12/2023   Warfarin Lab Questionnaire   Missed doses? No   Changes in diet or alcohol? No   Shortness of breath? No   Injuries or illness since last INR? No   Upcoming surgery, procedure? No   Medication changes? No   Abnormal bleeding? No   Best number to call with results? [phone]        Previous INR: Therapeutic last 2(+) visits  Additional findings: None       PLAN     Recommended plan for no diet, medication or health factor changes affecting INR     Dosing Instructions: Continue your current warfarin dose with next INR in 6 weeks       Summary  As of 4/13/2023    Full warfarin instructions:  2.5 mg every Sun, Thu; 5 mg all other days   Next INR check:  5/25/2023             Detailed voice message left for Jordan with dosing instructions and follow up date.     Contact [phone]  to schedule and with any changes, questions or concerns.     Education provided:     Please call back if any changes to your diet, medications or how you've been taking warfarin    Plan made per [facility] anticoagulation protocol    Vani Dumont, RN  Anticoagulation Clinic  4/13/2023    _______________________________________________________________________     Anticoagulation Episode Summary     Current INR goal:  2.0-3.0   TTR:  75.4 % (1 y)   Target end date:  Indefinite   Send INR reminders to:  [facility]    Indications    Acute deep vein thrombosis (DVT) of other specified vein of right lower extremity (H) [I82.491]  Long term current use of anticoagulant therapy  [Z79.01]           Comments:           Anticoagulation Care Providers     Provider Role Specialty Phone number    Kaleb Barnett MD Referring Emergency Medicine [phone]    Juan Antonio Flanagan MD Referring Family Medicine [phone]

## 2023-05-20 NOTE — TELEPHONE ENCOUNTER
"Patient calling again about his Olanzapine, as he hasn't heard from anyone. High priority messages were routed two days ago, and his PCP is referring him to prescribing MD.  Recommended he call his psychiatrist during office hours, and if he has having symptoms to go to ER.  Brielle Hardin RN  [facility]    Reason for Disposition    [1] Request for URGENT new prescription or refill of \"essential\" medication (i.e., likelihood of harm to patient if not taken) AND [2] triager unable to fill per unit policy    Protocols used: MEDICATION QUESTION CALL-ADULT-AH      " Universal Safety Interventions

## 2023-05-25 ENCOUNTER — ANTICOAGULATION THERAPY VISIT (OUTPATIENT)
Dept: ANTICOAGULATION | Facility: CLINIC | Age: 63
End: 2023-05-25

## 2023-05-25 ENCOUNTER — LAB (OUTPATIENT)
Dept: LAB | Facility: CLINIC | Age: 63
End: 2023-05-25
Payer: COMMERCIAL

## 2023-05-25 DIAGNOSIS — I82.491 ACUTE DEEP VEIN THROMBOSIS (DVT) OF OTHER SPECIFIED VEIN OF RIGHT LOWER EXTREMITY (H): Primary | ICD-10-CM

## 2023-05-25 DIAGNOSIS — I82.409 DVT (DEEP VENOUS THROMBOSIS) (H): ICD-10-CM

## 2023-05-25 DIAGNOSIS — Z79.01 LONG TERM CURRENT USE OF ANTICOAGULANT THERAPY: ICD-10-CM

## 2023-05-25 LAB — INR BLD: 2.2 (ref 0.9–1.1)

## 2023-05-25 PROCEDURE — 85610 PROTHROMBIN TIME: CPT

## 2023-05-25 PROCEDURE — 36416 COLLJ CAPILLARY BLOOD SPEC: CPT

## 2023-06-15 ENCOUNTER — ALLIED HEALTH/NURSE VISIT (OUTPATIENT)
Dept: FAMILY MEDICINE | Facility: CLINIC | Age: 63
End: 2023-06-15
Payer: COMMERCIAL

## 2023-06-15 DIAGNOSIS — Z23 ENCOUNTER FOR IMMUNIZATION: Primary | ICD-10-CM

## 2023-06-15 PROCEDURE — 90677 PCV20 VACCINE IM: CPT

## 2023-06-15 PROCEDURE — 90471 IMMUNIZATION ADMIN: CPT

## 2023-06-15 PROCEDURE — 99207 PR NO CHARGE NURSE ONLY: CPT

## 2023-06-15 NOTE — PROGRESS NOTES
Prior to immunization administration, verified patients identity using patient s name and date of birth. Please see Immunization Activity for additional information.     Screening Questionnaire for Adult Immunization    Are you sick today?   No   Do you have allergies to medications, food, a vaccine component or latex?   Yes   Have you ever had a serious reaction after receiving a vaccination?   No   Do you have a long-term health problem with heart, lung, kidney, or metabolic disease (e.g., diabetes), asthma, a blood disorder, no spleen, complement component deficiency, a cochlear implant, or a spinal fluid leak?  Are you on long-term aspirin therapy?   Yes   Do you have cancer, leukemia, HIV/AIDS, or any other immune system problem?   No   Do you have a parent, brother, or sister with an immune system problem?   No   In the past 3 months, have you taken medications that affect  your immune system, such as prednisone, other steroids, or anticancer drugs; drugs for the treatment of rheumatoid arthritis, Crohn s disease, or psoriasis; or have you had radiation treatments?   No   Have you had a seizure, or a brain or other nervous system problem?   No   During the past year, have you received a transfusion of blood or blood    products, or been given immune (gamma) globulin or antiviral drug?   No   For women: Are you pregnant or is there a chance you could become       pregnant during the next month?   No   Have you received any vaccinations in the past 4 weeks?   No     Immunization questionnaire answers were all negative except 2.  Intolerance to Lisinopril and CKD.    I have reviewed the following standing orders: This patient is due and qualifies for the Pneumococcal vaccine.    Click here for Pneumococcal (Adult) Standing Order    I have reviewed the vaccines inclusion and exclusion criteria;No concerns regarding eligibility.       Patient instructed to remain in clinic for 15 minutes afterwards, and to report  any adverse reactions.     Screening performed by Ayla Pyle CMA on 6/15/2023 at 9:20 AM.

## 2023-06-17 DIAGNOSIS — I82.401 ACUTE DEEP VEIN THROMBOSIS (DVT) OF RIGHT LOWER EXTREMITY, UNSPECIFIED VEIN (H): ICD-10-CM

## 2023-06-17 DIAGNOSIS — Z79.01 LONG TERM CURRENT USE OF ANTICOAGULANT THERAPY: ICD-10-CM

## 2023-06-17 DIAGNOSIS — I82.491 ACUTE DEEP VEIN THROMBOSIS (DVT) OF OTHER SPECIFIED VEIN OF RIGHT LOWER EXTREMITY (H): ICD-10-CM

## 2023-06-19 RX ORDER — WARFARIN SODIUM 5 MG/1
TABLET ORAL
Qty: 72 TABLET | Refills: 1 | Status: SHIPPED | OUTPATIENT
Start: 2023-06-19 | End: 2023-12-04

## 2023-06-19 NOTE — TELEPHONE ENCOUNTER
Pending Prescriptions:                       Disp   Refills    warfarin ANTICOAGULANT (COUMADIN) 5 MG tab*72 tab*0        Sig: TAKE 1/2 (ONE-HALF) TABLET BY MOUTH EVERY SUNDAY AND           THURSDAY. 1 TABLET BY MOUTH ALL OTHER DAYS OR AS           DIRECTED BY INR CLINIC      Alisa Barnett RN on 6/19/2023 at 1:19 PM

## 2023-06-19 NOTE — TELEPHONE ENCOUNTER
ANTICOAGULATION MANAGEMENT:  Medication Refill    Anticoagulation Summary  As of 5/25/2023    Warfarin maintenance plan:  2.5 mg (5 mg x 0.5) every Sun, Thu; 5 mg (5 mg x 1) all other days   Next INR check:  7/6/2023   Target end date:  Indefinite    Indications    Acute deep vein thrombosis (DVT) of other specified vein of right lower extremity (H) [I82.491]  Long term current use of anticoagulant therapy [Z79.01]             Anticoagulation Care Providers     Provider Role Specialty Phone number    Kaleb Barnett MD Referring Emergency Medicine [phone]    Juan Antonio Flanagan MD Referring Family Medicine [phone]          Visit with referring provider/group within last year: Yes    [facility] referral signed last signed: 09/08/2022; within last year: Yes    Jordan meets all criteria for refill (current [facility] referral, office visit with referring provider/group in last year, lab monitoring up to date or not exceeding 2 weeks overdue). Rx instructions and quantity match patient's current dosing plan. Warfarin 90 day supply with 1 refill granted per [facility] protocol     Fabiana Arguello RN  Anticoagulation Clinic

## 2023-06-29 ENCOUNTER — ANTICOAGULATION THERAPY VISIT (OUTPATIENT)
Dept: ANTICOAGULATION | Facility: CLINIC | Age: 63
End: 2023-06-29

## 2023-06-29 ENCOUNTER — LAB (OUTPATIENT)
Dept: LAB | Facility: CLINIC | Age: 63
End: 2023-06-29
Payer: COMMERCIAL

## 2023-06-29 DIAGNOSIS — I82.409 DVT (DEEP VENOUS THROMBOSIS) (H): ICD-10-CM

## 2023-06-29 DIAGNOSIS — I82.491 ACUTE DEEP VEIN THROMBOSIS (DVT) OF OTHER SPECIFIED VEIN OF RIGHT LOWER EXTREMITY (H): Primary | ICD-10-CM

## 2023-06-29 DIAGNOSIS — Z79.01 LONG TERM CURRENT USE OF ANTICOAGULANT THERAPY: ICD-10-CM

## 2023-06-29 LAB — INR BLD: 2 (ref 0.9–1.1)

## 2023-06-29 PROCEDURE — 36416 COLLJ CAPILLARY BLOOD SPEC: CPT

## 2023-06-29 PROCEDURE — 85610 PROTHROMBIN TIME: CPT

## 2023-06-29 NOTE — PROGRESS NOTES
ANTICOAGULATION MANAGEMENT     Jordan Barnett 63 year old male is on warfarin with therapeutic INR result. (Goal INR 2.0-3.0)    Recent labs: (last 7 days)     06/29/23  0654   INR 2.0*       ASSESSMENT       Source(s): Chart Review and Patient/Caregiver Call       Warfarin doses taken: Warfarin taken as instructed    Diet: eating beef livers for iron    Medication/supplement changes: None noted    New illness, injury, or hospitalization: No    Signs or symptoms of bleeding or clotting: No    Previous result: Therapeutic last 2(+) visits    Additional findings: None         PLAN     Recommended plan for no diet, medication or health factor changes affecting INR     Dosing Instructions: Continue your current warfarin dose with next INR in 6 weeks       Summary  As of 6/29/2023    Full warfarin instructions:  2.5 mg every Sun, Thu; 5 mg all other days   Next INR check:  8/10/2023             Telephone call with Jordan who verbalizes understanding and agrees to plan    Lab visit scheduled    Education provided:     Please call back if any changes to your diet, medications or how you've been taking warfarin    Contact [phone]  with any changes, questions or concerns.     Plan made per [facility] anticoagulation protocol    Maryellen Alberts RN  Anticoagulation Clinic  6/29/2023    _______________________________________________________________________     Anticoagulation Episode Summary     Current INR goal:  2.0-3.0   TTR:  87.7 % (1 y)   Target end date:  Indefinite   Send INR reminders to:  [facility]    Indications    Acute deep vein thrombosis (DVT) of other specified vein of right lower extremity (H) [I82.491]  Long term current use of anticoagulant therapy [Z79.01]           Comments:           Anticoagulation Care Providers     Provider Role Specialty Phone number    Kaleb Barnett MD Referring Emergency Medicine [phone]    Juan Antonio Flanagan MD Referring Family Medicine [phone]

## 2023-06-30 ENCOUNTER — DOCUMENTATION ONLY (OUTPATIENT)
Dept: OTHER | Facility: CLINIC | Age: 63
End: 2023-06-30
Payer: COMMERCIAL

## 2023-08-10 ENCOUNTER — ANTICOAGULATION THERAPY VISIT (OUTPATIENT)
Dept: ANTICOAGULATION | Facility: CLINIC | Age: 63
End: 2023-08-10

## 2023-08-10 ENCOUNTER — LAB (OUTPATIENT)
Dept: LAB | Facility: CLINIC | Age: 63
End: 2023-08-10
Payer: COMMERCIAL

## 2023-08-10 ENCOUNTER — DOCUMENTATION ONLY (OUTPATIENT)
Dept: ANTICOAGULATION | Facility: CLINIC | Age: 63
End: 2023-08-10

## 2023-08-10 DIAGNOSIS — I82.491 ACUTE DEEP VEIN THROMBOSIS (DVT) OF OTHER SPECIFIED VEIN OF RIGHT LOWER EXTREMITY (H): Primary | ICD-10-CM

## 2023-08-10 DIAGNOSIS — I82.409 DVT (DEEP VENOUS THROMBOSIS) (H): ICD-10-CM

## 2023-08-10 DIAGNOSIS — I82.411 ACUTE DEEP VEIN THROMBOSIS (DVT) OF FEMORAL VEIN OF RIGHT LOWER EXTREMITY (H): Primary | ICD-10-CM

## 2023-08-10 DIAGNOSIS — Z79.01 LONG TERM CURRENT USE OF ANTICOAGULANT THERAPY: ICD-10-CM

## 2023-08-10 LAB — INR BLD: 2.1 (ref 0.9–1.1)

## 2023-08-10 PROCEDURE — 36416 COLLJ CAPILLARY BLOOD SPEC: CPT

## 2023-08-10 PROCEDURE — 85610 PROTHROMBIN TIME: CPT

## 2023-08-10 NOTE — PROGRESS NOTES
ANTICOAGULATION CLINIC REFERRAL RENEWAL REQUEST       An annual renewal order is required for all patients referred to [facility].?  Please review and sign the pended referral order for Jordan Barnett.       ANTICOAGULATION SUMMARY      Warfarin indication(s)   DVT    Mechanical heart valve present?  NO       Current goal range   INR: 2.0-3.0     Goal appropriate for indication? Goal INR 2-3, standard for indication(s) above     Time in Therapeutic Range (TTR)  (Goal > 60%) 95.0%       Office visit with referring provider's group within last year yes on 1/12/2023       Vani Dumont, LORENA  [facility]

## 2023-08-10 NOTE — PROGRESS NOTES
ANTICOAGULATION MANAGEMENT     Jordan Barnett 63 year old male is on warfarin with therapeutic INR result. (Goal INR 2.0-3.0)    Recent labs: (last 7 days)     08/10/23  0659   INR 2.1*       ASSESSMENT     Source(s): Chart Review  Previous INR was Therapeutic last 2(+) visits  Medication, diet, health changes since last INR chart reviewed; none identified         PLAN     Recommended plan for no diet, medication or health factor changes affecting INR     Dosing Instructions: Continue your current warfarin dose with next INR in 6 weeks       Summary  As of 8/10/2023      Full warfarin instructions:  2.5 mg every Sun, Thu; 5 mg all other days   Next INR check:  9/21/2023               Detailed voice message left for Jordan with dosing instructions and follow up date. [email or web address] message also sent.     Lab visit scheduled    Education provided:   Please call back if any changes to your diet, medications or how you've been taking warfarin    Plan made per [facility] anticoagulation protocol    Vani Dumont RN  Anticoagulation Clinic  8/10/2023    _______________________________________________________________________     Anticoagulation Episode Summary       Current INR goal:  2.0-3.0   TTR:  95.0 % (1 y)   Target end date:  Indefinite   Send INR reminders to:  [facility]    Indications    Acute deep vein thrombosis (DVT) of other specified vein of right lower extremity (H) [I82.491]  Long term current use of anticoagulant therapy [Z79.01]             Comments:               Anticoagulation Care Providers       Provider Role Specialty Phone number    Kaleb Barnett MD Referring Emergency Medicine [phone]    Juan Antonio Flanagan MD Referring Family Medicine [phone]

## 2023-09-21 ENCOUNTER — ANTICOAGULATION THERAPY VISIT (OUTPATIENT)
Dept: ANTICOAGULATION | Facility: CLINIC | Age: 63
End: 2023-09-21

## 2023-09-21 ENCOUNTER — LAB (OUTPATIENT)
Dept: LAB | Facility: CLINIC | Age: 63
End: 2023-09-21
Payer: COMMERCIAL

## 2023-09-21 DIAGNOSIS — Z79.01 LONG TERM CURRENT USE OF ANTICOAGULANT THERAPY: ICD-10-CM

## 2023-09-21 DIAGNOSIS — I82.409 DVT (DEEP VENOUS THROMBOSIS) (H): ICD-10-CM

## 2023-09-21 DIAGNOSIS — I82.411 ACUTE DEEP VEIN THROMBOSIS (DVT) OF FEMORAL VEIN OF RIGHT LOWER EXTREMITY (H): ICD-10-CM

## 2023-09-21 DIAGNOSIS — I82.491 ACUTE DEEP VEIN THROMBOSIS (DVT) OF OTHER SPECIFIED VEIN OF RIGHT LOWER EXTREMITY (H): Primary | ICD-10-CM

## 2023-09-21 LAB — INR BLD: 1.6 (ref 0.9–1.1)

## 2023-09-21 PROCEDURE — 85610 PROTHROMBIN TIME: CPT

## 2023-09-21 PROCEDURE — 36416 COLLJ CAPILLARY BLOOD SPEC: CPT

## 2023-09-21 NOTE — PROGRESS NOTES
ANTICOAGULATION MANAGEMENT     Jordan Barnett 63 year old male is on warfarin with subtherapeutic INR result. (Goal INR 2.0-3.0)    Recent labs: (last 7 days)     09/21/23  0656   INR 1.6*       ASSESSMENT     Warfarin Lab Questionnaire    Warfarin Doses Last 7 Days      9/21/2023     6:23 AM   Dose in Tablet or Mg   TAB or MG? milligram (mg)     Pt Rptd Dose SUNDAY MONDAY TUESDAY WED THURS FRIDAY SATURDAY 9/21/2023   6:23 AM 2.5 5 5 5 2.5 5 5         9/21/2023   Warfarin Lab Questionnaire   Missed doses within past 14 days? No   Changes in diet or alcohol within past 14 days? No   Medication changes since last result? No   Injuries or illness since last result? No   New shortness of breath, severe headaches or sudden changes in vision since last result? No   Abnormal bleeding since last result? No   Upcoming surgery, procedure? No   Best number to call with results? [phone]     Previous result: Therapeutic last 2(+) visits  Additional findings: patient feels that his diarrhea is r/t his coffee intake. He has not stopped drinking coffee but he is working with a team on his diarrhea.     PLAN     Recommended plan for no diet, medication or health factor changes affecting INR     Dosing Instructions: Increase your warfarin dose (8.3% change) with next INR in 2 weeks       Pt is to check INR next week if his diarrhea improves    Summary  As of 9/21/2023      Full warfarin instructions:  2.5 mg every Sun; 5 mg all other days   Next INR check:  10/5/2023               Telephone call with Jordan who verbalizes understanding and agrees to plan    Lab visit scheduled    Education provided:   Please call back if any changes to your diet, medications or how you've been taking warfarin  Symptom monitoring: monitoring for clotting signs and symptoms and monitoring for stroke signs and symptoms    Plan made with [facility] Pharmacist Lizzette Gurrola, LORENA  Anticoagulation  Clinic  9/21/2023    _______________________________________________________________________     Anticoagulation Episode Summary       Current INR goal:  2.0-3.0   TTR:  90.8 % (1 y)   Target end date:  Indefinite   Send INR reminders to:  [facility]    Indications    Acute deep vein thrombosis (DVT) of other specified vein of right lower extremity (H) [I82.491]  Long term current use of anticoagulant therapy [Z79.01]  Acute deep vein thrombosis (DVT) of femoral vein of right lower extremity (H) [I82.411]             Comments:               Anticoagulation Care Providers       Provider Role Specialty Phone number    Kaleb Barnett MD Referring Emergency Medicine [phone]    Juan Antonio Flanagan MD Referring Family Medicine [phone]

## 2023-10-05 ENCOUNTER — ANTICOAGULATION THERAPY VISIT (OUTPATIENT)
Dept: ANTICOAGULATION | Facility: CLINIC | Age: 63
End: 2023-10-05

## 2023-10-05 ENCOUNTER — LAB (OUTPATIENT)
Dept: LAB | Facility: CLINIC | Age: 63
End: 2023-10-05
Payer: COMMERCIAL

## 2023-10-05 DIAGNOSIS — I82.411 ACUTE DEEP VEIN THROMBOSIS (DVT) OF FEMORAL VEIN OF RIGHT LOWER EXTREMITY (H): ICD-10-CM

## 2023-10-05 DIAGNOSIS — Z79.01 LONG TERM CURRENT USE OF ANTICOAGULANT THERAPY: ICD-10-CM

## 2023-10-05 DIAGNOSIS — I82.491 ACUTE DEEP VEIN THROMBOSIS (DVT) OF OTHER SPECIFIED VEIN OF RIGHT LOWER EXTREMITY (H): Primary | ICD-10-CM

## 2023-10-05 LAB — INR BLD: 1.6 (ref 0.9–1.1)

## 2023-10-05 PROCEDURE — 36416 COLLJ CAPILLARY BLOOD SPEC: CPT

## 2023-10-05 PROCEDURE — 85610 PROTHROMBIN TIME: CPT

## 2023-10-05 NOTE — PROGRESS NOTES
ANTICOAGULATION MANAGEMENT     Jordan Barnett 63 year old male is on warfarin with subtherapeutic INR result. (Goal INR 2.0-3.0)    Recent labs: (last 7 days)     10/05/23  0648   INR 1.6*       ASSESSMENT     Warfarin Lab Questionnaire    Warfarin Doses Last 7 Days      10/4/2023     4:04 PM   Dose in Tablet or Mg   TAB or MG? milligram (mg)     Pt Rptd Dose BAR MONDAY TUESDAY WED THURS FRIDAY SATURDAY   10/4/2023   4:04 PM 2.5 5 5 5 5 5 5         10/4/2023   Warfarin Lab Questionnaire   Missed doses within past 14 days? No   Changes in diet or alcohol within past 14 days? No   Medication changes since last result? Yes   Please list: 1000 mg vitamin C daily   Injuries or illness since last result? No   New shortness of breath, severe headaches or sudden changes in vision since last result? No   Abnormal bleeding since last result? No   Upcoming surgery, procedure? No   Best number to call with results? [phone]     Previous result: Subtherapeutic with dose increase after  Additional findings: Patient started daily vit C which can decrease INR. He plans to continue this.       PLAN     Recommended plan for ongoing change(s) affecting INR     Dosing Instructions: Increase your warfarin dose (15% change) with next INR in 2 weeks       Summary  As of 10/5/2023      Full warfarin instructions:  7.5 mg every Thu; 5 mg all other days   Next INR check:  10/19/2023               Telephone call with Jordan who verbalizes understanding and agrees to plan    Lab visit scheduled    Education provided:   Please call back if any changes to your diet, medications or how you've been taking warfarin  Importance of notifying anticoagulation clinic for: changes in medications; a sooner lab recheck maybe needed and diarrhea, nausea/vomiting, reduced intake, cold/flu, and/or infections; a sooner lab recheck maybe needed  Contact [phone]  with any changes, questions or concerns.     Plan made per [facility] anticoagulation  protocol    Maryellen Alberts RN  Anticoagulation Clinic  10/5/2023    _______________________________________________________________________     Anticoagulation Episode Summary       Current INR goal:  2.0-3.0   TTR:  87.0 % (1 y)   Target end date:  Indefinite   Send INR reminders to:  [facility]    Indications    Acute deep vein thrombosis (DVT) of other specified vein of right lower extremity (H) [I82.491]  Long term current use of anticoagulant therapy [Z79.01]  Acute deep vein thrombosis (DVT) of femoral vein of right lower extremity (H) [I82.411]             Comments:               Anticoagulation Care Providers       Provider Role Specialty Phone number    Kaleb Barnett MD Referring Emergency Medicine [phone]    Juan Antonio Flanagan MD Referring Family Medicine [phone]

## 2023-10-19 ENCOUNTER — LAB (OUTPATIENT)
Dept: LAB | Facility: CLINIC | Age: 63
End: 2023-10-19
Payer: COMMERCIAL

## 2023-10-19 ENCOUNTER — ANTICOAGULATION THERAPY VISIT (OUTPATIENT)
Dept: ANTICOAGULATION | Facility: CLINIC | Age: 63
End: 2023-10-19

## 2023-10-19 DIAGNOSIS — I82.411 ACUTE DEEP VEIN THROMBOSIS (DVT) OF FEMORAL VEIN OF RIGHT LOWER EXTREMITY (H): ICD-10-CM

## 2023-10-19 DIAGNOSIS — I82.491 ACUTE DEEP VEIN THROMBOSIS (DVT) OF OTHER SPECIFIED VEIN OF RIGHT LOWER EXTREMITY (H): Primary | ICD-10-CM

## 2023-10-19 DIAGNOSIS — Z79.01 LONG TERM CURRENT USE OF ANTICOAGULANT THERAPY: ICD-10-CM

## 2023-10-19 LAB — INR BLD: 5 (ref 0.9–1.1)

## 2023-10-19 PROCEDURE — 36416 COLLJ CAPILLARY BLOOD SPEC: CPT

## 2023-10-19 PROCEDURE — 85610 PROTHROMBIN TIME: CPT

## 2023-10-19 NOTE — PROGRESS NOTES
ANTICOAGULATION MANAGEMENT     Jordan Barnett 63 year old male is on warfarin with supratherapeutic INR result. (Goal INR 2.0-3.0)    Recent labs: (last 7 days)     10/19/23  0739   INR 5.0*       ASSESSMENT     Source(s): Chart Review and Patient/Caregiver Call     Warfarin doses taken: Warfarin taken as instructed  Diet: No new diet changes identified  Medication/supplement changes: None noted  New illness, injury, or hospitalization: No  Signs or symptoms of bleeding or clotting: No  Previous result: Subtherapeutic  Additional findings: None     PLAN     Recommended plan for no diet, medication or health factor changes affecting INR     Dosing Instructions: hold warfarin today with next INR in 1 day       Adjustment made to maintenance dose (13.3% decrease)    Summary  As of 10/19/2023      Full warfarin instructions:  10/19: Hold; Otherwise 2.5 mg every Thu; 5 mg all other days   Next INR check:  10/20/2023               Telephone call with Jordan who verbalizes understanding and agrees to plan    Routed to PCP as FYI due to supra therapeutic INR    Lab visit scheduled    Education provided:   Please call back if any changes to your diet, medications or how you've been taking warfarin  Symptom monitoring: monitoring for bleeding signs and symptoms    Plan made per [facility] anticoagulation protocol    Joann Gurrola RN  Anticoagulation Clinic  10/19/2023    _______________________________________________________________________     Anticoagulation Episode Summary       Current INR goal:  2.0-3.0   TTR:  84.3% (1 y)   Target end date:  Indefinite   Send IN[facility] reminders [facility]:  [facility]    Indications    Acute deep vein thrombosis (DVT) of other specified vein of right lower extremity (H) [I82.491]  Long term current use of anticoagulant therapy [Z79.01]  Acute deep vein thrombosis (DVT) of femoral vein of right lower extremity (H) [I82.411]             Comments:               Anticoagulation Care Providers        Provider Role Specialty Phone number    Kaleb Barnett MD Referring Emergency Medicine [phone]    Juan Antonio Flanagan MD Referring Family Medicine [phone]

## 2023-10-20 ENCOUNTER — LAB (OUTPATIENT)
Dept: LAB | Facility: CLINIC | Age: 63
End: 2023-10-20
Payer: COMMERCIAL

## 2023-10-20 ENCOUNTER — ANTICOAGULATION THERAPY VISIT (OUTPATIENT)
Dept: ANTICOAGULATION | Facility: CLINIC | Age: 63
End: 2023-10-20

## 2023-10-20 DIAGNOSIS — I82.491 ACUTE DEEP VEIN THROMBOSIS (DVT) OF OTHER SPECIFIED VEIN OF RIGHT LOWER EXTREMITY (H): Primary | ICD-10-CM

## 2023-10-20 DIAGNOSIS — I82.411 ACUTE DEEP VEIN THROMBOSIS (DVT) OF FEMORAL VEIN OF RIGHT LOWER EXTREMITY (H): ICD-10-CM

## 2023-10-20 DIAGNOSIS — Z79.01 LONG TERM CURRENT USE OF ANTICOAGULANT THERAPY: ICD-10-CM

## 2023-10-20 LAB — INR BLD: 2.8 (ref 0.9–1.1)

## 2023-10-20 PROCEDURE — 85610 PROTHROMBIN TIME: CPT

## 2023-10-20 PROCEDURE — 36416 COLLJ CAPILLARY BLOOD SPEC: CPT

## 2023-10-20 NOTE — PROGRESS NOTES
ANTICOAGULATION MANAGEMENT     Jordan Barnett 63 year old male is on warfarin with therapeutic INR result. (Goal INR 2.0-3.0)    Recent labs: (last 7 days)     10/20/23  1531   INR 2.8*       ASSESSMENT     Warfarin Lab Questionnaire    Warfarin Doses Last 7 Days      10/20/2023     2:43 PM   Dose in Tablet or Mg   TAB or MG? milligram (mg)     Pt Rptd Dose BAR MONDAY TUESDAY WED THURS FRIDAY   10/20/2023   2:43 PM 7.5 5 5 5 0 0         10/20/2023   Warfarin Lab Questionnaire   Missed doses within past 14 days? No   Changes in diet or alcohol within past 14 days? No   Medication changes since last result? No   Injuries or illness since last result? No   New shortness of breath, severe headaches or sudden changes in vision since last result? No   Abnormal bleeding since last result? No   Upcoming surgery, procedure? No   Best number to call with results? [phone]     Previous result: Supratherapeutic  Additional findings: None       PLAN     Recommended plan for temporary change(s) affecting INR     Dosing Instructions:  discussed new dosing  with next INR in 1 week       Summary  As of 10/20/2023      Full warfarin instructions:  2.5 mg every Thu; 5 mg all other days   Next INR check:  10/26/2023               Telephone call with Jordan who verbalizes understanding and agrees to plan    Lab visit scheduled    Education provided:   Please call back if any changes to your diet, medications or how you've been taking warfarin  Goal range and lab monitoring: goal range and significance of current result    Plan made per [facility] anticoagulation protocol    Macie Conner RN  Anticoagulation Clinic  10/20/2023    _______________________________________________________________________     Anticoagulation Episode Summary       Current INR goal:  2.0-3.0   TTR:  83.9% (1 y)   Target end date:  Indefinite   Send INR reminders to:  [facility]    Indications    Acute deep vein thrombosis (DVT) of other specified vein of  right lower extremity (H) [I82.491]  Long term current use of anticoagulant therapy [Z79.01]  Acute deep vein thrombosis (DVT) of femoral vein of right lower extremity (H) [I82.411]             Comments:               Anticoagulation Care Providers       Provider Role Specialty Phone number    Kaleb Barnett MD Referring Emergency Medicine [phone]    Juan Antonio Flanagan MD Referring Family Medicine [phone]

## 2023-10-26 NOTE — LETTER
REPORT OF WORK ABILITY     NOTE TO EMPLOYEE: You must promptly provide a copy of this report to your  employer or worker's compensation insurer, and Qualified Rehabilitation Consultant.     Date: 1/24/2018                     Employee Name: Jordan Barnett         YOB: 1960  Medical Record Number: [id number]   Soc.Sec.No: [id number]  Employer: None                Date of Injury: 11/27/2018  Managed Care Organization / Insurance Company Name: UNKNOWN     Diagnosis: Left Knee Patellar fracture  Work Related: yes     MMI: NO   Permanent Partial Disability(PPD) likely: UNKNOWN     EMPLOYEE IS ABLE TO WORK: Return to work with restrictions on 1/24/2018      RESTRICTIONS IF ANY:      Stand:  Continuously (6-8 hours)  Sit:  Continuously (6-8 hours)  Walk: Continuously (6-8 hours)  Kneel/Botines:  minimal  Ladder/Stair climb:  minimal     OTHER RESTRICTIONS: None     TREATMENT PLAN/NOTES: Follow-up in clinic in 1 month for re-xray.     Lucita Goetz MD           infected stone,   normal VS  worsening pain w/ nausea with oral abx  IV abx, urology  transfer to [facility]

## 2023-10-27 ENCOUNTER — ANTICOAGULATION THERAPY VISIT (OUTPATIENT)
Dept: ANTICOAGULATION | Facility: CLINIC | Age: 63
End: 2023-10-27

## 2023-10-27 ENCOUNTER — LAB (OUTPATIENT)
Dept: LAB | Facility: CLINIC | Age: 63
End: 2023-10-27
Payer: COMMERCIAL

## 2023-10-27 DIAGNOSIS — I82.411 ACUTE DEEP VEIN THROMBOSIS (DVT) OF FEMORAL VEIN OF RIGHT LOWER EXTREMITY (H): ICD-10-CM

## 2023-10-27 DIAGNOSIS — I82.491 ACUTE DEEP VEIN THROMBOSIS (DVT) OF OTHER SPECIFIED VEIN OF RIGHT LOWER EXTREMITY (H): Primary | ICD-10-CM

## 2023-10-27 DIAGNOSIS — Z79.01 LONG TERM CURRENT USE OF ANTICOAGULANT THERAPY: ICD-10-CM

## 2023-10-27 DIAGNOSIS — N18.30 CKD (CHRONIC KIDNEY DISEASE) STAGE 3, GFR 30-59 ML/MIN (H): Primary | ICD-10-CM

## 2023-10-27 LAB
HGB BLD-MCNC: 12.3 G/DL (ref 13.3–17.7)
INR BLD: 2.3 (ref 0.9–1.1)

## 2023-10-27 PROCEDURE — 85610 PROTHROMBIN TIME: CPT

## 2023-10-27 PROCEDURE — 85018 HEMOGLOBIN: CPT

## 2023-10-27 PROCEDURE — 36416 COLLJ CAPILLARY BLOOD SPEC: CPT

## 2023-10-27 NOTE — PROGRESS NOTES
ANTICOAGULATION MANAGEMENT     Jordan Barnett 63 year old male is on warfarin with therapeutic INR result. (Goal INR 2.0-3.0)    Recent labs: (last 7 days)     10/27/23  1604   INR 2.3*       ASSESSMENT     Warfarin Lab Questionnaire    Warfarin Doses Last 7 Days      10/26/2023    10:44 AM   Dose in Tablet or Mg   TAB or MG? milligram (mg)     Pt Rptd Dose BAR MONDAY TUESDAY WED THURS FRIDAY SATURDAY   10/26/2023  10:44 AM 5 5 5 5 5 5 5         10/26/2023   Warfarin Lab Questionnaire   Missed doses within past 14 days? No   Changes in diet or alcohol within past 14 days? No   Medication changes since last result? No   Injuries or illness since last result? No   New shortness of breath, severe headaches or sudden changes in vision since last result? No   Abnormal bleeding since last result? No   Upcoming surgery, procedure? No   Best number to call with results? [phone]     Previous result: Therapeutic last visit; previously outside of goal range  Additional findings:  more warfarin taken than instructed     PLAN     Recommended plan for temporary change(s) affecting INR     Dosing Instructions: Continue your current warfarin dose (adjusted maintenance dose to 5 mg daily as that is what patient has been taking) with next INR in 2 weeks       Summary  As of 10/27/2023      Full warfarin instructions:  5 mg every day   Next INR check:  11/9/2023               Telephone call with Jordan who verbalizes understanding and agrees to plan    Lab visit scheduled    Education provided:   Please call back if any changes to your diet, medications or how you've been taking warfarin    Plan made with [facility] Pharmacist Lizzette Gurrola RN  Anticoagulation Clinic  10/27/2023    _______________________________________________________________________     Anticoagulation Episode Summary       Current INR goal:  2.0-3.0   TTR:  83.9% (1 y)   Target end date:  Indefinite   Send INR reminders to:  [facility]     Indications    Acute deep vein thrombosis (DVT) of other specified vein of right lower extremity (H) [I82.491]  Long term current use of anticoagulant therapy [Z79.01]  Acute deep vein thrombosis (DVT) of femoral vein of right lower extremity (H) [I82.411]             Comments:               Anticoagulation Care Providers       Provider Role Specialty Phone number    Kaleb Barnett MD Referring Emergency Medicine [phone]    Juan Antonio Flanagan MD Referring Family Medicine [phone]

## 2023-11-09 ENCOUNTER — ANTICOAGULATION THERAPY VISIT (OUTPATIENT)
Dept: ANTICOAGULATION | Facility: CLINIC | Age: 63
End: 2023-11-09

## 2023-11-09 ENCOUNTER — LAB (OUTPATIENT)
Dept: LAB | Facility: CLINIC | Age: 63
End: 2023-11-09
Payer: COMMERCIAL

## 2023-11-09 DIAGNOSIS — Z79.01 LONG TERM CURRENT USE OF ANTICOAGULANT THERAPY: ICD-10-CM

## 2023-11-09 DIAGNOSIS — I82.491 ACUTE DEEP VEIN THROMBOSIS (DVT) OF OTHER SPECIFIED VEIN OF RIGHT LOWER EXTREMITY (H): Primary | ICD-10-CM

## 2023-11-09 DIAGNOSIS — I82.411 ACUTE DEEP VEIN THROMBOSIS (DVT) OF FEMORAL VEIN OF RIGHT LOWER EXTREMITY (H): ICD-10-CM

## 2023-11-09 LAB — INR BLD: 2.8 (ref 0.9–1.1)

## 2023-11-09 PROCEDURE — 36416 COLLJ CAPILLARY BLOOD SPEC: CPT

## 2023-11-09 PROCEDURE — 85610 PROTHROMBIN TIME: CPT

## 2023-11-09 NOTE — PROGRESS NOTES
ANTICOAGULATION MANAGEMENT     Jordan Barnett 63 year old male is on warfarin with therapeutic INR result. (Goal INR 2.0-3.0)    Recent labs: (last 7 days)     11/09/23  0710   INR 2.8*       ASSESSMENT     Source(s): Chart Review and Patient/Caregiver Call     Warfarin doses taken: Warfarin taken as instructed  Diet: No new diet changes identified  Medication/supplement changes: None noted  New illness, injury, or hospitalization: No  Signs or symptoms of bleeding or clotting: No  Previous result: Therapeutic last 2(+) visits  Additional findings: None       PLAN     Recommended plan for no diet, medication or health factor changes affecting INR     Dosing Instructions: Continue your current warfarin dose with next INR in 3 weeks       Summary  As of 11/9/2023      Full warfarin instructions:  5 mg every day   Next INR check:  11/30/2023               Telephone call with Jordan who verbalizes understanding and agrees to plan    Lab visit scheduled    Education provided:   Contact [phone]  with any changes, questions or concerns.     Plan made per [facility] anticoagulation protocol    Vani Dumont RN  Anticoagulation Clinic  11/9/2023    _______________________________________________________________________     Anticoagulation Episode Summary       Current INR goal:  2.0-3.0   TTR:  83.9% (1 y)   Target end date:  Indefinite   Send INR reminders to:  [facility]    Indications    Acute deep vein thrombosis (DVT) of other specified vein of right lower extremity (H) [I82.491]  Long term current use of anticoagulant therapy [Z79.01]  Acute deep vein thrombosis (DVT) of femoral vein of right lower extremity (H) [I82.411]             Comments:               Anticoagulation Care Providers       Provider Role Specialty Phone number    Kaleb Barnett MD Referring Emergency Medicine [phone]    Juan Antonio Flnaagan MD Referring Family Medicine [phone]

## 2023-11-30 ENCOUNTER — ANTICOAGULATION THERAPY VISIT (OUTPATIENT)
Dept: ANTICOAGULATION | Facility: CLINIC | Age: 63
End: 2023-11-30

## 2023-11-30 ENCOUNTER — LAB (OUTPATIENT)
Dept: LAB | Facility: CLINIC | Age: 63
End: 2023-11-30
Payer: COMMERCIAL

## 2023-11-30 DIAGNOSIS — I82.411 ACUTE DEEP VEIN THROMBOSIS (DVT) OF FEMORAL VEIN OF RIGHT LOWER EXTREMITY (H): ICD-10-CM

## 2023-11-30 DIAGNOSIS — I82.491 ACUTE DEEP VEIN THROMBOSIS (DVT) OF OTHER SPECIFIED VEIN OF RIGHT LOWER EXTREMITY (H): Primary | ICD-10-CM

## 2023-11-30 DIAGNOSIS — Z79.01 LONG TERM CURRENT USE OF ANTICOAGULANT THERAPY: ICD-10-CM

## 2023-11-30 LAB — INR BLD: 2.8 (ref 0.9–1.1)

## 2023-11-30 PROCEDURE — 85610 PROTHROMBIN TIME: CPT

## 2023-11-30 PROCEDURE — 36416 COLLJ CAPILLARY BLOOD SPEC: CPT

## 2023-11-30 NOTE — PROGRESS NOTES
ANTICOAGULATION MANAGEMENT     Jordna Barnett 63 year old male is on warfarin with therapeutic INR result. (Goal INR 2.0-3.0)    Recent labs: (last 7 days)     11/30/23  0704   INR 2.8*       ASSESSMENT     Source(s): Chart Review and Patient/Caregiver Call     Warfarin doses taken: Warfarin taken as instructed  Diet: No new diet changes identified  Medication/supplement changes: None noted  New illness, injury, or hospitalization: No  Signs or symptoms of bleeding or clotting: No  Previous result: Therapeutic last 2(+) visits  Additional findings: None     PLAN     Recommended plan for no diet, medication or health factor changes affecting INR     Dosing Instructions: Continue your current warfarin dose with next INR in 4 weeks       Summary  As of 11/30/2023      Full warfarin instructions:  5 mg every day   Next INR check:  12/28/2023               Telephone call with Jordan who verbalizes understanding and agrees to plan    Lab visit scheduled    Education provided:   Please call back if any changes to your diet, medications or how you've been taking warfarin    Plan made per [facility] anticoagulation protocol    Joann Gurrola RN  Anticoagulation Clinic  11/30/2023    _______________________________________________________________________     Anticoagulation Episode Summary       Current INR goal:  2.0-3.0   TTR:  83.9% (1 y)   Target end date:  Indefinite   Send INR reminders to:  [facility]    Indications    Acute deep vein thrombosis (DVT) of other specified vein of right lower extremity (H) [I82.491]  Long term current use of anticoagulant therapy [Z79.01]  Acute deep vein thrombosis (DVT) of femoral vein of right lower extremity (H) [I82.411]             Comments:               Anticoagulation Care Providers       Provider Role Specialty Phone number    Kaleb Barnett MD Referring Emergency Medicine [phone]    Juan Antonio Flanagan MD Referring Family Medicine [phone]

## 2023-12-04 DIAGNOSIS — I82.491 ACUTE DEEP VEIN THROMBOSIS (DVT) OF OTHER SPECIFIED VEIN OF RIGHT LOWER EXTREMITY (H): ICD-10-CM

## 2023-12-04 DIAGNOSIS — I82.401 ACUTE DEEP VEIN THROMBOSIS (DVT) OF RIGHT LOWER EXTREMITY, UNSPECIFIED VEIN (H): ICD-10-CM

## 2023-12-04 DIAGNOSIS — Z79.01 LONG TERM CURRENT USE OF ANTICOAGULANT THERAPY: Primary | ICD-10-CM

## 2023-12-04 RX ORDER — WARFARIN SODIUM 5 MG/1
TABLET ORAL
Qty: 100 TABLET | Refills: 1 | Status: SHIPPED | OUTPATIENT
Start: 2023-12-04 | End: 2024-05-09

## 2023-12-04 NOTE — TELEPHONE ENCOUNTER
Pending Prescriptions:                       Disp   Refills    warfarin ANTICOAGULANT (COUMADIN) 5 MG ta*72 tab*1            Sig: TAKE 1/2 (ONE-HALF) TABLET BY MOUTH EVERY SUNDAY           AND THURSDAY. 1 TABLET BY MOUTH ALL OTHER DAYS OR           AS DIRECTED BY INR CLINIC

## 2023-12-04 NOTE — TELEPHONE ENCOUNTER
ANTICOAGULATION MANAGEMENT:  Medication Refill    Anticoagulation Summary  As of 11/30/2023      Warfarin maintenance plan:  5 mg (5 mg x 1) every day   Next INR check:  12/28/2023   Target end date:  Indefinite    Indications    Acute deep vein thrombosis (DVT) of other specified vein of right lower extremity (H) [I82.491]  Long term current use of anticoagulant therapy [Z79.01]  Acute deep vein thrombosis (DVT) of femoral vein of right lower extremity (H) [I82.411]                 Anticoagulation Care Providers       Provider Role Specialty Phone number    Kaleb Barnett MD Referring Emergency Medicine [phone]    Juan Antonio Flanagan MD Referring Family Medicine [phone]            Refill Criteria    Visit with referring provider/group: Meets criteria: office visit within referring provider group in the last 1 year on 1/12/23    [facility] referral signed last signed: 08/10/2023; within last year: Yes    Lab monitoring not exceeding 2 weeks overdue: Yes    Jordan meets all criteria for refill. Rx instructions and quantity supplied updated to match patient's current dosing plan. Warfarin 90 day supply with 1 refill granted per [facility] protocol     Macie Conner RN  Anticoagulation Clinic

## 2023-12-04 NOTE — TELEPHONE ENCOUNTER
"Routing refill request to provider for review/approval because:  INR      Requested Prescriptions   Pending Prescriptions Disp Refills    warfarin ANTICOAGULANT (COUMADIN) 5 MG tablet 72 tablet 1     Sig: TAKE 1/2 (ONE-HALF) TABLET BY MOUTH EVERY SUNDAY AND THURSDAY. 1 TABLET BY MOUTH ALL OTHER DAYS OR AS DIRECTED BY INR CLINIC       Vitamin K Antagonists Failed - 12/4/2023  1:50 PM        Failed - INR is within goal in the past 6 weeks     Confirm INR is within goal in the past 6 weeks.     Recent Labs   Lab Test 11/30/23  0704   INR 2.8*                       Failed - Placeholder        Passed - Recent (12 mo) or future (30 days) visit within the authorizing provider's specialty     Patient has had an office visit with the authorizing provider or a provider within the authorizing providers department within the previous 12 mos or has a future within next 30 days. See \"Patient Info\" tab in inbasket, or \"Choose Columns\" in Meds & Orders section of the refill encounter.              Passed - Medication is active on med list        Passed - Patient is 18 years of age or older                 Lupillo Roman RN 12/04/23 2:57 PM   "

## 2023-12-26 ENCOUNTER — PATIENT OUTREACH (OUTPATIENT)
Dept: GASTROENTEROLOGY | Facility: CLINIC | Age: 63
End: 2023-12-26
Payer: COMMERCIAL

## 2023-12-28 ENCOUNTER — LAB (OUTPATIENT)
Dept: LAB | Facility: CLINIC | Age: 63
End: 2023-12-28
Payer: COMMERCIAL

## 2023-12-28 ENCOUNTER — ANTICOAGULATION THERAPY VISIT (OUTPATIENT)
Dept: ANTICOAGULATION | Facility: CLINIC | Age: 63
End: 2023-12-28

## 2023-12-28 DIAGNOSIS — I82.491 ACUTE DEEP VEIN THROMBOSIS (DVT) OF OTHER SPECIFIED VEIN OF RIGHT LOWER EXTREMITY (H): Primary | ICD-10-CM

## 2023-12-28 DIAGNOSIS — Z79.01 LONG TERM CURRENT USE OF ANTICOAGULANT THERAPY: ICD-10-CM

## 2023-12-28 DIAGNOSIS — I82.411 ACUTE DEEP VEIN THROMBOSIS (DVT) OF FEMORAL VEIN OF RIGHT LOWER EXTREMITY (H): ICD-10-CM

## 2023-12-28 LAB — INR BLD: 3.7 (ref 0.9–1.1)

## 2023-12-28 PROCEDURE — 85610 PROTHROMBIN TIME: CPT

## 2023-12-28 PROCEDURE — 36416 COLLJ CAPILLARY BLOOD SPEC: CPT

## 2023-12-28 NOTE — PROGRESS NOTES
ANTICOAGULATION MANAGEMENT     Jordan Barnett 63 year old male is on warfarin with supratherapeutic INR result. (Goal INR 2.0-3.0)    Recent labs: (last 7 days)     12/28/23  0701   INR 3.7*       ASSESSMENT     Source(s): Chart Review and Patient/Caregiver Call     Warfarin doses taken: Warfarin taken as instructed  Diet: No new diet changes identified  Medication/supplement changes: None noted  New illness, injury, or hospitalization: No  Signs or symptoms of bleeding or clotting: No  Previous result: Therapeutic last 2(+) visits  Additional findings: None     PLAN     Recommended plan for no diet, medication or health factor changes affecting INR     Dosing Instructions: decrease your warfarin dose (7.1% change) with next INR in 2 weeks       Summary  As of 12/28/2023      Full warfarin instructions:  2.5 mg every Thu; 5 mg all other days   Next INR check:  1/11/2024               Telephone call with Jordan who verbalizes understanding and agrees to plan    Lab visit scheduled    Education provided:   Please call back if any changes to your diet, medications or how you've been taking warfarin  Symptom monitoring: monitoring for bleeding signs and symptoms    Plan made per [facility] anticoagulation protocol    Joann Gurrola RN  Anticoagulation Clinic  12/28/2023    _______________________________________________________________________     Anticoagulation Episode Summary       Current INR goal:  2.0-3.0   TTR:  78.0% (1 y)   Target end date:  Indefinite   Send INR reminders to:  [facility]    Indications    Acute deep vein thrombosis (DVT) of other specified vein of right lower extremity (H) [I82.491]  Long term current use of anticoagulant therapy [Z79.01]  Acute deep vein thrombosis (DVT) of femoral vein of right lower extremity (H) [I82.411]             Comments:               Anticoagulation Care Providers       Provider Role Specialty Phone number    Kaleb Barnett MD Referring Emergency Medicine  [phone]    Juan Antonio Flanagan MD [facility] Medicine [phone]

## 2024-01-11 ENCOUNTER — LAB (OUTPATIENT)
Dept: LAB | Facility: CLINIC | Age: 64
End: 2024-01-11
Payer: COMMERCIAL

## 2024-01-11 ENCOUNTER — ANTICOAGULATION THERAPY VISIT (OUTPATIENT)
Dept: ANTICOAGULATION | Facility: CLINIC | Age: 64
End: 2024-01-11

## 2024-01-11 DIAGNOSIS — I82.411 ACUTE DEEP VEIN THROMBOSIS (DVT) OF FEMORAL VEIN OF RIGHT LOWER EXTREMITY (H): ICD-10-CM

## 2024-01-11 DIAGNOSIS — I82.491 ACUTE DEEP VEIN THROMBOSIS (DVT) OF OTHER SPECIFIED VEIN OF RIGHT LOWER EXTREMITY (H): Primary | ICD-10-CM

## 2024-01-11 DIAGNOSIS — Z79.01 LONG TERM CURRENT USE OF ANTICOAGULANT THERAPY: ICD-10-CM

## 2024-01-11 LAB — INR BLD: 3.8 (ref 0.9–1.1)

## 2024-01-11 PROCEDURE — 85610 PROTHROMBIN TIME: CPT

## 2024-01-11 PROCEDURE — 36416 COLLJ CAPILLARY BLOOD SPEC: CPT

## 2024-01-11 NOTE — PROGRESS NOTES
ANTICOAGULATION MANAGEMENT     Jordan Barnett 63 year old male is on warfarin with supratherapeutic INR result. (Goal INR 2.0-3.0)    Recent labs: (last 7 days)     01/11/24  0655   INR 3.8*       ASSESSMENT     Source(s): Chart Review and Patient/Caregiver Call     Warfarin doses taken: Warfarin taken as instructed (did miss one dose 2 weeks ago)  Diet: No new diet changes identified  Medication/supplement changes: None noted  New illness, injury, or hospitalization: Yes: ongoing diarrhea  Signs or symptoms of bleeding or clotting: No  Previous result: Supratherapeutic  Additional findings: None     PLAN     Recommended plan for ongoing change(s) affecting INR     Dosing Instructions: decrease your warfarin dose (7.7% change) with next INR in 2 weeks       Summary  As of 1/11/2024      Full warfarin instructions:  2.5 mg every Mon, Thu; 5 mg all other days   Next INR check:  1/25/2024               Telephone call with Jordan who verbalizes understanding and agrees to plan    Lab visit scheduled    Education provided:   Please call back if any changes to your diet, medications or how you've been taking warfarin  Symptom monitoring: monitoring for bleeding signs and symptoms    Plan made per [facility] anticoagulation protocol    Joann Gurrola RN  Anticoagulation Clinic  1/11/2024    _______________________________________________________________________     Anticoagulation Episode Summary       Current INR goal:  2.0-3.0   TTR:  74.1% (1 y)   Target end date:  Indefinite   Send INR reminders to:  [facility]    Indications    Acute deep vein thrombosis (DVT) of other specified vein of right lower extremity (H) [I82.491]  Long term current use of anticoagulant therapy [Z79.01]  Acute deep vein thrombosis (DVT) of femoral vein of right lower extremity (H) [I82.411]             Comments:               Anticoagulation Care Providers       Provider Role Specialty Phone number    Kaleb Barnett MD Referring Emergency  Medicine [phone]    Juan Antonio Flanagan MD Referring Family Medicine [phone]

## 2024-01-25 ENCOUNTER — ANTICOAGULATION THERAPY VISIT (OUTPATIENT)
Dept: ANTICOAGULATION | Facility: CLINIC | Age: 64
End: 2024-01-25

## 2024-01-25 ENCOUNTER — LAB (OUTPATIENT)
Dept: LAB | Facility: CLINIC | Age: 64
End: 2024-01-25
Payer: COMMERCIAL

## 2024-01-25 DIAGNOSIS — I82.411 ACUTE DEEP VEIN THROMBOSIS (DVT) OF FEMORAL VEIN OF RIGHT LOWER EXTREMITY (H): ICD-10-CM

## 2024-01-25 DIAGNOSIS — I82.491 ACUTE DEEP VEIN THROMBOSIS (DVT) OF OTHER SPECIFIED VEIN OF RIGHT LOWER EXTREMITY (H): Primary | ICD-10-CM

## 2024-01-25 DIAGNOSIS — Z79.01 LONG TERM CURRENT USE OF ANTICOAGULANT THERAPY: ICD-10-CM

## 2024-01-25 LAB — INR BLD: 2.2 (ref 0.9–1.1)

## 2024-01-25 PROCEDURE — 36416 COLLJ CAPILLARY BLOOD SPEC: CPT

## 2024-01-25 PROCEDURE — 85610 PROTHROMBIN TIME: CPT

## 2024-01-25 NOTE — PROGRESS NOTES
ANTICOAGULATION MANAGEMENT     Jordan Barnett 63 year old male is on warfarin with therapeutic INR result. (Goal INR 2.0-3.0)    Recent labs: (last 7 days)     01/25/24  0707   INR 2.2*       ASSESSMENT     Source(s): Chart Review and Patient/Caregiver Call     Warfarin doses taken: Warfarin taken as instructed  Diet: No new diet changes identified  Medication/supplement changes: increase in biotin supplement - has been taking it for about a year   New illness, injury, or hospitalization: No  Signs or symptoms of bleeding or clotting: Yes: patient states he had a bloody nose last night. He reports that it was not concerning and it lasted less than 5 minutes.  Previous result: Supratherapeutic  Additional findings: None     PLAN     Recommended plan for ongoing change(s) affecting INR     Dosing Instructions: Continue your current warfarin dose with next INR in 3 weeks       Summary  As of 1/25/2024      Full warfarin instructions:  2.5 mg every Mon, Thu; 5 mg all other days   Next INR check:  2/15/2024               Telephone call with Jordan who verbalizes understanding and agrees to plan    Lab visit scheduled    Education provided:   Please call back if any changes to your diet, medications or how you've been taking warfarin    Plan made per [facility] anticoagulation protocol    Joann Gurrola RN  Anticoagulation Clinic  1/25/2024    _______________________________________________________________________     Anticoagulation Episode Summary       Current INR goal:  2.0-3.0   TTR:  72.2% (1 y)   Target end date:  Indefinite   Send INR reminders to:  [facility]    Indications    Acute deep vein thrombosis (DVT) of other specified vein of right lower extremity (H) [I82.491]  Long term current use of anticoagulant therapy [Z79.01]  Acute deep vein thrombosis (DVT) of femoral vein of right lower extremity (H) [I82.411]             Comments:               Anticoagulation Care Providers       Provider Role Specialty  Phone number    Kaleb Barnett MD Referring Emergency Medicine [phone]    Juan Antonio Flanagan MD Referring Family Medicine [phone]

## 2024-02-10 DIAGNOSIS — E78.2 MIXED HYPERLIPIDEMIA: ICD-10-CM

## 2024-02-10 DIAGNOSIS — I10 ESSENTIAL HYPERTENSION WITH GOAL BLOOD PRESSURE LESS THAN 140/90: ICD-10-CM

## 2024-02-10 DIAGNOSIS — N18.32 STAGE 3B CHRONIC KIDNEY DISEASE (CKD) (H): ICD-10-CM

## 2024-02-10 DIAGNOSIS — Z51.81 ENCOUNTER FOR THERAPEUTIC DRUG MONITORING: Primary | ICD-10-CM

## 2024-02-10 DIAGNOSIS — R80.9 ALBUMINURIA: ICD-10-CM

## 2024-02-10 DIAGNOSIS — D64.9 ANEMIA, UNSPECIFIED TYPE: ICD-10-CM

## 2024-02-10 DIAGNOSIS — N18.31 STAGE 3A CHRONIC KIDNEY DISEASE (H): ICD-10-CM

## 2024-02-10 DIAGNOSIS — Z79.01 LONG TERM CURRENT USE OF ANTICOAGULANT THERAPY: ICD-10-CM

## 2024-02-13 RX ORDER — LOSARTAN POTASSIUM 100 MG/1
100 TABLET ORAL DAILY
Qty: 90 TABLET | Refills: 0 | Status: SHIPPED | OUTPATIENT
Start: 2024-02-13 | End: 2024-05-09

## 2024-02-15 ENCOUNTER — LAB (OUTPATIENT)
Dept: LAB | Facility: CLINIC | Age: 64
End: 2024-02-15
Payer: COMMERCIAL

## 2024-02-15 ENCOUNTER — ANTICOAGULATION THERAPY VISIT (OUTPATIENT)
Dept: ANTICOAGULATION | Facility: CLINIC | Age: 64
End: 2024-02-15

## 2024-02-15 DIAGNOSIS — I82.411 ACUTE DEEP VEIN THROMBOSIS (DVT) OF FEMORAL VEIN OF RIGHT LOWER EXTREMITY (H): ICD-10-CM

## 2024-02-15 DIAGNOSIS — E78.2 MIXED HYPERLIPIDEMIA: ICD-10-CM

## 2024-02-15 DIAGNOSIS — I10 ESSENTIAL HYPERTENSION WITH GOAL BLOOD PRESSURE LESS THAN 140/90: ICD-10-CM

## 2024-02-15 DIAGNOSIS — Z79.01 LONG TERM CURRENT USE OF ANTICOAGULANT THERAPY: ICD-10-CM

## 2024-02-15 DIAGNOSIS — I82.491 ACUTE DEEP VEIN THROMBOSIS (DVT) OF OTHER SPECIFIED VEIN OF RIGHT LOWER EXTREMITY (H): Primary | ICD-10-CM

## 2024-02-15 DIAGNOSIS — Z51.81 ENCOUNTER FOR THERAPEUTIC DRUG MONITORING: ICD-10-CM

## 2024-02-15 DIAGNOSIS — D64.9 ANEMIA, UNSPECIFIED TYPE: ICD-10-CM

## 2024-02-15 DIAGNOSIS — N18.31 STAGE 3A CHRONIC KIDNEY DISEASE (H): ICD-10-CM

## 2024-02-15 LAB
ALBUMIN SERPL BCG-MCNC: 4 G/DL (ref 3.5–5.2)
ALP SERPL-CCNC: 90 U/L (ref 40–150)
ALT SERPL W P-5'-P-CCNC: 34 U/L (ref 0–70)
ANION GAP SERPL CALCULATED.3IONS-SCNC: 12 MMOL/L (ref 7–15)
AST SERPL W P-5'-P-CCNC: 22 U/L (ref 0–45)
BILIRUB DIRECT SERPL-MCNC: <0.2 MG/DL (ref 0–0.3)
BILIRUB SERPL-MCNC: <0.2 MG/DL
BUN SERPL-MCNC: 54.9 MG/DL (ref 8–23)
CALCIUM SERPL-MCNC: 9.3 MG/DL (ref 8.8–10.2)
CHLORIDE SERPL-SCNC: 104 MMOL/L (ref 98–107)
CHOLEST SERPL-MCNC: 146 MG/DL
CREAT SERPL-MCNC: 1.68 MG/DL (ref 0.67–1.17)
DEPRECATED HCO3 PLAS-SCNC: 23 MMOL/L (ref 22–29)
EGFRCR SERPLBLD CKD-EPI 2021: 46 ML/MIN/1.73M2
ERYTHROCYTE [DISTWIDTH] IN BLOOD BY AUTOMATED COUNT: 12.1 % (ref 10–15)
FASTING STATUS PATIENT QL REPORTED: YES
GLUCOSE SERPL-MCNC: 112 MG/DL (ref 70–99)
HCT VFR BLD AUTO: 35.9 % (ref 40–53)
HDLC SERPL-MCNC: 62 MG/DL
HGB BLD-MCNC: 11.8 G/DL (ref 13.3–17.7)
INR BLD: 2 (ref 0.9–1.1)
LDLC SERPL CALC-MCNC: 58 MG/DL
MCH RBC QN AUTO: 31.4 PG (ref 26.5–33)
MCHC RBC AUTO-ENTMCNC: 32.9 G/DL (ref 31.5–36.5)
MCV RBC AUTO: 96 FL (ref 78–100)
NONHDLC SERPL-MCNC: 84 MG/DL
PLATELET # BLD AUTO: 222 10E3/UL (ref 150–450)
POTASSIUM SERPL-SCNC: 4.7 MMOL/L (ref 3.4–5.3)
PROT SERPL-MCNC: 7.2 G/DL (ref 6.4–8.3)
RBC # BLD AUTO: 3.76 10E6/UL (ref 4.4–5.9)
SODIUM SERPL-SCNC: 139 MMOL/L (ref 135–145)
TRIGL SERPL-MCNC: 130 MG/DL
WBC # BLD AUTO: 6.1 10E3/UL (ref 4–11)

## 2024-02-15 PROCEDURE — 80053 COMPREHEN METABOLIC PANEL: CPT

## 2024-02-15 PROCEDURE — 36415 COLL VENOUS BLD VENIPUNCTURE: CPT

## 2024-02-15 PROCEDURE — 80061 LIPID PANEL: CPT

## 2024-02-15 PROCEDURE — 85027 COMPLETE CBC AUTOMATED: CPT

## 2024-02-15 PROCEDURE — 85610 PROTHROMBIN TIME: CPT

## 2024-02-15 PROCEDURE — 82248 BILIRUBIN DIRECT: CPT

## 2024-02-15 NOTE — PROGRESS NOTES
ANTICOAGULATION MANAGEMENT     Jordan Barnett 63 year old male is on warfarin with therapeutic INR result. (Goal INR 2.0-3.0)    Recent labs: (last 7 days)     02/15/24  0656   INR 2.0*       ASSESSMENT     Warfarin Lab Questionnaire    Warfarin Doses Last 7 Days      2/15/2024     6:50 AM   Dose in Tablet or Mg   TAB or MG? milligram (mg)     Pt Rptd Dose BAR MONDAY TUESDAY WED THURS FRIDAY SATURDAY   2/15/2024   6:50 AM 5 2.5 5 5 2.5 5 5         2/15/2024   Warfarin Lab Questionnaire   Missed doses within past 14 days? No   Changes in diet or alcohol within past 14 days? No   Medication changes since last result? No   Injuries or illness since last result? No   New shortness of breath, severe headaches or sudden changes in vision since last result? No   Abnormal bleeding since last result? No   Upcoming surgery, procedure? No   Best number to call with results? [phone]     Previous result: Therapeutic last visit; previously outside of goal range  Additional findings: None       PLAN     Recommended plan for no diet, medication or health factor changes affecting INR     Dosing Instructions: Continue your current warfarin dose with next INR in 4 weeks       Summary  As of 2/15/2024      Full warfarin instructions:  2.5 mg every Mon, Thu; 5 mg all other days   Next INR check:  3/14/2024               Telephone call with Jordan who verbalizes understanding and agrees to plan    Lab visit scheduled    Education provided:   Please call back if any changes to your diet, medications or how you've been taking warfarin    Plan made per [facility] anticoagulation protocol    Erica Mello RN  Anticoagulation Clinic  2/15/2024    _______________________________________________________________________     Anticoagulation Episode Summary       Current INR goal:  2.0-3.0   TTR:  72.2% (1 y)   Target end date:  Indefinite   Send INR reminders to:  [facility]    Indications    Acute deep vein thrombosis (DVT) of other  specified vein of right lower extremity (H) [I82.491]  Long term current use of anticoagulant therapy [Z79.01]  Acute deep vein thrombosis (DVT) of femoral vein of right lower extremity (H) [I82.411]             Comments:               Anticoagulation Care Providers       Provider Role Specialty Phone number    Kaleb Barnett MD Referring Emergency Medicine [phone]    Juan Antonio Flanagan MD Referring Family Medicine [phone]             ambulatory

## 2024-02-16 ENCOUNTER — TELEPHONE (OUTPATIENT)
Dept: NEPHROLOGY | Facility: CLINIC | Age: 64
End: 2024-02-16
Payer: COMMERCIAL

## 2024-03-14 ENCOUNTER — LAB (OUTPATIENT)
Dept: LAB | Facility: CLINIC | Age: 64
End: 2024-03-14
Payer: COMMERCIAL

## 2024-03-14 ENCOUNTER — ANTICOAGULATION THERAPY VISIT (OUTPATIENT)
Dept: ANTICOAGULATION | Facility: CLINIC | Age: 64
End: 2024-03-14

## 2024-03-14 DIAGNOSIS — I82.411 ACUTE DEEP VEIN THROMBOSIS (DVT) OF FEMORAL VEIN OF RIGHT LOWER EXTREMITY (H): ICD-10-CM

## 2024-03-14 DIAGNOSIS — I82.491 ACUTE DEEP VEIN THROMBOSIS (DVT) OF OTHER SPECIFIED VEIN OF RIGHT LOWER EXTREMITY (H): Primary | ICD-10-CM

## 2024-03-14 DIAGNOSIS — Z79.01 LONG TERM CURRENT USE OF ANTICOAGULANT THERAPY: ICD-10-CM

## 2024-03-14 LAB — INR BLD: 1.6 (ref 0.9–1.1)

## 2024-03-14 PROCEDURE — 85610 PROTHROMBIN TIME: CPT

## 2024-03-14 PROCEDURE — 36416 COLLJ CAPILLARY BLOOD SPEC: CPT

## 2024-03-14 NOTE — PROGRESS NOTES
ANTICOAGULATION MANAGEMENT     Jordan Barnett 63 year old male is on warfarin with subtherapeutic INR result. (Goal INR 2.0-3.0)    Recent labs: (last 7 days)     03/14/24  0741   INR 1.6*       ASSESSMENT     Warfarin Lab Questionnaire    Warfarin Doses Last 7 Days      3/13/2024     6:33 PM   Dose in Tablet or Mg   TAB or MG? milligram (mg)     Pt Rptd Dose BAR MONDAY TUESDAY WED THURS FRIDAY SATURDAY   3/13/2024   6:33 PM 5 2.5 5 5 2.5 5 5         3/13/2024   Warfarin Lab Questionnaire   Missed doses within past 14 days? No   Changes in diet or alcohol within past 14 days? No   Medication changes since last result? Yes   Please list: Taking vitamin k with my vitamin d   Injuries or illness since last result? Yes   If yes, please explain: Bad cold   New shortness of breath, severe headaches or sudden changes in vision since last result? No   Abnormal bleeding since last result? No   Upcoming surgery, procedure? No   Best number to call with results? [phone]     Previous result: Therapeutic last 2(+) visits  Additional findings:  Verified with patient whether he is taking Vitamin K along with Vitamin D or if it is Vitamin C, patient confirms he is taking Vit K.        PLAN     Recommended plan for ongoing change(s) affecting INR     Dosing Instructions: Increase your warfarin dose (16.7% change) with next INR in 2 weeks       Summary  As of 3/14/2024      Full warfarin instructions:  3/14: 5 mg; Otherwise 2.5 mg every Mon; 5 mg all other days   Next INR check:  3/28/2024               Telephone call with Jordan who verbalizes understanding and agrees to plan and who agrees to plan and repeated back plan correctly    Lab visit scheduled    Education provided:   Goal range and lab monitoring: goal range and significance of current result  Symptom monitoring: monitoring for clotting signs and symptoms and when to seek medical attention/emergency care  Contact [phone]  with any changes, questions or  concerns.     Plan made with [facility] Pharmacist Lizzette Barnett- dose adjustment is slightly above protocol.     Vani Dumont, RN  Anticoagulation Clinic  3/14/2024    _______________________________________________________________________     Anticoagulation Episode Summary       Current INR goal:  2.0-3.0   TTR:  64.5% (1 y)   Target end date:  Indefinite   Send INR reminders to:  [facility]    Indications    Acute deep vein thrombosis (DVT) of other specified vein of right lower extremity (H) [I82.491]  Long term current use of anticoagulant therapy [Z79.01]  Acute deep vein thrombosis (DVT) of femoral vein of right lower extremity (H) [I82.411]             Comments:               Anticoagulation Care Providers       Provider Role Specialty Phone number    Kaleb Barnett MD Referring Emergency Medicine [phone]    Juan Antonio Flanagan MD Referring Family Medicine [phone]

## 2024-03-28 ENCOUNTER — ANTICOAGULATION THERAPY VISIT (OUTPATIENT)
Dept: ANTICOAGULATION | Facility: CLINIC | Age: 64
End: 2024-03-28

## 2024-03-28 ENCOUNTER — LAB (OUTPATIENT)
Dept: LAB | Facility: CLINIC | Age: 64
End: 2024-03-28
Payer: COMMERCIAL

## 2024-03-28 DIAGNOSIS — I82.491 ACUTE DEEP VEIN THROMBOSIS (DVT) OF OTHER SPECIFIED VEIN OF RIGHT LOWER EXTREMITY (H): Primary | ICD-10-CM

## 2024-03-28 DIAGNOSIS — I82.411 ACUTE DEEP VEIN THROMBOSIS (DVT) OF FEMORAL VEIN OF RIGHT LOWER EXTREMITY (H): ICD-10-CM

## 2024-03-28 DIAGNOSIS — Z79.01 LONG TERM CURRENT USE OF ANTICOAGULANT THERAPY: ICD-10-CM

## 2024-03-28 LAB — INR BLD: 2.7 (ref 0.9–1.1)

## 2024-03-28 PROCEDURE — 36416 COLLJ CAPILLARY BLOOD SPEC: CPT

## 2024-03-28 PROCEDURE — 85610 PROTHROMBIN TIME: CPT

## 2024-03-28 NOTE — PROGRESS NOTES
ANTICOAGULATION MANAGEMENT     Jordan Barnett 63 year old male is on warfarin with therapeutic INR result. (Goal INR 2.0-3.0)    Recent labs: (last 7 days)     03/28/24  0650   INR 2.7*       ASSESSMENT     Source(s): Chart Review and Patient/Caregiver Call     Warfarin doses taken: Warfarin taken as instructed  Diet: No new diet changes identified  Medication/supplement changes: None noted  New illness, injury, or hospitalization: No  Signs or symptoms of bleeding or clotting: No  Previous result: Subtherapeutic  Additional findings: None       PLAN     Recommended plan for no diet, medication or health factor changes affecting INR     Dosing Instructions: Continue your current warfarin dose with next INR in 3 weeks       Summary  As of 3/28/2024      Full warfarin instructions:  5 mg every day   Next INR check:  4/18/2024               Telephone call with Jordan who verbalizes understanding and agrees to plan    Lab visit scheduled    Education provided:   Please call back if any changes to your diet, medications or how you've been taking warfarin    Plan made per [facility] anticoagulation protocol    Erica Mello RN  Anticoagulation Clinic  3/28/2024    _______________________________________________________________________     Anticoagulation Episode Summary       Current INR goal:  2.0-3.0   TTR:  63.2% (1 y)   Target end date:  Indefinite   Send INR reminders to:  [facility]    Indications    Acute deep vein thrombosis (DVT) of other specified vein of right lower extremity (H) [I82.491]  Long term current use of anticoagulant therapy [Z79.01]  Acute deep vein thrombosis (DVT) of femoral vein of right lower extremity (H) [I82.411]             Comments:               Anticoagulation Care Providers       Provider Role Specialty Phone number    Kaleb Barnett MD Referring Emergency Medicine [phone]    Juan Antonio Flanagan MD Referring Family Medicine [phone]

## 2024-03-31 ENCOUNTER — HEALTH MAINTENANCE LETTER (OUTPATIENT)
Age: 64
End: 2024-03-31

## 2024-04-18 ENCOUNTER — ANTICOAGULATION THERAPY VISIT (OUTPATIENT)
Dept: ANTICOAGULATION | Facility: CLINIC | Age: 64
End: 2024-04-18

## 2024-04-18 ENCOUNTER — LAB (OUTPATIENT)
Dept: LAB | Facility: CLINIC | Age: 64
End: 2024-04-18
Payer: COMMERCIAL

## 2024-04-18 DIAGNOSIS — I82.411 ACUTE DEEP VEIN THROMBOSIS (DVT) OF FEMORAL VEIN OF RIGHT LOWER EXTREMITY (H): ICD-10-CM

## 2024-04-18 DIAGNOSIS — Z79.01 LONG TERM CURRENT USE OF ANTICOAGULANT THERAPY: ICD-10-CM

## 2024-04-18 DIAGNOSIS — I82.491 ACUTE DEEP VEIN THROMBOSIS (DVT) OF OTHER SPECIFIED VEIN OF RIGHT LOWER EXTREMITY (H): Primary | ICD-10-CM

## 2024-04-18 LAB — INR BLD: 3.2 (ref 0.9–1.1)

## 2024-04-18 PROCEDURE — 85610 PROTHROMBIN TIME: CPT

## 2024-04-18 PROCEDURE — 36416 COLLJ CAPILLARY BLOOD SPEC: CPT

## 2024-04-18 NOTE — PROGRESS NOTES
ANTICOAGULATION MANAGEMENT     Jordan Barnett 63 year old male is on warfarin with supratherapeutic INR result. (Goal INR 2.0-3.0)    Recent labs: (last 7 days)     04/18/24  0704   INR 3.2*       ASSESSMENT     Source(s): Chart Review and Patient/Caregiver Call     Warfarin doses taken: Warfarin taken as instructed  Diet: No new diet changes identified  Medication/supplement changes:  patient discontinued vitamin D with vitamin K  New illness, injury, or hospitalization: No  Signs or symptoms of bleeding or clotting: No  Previous result: Therapeutic last visit; previously outside of goal range  Additional findings: None     PLAN     Recommended plan for ongoing change(s) affecting INR     Dosing Instructions: decrease your warfarin dose (7.1% change) with next INR in 2 weeks       Summary  As of 4/18/2024      Full warfarin instructions:  2.5 mg every Thu; 5 mg all other days   Next INR check:  5/2/2024               Telephone call with Jordan who verbalizes understanding and agrees to plan    Lab visit scheduled    Education provided:   Please call back if any changes to your diet, medications or how you've been taking warfarin  Symptom monitoring: monitoring for bleeding signs and symptoms    Plan made per [facility] anticoagulation protocol    Joann Gurrola RN  Anticoagulation Clinic  4/18/2024    _______________________________________________________________________     Anticoagulation Episode Summary       Current INR goal:  2.0-3.0   TTR:  60.9% (1 y)   Target end date:  Indefinite   Send INR reminders to:  [facility]    Indications    Acute deep vein thrombosis (DVT) of other specified vein of right lower extremity (H) [I82.491]  Long term current use of anticoagulant therapy [Z79.01]  Acute deep vein thrombosis (DVT) of femoral vein of right lower extremity (H) [I82.411]             Comments:               Anticoagulation Care Providers       Provider Role Specialty Phone number    Kaleb Barnett MD  Referring Emergency Medicine [phone]    Juan Antonio Flanagan MD Referring Family Medicine [phone]

## 2024-05-02 ENCOUNTER — LAB (OUTPATIENT)
Dept: LAB | Facility: CLINIC | Age: 64
End: 2024-05-02
Payer: COMMERCIAL

## 2024-05-02 ENCOUNTER — ANTICOAGULATION THERAPY VISIT (OUTPATIENT)
Dept: ANTICOAGULATION | Facility: CLINIC | Age: 64
End: 2024-05-02

## 2024-05-02 DIAGNOSIS — I82.411 ACUTE DEEP VEIN THROMBOSIS (DVT) OF FEMORAL VEIN OF RIGHT LOWER EXTREMITY (H): ICD-10-CM

## 2024-05-02 DIAGNOSIS — I82.491 ACUTE DEEP VEIN THROMBOSIS (DVT) OF OTHER SPECIFIED VEIN OF RIGHT LOWER EXTREMITY (H): Primary | ICD-10-CM

## 2024-05-02 DIAGNOSIS — Z79.01 LONG TERM CURRENT USE OF ANTICOAGULANT THERAPY: ICD-10-CM

## 2024-05-02 LAB — INR BLD: 3.1 (ref 0.9–1.1)

## 2024-05-02 PROCEDURE — 85610 PROTHROMBIN TIME: CPT

## 2024-05-02 PROCEDURE — 36416 COLLJ CAPILLARY BLOOD SPEC: CPT

## 2024-05-02 NOTE — PROGRESS NOTES
ANTICOAGULATION MANAGEMENT     Jordan Barnett 63 year old male is on warfarin with supratherapeutic INR result. (Goal INR 2.0-3.0)    Recent labs: (last 7 days)     05/02/24  0811   INR 3.1*       ASSESSMENT     Source(s): Chart Review and Patient/Caregiver Call     Warfarin doses taken: Warfarin taken as instructed - patient confirmed he did take new dose  Diet: No new diet changes identified  Medication/supplement changes:  about 2-3 weeks ago patient quit taking vitamin D with vitamin K  New illness, injury, or hospitalization: No  Signs or symptoms of bleeding or clotting: No  Previous result: Supratherapeutic  Additional findings: None     PLAN     Recommended plan for no diet, medication or health factor changes affecting INR     Dosing Instructions: decrease your warfarin dose (7.7% change) with next INR in 1 week (patient is in clinic in one week for a FP appt)      Summary  As of 5/2/2024      Full warfarin instructions:  2.5 mg every Mon, Thu; 5 mg all other days   Next INR check:  5/9/2024               Telephone call with Jordan who verbalizes understanding and agrees to plan    Lab visit scheduled    Education provided:   Please call back if any changes to your diet, medications or how you've been taking warfarin  Symptom monitoring: monitoring for bleeding signs and symptoms    Plan made per [facility] anticoagulation protocol    Joann Gurrola RN  Anticoagulation Clinic  5/2/2024    _______________________________________________________________________     Anticoagulation Episode Summary       Current INR goal:  2.0-3.0   TTR:  57.0% (1 y)   Target end date:  Indefinite   Send INR reminders to:  [facility]    Indications    Acute deep vein thrombosis (DVT) of other specified vein of right lower extremity (H) [I82.491]  Long term current use of anticoagulant therapy [Z79.01]  Acute deep vein thrombosis (DVT) of femoral vein of right lower extremity (H) [I82.411]             Comments:                Anticoagulation Care Providers       Provider Role Specialty Phone number    Kaleb Barnett MD Referring Emergency Medicine [phone]    Juan Antonio Flanagan MD Referring Family Medicine [phone]

## 2024-05-05 SDOH — HEALTH STABILITY: PHYSICAL HEALTH: ON AVERAGE, HOW MANY MINUTES DO YOU ENGAGE IN EXERCISE AT THIS LEVEL?: 30 MIN

## 2024-05-05 SDOH — HEALTH STABILITY: PHYSICAL HEALTH: ON AVERAGE, HOW MANY DAYS PER WEEK DO YOU ENGAGE IN MODERATE TO STRENUOUS EXERCISE (LIKE A BRISK WALK)?: 5 DAYS

## 2024-05-05 ASSESSMENT — SOCIAL DETERMINANTS OF HEALTH (SDOH): HOW OFTEN DO YOU GET TOGETHER WITH FRIENDS OR RELATIVES?: MORE THAN THREE TIMES A WEEK

## 2024-05-06 DIAGNOSIS — I10 ESSENTIAL HYPERTENSION WITH GOAL BLOOD PRESSURE LESS THAN 140/90: ICD-10-CM

## 2024-05-07 RX ORDER — LOSARTAN POTASSIUM 100 MG/1
TABLET ORAL
Qty: 90 TABLET | Refills: 0 | OUTPATIENT
Start: 2024-05-07

## 2024-05-07 NOTE — TELEPHONE ENCOUNTER
Has appointment in 2 days    Requested Prescriptions   Pending Prescriptions Disp Refills    losartan (COZAAR) 100 MG tablet [Pharmacy Med Name: Losartan Potassium 100 MG Oral Tablet] 90 tablet 0     Sig: TAKE 1 TABLET BY MOUTH ONCE DAILY.  NEED TO SCHEDULE FASTING LAB AND IN CLINIC PROVIDER APPOINTMENT       Angiotensin-II Receptors Failed - 5/6/2024  8:34 PM        Failed - Last blood pressure under 140/90 in past 12 months     BP Readings from Last 3 Encounters:   01/12/23 100/70   02/03/22 97/72   01/06/22 120/80       No data recorded            Failed - Has GFR on file in past 12 months and most recent value is normal        Passed - Medication is active on med list        Passed - Medication indicated for associated diagnosis     The medication is prescribed for one or more of the following conditions:    Chronic Kidney Disease (CDK)    Heart Failure (HF)    Diabetes, Nephropathy   Hypertension    Coronary Artery Disease (CAD)   Raynaud's Disease          Passed - Recent (12 mo) or future (90days) visit within the authorizing provider's specialty     The patient must have completed an in-person or virtual visit within the past 12 months or has a future visit scheduled within the next 90 days with the authorizing provider s specialty.  Urgent care and e-visits do not quality as an office visit for this protocol.          Passed - Patient is age 18 or older        Passed - Normal serum potassium on file in past 12 months     Recent Labs   Lab Test 02/15/24  0656   POTASSIUM 4.7

## 2024-05-07 NOTE — TELEPHONE ENCOUNTER
Will refill when patient comes in clinic in 2 days  Based on last refill, he is expected to be out of meds after his scheduled clinic visit.

## 2024-05-09 ENCOUNTER — LAB (OUTPATIENT)
Dept: LAB | Facility: CLINIC | Age: 64
End: 2024-05-09
Payer: COMMERCIAL

## 2024-05-09 ENCOUNTER — ANTICOAGULATION THERAPY VISIT (OUTPATIENT)
Dept: ANTICOAGULATION | Facility: CLINIC | Age: 64
End: 2024-05-09

## 2024-05-09 ENCOUNTER — OFFICE VISIT (OUTPATIENT)
Dept: FAMILY MEDICINE | Facility: CLINIC | Age: 64
End: 2024-05-09
Payer: COMMERCIAL

## 2024-05-09 VITALS
HEIGHT: 71 IN | TEMPERATURE: 96.7 F | OXYGEN SATURATION: 94 % | RESPIRATION RATE: 20 BRPM | HEART RATE: 56 BPM | BODY MASS INDEX: 23.38 KG/M2 | WEIGHT: 167 LBS | DIASTOLIC BLOOD PRESSURE: 68 MMHG | SYSTOLIC BLOOD PRESSURE: 102 MMHG

## 2024-05-09 DIAGNOSIS — Z79.01 LONG TERM CURRENT USE OF ANTICOAGULANT THERAPY: ICD-10-CM

## 2024-05-09 DIAGNOSIS — I82.411 ACUTE DEEP VEIN THROMBOSIS (DVT) OF FEMORAL VEIN OF RIGHT LOWER EXTREMITY (H): ICD-10-CM

## 2024-05-09 DIAGNOSIS — I82.491 ACUTE DEEP VEIN THROMBOSIS (DVT) OF OTHER SPECIFIED VEIN OF RIGHT LOWER EXTREMITY (H): Primary | ICD-10-CM

## 2024-05-09 DIAGNOSIS — Z87.891 PERSONAL HISTORY OF TOBACCO USE: ICD-10-CM

## 2024-05-09 DIAGNOSIS — I82.491 ACUTE DEEP VEIN THROMBOSIS (DVT) OF OTHER SPECIFIED VEIN OF RIGHT LOWER EXTREMITY (H): ICD-10-CM

## 2024-05-09 DIAGNOSIS — Z00.00 ROUTINE GENERAL MEDICAL EXAMINATION AT A HEALTH CARE FACILITY: Primary | ICD-10-CM

## 2024-05-09 DIAGNOSIS — N18.31 STAGE 3A CHRONIC KIDNEY DISEASE (H): ICD-10-CM

## 2024-05-09 DIAGNOSIS — I10 ESSENTIAL HYPERTENSION WITH GOAL BLOOD PRESSURE LESS THAN 140/90: ICD-10-CM

## 2024-05-09 LAB
CREAT UR-MCNC: 23.1 MG/DL
INR BLD: 2.5 (ref 0.9–1.1)
MICROALBUMIN UR-MCNC: 17 MG/L
MICROALBUMIN/CREAT UR: 73.59 MG/G CR (ref 0–17)

## 2024-05-09 PROCEDURE — G0296 VISIT TO DETERM LDCT ELIG: HCPCS | Performed by: FAMILY MEDICINE

## 2024-05-09 PROCEDURE — 82043 UR ALBUMIN QUANTITATIVE: CPT | Performed by: FAMILY MEDICINE

## 2024-05-09 PROCEDURE — 36416 COLLJ CAPILLARY BLOOD SPEC: CPT

## 2024-05-09 PROCEDURE — 99214 OFFICE O/P EST MOD 30 MIN: CPT | Mod: 25 | Performed by: FAMILY MEDICINE

## 2024-05-09 PROCEDURE — 82570 ASSAY OF URINE CREATININE: CPT | Performed by: FAMILY MEDICINE

## 2024-05-09 PROCEDURE — 99396 PREV VISIT EST AGE 40-64: CPT | Performed by: FAMILY MEDICINE

## 2024-05-09 PROCEDURE — 85610 PROTHROMBIN TIME: CPT

## 2024-05-09 RX ORDER — LOSARTAN POTASSIUM 50 MG/1
50 TABLET ORAL DAILY
Qty: 90 TABLET | Refills: 3 | Status: SHIPPED | OUTPATIENT
Start: 2024-05-09

## 2024-05-09 RX ORDER — WARFARIN SODIUM 5 MG/1
TABLET ORAL
Qty: 100 TABLET | Refills: 1 | Status: SHIPPED | OUTPATIENT
Start: 2024-05-09

## 2024-05-09 ASSESSMENT — PAIN SCALES - GENERAL: PAINLEVEL: NO PAIN (0)

## 2024-05-09 NOTE — PROGRESS NOTES
ANTICOAGULATION MANAGEMENT     Patient Name:  Jordan Barnett  Date:  12/17/2020    ASSESSMENT /SUBJECTIVE:    Today's INR result of 3.10 is supratherapeutic. Goal INR of 2.0-3.0      Warfarin dose taken: Warfarin taken as instructed    Diet: Decreased greens/vitamin K in diet which may be affecting INR; plans to resume previous intake    Medication changes/ interactions: No new medications/supplements affecting INR    Previous INR: Therapeutic     S/S of bleeding or thromboembolism: No    New injury or illness: No    Upcoming surgery, procedure or cardioversion: No    Additional findings: None      PLAN:    Telephone call with Jordan regarding INR result and instructed:     Warfarin Dosing Instructions: Continue your current warfarin dose 5 mg daily    Instructed patient to follow up no later than: 1 week  Lab visit scheduled    Education provided: Importance of consistent vitamin K intake and Target INR goal and significance of current INR result      Jordan verbalizes understanding and agrees to warfarin dosing plan.    Instructed to call the Anticoagulation Clinic for any changes, questions or concerns. (#[phone])        Vani Dumont RN      OBJECTIVE:  Recent labs: (last 7 days)     12/17/20  0736   INR 3.10*         No question data found.  Anticoagulation Summary  As of 12/17/2020    INR goal:  2.0-3.0   TTR:  90.6 % (1.3 wk)   INR used for dosing:  3.10 (12/17/2020)   Warfarin maintenance plan:  5 mg (5 mg x 1) every day   Full warfarin instructions:  5 mg every day   Weekly warfarin total:  35 mg   No change documented:  Vani Dumont RN   Plan last modified:  Maryellen Alberts RN (12/3/2020)   Next INR check:  12/23/2020   Priority:  High   Target end date:  Indefinite    Indications    Acute deep vein thrombosis (DVT) of other specified vein of right lower extremity (H) [I82.491]             Anticoagulation Episode Summary     INR check location:      Preferred lab:      Send INR reminders  Your blood count is within acceptable ranges.  Your urinalysis shows blood.  There is no evidence of bleeding in your stool.  Bleeding in your may be related to a bladder infection.  Complete the course of antibiotics prescribed.  Attend your scheduled follow-up with your urologist on Monday.  Return to the emergency department for fever or any new, worsening or significantly concerning symptoms.    Thank you for coming to our Emergency Department today. It is important to remember that some problems are difficult to diagnose and may not be found during your first visit. Be sure to follow up with your primary care doctor and review any labs/imaging that was performed with them. If you do not have a primary care doctor, you may contact the one listed on your discharge paperwork or you may also call the [facility] Appointment Desk at [phone] to schedule an appointment with one.     All medications may potentially have side effects and it is impossible to predict which medications may give you side effects. If you feel that you are having a negative effect of any medication you should immediately stop taking them and seek medical attention.    Return to the ER with any questions/concerns, new/concerning symptoms, worsening or failure to improve. Do not drive or make any important decisions for 24 hours if you have received any pain medications, sedatives or mood altering drugs during your ER visit.     to:  [facility]    Comments:  * recurrent dvts      Anticoagulation Care Providers     Provider Role Specialty Phone number    Kaleb Barnett MD Referring Emergency Medicine [phone]    Juan Antonio Flanagan MD Referring Family Medicine [phone]

## 2024-05-09 NOTE — PROGRESS NOTES
ANTICOAGULATION MANAGEMENT     Jordan Barnett 63 year old male is on warfarin with therapeutic INR result. (Goal INR 2.0-3.0)    Recent labs: (last 7 days)     05/09/24  1536   INR 2.5*       ASSESSMENT     Source(s): Chart Review and Patient/Caregiver Call     Warfarin doses taken: Warfarin taken as instructed  Diet: No new diet changes identified  Medication/supplement changes: None noted  New illness, injury, or hospitalization: No  Signs or symptoms of bleeding or clotting: No  Previous result: Supratherapeutic  Additional findings: None       PLAN     Recommended plan for no diet, medication or health factor changes affecting INR     Dosing Instructions: Continue your current warfarin dose with next INR in 2 weeks       Summary  As of 5/9/2024      Full warfarin instructions:  2.5 mg every Mon, Thu; 5 mg all other days   Next INR check:  5/23/2024               Telephone call with Jordan who verbalizes understanding and agrees to plan    Lab visit scheduled    Education provided:   Goal range and lab monitoring: goal range and significance of current result    Plan made per [facility] anticoagulation protocol    Zuleyka Floyd RN  Anticoagulation Clinic  5/9/2024    _______________________________________________________________________     Anticoagulation Episode Summary       Current INR goal:  2.0-3.0   TTR:  56.7% (1 y)   Target end date:  Indefinite   Send INR reminders to:  [facility]    Indications    Acute deep vein thrombosis (DVT) of other specified vein of right lower extremity (H) [I82.491]  Long term current use of anticoagulant therapy [Z79.01]  Acute deep vein thrombosis (DVT) of femoral vein of right lower extremity (H) [I82.411]             Comments:               Anticoagulation Care Providers       Provider Role Specialty Phone number    Kaleb Barnett MD Referring Emergency Medicine [phone]    Juan Antonio Flanagan MD Referring Family Medicine [phone]

## 2024-05-09 NOTE — PATIENT INSTRUCTIONS
You will be contacted in 1-2 days for results of your lab tests  Reduce losartan to 50 mg daily due to your blood pressure being in the low end of normal.  To schedule the low dose chest CT scan for lung cancer screening, call [phone].   Be consistent with low salt, low trans fat and low saturated fat diet.  Eat food rich in omega-3-fatty acids as you tolerate. (salmon, olive oil)  Eat 5 cups of vegetables, fruits and whole grains per day.  Limit starchy food (white rice, white bread, white pasta, white potatoes) to less than a cup per meal.  Minimize sweets, junk food and fastfood. Limit soda beverages to one serving per day; best to avoid it altogether though.    Exercise: moderate intensity sustained for at least 30 mins per episode, goal of 150 mins per week at least  Combine cardiovascular and resistance exercises.  These exercise recommendations are in addition to your daily activity at work or home.    See nephrology as scheduled in October 2024.      Preventative Care Visits include: Yearly physicals, Well-child visits, Welcome to Medicare visits, & Medicare yearly wellness exams.    The purpose of these visits is to discuss your medical history and prevent health problems before you are sick.  You may need to pay a copay, coinsurance or deductible if your visit today includes testing or treating for a new or existing condition.    Additional charges may be incurred for today's visit. If you have questions about what your insurance plan covers, please contact your Insurance Company's member service department.  If you have questions specific to a bill you have already received from [email or web address], please contact the [facility] Billing Office at [phone].  .     Preventive Care Advice   This is general advice we often give to help people stay healthy. Your care team may have specific advice just for you. Please talk to your care team about your own preventive care needs.  Lifestyle  Exercise at least 150  "minutes each week (30 minutes a day, 5 days a week).  Do muscle strengthening activities 2 days a week. These help control your weight and prevent disease.  No smoking.  Wear sunscreen to prevent skin cancer.  Have your home tested for radon every 2 to 5 years. Radon is a colorless, odorless gas that can harm your lungs. To learn more, go to www.health.[facility].mn. and search for \"Radon in Homes.\"  Keep guns unloaded and locked up in a safe place like a safe or gun vault, or, use a gun lock and hide the keys. Always lock away bullets separately. To learn more, visit [employer].gov and search for \"safe gun storage.\"  Nutrition  Eat 5 or more servings of fruits and vegetables each day.  Try wheat bread, brown rice and whole grain pasta (instead of white bread, rice, and pasta).  Get enough calcium and vitamin D. Check the label on foods and aim for 100% of the RDA (recommended daily allowance).  Regular exams  Have a dental exam and cleaning every 6 months.  See your health care team every year to talk about:  Any changes in your health.  Any medicines your care team has prescribed.  Preventive care, family planning, and ways to prevent chronic diseases.  Shots (vaccines)   HPV shots (up to age 26), if you've never had them before.  Hepatitis B shots (up to age 59), if you've never had them before.  COVID-19 shot: Get this shot when it's due.  Flu shot: Get a flu shot every year.  Tetanus shot: Get a tetanus shot every 10 years.  Pneumococcal, hepatitis A, and RSV shots: Ask your care team if you need these based on your risk.  Shingles shot (for age 50 and up).  General health tests  Diabetes screening:  Starting at age 35, Get screened for diabetes at least every 3 years.  If you are younger than age 35, ask your care team if you should be screened for diabetes.  Cholesterol test: At age 39, start having a cholesterol test every 5 years, or more often if advised.  Bone density scan (DEXA): At age 50, ask your care team " if you should have this scan for osteoporosis (brittle bones).  Hepatitis C: Get tested at least once in your life.  Abdominal aortic aneurysm screening: Talk to your doctor about having this screening if you:  Have ever smoked; and  Are biologically male; and  Are between the ages of 65 and 75.  STIs (sexually transmitted infections)  Before age 24: Ask your care team if you should be screened for STIs.  After age 24: Get screened for STIs if you're at risk. You are at risk for STIs (including HIV) if:  You are sexually active with more than one person.  You don't use condoms every time.  You or a partner was diagnosed with a sexually transmitted infection.  If you are at risk for HIV, ask about PrEP medicine to prevent HIV.  Get tested for HIV at least once in your life, whether you are at risk for HIV or not.  Cancer screening tests  Cervical cancer screening: If you have a cervix, begin getting regular cervical cancer screening tests at age 21. Most people who have regular screenings with normal results can stop after age 65. Talk about this with your provider.  Breast cancer scan (mammogram): If you've ever had breasts, begin having regular mammograms starting at age 40. This is a scan to check for breast cancer.  Colon cancer screening: It is important to start screening for colon cancer at age 45.  Have a colonoscopy test every 10 years (or more often if you're at risk) Or, ask your provider about stool tests like a FIT test every year or Cologuard test every 3 years.  To learn more about your testing options, visit: [email or web address].  For help making a decision, visit: [email or web address].  Prostate cancer screening test: If you have a prostate and are age 55 to 69, ask your provider if you would benefit from a yearly prostate cancer screening test.  Lung cancer screening: If you are a current or former smoker age 50 to 80, ask your care team if ongoing lung cancer screenings are right for you.  For  informational purposes only. Not to replace the advice of your health care provider. Copyright   2023 [facility]. All rights reserved. Clinically reviewed by the [facility]. [employer] 371193 - REV 04/24.    Learning About Stress  What is stress?     Stress is your body's response to a hard situation. Your body can have a physical, emotional, or mental response. Stress is a fact of life for most people, and it affects everyone differently. What causes stress for you may not be stressful for someone else.  A lot of things can cause stress. You may feel stress when you go on a job interview, take a test, or run a race. This kind of short-term stress is normal and even useful. It can help you if you need to work hard or react quickly. For example, stress can help you finish an important job on time.  Long-term stress is caused by ongoing stressful situations or events. Examples of long-term stress include long-term health problems, ongoing problems at work, or conflicts in your family. Long-term stress can harm your health.  How does stress affect your health?  When you are stressed, your body responds as though you are in danger. It makes hormones that speed up your heart, make you breathe faster, and give you a burst of energy. This is called the fight-or-flight stress response. If the stress is over quickly, your body goes back to normal and no harm is done.  But if stress happens too often or lasts too long, it can have bad effects. Long-term stress can make you more likely to get sick, and it can make symptoms of some diseases worse. If you tense up when you are stressed, you may develop neck, shoulder, or low back pain. Stress is linked to high blood pressure and heart disease.  Stress also harms your emotional health. It can make you fisher, tense, or depressed. Your relationships may suffer, and you may not do well at work or school.  What can you do to manage  stress?  You can try these things to help manage stress:   Do something active. Exercise or activity can help reduce stress. Walking is a great way to get started. Even everyday activities such as housecleaning or yard work can help.  Try yoga or shaun chi. These techniques combine exercise and meditation. You may need some training at first to learn them.  Do something you enjoy. For example, listen to music or go to a movie. Practice your hobby or do volunteer work.  Meditate. This can help you relax, because you are not worrying about what happened before or what may happen in the future.  Do guided imagery. Imagine yourself in any setting that helps you feel calm. You can use online videos, books, or a teacher to guide you.  Do breathing exercises. For example:  From a standing position, bend forward from the waist with your knees slightly bent. Let your arms dangle close to the floor.  Breathe in slowly and deeply as you return to a standing position. Roll up slowly and lift your head last.  Hold your breath for just a few seconds in the standing position.  Breathe out slowly and bend forward from the waist.  Let your feelings out. Talk, laugh, cry, and express anger when you need to. Talking with supportive friends or family, a counselor, or a richard leader about your feelings is a healthy way to relieve stress. Avoid discussing your feelings with people who make you feel worse.  Write. It may help to write about things that are bothering you. This helps you find out how much stress you feel and what is causing it. When you know this, you can find better ways to cope.  What can you do to prevent stress?  You might try some of these things to help prevent stress:  Manage your time. This helps you find time to do the things you want and need to do.  Get enough sleep. Your body recovers from the stresses of the day while you are sleeping.  Get support. Your family, friends, and community can make a difference in how  "you experience stress.  Limit your news feed. Avoid or limit time on social media or news that may make you feel stressed.  Do something active. Exercise or activity can help reduce stress. Walking is a great way to get started.  Where can you learn more?  Go to https://www.[employer].net/patiented  Enter N032 in the search box to learn more about \"Learning About Stress.\"  Current as of: October 24, 2023               Content Version: 14.0    1123-6192 [employer].   Care instructions adapted under license by your healthcare professional. If you have questions about a medical condition or this instruction, always ask your healthcare professional. [employer] disclaims any warranty or liability for your use of this information.      Lung Cancer Screening   Frequently Asked Questions  If you are at high-risk for lung cancer, getting screened with low-dose computed tomography (LDCT) every year can help save your life. This handout offers answers to some of the most common questions about lung cancer screening. If you have other questions, please call [phone] ([phone]).     What is it?  Lung cancer screening uses special X-ray technology to create an image of your lung tissue. The exam is quick and easy and takes less than 10 seconds. We don t give you any medicine or use any needles. You can eat before and after the exam. You don t need to change your clothes as long as the clothing on your chest doesn t contain metal. But, you do need to be able to hold your breath for at least 6 seconds during the exam.    What is the goal of lung cancer screening?  The goal of lung cancer screening is to save lives. Many times, lung cancer is not found until a person starts having physical symptoms. Lung cancer screening can help detect lung cancer in the earliest stages when it may be easier to treat.    Who should be screened for lung cancer?  We suggest lung cancer screening for anyone " who is at high-risk for lung cancer. You are in the high-risk group if you:     are between the ages of 55 and 79, and   have smoked at least 1 pack of cigarettes a day for 20 or more years, and   still smoke or have quit within the past 15 years.    However, if you have a new cough or shortness of breath, you should talk to your doctor before being screened.    Why does it matter if I have symptoms?  Certain symptoms can be a sign that you have a condition in your lungs that should be checked and treated by your doctor. These symptoms include fever, chest pain, a new or changing cough, shortness of breath that you have never felt before, coughing up blood or unexplained weight loss. Having any of these symptoms can greatly affect the results of lung cancer screening.       Should all smokers get an LDCT lung cancer screening exam?  It depends. Lung cancer screening is for a very specific group of men and women who have a history of heavy smoking over a long period of time (see  Who should be screened for lung cancer  above).  I am in the high-risk group, but have been diagnosed with cancer in the past. Is LDCT lung cancer screening right for me?  In some cases, you should not have LDCT lung screening, such as when your doctor is already following your cancer with CT scan studies. Your doctor will help you decide if LDCT lung screening is right for you.  Do I need to have a screening exam every year?  Yes. If you are in the high-risk group described earlier, you should get an LDCT lung cancer screening exam every year until you are 79, or are no longer willing or able to undergo screening and possible procedures to diagnose and treat lung cancer.  How effective is LDCT at preventing death from lung cancer?  Studies have shown that LDCT lung cancer screening can lower the risk of death from lung cancer by 20 percent in people who are at high-risk.  What are the risks?  There are some risks and limitations of LDCT  lung cancer screening. We want to make sure you understand the risks and benefits, so please let us know if you have any questions. Your doctor may want to talk with you more about these risks.   Radiation exposure: As with any exam that uses radiation, there is a very small increased risk of cancer. The amount of radiation in LDCT is small--about the same amount a person would get from a mammogram. Your doctor orders the exam when he or she feels the potential benefits outweigh the risks.   False negatives: No test is perfect, including LDCT. It is possible that you may have a medical condition, including lung cancer, that is not found during your exam. This is called a false negative result.   False positives and more testing: LDCT very often finds something in the lung that could be cancer, but in fact is not. This is called a false positive result. False positive tests often cause anxiety. To make sure these findings are not cancer, you may need to have more tests. These tests will be done only if you give us permission. Sometimes patients need a treatment that can have side effects, such as a biopsy. For more information on false positives, see  What can I expect from the results?    Findings not related to lung cancer: Your LDCT exam also takes pictures of areas of your body next to your lungs. In a very small number of cases, the CT scan will show an abnormal finding in one of these areas, such as your kidneys, adrenal glands, liver or thyroid. This finding may not be serious, but you may need more tests. Your doctor can help you decide what other tests you may need, if any.  What can I expect from the results?  About 1 out of 4 LDCT exams will find something that may need more tests. Most of the time, these findings are lung nodules. Lung nodules are very small collections of tissue in the lung. These nodules are very common, and the vast majority--more than 97 percent--are not cancer (benign). Most are  normal lymph nodes or small areas of scarring from past infections.  But, if a small lung nodule is found to be cancer, the cancer can be cured more than 90 percent of the time. To know if the nodule is cancer, we may need to get more images before your next yearly screening exam. If the nodule has suspicious features (for example, it is large, has an odd shape or grows over time), we will refer you to a specialist for further testing.  Will my doctor also get the results?  Yes. Your doctor will get a copy of your results.  Is it okay to keep smoking now that there s a cancer screening exam?  No. Tobacco is one of the strongest cancer-causing agents. It causes not only lung cancer, but other cancers and cardiovascular (heart) diseases as well. The damage caused by smoking builds over time. This means that the longer you smoke, the higher your risk of disease. While it is never too late to quit, the sooner you quit, the better.  Where can I find help to quit smoking?  The best way to prevent lung cancer is to stop smoking. If you have already quit smoking, congratulations and keep it up! For help on quitting smoking, please call [employer]Me[employer] at 9-607-QUITNOW (1-176.208.7583) or the American [employer] Society at 1-510.483.6566 to find local resources near you.  One-on-one health coaching:  If you d prefer to work individually with a health care provider on tobacco cessation, we offer:     Medication Therapy Management:  Our specially trained pharmacists work closely with you and your doctor to help you quit smoking.  Call [phone] or [phone] (toll free).

## 2024-05-09 NOTE — PROGRESS NOTES
Preventive Care Visit  [facility]  Juan Antonio Flanagan MD, Family Medicine  May 9, 2024      Assessment & Plan     Routine general medical examination at a health care facility  Patient was advised on recommended screening and preventive health recommendations.  He verbalized understanding and agreed to the plans below.     Acute deep vein thrombosis (DVT) of other specified vein of right lower extremity (H)  Asymptomatic now.  Continue anticoagulation.  - warfarin ANTICOAGULANT (COUMADIN) 5 MG tablet  Dispense: 100 tablet; Refill: 1  - OFFICE/OUTPT VISIT,EST,LEVL IV  - OFFICE/OUTPT VISIT,EST,LEVL IV    Long term current use of anticoagulant therapy  Sees anticoagulation clinic. Up to date with INRs.  - warfarin ANTICOAGULANT (COUMADIN) 5 MG tablet  Dispense: 100 tablet; Refill: 1  - OFFICE/OUTPT VISIT,EST,LEVL IV  - OFFICE/OUTPT VISIT,EST,LEVL IV    Essential hypertension with goal blood pressure less than 140/90  Controlled but BP in low normal.  Reduced losartan to 50 mg daily to prevent development of orthostatic hypotension.  Reinforced heart healthy lifestyle.  Recheck BP with RN in 1 month.  - losartan (COZAAR) 50 MG tablet  Dispense: 90 tablet; Refill: 3  - OFFICE/OUTPT VISIT,EST,LEVL IV  - OFFICE/OUTPT VISIT,EST,LEVL IV    Stage 3a chronic kidney disease (H)  Stable renal function on recent labs.  Maintain hydration. Avoid nephrotoxins.   Sees nephrology in October.  - Albumin Random Urine Quantitative with Creat Ratio    Personal history of tobacco use  - Prof fee: Shared Decision Making for Lung Cancer Screening  - CT Chest Lung Cancer Scrn Low Dose wo      Patient has been advised of split billing requirements and indicates understanding: Yes    Counseling  Appropriate preventive services were discussed with this patient, including applicable screening as appropriate for fall prevention, nutrition, physical activity, Tobacco-use cessation, weight loss and cognition.  Checklist  reviewing preventive services available has been given to the patient.  Reviewed patient's diet, addressing concerns and/or questions.   He is at risk for psychosocial distress and has been provided with information to reduce risk.       Patient Instructions   You will be contacted in 1-2 days for results of your lab tests  Reduce losartan to 50 mg daily due to your blood pressure being in the low end of normal.  To schedule the low dose chest CT scan for lung cancer screening, call [phone].   Be consistent with low salt, low trans fat and low saturated fat diet.  Eat food rich in omega-3-fatty acids as you tolerate. (salmon, olive oil)  Eat 5 cups of vegetables, fruits and whole grains per day.  Limit starchy food (white rice, white bread, white pasta, white potatoes) to less than a cup per meal.  Minimize sweets, junk food and fastfood. Limit soda beverages to one serving per day; best to avoid it altogether though.    Exercise: moderate intensity sustained for at least 30 mins per episode, goal of 150 mins per week at least  Combine cardiovascular and resistance exercises.  These exercise recommendations are in addition to your daily activity at work or home.    See nephrology as scheduled in October 2024.      Preventative Care Visits include: Yearly physicals, Well-child visits, Welcome to Medicare visits, & Medicare yearly wellness exams.    The purpose of these visits is to discuss your medical history and prevent health problems before you are sick.  You may need to pay a copay, coinsurance or deductible if your visit today includes testing or treating for a new or existing condition.    Additional charges may be incurred for today's visit. If you have questions about what your insurance plan covers, please contact your Insurance Company's member service department.  If you have questions specific to a bill you have already received from [facility], please contact the [email or web address] Billing Office at  "[phone].  .     Preventive Care Advice   This is general advice we often give to help people stay healthy. Your care team may have specific advice just for you. Please talk to your care team about your own preventive care needs.  Lifestyle  Exercise at least 150 minutes each week (30 minutes a day, 5 days a week).  Do muscle strengthening activities 2 days a week. These help control your weight and prevent disease.  No smoking.  Wear sunscreen to prevent skin cancer.  Have your home tested for radon every 2 to 5 years. Radon is a colorless, odorless gas that can harm your lungs. To learn more, go to www.health.[facility].mn. and search for \"Radon in Homes.\"  Keep guns unloaded and locked up in a safe place like a safe or gun vault, or, use a gun lock and hide the keys. Always lock away bullets separately. To learn more, visit [employer].[place].gov and search for \"safe gun storage.\"  Nutrition  Eat 5 or more servings of fruits and vegetables each day.  Try wheat bread, brown rice and whole grain pasta (instead of white bread, rice, and pasta).  Get enough calcium and vitamin D. Check the label on foods and aim for 100% of the RDA (recommended daily allowance).  Regular exams  Have a dental exam and cleaning every 6 months.  See your health care team every year to talk about:  Any changes in your health.  Any medicines your care team has prescribed.  Preventive care, family planning, and ways to prevent chronic diseases.  Shots (vaccines)   HPV shots (up to age 26), if you've never had them before.  Hepatitis B shots (up to age 59), if you've never had them before.  COVID-19 shot: Get this shot when it's due.  Flu shot: Get a flu shot every year.  Tetanus shot: Get a tetanus shot every 10 years.  Pneumococcal, hepatitis A, and RSV shots: Ask your care team if you need these based on your risk.  Shingles shot (for age 50 and up).  General health tests  Diabetes screening:  Starting at age 35, Get screened for diabetes at least " every 3 years.  If you are younger than age 35, ask your care team if you should be screened for diabetes.  Cholesterol test: At age 39, start having a cholesterol test every 5 years, or more often if advised.  Bone density scan (DEXA): At age 50, ask your care team if you should have this scan for osteoporosis (brittle bones).  Hepatitis C: Get tested at least once in your life.  Abdominal aortic aneurysm screening: Talk to your doctor about having this screening if you:  Have ever smoked; and  Are biologically male; and  Are between the ages of 65 and 75.  STIs (sexually transmitted infections)  Before age 24: Ask your care team if you should be screened for STIs.  After age 24: Get screened for STIs if you're at risk. You are at risk for STIs (including HIV) if:  You are sexually active with more than one person.  You don't use condoms every time.  You or a partner was diagnosed with a sexually transmitted infection.  If you are at risk for HIV, ask about PrEP medicine to prevent HIV.  Get tested for HIV at least once in your life, whether you are at risk for HIV or not.  Cancer screening tests  Cervical cancer screening: If you have a cervix, begin getting regular cervical cancer screening tests at age 21. Most people who have regular screenings with normal results can stop after age 65. Talk about this with your provider.  Breast cancer scan (mammogram): If you've ever had breasts, begin having regular mammograms starting at age 40. This is a scan to check for breast cancer.  Colon cancer screening: It is important to start screening for colon cancer at age 45.  Have a colonoscopy test every 10 years (or more often if you're at risk) Or, ask your provider about stool tests like a FIT test every year or Cologuard test every 3 years.  To learn more about your testing options, visit: www.[place]/253874.pdf.  For help making a decision, visit: emelia/ql94639.  Prostate cancer screening test: If you have a  prostate and are age 55 to 69, ask your provider if you would benefit from a yearly prostate cancer screening test.  Lung cancer screening: If you are a current or former smoker age 50 to 80, ask your care team if ongoing lung cancer screenings are right for you.  For informational purposes only. Not to replace the advice of your health care provider. Copyright   2023 [facility]. All rights reserved. Clinically reviewed by the [facility] Transitions Program. [employer] 447485 - REV 04/24.    Learning About Stress  What is stress?     Stress is your body's response to a hard situation. Your body can have a physical, emotional, or mental response. Stress is a fact of life for most people, and it affects everyone differently. What causes stress for you may not be stressful for someone else.  A lot of things can cause stress. You may feel stress when you go on a job interview, take a test, or run a race. This kind of short-term stress is normal and even useful. It can help you if you need to work hard or react quickly. For example, stress can help you finish an important job on time.  Long-term stress is caused by ongoing stressful situations or events. Examples of long-term stress include long-term health problems, ongoing problems at work, or conflicts in your family. Long-term stress can harm your health.  How does stress affect your health?  When you are stressed, your body responds as though you are in danger. It makes hormones that speed up your heart, make you breathe faster, and give you a burst of energy. This is called the fight-or-flight stress response. If the stress is over quickly, your body goes back to normal and no harm is done.  But if stress happens too often or lasts too long, it can have bad effects. Long-term stress can make you more likely to get sick, and it can make symptoms of some diseases worse. If you tense up when you are stressed, you may develop neck, shoulder, or low  back pain. Stress is linked to high blood pressure and heart disease.  Stress also harms your emotional health. It can make you fisher, tense, or depressed. Your relationships may suffer, and you may not do well at work or school.  What can you do to manage stress?  You can try these things to help manage stress:   Do something active. Exercise or activity can help reduce stress. Walking is a great way to get started. Even everyday activities such as housecleaning or yard work can help.  Try yoga or shaun chi. These techniques combine exercise and meditation. You may need some training at first to learn them.  Do something you enjoy. For example, listen to music or go to a movie. Practice your hobby or do volunteer work.  Meditate. This can help you relax, because you are not worrying about what happened before or what may happen in the future.  Do guided imagery. Imagine yourself in any setting that helps you feel calm. You can use online videos, books, or a teacher to guide you.  Do breathing exercises. For example:  From a standing position, bend forward from the waist with your knees slightly bent. Let your arms dangle close to the floor.  Breathe in slowly and deeply as you return to a standing position. Roll up slowly and lift your head last.  Hold your breath for just a few seconds in the standing position.  Breathe out slowly and bend forward from the waist.  Let your feelings out. Talk, laugh, cry, and express anger when you need to. Talking with supportive friends or family, a counselor, or a richard leader about your feelings is a healthy way to relieve stress. Avoid discussing your feelings with people who make you feel worse.  Write. It may help to write about things that are bothering you. This helps you find out how much stress you feel and what is causing it. When you know this, you can find better ways to cope.  What can you do to prevent stress?  You might try some of these things to help prevent  "stress:  Manage your time. This helps you find time to do the things you want and need to do.  Get enough sleep. Your body recovers from the stresses of the day while you are sleeping.  Get support. Your family, friends, and community can make a difference in how you experience stress.  Limit your news feed. Avoid or limit time on social media or news that may make you feel stressed.  Do something active. Exercise or activity can help reduce stress. Walking is a great way to get started.  Where can you learn more?  Go to https://www.[employer].net/patiented  Enter N032 in the search box to learn more about \"Learning About Stress.\"  Current as of: October 24, 2023               Content Version: 14.0    6436-2221 [employer].   Care instructions adapted under license by your healthcare professional. If you have questions about a medical condition or this instruction, always ask your healthcare professional. [employer] disclaims any warranty or liability for your use of this information.      Lung Cancer Screening   Frequently Asked Questions  If you are at high-risk for lung cancer, getting screened with low-dose computed tomography (LDCT) every year can help save your life. This handout offers answers to some of the most common questions about lung cancer screening. If you have other questions, please call [phone] ([phone]).     What is it?  Lung cancer screening uses special X-ray technology to create an image of your lung tissue. The exam is quick and easy and takes less than 10 seconds. We don t give you any medicine or use any needles. You can eat before and after the exam. You don t need to change your clothes as long as the clothing on your chest doesn t contain metal. But, you do need to be able to hold your breath for at least 6 seconds during the exam.    What is the goal of lung cancer screening?  The goal of lung cancer screening is to save lives. Many times, " lung cancer is not found until a person starts having physical symptoms. Lung cancer screening can help detect lung cancer in the earliest stages when it may be easier to treat.    Who should be screened for lung cancer?  We suggest lung cancer screening for anyone who is at high-risk for lung cancer. You are in the high-risk group if you:     are between the ages of 55 and 79, and   have smoked at least 1 pack of cigarettes a day for 20 or more years, and   still smoke or have quit within the past 15 years.    However, if you have a new cough or shortness of breath, you should talk to your doctor before being screened.    Why does it matter if I have symptoms?  Certain symptoms can be a sign that you have a condition in your lungs that should be checked and treated by your doctor. These symptoms include fever, chest pain, a new or changing cough, shortness of breath that you have never felt before, coughing up blood or unexplained weight loss. Having any of these symptoms can greatly affect the results of lung cancer screening.       Should all smokers get an LDCT lung cancer screening exam?  It depends. Lung cancer screening is for a very specific group of men and women who have a history of heavy smoking over a long period of time (see  Who should be screened for lung cancer  above).  I am in the high-risk group, but have been diagnosed with cancer in the past. Is LDCT lung cancer screening right for me?  In some cases, you should not have LDCT lung screening, such as when your doctor is already following your cancer with CT scan studies. Your doctor will help you decide if LDCT lung screening is right for you.  Do I need to have a screening exam every year?  Yes. If you are in the high-risk group described earlier, you should get an LDCT lung cancer screening exam every year until you are 79, or are no longer willing or able to undergo screening and possible procedures to diagnose and treat lung cancer.  How  effective is LDCT at preventing death from lung cancer?  Studies have shown that LDCT lung cancer screening can lower the risk of death from lung cancer by 20 percent in people who are at high-risk.  What are the risks?  There are some risks and limitations of LDCT lung cancer screening. We want to make sure you understand the risks and benefits, so please let us know if you have any questions. Your doctor may want to talk with you more about these risks.   Radiation exposure: As with any exam that uses radiation, there is a very small increased risk of cancer. The amount of radiation in LDCT is small--about the same amount a person would get from a mammogram. Your doctor orders the exam when he or she feels the potential benefits outweigh the risks.   False negatives: No test is perfect, including LDCT. It is possible that you may have a medical condition, including lung cancer, that is not found during your exam. This is called a false negative result.   False positives and more testing: LDCT very often finds something in the lung that could be cancer, but in fact is not. This is called a false positive result. False positive tests often cause anxiety. To make sure these findings are not cancer, you may need to have more tests. These tests will be done only if you give us permission. Sometimes patients need a treatment that can have side effects, such as a biopsy. For more information on false positives, see  What can I expect from the results?    Findings not related to lung cancer: Your LDCT exam also takes pictures of areas of your body next to your lungs. In a very small number of cases, the CT scan will show an abnormal finding in one of these areas, such as your kidneys, adrenal glands, liver or thyroid. This finding may not be serious, but you may need more tests. Your doctor can help you decide what other tests you may need, if any.  What can I expect from the results?  About 1 out of 4 LDCT exams will  find something that may need more tests. Most of the time, these findings are lung nodules. Lung nodules are very small collections of tissue in the lung. These nodules are very common, and the vast majority--more than 97 percent--are not cancer (benign). Most are normal lymph nodes or small areas of scarring from past infections.  But, if a small lung nodule is found to be cancer, the cancer can be cured more than 90 percent of the time. To know if the nodule is cancer, we may need to get more images before your next yearly screening exam. If the nodule has suspicious features (for example, it is large, has an odd shape or grows over time), we will refer you to a specialist for further testing.  Will my doctor also get the results?  Yes. Your doctor will get a copy of your results.  Is it okay to keep smoking now that there s a cancer screening exam?  No. Tobacco is one of the strongest cancer-causing agents. It causes not only lung cancer, but other cancers and cardiovascular (heart) diseases as well. The damage caused by smoking builds over time. This means that the longer you smoke, the higher your risk of disease. While it is never too late to quit, the sooner you quit, the better.  Where can I find help to quit smoking?  The best way to prevent lung cancer is to stop smoking. If you have already quit smoking, congratulations and keep it up! For help on quitting smoking, please call [employer] at 6-845-QUITNOW (1-958.210.9621) or the American Cancer Society at 1-845.993.9986 to find local resources near you.  One-on-one health coaching:  If you d prefer to work individually with a health care provider on tobacco cessation, we offer:     Medication Therapy Management:  Our specially trained pharmacists work closely with you and your doctor to help you quit smoking.  Call [phone] or [phone] (toll free).      Yaritza Ortega is a 63 year old, presenting for the following:  Physical        5/9/2024      4:05 PM   Additional Questions   Roomed by Halina COX MA   Accompanied by Self         5/9/2024     4:05 PM   Patient Reported Additional Medications   Patient reports taking the following new medications none        Health Care Directive  Patient has a Health Care Directive on file  Advance care planning document is on file and is current.    HPI      Has appt with nephrology in October 2024.          5/5/2024   General Health   How would you rate your overall physical health? Good   Feel stress (tense, anxious, or unable to sleep) Only a little   (!) STRESS CONCERN      5/5/2024   Nutrition   Three or more servings of calcium each day? Yes   Diet: Regular (no restrictions)   How many servings of fruit and vegetables per day? (!) 2-3   How many sweetened beverages each day? 0-1         5/5/2024   Exercise   Days per week of moderate/strenous exercise 5 days   Average minutes spent exercising at this level 30 min         5/5/2024   Social Factors   Frequency of gathering with friends or relatives More than three times a week   Worry food won't last until get money to buy more No   Food not last or not have enough money for food? No   Do you have housing?  Yes   Are you worried about losing your housing? No   Lack of transportation? No   Unable to get utilities (heat,electricity)? No         5/5/2024   Fall Risk   Fallen 2 or more times in the past year? No   Trouble with walking or balance? No          5/5/2024   Dental   Dentist two times every year? Yes         5/5/2024   TB Screening   Were you born outside of the [place]? No         Today's PHQ-2 Score:       5/9/2024     3:42 PM   PHQ-2 ( 1999 [employer])   Q1: Little interest or pleasure in doing things 0   Q2: Feeling down, depressed or hopeless 0   PHQ-2 Score 0   Q1: Little interest or pleasure in doing things Not at all   Q2: Feeling down, depressed or hopeless Not at all   PHQ-2 Score 0           5/5/2024   Substance Use   Alcohol more than 3/day or more than  7/wk Not Applicable   Do you use any other substances recreationally? No     Social History     Tobacco Use    Smoking status: Former     Current packs/day: 0.00     Average packs/day: 1 pack/day for 30.0 years (30.0 ttl pk-yrs)     Types: Cigarettes     Start date: 1982     Quit date: 2012     Years since quittin.1    Smokeless tobacco: Never    Tobacco comments:     2 PPD   Vaping Use    Vaping status: Never Used   Substance Use Topics    Alcohol use: Not Currently     Comment: RARE    Drug use: Not Currently     Types: Marijuana, Hashish, LSD     Comment: 30 years ago           2024   STI Screening   New sexual partner(s) since last STI/HIV test? No   Last PSA:   PSA   Date Value Ref Range Status   2019 2.47 0 - 4 ug/L Final     Comment:     Assay Method:  Chemiluminescence using Siemens Vista analyzer     ASCVD Risk   The 10-year ASCVD risk score (Vee VERA, et al., 2019) is: 5.3%    Values used to calculate the score:      Age: 63 years      Sex: Male      Is Non- : No      Diabetic: No      Tobacco smoker: No      Systolic Blood Pressure: 102 mmHg      Is BP treated: Yes      HDL Cholesterol: 62 mg/dL      Total Cholesterol: 146 mg/dL           Reviewed and updated as needed this visit by Provider                    Past Medical History:   Diagnosis Date    CKD (chronic kidney disease)     stage 3    Depressive disorder     Schizoaffective - manic depressive with schizophrenic tendencies    Depressive disorder, not elsewhere classified     Manic     HTN (hypertension)     Unspecified schizophrenia, unspecified condition      Past Surgical History:   Procedure Laterality Date    ABDOMEN SURGERY      Double hernja    COLONOSCOPY      normal. has fam hx colon cancer    COLONOSCOPY N/A 10/13/2014    Procedure: COLONOSCOPY;  Surgeon: Santy Constantino MD;  Location: [facility]    COLONOSCOPY N/A 2022    Procedure: COLONOSCOPY;  Surgeon: Sherif Duarte MD;   Location: [facility]    HERNIA REPAIR, INGUINAL RT/LT  2004    SURGICAL HISTORY OF -       Incised & Drained - ? Perirectal Abscess      Patient Active Problem List   Diagnosis    Severe bipolar I disorder, most recent episode mixed, with psychotic features (H)    Sebaceous cyst    CKD (chronic kidney disease) stage 3, GFR 30-59 ml/min (H)    Schizoaffective disorder (H)    Hyperlipidemia LDL goal <160    Tubular adenoma of colon    Former smoker    Benign essential hypertension    Former smoker, stopped smoking in distant past    Deep vein thrombosis (DVT) (H)    Acute deep vein thrombosis (DVT) of femoral vein of right lower extremity (H)    Acute deep vein thrombosis (DVT) of right lower extremity, unspecified vein (H)    Acute deep vein thrombosis (DVT) of other specified vein of right lower extremity (H)    Long term current use of anticoagulant therapy     Past Surgical History:   Procedure Laterality Date    ABDOMEN SURGERY      Double hernja    COLONOSCOPY      normal. has fam hx colon cancer    COLONOSCOPY N/A 10/13/2014    Procedure: COLONOSCOPY;  Surgeon: Santy Constantino MD;  Location: [facility]    COLONOSCOPY N/A 2022    Procedure: COLONOSCOPY;  Surgeon: Sherif Duarte MD;  Location: [facility]    HERNIA REPAIR, INGUINAL RT/LT  2004    SURGICAL HISTORY OF -       Incised & Drained - ? Perirectal Abscess        Social History     Tobacco Use    Smoking status: Former     Current packs/day: 0.00     Average packs/day: 1 pack/day for 30.0 years (30.0 ttl pk-yrs)     Types: Cigarettes     Start date: 1982     Quit date: 2012     Years since quittin.1    Smokeless tobacco: Never    Tobacco comments:     2 PPD   Substance Use Topics    Alcohol use: Not Currently     Comment: RARE     Family History   Problem Relation Age of Onset    Thyroid Disease Mother     Coronary Artery Disease Mother         pacemaker    Cancer Paternal Grandfather     Cancer Sister     Cancer - colorectal Sister      "Colon Cancer Sister     Mental Illness Nephew         Noé committed suicide - ex wife committed suicide also         Current Outpatient Medications   Medication Sig Dispense Refill    losartan (COZAAR) 100 MG tablet Take 1 tablet (100 mg) by mouth daily SCHEDULE FASTING LAB APPOINTMENT, THEN AN IN-CLINIC PROVIDER APPOINTMENT. 90 tablet 0    Multiple Vitamins-Minerals (MULTIVITAMIN ADULT PO) Take 1 tablet by mouth At Bedtime       OLANZapine (ZYPREXA) 5 MG tablet TAKE 1 TABLET BY MOUTH ONCE DAILY AT BEDTIME AND 1/2 (ONE-HALF) AS NEEDED AS DIRECTED      Omega-3 Fatty Acids (OMEGA 3 PO) Take 2,400 mg by mouth every evening      risperiDONE (RISPERDAL) 0.25 MG tablet       cholecalciferol 5000 units (125 mcg) PO capsule Take by mouth daily (Patient not taking: Reported on 5/9/2024)      lamoTRIgine (LAMICTAL) 100 MG tablet Take 1 tablet (100 mg) by mouth 2 times daily 60 tablet 0    warfarin ANTICOAGULANT (COUMADIN) 5 MG tablet Take 5 mg daily, or as directed by inr clinic 100 tablet 1     Allergies   Allergen Reactions    Lisinopril Nausea     Felt weakness nausea, couldn't sleep         Review of Systems  Constitutional, HEENT, cardiovascular, pulmonary, GI, , musculoskeletal, neuro, skin, endocrine and psych systems are negative, except as otherwise noted.     Objective    Exam  /68 (BP Location: Right arm, Patient Position: Sitting, Cuff Size: Adult Regular)   Pulse 56   Temp (!) 96.7  F (35.9  C) (Tympanic)   Resp 20   Ht 1.793 m (5' 10.6\")   Wt 75.8 kg (167 lb)   SpO2 94%   BMI 23.56 kg/m     Estimated body mass index is 23.56 kg/m  as calculated from the following:    Height as of this encounter: 1.793 m (5' 10.6\").    Weight as of this encounter: 75.8 kg (167 lb).    Physical Exam  GENERAL APPEARANCE: well-nourished, ambulatory w/o assist,  alert and no distress  EYES: pink conj, no icterus, PERRL, EOMI  HENT: ear canals and TM's normal, nose and mouth without ulcers or lesions, oropharynx clear " and oral mucous membranes moist  NECK: no adenopathy, no asymmetry, masses, or scars and thyroid normal to palpation  RESP: lungs clear to auscultation - no rales, rhonchi or wheezes  CV: regular rates and rhythm, normal S1 S2, no S3 or S4, no murmur, click or rub, no peripheral edema and peripheral pulses strong  ABDOMEN: soft, nontender, no hepatosplenomegaly, no masses and bowel sounds normal  RECTAL: deferred  MS: no musculoskeletal defects are noted and gait is age appropriate without ataxia  SKIN: no suspicious lesions or rashes  NEURO: Normal strength and tone, sensory exam grossly normal, mentation intact and speech normal         Signed Electronically by: Juan Antonio Flanagan MD  Lung Cancer Screening Shared Decision Making Visit     Jordan Barnett, a 63 year old male, is eligible for lung cancer screening    History   Smoking Status    Former    Types: Cigarettes   Smokeless Tobacco    Never       I have discussed with patient the risks and benefits of screening for lung cancer with low-dose CT.     The risks include:    radiation exposure: one low dose chest CT has as much ionizing radiation as about 15 chest x-rays, or 6 months of background radiation living in [place]      false positives: most findings/nodules are NOT cancer, but some might still require additional diagnostic evaluation, including biopsy    over-diagnosis: some slow growing cancers that might never have been clinically significant will be detected and treated unnecessarily     The benefit of early detection of lung cancer is contingent upon adherence to annual screening or more frequent follow up if indicated.     Furthermore, to benefit from screening, Jordan must be willing and able to undergo diagnostic procedures, if indicated. Although no specific guide is available for determining severity of comorbidities, it is reasonable to withhold screening in patients who have greater mortality risk from other diseases.     We did discuss  that the best way to prevent lung cancer is to not smoke.    Some patients may value a numeric estimation of lung cancer risk when evaluating if lung cancer screening is right for them, here is one calculator:    ShouldIScreen

## 2024-05-23 ENCOUNTER — ANTICOAGULATION THERAPY VISIT (OUTPATIENT)
Dept: ANTICOAGULATION | Facility: CLINIC | Age: 64
End: 2024-05-23

## 2024-05-23 ENCOUNTER — LAB (OUTPATIENT)
Dept: LAB | Facility: CLINIC | Age: 64
End: 2024-05-23
Payer: COMMERCIAL

## 2024-05-23 DIAGNOSIS — Z79.01 LONG TERM CURRENT USE OF ANTICOAGULANT THERAPY: ICD-10-CM

## 2024-05-23 DIAGNOSIS — I82.491 ACUTE DEEP VEIN THROMBOSIS (DVT) OF OTHER SPECIFIED VEIN OF RIGHT LOWER EXTREMITY (H): Primary | ICD-10-CM

## 2024-05-23 DIAGNOSIS — I82.411 ACUTE DEEP VEIN THROMBOSIS (DVT) OF FEMORAL VEIN OF RIGHT LOWER EXTREMITY (H): ICD-10-CM

## 2024-05-23 LAB — INR BLD: 2.6 (ref 0.9–1.1)

## 2024-05-23 PROCEDURE — 85610 PROTHROMBIN TIME: CPT

## 2024-05-23 PROCEDURE — 36416 COLLJ CAPILLARY BLOOD SPEC: CPT

## 2024-05-23 NOTE — PROGRESS NOTES
ANTICOAGULATION MANAGEMENT     Jordan Barnett 63 year old male is on warfarin with therapeutic INR result. (Goal INR 2.0-3.0)    Recent labs: (last 7 days)     05/23/24  1454   INR 2.6*       ASSESSMENT     Source(s): Chart Review and Patient/Caregiver Call     Warfarin doses taken: Warfarin taken as instructed  Diet: No new diet changes identified  Medication/supplement changes: None noted  New illness, injury, or hospitalization: No  Signs or symptoms of bleeding or clotting: No  Previous result: Therapeutic last visit; previously outside of goal range  Additional findings: None       PLAN     Recommended plan for no diet, medication or health factor changes affecting INR     Dosing Instructions: Continue your current warfarin dose with next INR in 3 weeks       Summary  As of 5/23/2024      Full warfarin instructions:  2.5 mg every Mon, Thu; 5 mg all other days   Next INR check:  6/13/2024               Telephone call with Jordan who verbalizes understanding and agrees to plan    Lab visit scheduled    Education provided:   Please call back if any changes to your diet, medications or how you've been taking warfarin  Contact [phone]  with any changes, questions or concerns.     Plan made per [facility] anticoagulation protocol    Maryellen Alberts RN  Anticoagulation Clinic  5/23/2024    _______________________________________________________________________     Anticoagulation Episode Summary       Current INR goal:  2.0-3.0   TTR:  56.7% (1 y)   Target end date:  Indefinite   Send INR reminders to:  [facility]    Indications    Acute deep vein thrombosis (DVT) of other specified vein of right lower extremity (H) [I82.491]  Long term current use of anticoagulant therapy [Z79.01]  Acute deep vein thrombosis (DVT) of femoral vein of right lower extremity (H) [I82.411]             Comments:               Anticoagulation Care Providers       Provider Role Specialty Phone number    Kaleb Barnett MD Referring  Emergency Medicine [phone]    Juan Antonio Flanagan MD Referring Family Medicine [phone]

## 2024-06-06 ENCOUNTER — HOSPITAL ENCOUNTER (OUTPATIENT)
Dept: CT IMAGING | Facility: CLINIC | Age: 64
Discharge: HOME | End: 2024-06-06
Attending: FAMILY MEDICINE | Admitting: FAMILY MEDICINE
Payer: COMMERCIAL

## 2024-06-06 DIAGNOSIS — Z87.891 PERSONAL HISTORY OF TOBACCO USE: ICD-10-CM

## 2024-06-06 PROCEDURE — 71271 CT THORAX LUNG CANCER SCR C-: CPT

## 2024-06-07 NOTE — RESULT ENCOUNTER NOTE
The 10-year ASCVD risk score (Vee VERA, et al., 2019) is: 5.3%    Values used to calculate the score:      Age: 63 years      Sex: Male      Is Non- : No      Diabetic: No      Tobacco smoker: No      Systolic Blood Pressure: 102 mmHg      Is BP treated: Yes      HDL Cholesterol: 62 mg/dL      Total Cholesterol: 146 mg/dL

## 2024-06-13 ENCOUNTER — TELEPHONE (OUTPATIENT)
Dept: FAMILY MEDICINE | Facility: CLINIC | Age: 64
End: 2024-06-13

## 2024-06-13 ENCOUNTER — LAB (OUTPATIENT)
Dept: LAB | Facility: CLINIC | Age: 64
End: 2024-06-13
Payer: COMMERCIAL

## 2024-06-13 ENCOUNTER — ALLIED HEALTH/NURSE VISIT (OUTPATIENT)
Dept: FAMILY MEDICINE | Facility: CLINIC | Age: 64
End: 2024-06-13
Payer: COMMERCIAL

## 2024-06-13 ENCOUNTER — ANTICOAGULATION THERAPY VISIT (OUTPATIENT)
Dept: ANTICOAGULATION | Facility: CLINIC | Age: 64
End: 2024-06-13

## 2024-06-13 VITALS — SYSTOLIC BLOOD PRESSURE: 100 MMHG | HEART RATE: 76 BPM | DIASTOLIC BLOOD PRESSURE: 68 MMHG

## 2024-06-13 DIAGNOSIS — I10 BENIGN ESSENTIAL HYPERTENSION: Primary | ICD-10-CM

## 2024-06-13 DIAGNOSIS — I82.491 ACUTE DEEP VEIN THROMBOSIS (DVT) OF OTHER SPECIFIED VEIN OF RIGHT LOWER EXTREMITY (H): Primary | ICD-10-CM

## 2024-06-13 DIAGNOSIS — I82.411 ACUTE DEEP VEIN THROMBOSIS (DVT) OF FEMORAL VEIN OF RIGHT LOWER EXTREMITY (H): ICD-10-CM

## 2024-06-13 DIAGNOSIS — Z79.01 LONG TERM CURRENT USE OF ANTICOAGULANT THERAPY: ICD-10-CM

## 2024-06-13 LAB — INR BLD: 3.4 (ref 0.9–1.1)

## 2024-06-13 PROCEDURE — 85610 PROTHROMBIN TIME: CPT

## 2024-06-13 PROCEDURE — 36416 COLLJ CAPILLARY BLOOD SPEC: CPT

## 2024-06-13 PROCEDURE — 99207 PR NO CHARGE NURSE ONLY: CPT

## 2024-06-13 NOTE — PROGRESS NOTES
Jordan Barnett is a 64 year old year old patient who comes in today for a Blood Pressure check because of medication change, losartan was decreased to 50 mg po daily on 5/9/24.  Vital Signs as repeated by RN /68 P 76.  Patient is taking medication as prescribed  Patient is tolerating medications well.  Patient is not monitoring Blood Pressure at home.  Average readings if yes are N/A.  Current complaints: none  Disposition:  BP readings routed to Dr. Flanagan for review/advise, recommended he check BP weekly notifying care team of readings > 140/90 or < 100/60, patient to continue with the same medication and patient reminded to call as needed.    Julie Behrendt RN

## 2024-06-13 NOTE — PROGRESS NOTES
ANTICOAGULATION MANAGEMENT     Jordan Banrett 64 year old male is on warfarin with supratherapeutic INR result. (Goal INR 2.0-3.0)    Recent labs: (last 7 days)     06/13/24  1531   INR 3.4*       ASSESSMENT     Source(s): Chart Review and Patient/Caregiver Call     Warfarin doses taken: Warfarin taken as instructed  Diet: No new diet changes identified  Medication/supplement changes: None noted  New illness, injury, or hospitalization: No  Signs or symptoms of bleeding or clotting: No  Previous result: Therapeutic last 2(+) visits  Additional findings: None       PLAN     Recommended plan for no diet, medication or health factor changes affecting INR     Dosing Instructions: decrease your warfarin dose (8.3% change) with next INR in 2 weeks       Summary  As of 6/13/2024      Full warfarin instructions:  2.5 mg every Mon, Thu, Sat; 5 mg all other days   Next INR check:  6/27/2024               Telephone call with Jordan who verbalizes understanding and agrees to plan    Lab visit scheduled    Education provided:   Goal range and lab monitoring: goal range and significance of current result    Plan made per [facility] anticoagulation protocol    Zuleyka Floyd RN  Anticoagulation Clinic  6/13/2024    _______________________________________________________________________     Anticoagulation Episode Summary       Current INR goal:  2.0-3.0   TTR:  53.8% (1 y)   Target end date:  Indefinite   Send INR reminders to:  [facility]    Indications    Acute deep vein thrombosis (DVT) of other specified vein of right lower extremity (H) [I82.491]  Long term current use of anticoagulant therapy [Z79.01]  Acute deep vein thrombosis (DVT) of femoral vein of right lower extremity (H) [I82.411]             Comments:               Anticoagulation Care Providers       Provider Role Specialty Phone number    Kaleb Barnett MD Referring Emergency Medicine [phone]    Juan Antonio Flanagan MD Referring Family Medicine  [phone]

## 2024-07-05 ENCOUNTER — ANTICOAGULATION THERAPY VISIT (OUTPATIENT)
Dept: ANTICOAGULATION | Facility: CLINIC | Age: 64
End: 2024-07-05

## 2024-07-05 ENCOUNTER — LAB (OUTPATIENT)
Dept: LAB | Facility: CLINIC | Age: 64
End: 2024-07-05
Payer: COMMERCIAL

## 2024-07-05 ENCOUNTER — TELEPHONE (OUTPATIENT)
Dept: FAMILY MEDICINE | Facility: CLINIC | Age: 64
End: 2024-07-05

## 2024-07-05 DIAGNOSIS — Z79.01 LONG TERM CURRENT USE OF ANTICOAGULANT THERAPY: ICD-10-CM

## 2024-07-05 DIAGNOSIS — I82.491 ACUTE DEEP VEIN THROMBOSIS (DVT) OF OTHER SPECIFIED VEIN OF RIGHT LOWER EXTREMITY (H): Primary | ICD-10-CM

## 2024-07-05 DIAGNOSIS — Z79.01 LONG TERM CURRENT USE OF ANTICOAGULANT THERAPY: Primary | ICD-10-CM

## 2024-07-05 DIAGNOSIS — I82.411 ACUTE DEEP VEIN THROMBOSIS (DVT) OF FEMORAL VEIN OF RIGHT LOWER EXTREMITY (H): ICD-10-CM

## 2024-07-05 DIAGNOSIS — I82.491 ACUTE DEEP VEIN THROMBOSIS (DVT) OF OTHER SPECIFIED VEIN OF RIGHT LOWER EXTREMITY (H): ICD-10-CM

## 2024-07-05 LAB — INR BLD: 3.1 (ref 0.9–1.1)

## 2024-07-05 PROCEDURE — 85610 PROTHROMBIN TIME: CPT

## 2024-07-05 PROCEDURE — 36416 COLLJ CAPILLARY BLOOD SPEC: CPT

## 2024-07-05 NOTE — PROGRESS NOTES
ANTICOAGULATION MANAGEMENT     Jordan Barnett 64 year old male is on warfarin with supratherapeutic INR result. (Goal INR 2.0-3.0)    Recent labs: (last 7 days)     07/05/24  1539   INR 3.1*       ASSESSMENT     Warfarin Lab Questionnaire    Warfarin Doses Last 7 Days      7/5/2024     1:46 PM   Dose in Tablet or Mg   TAB or MG? milligram (mg)     Pt Rptd Dose SUNDAY MONDAY TUESDAY WED THURS FRIDAY SATURDAY 7/5/2024   1:46 PM 5 2.5 5 5 2.5 5 2.5         7/5/2024   Warfarin Lab Questionnaire   Missed doses within past 14 days? No   Changes in diet or alcohol within past 14 days? No   Medication changes since last result? No   Injuries or illness since last result? No   New shortness of breath, severe headaches or sudden changes in vision since last result? No   Abnormal bleeding since last result? No   Upcoming surgery, procedure? No   Best number to call with results? [phone]        Previous result: Supratherapeutic  Additional findings: None       PLAN     Recommended plan for no diet, medication or health factor changes affecting INR     Dosing Instructions: decrease your warfarin dose (9.1% change) with next INR in 2 weeks       Summary  As of 7/5/2024      Full warfarin instructions:  5 mg every Sun, Wed, Fri; 2.5 mg all other days   Next INR check:  7/17/2024               Telephone call with Jordan who verbalizes understanding and agrees to plan    Lab visit scheduled    Education provided: Goal range and lab monitoring: goal range and significance of current result    Plan made per [facility] anticoagulation protocol    Zuleyka Floyd RN  Anticoagulation Clinic  7/5/2024    _______________________________________________________________________     Anticoagulation Episode Summary       Current INR goal:  2.0-3.0   TTR:  47.8% (1 y)   Target end date:  Indefinite   Send INR reminders to:  [facility]    Indications    Acute deep vein thrombosis (DVT) of other specified vein of right lower extremity (H)  [I82.491]  Long term current use of anticoagulant therapy [Z79.01]  Acute deep vein thrombosis (DVT) of femoral vein of right lower extremity (H) [I82.411]             Comments:               Anticoagulation Care Providers       Provider Role Specialty Phone number    Kaleb Barnett MD Referring Emergency Medicine [phone]    Juan Antonio Flanagan MD Referring Family Medicine [phone]

## 2024-07-05 NOTE — TELEPHONE ENCOUNTER
ANTICOAGULATION CLINIC REFERRAL RENEWAL REQUEST       An annual renewal order is required for all patients referred to [facility].?  Please review and sign the pended referral order for Jordan Barnett.       ANTICOAGULATION SUMMARY      Warfarin indication(s)   DVT    Mechanical heart valve present?  NO       Current goal range   INR: 2.0-3.0     Goal appropriate for indication? Goal INR 2-3, standard for indication(s) above     Time in Therapeutic Range (TTR)  (Goal > 60%) 53.8%       Office visit with referring provider's group within last year yes on 5/9/24       Zuleyka Floyd RN  [facility]

## 2024-07-11 ENCOUNTER — PATIENT OUTREACH (OUTPATIENT)
Dept: ONCOLOGY | Facility: CLINIC | Age: 64
End: 2024-07-11
Payer: COMMERCIAL

## 2024-07-11 NOTE — PROGRESS NOTES
[facility]: Cancer Care                                                                                          RNCC Spring Mcclendon removed from pt list as pt no longer follows with hematology.    Signature:  SHERRIE BARRETO RN

## 2024-07-17 ENCOUNTER — ANTICOAGULATION THERAPY VISIT (OUTPATIENT)
Dept: ANTICOAGULATION | Facility: CLINIC | Age: 64
End: 2024-07-17

## 2024-07-17 ENCOUNTER — LAB (OUTPATIENT)
Dept: LAB | Facility: CLINIC | Age: 64
End: 2024-07-17
Payer: COMMERCIAL

## 2024-07-17 DIAGNOSIS — Z79.01 LONG TERM CURRENT USE OF ANTICOAGULANT THERAPY: ICD-10-CM

## 2024-07-17 DIAGNOSIS — I82.491 ACUTE DEEP VEIN THROMBOSIS (DVT) OF OTHER SPECIFIED VEIN OF RIGHT LOWER EXTREMITY (H): Primary | ICD-10-CM

## 2024-07-17 DIAGNOSIS — I82.491 ACUTE DEEP VEIN THROMBOSIS (DVT) OF OTHER SPECIFIED VEIN OF RIGHT LOWER EXTREMITY (H): ICD-10-CM

## 2024-07-17 DIAGNOSIS — I82.411 ACUTE DEEP VEIN THROMBOSIS (DVT) OF FEMORAL VEIN OF RIGHT LOWER EXTREMITY (H): ICD-10-CM

## 2024-07-17 LAB — INR BLD: 1.9 (ref 0.9–1.1)

## 2024-07-17 PROCEDURE — 85610 PROTHROMBIN TIME: CPT

## 2024-07-17 PROCEDURE — 36416 COLLJ CAPILLARY BLOOD SPEC: CPT

## 2024-07-17 NOTE — PROGRESS NOTES
ANTICOAGULATION MANAGEMENT     Jordan Barnett 64 year old male is on warfarin with subtherapeutic INR result. (Goal INR 2.0-3.0)    Recent labs: (last 7 days)     07/17/24  0716   INR 1.9*       ASSESSMENT     Source(s): Chart Review and Patient/Caregiver Call     Warfarin doses taken: Warfarin taken as instructed  Diet: No new diet changes identified  Medication/supplement changes: None noted  New illness, injury, or hospitalization: No  Signs or symptoms of bleeding or clotting: No  Previous result: Supratherapeutic  Additional findings:  Maintenance dose was decreased at the last visit. Patient may need  2.5 mg tablets ordered to stabilize INR.       PLAN     Recommended plan for no diet, medication or health factor changes affecting INR     Dosing Instructions: Increase your warfarin dose (10% change) with next INR in 2 weeks       Summary  As of 7/17/2024      Full warfarin instructions:  2.5 mg every Tue, Thu, Sat; 5 mg all other days   Next INR check:  7/31/2024               Telephone call with Jordan who verbalizes understanding and agrees to plan    Lab visit scheduled    Education provided: Taking warfarin: discussed tablet strength and that the patient may need 2.5 mg tablets.   Contact [phone] with any changes, questions or concerns.     Plan made per [facility] anticoagulation protocol    Lynda COX RN  Anticoagulation Clinic  7/17/2024    _______________________________________________________________________     Anticoagulation Episode Summary       Current INR goal:  2.0-3.0   TTR:  47.2% (1 y)   Target end date:  Indefinite   [facility]    Indications    Acute deep vein thrombosis (DVT) of other specified vein of right lower extremity (H) [I82.491]  Long term current use of anticoagulant therapy [Z79.01]  Acute deep vein thrombosis (DVT) of femoral vein of right lower extremity (H) [I82.411]             Comments:               Anticoagulation Care Providers        Provider Role Specialty Phone number    Kaleb Barnett MD Referring Emergency Medicine [phone]    Juan Antonio Flanagan MD Referring Family Medicine [phone]

## 2024-07-29 ENCOUNTER — LAB (OUTPATIENT)
Dept: LAB | Facility: CLINIC | Age: 64
End: 2024-07-29
Payer: COMMERCIAL

## 2024-07-29 ENCOUNTER — ANTICOAGULATION THERAPY VISIT (OUTPATIENT)
Dept: ANTICOAGULATION | Facility: CLINIC | Age: 64
End: 2024-07-29

## 2024-07-29 DIAGNOSIS — Z79.01 LONG TERM CURRENT USE OF ANTICOAGULANT THERAPY: ICD-10-CM

## 2024-07-29 DIAGNOSIS — I82.411 ACUTE DEEP VEIN THROMBOSIS (DVT) OF FEMORAL VEIN OF RIGHT LOWER EXTREMITY (H): ICD-10-CM

## 2024-07-29 DIAGNOSIS — I82.491 ACUTE DEEP VEIN THROMBOSIS (DVT) OF OTHER SPECIFIED VEIN OF RIGHT LOWER EXTREMITY (H): ICD-10-CM

## 2024-07-29 DIAGNOSIS — I82.491 ACUTE DEEP VEIN THROMBOSIS (DVT) OF OTHER SPECIFIED VEIN OF RIGHT LOWER EXTREMITY (H): Primary | ICD-10-CM

## 2024-07-29 LAB — INR BLD: 2.7 (ref 0.9–1.1)

## 2024-07-29 PROCEDURE — 36416 COLLJ CAPILLARY BLOOD SPEC: CPT

## 2024-07-29 PROCEDURE — 85610 PROTHROMBIN TIME: CPT

## 2024-07-29 NOTE — PROGRESS NOTES
ANTICOAGULATION MANAGEMENT     Jordan Barnett 64 year old male is on warfarin with therapeutic INR result. (Goal INR 2.0-3.0)    Recent labs: (last 7 days)     07/29/24  1447   INR 2.7*       ASSESSMENT     Warfarin Lab Questionnaire    Warfarin Doses Last 7 Days      7/29/2024    11:45 AM   Dose in Tablet or Mg   TAB or MG? milligram (mg)     Pt Rptd Dose SUNDAY MONDAY TUESDAY WED THURS FRIDAY SATURDAY 7/29/2024  11:45 AM 5 5 2.5 5 2.5 5 2.5         7/29/2024   Warfarin Lab Questionnaire   Missed doses within past 14 days? No   Changes in diet or alcohol within past 14 days? No   Medication changes since last result? Yes   Please list: Changed Warfarin   Injuries or illness since last result? No   New shortness of breath, severe headaches or sudden changes in vision since last result? No   Abnormal bleeding since last result? No   Upcoming surgery, procedure? No   Best number to call with results? [phone]      Previous result: Subtherapeutic  Additional findings: None     PLAN     Recommended plan for no diet, medication or health factor changes affecting INR     Dosing Instructions: Continue your current warfarin dose with next INR in 3 weeks       Summary  As of 7/29/2024      Full warfarin instructions:  2.5 mg every Tue, Thu, Sat; 5 mg all other days   Next INR check:  8/22/2024               Telephone call with Jordan who verbalizes understanding and agrees to plan    Lab visit scheduled    Education provided: Please call back if any changes to your diet, medications or how you've been taking warfarin    Plan made per [facility] anticoagulation protocol    Joann Gurrola RN  Anticoagulation Clinic  7/29/2024    _______________________________________________________________________     Anticoagulation Episode Summary       Current INR goal:  2.0-3.0   TTR:  46.9% (1 y)   Target end date:  Indefinite   Send INR reminders to:  [facility]    Indications    Acute deep vein thrombosis (DVT) of other specified  vein of right lower extremity (H) [I82.491]  Long term current use of anticoagulant therapy [Z79.01]  Acute deep vein thrombosis (DVT) of femoral vein of right lower extremity (H) [I82.411]             Comments:               Anticoagulation Care Providers       Provider Role Specialty Phone number    Kaleb Barnett MD Referring Emergency Medicine [phone]    Juan Antonio Flanagan MD Referring Family Medicine [phone]

## 2024-08-22 ENCOUNTER — ANTICOAGULATION THERAPY VISIT (OUTPATIENT)
Dept: ANTICOAGULATION | Facility: CLINIC | Age: 64
End: 2024-08-22

## 2024-08-22 ENCOUNTER — LAB (OUTPATIENT)
Dept: LAB | Facility: CLINIC | Age: 64
End: 2024-08-22
Payer: COMMERCIAL

## 2024-08-22 DIAGNOSIS — Z79.01 LONG TERM CURRENT USE OF ANTICOAGULANT THERAPY: ICD-10-CM

## 2024-08-22 DIAGNOSIS — I82.491 ACUTE DEEP VEIN THROMBOSIS (DVT) OF OTHER SPECIFIED VEIN OF RIGHT LOWER EXTREMITY (H): Primary | ICD-10-CM

## 2024-08-22 DIAGNOSIS — I82.411 ACUTE DEEP VEIN THROMBOSIS (DVT) OF FEMORAL VEIN OF RIGHT LOWER EXTREMITY (H): ICD-10-CM

## 2024-08-22 DIAGNOSIS — I82.491 ACUTE DEEP VEIN THROMBOSIS (DVT) OF OTHER SPECIFIED VEIN OF RIGHT LOWER EXTREMITY (H): ICD-10-CM

## 2024-08-22 LAB — INR BLD: 2.8 (ref 0.9–1.1)

## 2024-08-22 PROCEDURE — 36416 COLLJ CAPILLARY BLOOD SPEC: CPT

## 2024-08-22 PROCEDURE — 85610 PROTHROMBIN TIME: CPT

## 2024-08-22 NOTE — PROGRESS NOTES
ANTICOAGULATION MANAGEMENT     Jordan Barnett 64 year old male is on warfarin with therapeutic INR result. (Goal INR 2.0-3.0)    Recent labs: (last 7 days)     08/22/24  0715   INR 2.8*       ASSESSMENT     Warfarin Lab Questionnaire    Warfarin Doses Last 7 Days      8/22/2024     4:49 AM   Dose in Tablet or Mg   TAB or MG? milligram (mg)     Pt Rptd Dose SUNDAY MONDAY TUESDAY WED THURS FRIDAY SATURDAY 8/22/2024   4:49 AM 5 5 2.5 5 2.5 5 2.5         8/22/2024   Warfarin Lab Questionnaire   Missed doses within past 14 days? No   Changes in diet or alcohol within past 14 days? No   Medication changes since last result? No   Injuries or illness since last result? No   New shortness of breath, severe headaches or sudden changes in vision since last result? No   Abnormal bleeding since last result? No   Upcoming surgery, procedure? No      Previous result: Therapeutic last visit; previously outside of goal range  Additional findings: None     PLAN     Recommended plan for no diet, medication or health factor changes affecting INR     Dosing Instructions: Continue your current warfarin dose with next INR in 4 weeks       Summary  As of 8/22/2024      Full warfarin instructions:  2.5 mg every Tue, Thu, Sat; 5 mg all other days   Next INR check:  9/19/2024               Telephone call with Jordan who verbalizes understanding and agrees to plan    Lab visit scheduled    Education provided: Please call back if any changes to your diet, medications or how you've been taking warfarin    Plan made per [facility] anticoagulation protocol    Joann Gurrola RN  Anticoagulation Clinic  8/22/2024    _______________________________________________________________________     Anticoagulation Episode Summary       Current INR goal:  2.0-3.0   TTR:  48.0% (1 y)   Target end date:  Indefinite   Send INR reminders to:  [facility]    Indications    Acute deep vein thrombosis (DVT) of other specified vein of right lower extremity (H)  [I82.491]  Long term current use of anticoagulant therapy [Z79.01]  Acute deep vein thrombosis (DVT) of femoral vein of right lower extremity (H) [I82.411]             Comments:               Anticoagulation Care Providers       Provider Role Specialty Phone number    Kaleb Barnett MD Referring Emergency Medicine [phone]    Juan Antonio Flanagan MD Referring Family Medicine [phone]

## 2024-09-06 NOTE — CONFIDENTIAL NOTE
DIAGNOSIS:    Essential hypertension with goal blood pressure less than 140/90   Anemia, unspecified type   Stage 3b chronic kidney disease (CKD) (H)   Albuminuria      DATE RECEIVED:  10.21.2024     NOTES STATUS DETAILS   OFFICE NOTE from referring provider Internal 02.10.2024  Juan Antonio Flanagan MD    MEDICATION LIST Internal    LABS     CBC Internal 02.15.2024   CMP Internal 02.15.2024   BMP Internal 02.15.2024

## 2024-09-11 NOTE — LETTER
1/24/2018         RE: Jordan Barnett  [address]  [facility] [address]        Dear Colleague,    Thank you for referring your patient, Jordan Barnett, to the [facility]. Please see a copy of my visit note below.    Sports Medicine Clinic Visit - Interim History January 24, 2018      PCP: Juan Antonio Flanagan    Jordan Barnett is a 57 year old male who is seen in f/u up for Other closed fracture of left patella with routine healing, subsequent encounter. Since last visit on 1/5/18, patient reports he has not been kneeling per restrictions and the only thing that is bothersome is sitting for a long period of time.  - Now ~ 8 weeks from initial injury    Social History: stock at [employer]    Review of Systems  Skin: initial bruising, initial swelling  Musculoskeletal: as above  Neurologic: no numbness, paresthesias  Remainder of review of systems is negative including constitutional, CV, pulmonary, GI, except as noted in HPI or medical history.    Patient's current problem list, past medical and surgical history, and family history were reviewed.    Patient Active Problem List   Diagnosis     Severe bipolar I disorder, most recent episode mixed, with psychotic features (H)     Sebaceous cyst     CKD (chronic kidney disease) stage 3, GFR 30-59 ml/min     Schizoaffective disorder (H)     Hyperlipidemia LDL goal <160     Tubular adenoma of colon     Former smoker     Hypertension goal BP (blood pressure) < 140/90     Former smoker, stopped smoking in distant past     Past Medical History:   Diagnosis Date     Depressive disorder, not elsewhere classified     Manic      Unspecified schizophrenia, unspecified condition      Past Surgical History:   Procedure Laterality Date     COLONOSCOPY      normal. has fam hx colon cancer     COLONOSCOPY N/A 10/13/2014    Procedure: COLONOSCOPY;  Surgeon: Santy Constantino MD;  Location: [facility]     HERNIA REPAIR, INGUINAL RT/LT   "02/12/2004     SURGICAL HISTORY OF -       Incised & Drained - ? Perirectal Abscess      Family History   Problem Relation Age of Onset     Thyroid Disease Mother      Coronary Artery Disease Mother      pacemaker     CANCER Paternal Grandfather      CANCER Sister      Cancer - colorectal Sister      MENTAL ILLNESS Nephew      committed suicide at age 27       Objective  /85 (BP Location: Left arm, Patient Position: Sitting, Cuff Size: Adult Regular)  Ht 5' 10.5\" (1.791 m)  Wt 160 lb (72.6 kg)  BMI 22.63 kg/m2    GENERAL APPEARANCE: healthy, alert and no distress   GAIT: NORMAL  SKIN: no suspicious lesions or rashes  HEENT: Sclera clear, anicteric  CV: good peripheral pulses  RESP: Breathing not labored  NEURO: Normal strength and tone, mentation intact and speech normal  PSYCH:  mentation appears normal and affect normal/bright    Bilateral Knee exam  Inspection:      Mild anterior knee swelling left - improved     Patella:      Mobility -       hypomobile left       Crepitus noted in the patellofemoral joint left        positive (+) compression test left     Tender:      Mild directly over knee cap - improved     Non Tender:      remainder of knee area left     Knee ROM:      Full extension, mildly decreased flexion     Strength:      5-/5 with knee extension left  - SLR intact     Special Tests:     neg (-) Trena left       neg (-) Lachmans left       neg (-) anterior drawer left       neg (-) posterior drawer left       neg (-) varus at 0 deg and 30 deg left       neg (-) valgus at 0 deg and 30 deg left     Gait:      normal     Neurovascular:      2+ peripheral pulses bilaterally and brisk capillary refill       sensation grossly intact    Radiology  I ordered, visualized and reviewed these images with the patient    Recent Results (from the past 744 hour(s))   XR Knee Standing AP Bilat Bergman Bilat Lat Left    Narrative    XR KNEE STANDING AP BILAT SUNRISE BILAT LAT LT   1/5/2018 3:25 PM "     HISTORY: ; Injury of left knee, initial encounter    COMPARISON: None.      Impression    IMPRESSION: There is a longitudinal fracture of the left patella. No  significant displacement. Small knee joint effusion appears to be  present.    IMPRESSION: Fractured left patella.    PITA PENA MD   CT Lower Extremity Left w/o Contrast    Narrative    CT LOWER EXTREMITY (KNEE) LEFT WITHOUT CONTRAST 1/11/2018 3:12 PM     HISTORY: Evaluate patellar fracture for displacement. Other closed  fracture of left patella, initial encounter.     TECHNIQUE: Axial images with reconstructions. Radiation dose for this  scan was reduced using automated exposure control, adjustment of the  mA and/or kV according to patient size, or iterative reconstruction  technique.     FINDINGS: Anterior soft tissue swelling. Trochlear subchondral cystic  change presumably related to chondromalacia. There is a small  intra-articular spur of the trochlea and medial femoral condyle.      Impression    IMPRESSION: There is a vertically oriented fracture of the lateral  patella, with 3 mm of maximal distraction. Inferiorly, there is 2 mm  of articular surface depression.    JOAN PEDROZA MD         Assessment:  1. Other closed fracture of left patella with routine healing, subsequent encounter      Clinically doing well, no healing evident on CT.  We discussed continued bracing and work restrictions, follow up in 1 month.    Plan:  - Today's Plan of Care:  Work Restrictions    Follow Up: 1 month in clinic for re-xray    Concerning signs and symptoms were reviewed.  The patient expressed understanding of this management plan and all questions were answered at this time.    Lucita Goetz MD [facility]  Primary Care Sports Medicine  [facility]    Again, thank you for allowing me to participate in the care of your patient.        Sincerely,        Lucita Goetz MD     Calm

## 2024-09-19 ENCOUNTER — LAB (OUTPATIENT)
Dept: LAB | Facility: CLINIC | Age: 64
End: 2024-09-19
Payer: COMMERCIAL

## 2024-09-19 ENCOUNTER — ANTICOAGULATION THERAPY VISIT (OUTPATIENT)
Dept: ANTICOAGULATION | Facility: CLINIC | Age: 64
End: 2024-09-19

## 2024-09-19 DIAGNOSIS — I82.491 ACUTE DEEP VEIN THROMBOSIS (DVT) OF OTHER SPECIFIED VEIN OF RIGHT LOWER EXTREMITY (H): Primary | ICD-10-CM

## 2024-09-19 DIAGNOSIS — Z79.01 LONG TERM CURRENT USE OF ANTICOAGULANT THERAPY: ICD-10-CM

## 2024-09-19 DIAGNOSIS — I82.411 ACUTE DEEP VEIN THROMBOSIS (DVT) OF FEMORAL VEIN OF RIGHT LOWER EXTREMITY (H): ICD-10-CM

## 2024-09-19 DIAGNOSIS — I82.491 ACUTE DEEP VEIN THROMBOSIS (DVT) OF OTHER SPECIFIED VEIN OF RIGHT LOWER EXTREMITY (H): ICD-10-CM

## 2024-09-19 LAB — INR BLD: 2.4 (ref 0.9–1.1)

## 2024-09-19 PROCEDURE — 36416 COLLJ CAPILLARY BLOOD SPEC: CPT

## 2024-09-19 PROCEDURE — 85610 PROTHROMBIN TIME: CPT

## 2024-09-19 NOTE — PROGRESS NOTES
ANTICOAGULATION MANAGEMENT     Jordan Barnett 64 year old male is on warfarin with therapeutic INR result. (Goal INR 2.0-3.0)    Recent labs: (last 7 days)     09/19/24  0845   INR 2.4*       ASSESSMENT     Warfarin Lab Questionnaire    Warfarin Doses Last 7 Days      9/19/2024     8:07 AM   Dose in Tablet or Mg   TAB or MG? milligram (mg)     Pt Rptd Dose SUNDAY MONDAY TUESDAY WED THURS FRIDAY SATURDAY 9/19/2024   8:07 AM 5 5 2.5 5 2.5 5 2.5         9/19/2024   Warfarin Lab Questionnaire   Missed doses within past 14 days? No   Changes in diet or alcohol within past 14 days? No   Medication changes since last result? No   Injuries or illness since last result? No   New shortness of breath, severe headaches or sudden changes in vision since last result? No   Abnormal bleeding since last result? No   Upcoming surgery, procedure? No   Best number to call with results? [phone]      Previous result: Therapeutic last 2(+) visits  Additional findings: None     PLAN     Recommended plan for no diet, medication or health factor changes affecting INR     Dosing Instructions: Continue your current warfarin dose with next INR in 5 weeks       Summary  As of 9/19/2024      Full warfarin instructions:  2.5 mg every Tue, Thu, Sat; 5 mg all other days   Next INR check:  10/24/2024               Telephone call with Jordan who verbalizes understanding and agrees to plan    Lab visit scheduled    Education provided: Please call back if any changes to your diet, medications or how you've been taking warfarin    Plan made per [facility] anticoagulation protocol    Joann Gurrola RN  9/19/2024  Anticoagulation Clinic  [facility] for routing messages: carmel [facility] patient phone line: [phone]        _______________________________________________________________________     Anticoagulation Episode Summary       Current INR goal:  2.0-3.0   TTR:  55.7% (1 y)   Target end date:  Indefinite   Send INR reminders to:  [facility]  [place]    Indications    Acute deep vein thrombosis (DVT) of other specified vein of right lower extremity (H) [I82.491]  Long term current use of anticoagulant therapy [Z79.01]  Acute deep vein thrombosis (DVT) of femoral vein of right lower extremity (H) [I82.411]             Comments:               Anticoagulation Care Providers       Provider Role Specialty Phone number    Kaleb Barnett MD Referring Emergency Medicine [phone]    Juan Antonio Flanagan MD Referring Family Medicine [phone]

## 2024-10-08 DIAGNOSIS — N18.32 STAGE 3B CHRONIC KIDNEY DISEASE (H): Primary | ICD-10-CM

## 2024-10-17 ENCOUNTER — LAB (OUTPATIENT)
Dept: LAB | Facility: CLINIC | Age: 64
End: 2024-10-17
Payer: COMMERCIAL

## 2024-10-17 DIAGNOSIS — I82.491 ACUTE DEEP VEIN THROMBOSIS (DVT) OF OTHER SPECIFIED VEIN OF RIGHT LOWER EXTREMITY (H): ICD-10-CM

## 2024-10-17 DIAGNOSIS — N18.32 STAGE 3B CHRONIC KIDNEY DISEASE (H): ICD-10-CM

## 2024-10-17 DIAGNOSIS — Z79.01 LONG TERM CURRENT USE OF ANTICOAGULANT THERAPY: ICD-10-CM

## 2024-10-17 LAB
ALBUMIN MFR UR ELPH: 6.5 MG/DL
ALBUMIN SERPL BCG-MCNC: 4 G/DL (ref 3.5–5.2)
ALBUMIN UR-MCNC: NEGATIVE MG/DL
ANION GAP SERPL CALCULATED.3IONS-SCNC: 9 MMOL/L (ref 7–15)
APPEARANCE UR: CLEAR
BILIRUB UR QL STRIP: NEGATIVE
BUN SERPL-MCNC: 37.7 MG/DL (ref 8–23)
CALCIUM SERPL-MCNC: 9.4 MG/DL (ref 8.8–10.4)
CHLORIDE SERPL-SCNC: 107 MMOL/L (ref 98–107)
COLOR UR AUTO: YELLOW
CREAT SERPL-MCNC: 1.82 MG/DL (ref 0.67–1.17)
CREAT UR-MCNC: 30.4 MG/DL
CREAT UR-MCNC: 30.4 MG/DL
EGFRCR SERPLBLD CKD-EPI 2021: 41 ML/MIN/1.73M2
ERYTHROCYTE [DISTWIDTH] IN BLOOD BY AUTOMATED COUNT: 12.7 % (ref 10–15)
GLUCOSE SERPL-MCNC: 93 MG/DL (ref 70–99)
GLUCOSE UR STRIP-MCNC: NEGATIVE MG/DL
HCO3 SERPL-SCNC: 24 MMOL/L (ref 22–29)
HCT VFR BLD AUTO: 35.2 % (ref 40–53)
HGB BLD-MCNC: 11.6 G/DL (ref 13.3–17.7)
HGB UR QL STRIP: NEGATIVE
KETONES UR STRIP-MCNC: NEGATIVE MG/DL
LEUKOCYTE ESTERASE UR QL STRIP: NEGATIVE
MCH RBC QN AUTO: 31.1 PG (ref 26.5–33)
MCHC RBC AUTO-ENTMCNC: 33 G/DL (ref 31.5–36.5)
MCV RBC AUTO: 94 FL (ref 78–100)
MICROALBUMIN UR-MCNC: 19.7 MG/L
MICROALBUMIN/CREAT UR: 64.8 MG/G CR (ref 0–17)
NITRATE UR QL: NEGATIVE
PH UR STRIP: 5.5 [PH] (ref 5–7)
PHOSPHATE SERPL-MCNC: 3.5 MG/DL (ref 2.5–4.5)
PLATELET # BLD AUTO: 249 10E3/UL (ref 150–450)
POTASSIUM SERPL-SCNC: 4.8 MMOL/L (ref 3.4–5.3)
PROT/CREAT 24H UR: 0.21 MG/MG CR (ref 0–0.2)
PTH-INTACT SERPL-MCNC: 59 PG/ML (ref 15–65)
RBC # BLD AUTO: 3.73 10E6/UL (ref 4.4–5.9)
RBC #/AREA URNS AUTO: NORMAL /HPF
SODIUM SERPL-SCNC: 140 MMOL/L (ref 135–145)
SP GR UR STRIP: <=1.005 (ref 1–1.03)
UROBILINOGEN UR STRIP-ACNC: 0.2 E.U./DL
VIT D+METAB SERPL-MCNC: 60 NG/ML (ref 20–50)
WBC # BLD AUTO: 6.4 10E3/UL (ref 4–11)
WBC #/AREA URNS AUTO: NORMAL /HPF

## 2024-10-17 PROCEDURE — 84156 ASSAY OF PROTEIN URINE: CPT

## 2024-10-17 PROCEDURE — 82570 ASSAY OF URINE CREATININE: CPT

## 2024-10-17 PROCEDURE — 81001 URINALYSIS AUTO W/SCOPE: CPT

## 2024-10-17 PROCEDURE — 36415 COLL VENOUS BLD VENIPUNCTURE: CPT

## 2024-10-17 PROCEDURE — 83970 ASSAY OF PARATHORMONE: CPT

## 2024-10-17 PROCEDURE — 82306 VITAMIN D 25 HYDROXY: CPT

## 2024-10-17 PROCEDURE — 80069 RENAL FUNCTION PANEL: CPT

## 2024-10-17 PROCEDURE — 85027 COMPLETE CBC AUTOMATED: CPT

## 2024-10-17 PROCEDURE — 82043 UR ALBUMIN QUANTITATIVE: CPT

## 2024-10-21 ENCOUNTER — VIRTUAL VISIT (OUTPATIENT)
Dept: NEPHROLOGY | Facility: CLINIC | Age: 64
End: 2024-10-21
Attending: INTERNAL MEDICINE
Payer: COMMERCIAL

## 2024-10-21 ENCOUNTER — PRE VISIT (OUTPATIENT)
Dept: NEPHROLOGY | Facility: CLINIC | Age: 64
End: 2024-10-21
Payer: COMMERCIAL

## 2024-10-21 DIAGNOSIS — N18.32 STAGE 3B CHRONIC KIDNEY DISEASE (H): Primary | ICD-10-CM

## 2024-10-21 PROCEDURE — 99204 OFFICE O/P NEW MOD 45 MIN: CPT | Mod: 95 | Performed by: INTERNAL MEDICINE

## 2024-10-21 NOTE — NURSING NOTE
Current patient location: [facility] [address]  [address] [facility] [address]    Is the patient currently in the state of [place]? YES    Visit mode:VIDEO    If the visit is dropped, the patient can be reconnected by: VIDEO VISIT: Send to e-mail at: [email or web address]    Will anyone else be joining the visit? NO  (If patient encounters technical issues they should call [phone]    Are changes needed to the allergy or medication list? No    Are refills needed on medications prescribed by this physician? NO    Rooming Documentation:  Questionnaire(s) completed    Reason for visit: Consult    Bill DHILLON

## 2024-10-21 NOTE — LETTER
10/21/2024       RE: Jordan Barnett  [address]  [address] [facility] [address]     Dear Colleague,    Thank you for referring your patient, Jordan Barnett, to the [facility] [place] [facility]. Please see a copy of my visit note below.    Virtual Visit Details    Type of service:  Video Visit     Originating Location (pt. Location): Home    Distant Location (provider location):  On-site  Platform used for Video Visit: Lina    ---------------------------------------------------------------------------------------------------------------  PCP:  Dr. Juan Antonio Flanagan  [address] [place] [address]    CC: CKD 3b    HPI:   It was a pleasure to meet Mr. Jordan Barnett today.  He is a 64 year old male  who presents for evaluation of CKD stage IIIb.  This is a video visit and the patient was by himself.  He currently lives at home with his dad.    Mr. Barnett medical history is significant for bipolar disorder with schizophrenic tendencies, history of right lower extremity DVT currently on warfarin and longstanding hypertension for more than 15 years.  He does not check his blood pressure at home but reviewing his clinic blood pressure readings, these have ranged between 97-1 16/60 8-84 back in April-June 2024.  He denies any dizziness episodes.  He is compliant with his medications.  It looks like in May 2024, his losartan dose was reduced to 50 mg because of low blood pressure readings.    He reports that he has been on lithium for 2 years but reviewing his records, it seems that he might have been on lithium for longer than that.  As far as I can tell, he was on lithium in 2004 which was probably ?? stopped in 2011.  His records also indicate that his creatinine has been around 1.7 mg/dL since 2007.     Currently he is doing well.  He denies any acute complaints.  He denies any urinary symptoms or any gross hematuria.  He  denies any joint pain, skin rash, longstanding history of nonsteroidal anti-inflammatory drug intake.    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  He lives currently with his dad.  He is single with no children.  He has 2 brothers and 2 sisters all of them live in [place].  He is an ex-heavy smoker.  He currently does not smoke or drink or do any drugs.    BP Readings from Last 6 Encounters:   06/13/24 100/68   05/09/24 102/68   01/12/23 100/70   02/03/22 97/72   01/06/22 120/80   12/03/20 116/84     Allergies   Allergen Reactions     Lisinopril Nausea     Felt weakness nausea, couldn't sleep       Current Outpatient Medications   Medication Sig Dispense Refill     cholecalciferol 5000 units (125 mcg) PO capsule Take by mouth daily (Patient not taking: Reported on 5/9/2024)       lamoTRIgine (LAMICTAL) 100 MG tablet Take 1 tablet (100 mg) by mouth 2 times daily 60 tablet 0     losartan (COZAAR) 50 MG tablet Take 1 tablet (50 mg) by mouth daily Disregard 100 mg Rx - patient had adjustment in dose. 90 tablet 3     Multiple Vitamins-Minerals (MULTIVITAMIN ADULT PO) Take 1 tablet by mouth At Bedtime        OLANZapine (ZYPREXA) 5 MG tablet TAKE 1 TABLET BY MOUTH ONCE DAILY AT BEDTIME AND 1/2 (ONE-HALF) AS NEEDED AS DIRECTED       Omega-3 Fatty Acids (OMEGA 3 PO) Take 2,400 mg by mouth every evening       risperiDONE (RISPERDAL) 0.25 MG tablet        warfarin ANTICOAGULANT (COUMADIN) 5 MG tablet Take 5 mg daily, or as directed by inr clinic 100 tablet 1     No current facility-administered medications for this visit.       Past Medical History:   Diagnosis Date     CKD (chronic kidney disease)     stage 3     Depressive disorder     Schizoaffective - manic depressive with schizophrenic tendencies     Depressive disorder, not elsewhere classified     Manic      HTN (hypertension)      Unspecified schizophrenia, unspecified condition        Past Surgical History:   Procedure  Laterality Date     ABDOMEN SURGERY      Double hernja     COLONOSCOPY      normal. has fam hx colon cancer     COLONOSCOPY N/A 10/13/2014    Procedure: COLONOSCOPY;  Surgeon: Santy Constantino MD;  Location: [facility]     COLONOSCOPY N/A 2022    Procedure: COLONOSCOPY;  Surgeon: Sherif Duarte MD;  Location: [facility]     HERNIA REPAIR, INGUINAL RT/LT  2004     SURGICAL HISTORY OF -       Incised & Drained - ? Perirectal Abscess        Social History     Tobacco Use     Smoking status: Former     Current packs/day: 0.00     Average packs/day: 1 pack/day for 30.0 years (30.0 ttl pk-yrs)     Types: Cigarettes     Start date: 1982     Quit date: 2012     Years since quittin.5     Smokeless tobacco: Never     Tobacco comments:     2 PPD   Vaping Use     Vaping status: Never Used   Substance Use Topics     Alcohol use: Not Currently     Comment: RARE     Drug use: Not Currently     Types: Marijuana, Hashish, LSD     Comment: 30 years ago       Family History   Problem Relation Age of Onset     Thyroid Disease Mother      Coronary Artery Disease Mother         pacemaker     Cancer Paternal Grandfather      Cancer Sister      Cancer - colorectal Sister      Colon Cancer Sister      Mental Illness Nephew         Noé committed suicide - ex wife committed suicide also       ROS: A 12 system review of systems was negative other than noted here or above.     Exam:  There were no vitals taken for this visit.  He looks comfortable.  He is not overweight.  He denies lower extremity edema    Results: Reviewed in details with the patient    Assessment/Plan:   Problem #1 chronic kidney disease stage IIIbA2: His chronic kidney disease dates back to at least  but his creatinine has been ranging around 1.7 mg/dL. At that time he was on lithium, though the levels in our system do not show high lithium levels.  It is unclear to me how long he has been on lithium.  He reports 2 years but it might be more than that.   Based on the available history, suspect that his chronic kidney disease might have been precipitated by lithium.  His notes reports some episodes of dizziness so also wondering whether he had any hypotension episodes exacerbating some ischemic events.  He denies any history of nonsteroidal anti-inflammatory drugs.  He has no hematuria and minimal proteinuria.  His UPCR is at 0.21 g/g.  -We had a long discussion today about the ways to halt the progression of chronic kidney disease including maintaining good blood pressure, maintaining body weight and low-salt diet.  He seems to be compliant with all of that.  His blood pressure is at goal.  His weight is at goal and he follows a low-salt diet.  I do not have any recent blood pressure readings on him but his blood pressure readings in June has been around 100/60 and his losartan dose was reduced to 50 mg daily.  -Encouraged him to continue to follow a low-salt and healthy diet    Problem #2 hypertension: I expect his blood pressure rather to be at goal.  He is on losartan 50 mg daily.    Problem #3 schizoaffective bipolar disorder: He is followed by psychiatry and seem to be very stable without any acute symptoms during this interview    Problem #4 vitamin D deficiency: His vitamin D level is currently at 60.  He is off his vitamin D.  He was instructed to resume his vitamin D 1 tablet weekly in the winter.    Problem #5 anemia: Normocytic, mild.  Likely secondary to chronic disease.  His hemoglobin is at goal.  No need for ATILIO.    Problem #6 CKD MBD: His calcium, phosphorus, PTH are within normal limits.      *This dictation was prepared in part using Dragon recognition software.  As a result errors may occur. When identified these transcription errors have been corrected.  While every attempt is made to correct errors during dictation, errors may still exist.     Juan Casiano MD   [facility]   Department of Medicine   Division of  Renal Disease and Hypertension         Again, thank you for allowing me to participate in the care of your patient.      Sincerely,    ESTEFANI COOK MD

## 2024-10-21 NOTE — PROGRESS NOTES
Virtual Visit Details    Type of service:  Video Visit     Originating Location (pt. Location): Home    Distant Location (provider location):  On-site  Platform used for Video Visit: Lina    ---------------------------------------------------------------------------------------------------------------  PCP:  Dr. Juan Antonio Flanagan  [address] [place] [address]    CC: CKD 3b    HPI:   It was a pleasure to meet Mr. Jordan Barnett today.  He is a 64 year old male  who presents for evaluation of CKD stage IIIb.  This is a video visit and the patient was by himself.  He currently lives at home with his dad.    Mr. Barnett medical history is significant for bipolar disorder with schizophrenic tendencies, history of right lower extremity DVT currently on warfarin and longstanding hypertension for more than 15 years.  He does not check his blood pressure at home but reviewing his clinic blood pressure readings, these have ranged between 97-1 16/60 8-84 back in April-June 2024.  He denies any dizziness episodes.  He is compliant with his medications.  It looks like in May 2024, his losartan dose was reduced to 50 mg because of low blood pressure readings.    He reports that he has been on lithium for 2 years but reviewing his records, it seems that he might have been on lithium for longer than that.  As far as I can tell, he was on lithium in 2004 which was probably ?? stopped in 2011.  His records also indicate that his creatinine has been around 1.7 mg/dL since 2007.     Currently he is doing well.  He denies any acute complaints.  He denies any urinary symptoms or any gross hematuria.  He denies any joint pain, skin rash, longstanding history of nonsteroidal anti-inflammatory drug intake.    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  He lives currently with his dad.  He is single with no children.  He has 2 brothers and 2 sisters all of them live in [place].  He is an  ex-heavy smoker.  He currently does not smoke or drink or do any drugs.    BP Readings from Last 6 Encounters:   06/13/24 100/68   05/09/24 102/68   01/12/23 100/70   02/03/22 97/72   01/06/22 120/80   12/03/20 116/84     Allergies   Allergen Reactions    Lisinopril Nausea     Felt weakness nausea, couldn't sleep       Current Outpatient Medications   Medication Sig Dispense Refill    cholecalciferol 5000 units (125 mcg) PO capsule Take by mouth daily (Patient not taking: Reported on 5/9/2024)      lamoTRIgine (LAMICTAL) 100 MG tablet Take 1 tablet (100 mg) by mouth 2 times daily 60 tablet 0    losartan (COZAAR) 50 MG tablet Take 1 tablet (50 mg) by mouth daily Disregard 100 mg Rx - patient had adjustment in dose. 90 tablet 3    Multiple Vitamins-Minerals (MULTIVITAMIN ADULT PO) Take 1 tablet by mouth At Bedtime       OLANZapine (ZYPREXA) 5 MG tablet TAKE 1 TABLET BY MOUTH ONCE DAILY AT BEDTIME AND 1/2 (ONE-HALF) AS NEEDED AS DIRECTED      Omega-3 Fatty Acids (OMEGA 3 PO) Take 2,400 mg by mouth every evening      risperiDONE (RISPERDAL) 0.25 MG tablet       warfarin ANTICOAGULANT (COUMADIN) 5 MG tablet Take 5 mg daily, or as directed by inr clinic 100 tablet 1     No current facility-administered medications for this visit.       Past Medical History:   Diagnosis Date    CKD (chronic kidney disease)     stage 3    Depressive disorder     Schizoaffective - manic depressive with schizophrenic tendencies    Depressive disorder, not elsewhere classified     Manic     HTN (hypertension)     Unspecified schizophrenia, unspecified condition        Past Surgical History:   Procedure Laterality Date    ABDOMEN SURGERY      Double hernja    COLONOSCOPY      normal. has fam hx colon cancer    COLONOSCOPY N/A 10/13/2014    Procedure: COLONOSCOPY;  Surgeon: Santy Constantino MD;  Location: [facility]    COLONOSCOPY N/A 02/03/2022    Procedure: COLONOSCOPY;  Surgeon: Sherif Duarte MD;  Location: [facility]    HERNIA REPAIR, INGUINAL  RT/LT  2004    SURGICAL HISTORY OF -       Incised & Drained - ? Perirectal Abscess        Social History     Tobacco Use    Smoking status: Former     Current packs/day: 0.00     Average packs/day: 1 pack/day for 30.0 years (30.0 ttl pk-yrs)     Types: Cigarettes     Start date: 1982     Quit date: 2012     Years since quittin.5    Smokeless tobacco: Never    Tobacco comments:     2 PPD   Vaping Use    Vaping status: Never Used   Substance Use Topics    Alcohol use: Not Currently     Comment: RARE    Drug use: Not Currently     Types: Marijuana, Hashish, LSD     Comment: 30 years ago       Family History   Problem Relation Age of Onset    Thyroid Disease Mother     Coronary Artery Disease Mother         pacemaker    Cancer Paternal Grandfather     Cancer Sister     Cancer - colorectal Sister     Colon Cancer Sister     Mental Illness Nephew         Noé committed suicide - ex wife committed suicide also       ROS: A 12 system review of systems was negative other than noted here or above.     Exam:  There were no vitals taken for this visit.  He looks comfortable.  He is not overweight.  He denies lower extremity edema    Results: Reviewed in details with the patient    Assessment/Plan:   Problem #1 chronic kidney disease stage IIIbA2: His chronic kidney disease dates back to at least  but his creatinine has been ranging around 1.7 mg/dL. At that time he was on lithium, though the levels in our system do not show high lithium levels.  It is unclear to me how long he has been on lithium.  He reports 2 years but it might be more than that.  Based on the available history, suspect that his chronic kidney disease might have been precipitated by lithium.  His notes reports some episodes of dizziness so also wondering whether he had any hypotension episodes exacerbating some ischemic events.  He denies any history of nonsteroidal anti-inflammatory drugs.  He has no hematuria and minimal proteinuria.   His UPCR is at 0.21 g/g.  -We had a long discussion today about the ways to halt the progression of chronic kidney disease including maintaining good blood pressure, maintaining body weight and low-salt diet.  He seems to be compliant with all of that.  His blood pressure is at goal.  His weight is at goal and he follows a low-salt diet.  I do not have any recent blood pressure readings on him but his blood pressure readings in June has been around 100/60 and his losartan dose was reduced to 50 mg daily.  -Encouraged him to continue to follow a low-salt and healthy diet    Problem #2 hypertension: I expect his blood pressure rather to be at goal.  He is on losartan 50 mg daily.    Problem #3 schizoaffective bipolar disorder: He is followed by psychiatry and seem to be very stable without any acute symptoms during this interview    Problem #4 vitamin D deficiency: His vitamin D level is currently at 60.  He is off his vitamin D.  He was instructed to resume his vitamin D 1 tablet weekly in the winter.    Problem #5 anemia: Normocytic, mild.  Likely secondary to chronic disease.  His hemoglobin is at goal.  No need for ATILIO.    Problem #6 CKD MBD: His calcium, phosphorus, PTH are within normal limits.      *This dictation was prepared in part using Dragon recognition software.  As a result errors may occur. When identified these transcription errors have been corrected.  While every attempt is made to correct errors during dictation, errors may still exist.     Juan Casiano MD   [facility]   Department of Medicine   Division of Renal Disease and Hypertension

## 2024-10-24 ENCOUNTER — LAB (OUTPATIENT)
Dept: LAB | Facility: CLINIC | Age: 64
End: 2024-10-24
Payer: COMMERCIAL

## 2024-10-24 ENCOUNTER — ANTICOAGULATION THERAPY VISIT (OUTPATIENT)
Dept: ANTICOAGULATION | Facility: CLINIC | Age: 64
End: 2024-10-24

## 2024-10-24 DIAGNOSIS — I82.491 ACUTE DEEP VEIN THROMBOSIS (DVT) OF OTHER SPECIFIED VEIN OF RIGHT LOWER EXTREMITY (H): ICD-10-CM

## 2024-10-24 DIAGNOSIS — I82.491 ACUTE DEEP VEIN THROMBOSIS (DVT) OF OTHER SPECIFIED VEIN OF RIGHT LOWER EXTREMITY (H): Primary | ICD-10-CM

## 2024-10-24 DIAGNOSIS — Z79.01 LONG TERM CURRENT USE OF ANTICOAGULANT THERAPY: ICD-10-CM

## 2024-10-24 DIAGNOSIS — I82.411 ACUTE DEEP VEIN THROMBOSIS (DVT) OF FEMORAL VEIN OF RIGHT LOWER EXTREMITY (H): ICD-10-CM

## 2024-10-24 LAB — INR BLD: 3.3 (ref 0.9–1.1)

## 2024-10-24 PROCEDURE — 85610 PROTHROMBIN TIME: CPT

## 2024-10-24 PROCEDURE — 36416 COLLJ CAPILLARY BLOOD SPEC: CPT

## 2024-10-24 NOTE — PROGRESS NOTES
ANTICOAGULATION MANAGEMENT     Jordan Barnett 64 year old male is on warfarin with supratherapeutic INR result. (Goal INR 2.0-3.0)    Recent labs: (last 7 days)     10/24/24  0709   INR 3.3*       ASSESSMENT     Warfarin Lab Questionnaire    Warfarin Doses Last 7 Days      10/23/2024     1:50 AM   Dose in Tablet or Mg   TAB or MG? milligram (mg)     Pt Rptd Dose BAR MONDAY TUESDAY WED THURS FRIDAY SATURDAY   10/23/2024   1:50 AM 5 5 2.5 5 2.5 5 2.5         10/23/2024   Warfarin Lab Questionnaire   Missed doses within past 14 days? No   Changes in diet or alcohol within past 14 days? No   Medication changes since last result? No   Injuries or illness since last result? No   New shortness of breath, severe headaches or sudden changes in vision since last result? No   Abnormal bleeding since last result? No   Upcoming surgery, procedure? No   Best number to call with results? [phone]        Previous result: Therapeutic last 2(+) visits  Additional findings: None       PLAN     Recommended plan for no diet, medication or health factor changes affecting INR     Dosing Instructions: Continue your current warfarin dose with next INR in 2 weeks       Summary  As of 10/24/2024      Full warfarin instructions:  2.5 mg every Tue, Thu, Sat; 5 mg all other days   Next INR check:  11/7/2024               Telephone call with Jordan who verbalizes understanding and agrees to plan    Lab visit scheduled    Education provided: Goal range and lab monitoring: goal range and significance of current result    Plan made per [facility] anticoagulation protocol    Zuleyka Floyd RN  10/24/2024  Anticoagulation Clinic  [employer] for routing messages: carmel MUNIZ [facility] patient phone line: [phone]        _______________________________________________________________________     Anticoagulation Episode Summary       Current INR goal:  2.0-3.0   TTR:  59.7% (1 y)   Target end date:  Indefinite   Send INR reminders to:  FLAVIO  [place]    Indications    Acute deep vein thrombosis (DVT) of other specified vein of right lower extremity (H) [I82.491]  Long term current use of anticoagulant therapy [Z79.01]  Acute deep vein thrombosis (DVT) of femoral vein of right lower extremity (H) [I82.411]             Comments:  --             Anticoagulation Care Providers       Provider Role Specialty Phone number    Kaleb Barnett MD Referring Emergency Medicine [phone]    Juan Antonio Flanagan MD Referring Family Medicine [phone]

## 2024-11-07 ENCOUNTER — LAB (OUTPATIENT)
Dept: LAB | Facility: CLINIC | Age: 64
End: 2024-11-07
Payer: COMMERCIAL

## 2024-11-07 ENCOUNTER — ANTICOAGULATION THERAPY VISIT (OUTPATIENT)
Dept: ANTICOAGULATION | Facility: CLINIC | Age: 64
End: 2024-11-07

## 2024-11-07 DIAGNOSIS — Z79.01 LONG TERM CURRENT USE OF ANTICOAGULANT THERAPY: ICD-10-CM

## 2024-11-07 DIAGNOSIS — I82.491 ACUTE DEEP VEIN THROMBOSIS (DVT) OF OTHER SPECIFIED VEIN OF RIGHT LOWER EXTREMITY (H): ICD-10-CM

## 2024-11-07 DIAGNOSIS — I82.491 ACUTE DEEP VEIN THROMBOSIS (DVT) OF OTHER SPECIFIED VEIN OF RIGHT LOWER EXTREMITY (H): Primary | ICD-10-CM

## 2024-11-07 DIAGNOSIS — I82.411 ACUTE DEEP VEIN THROMBOSIS (DVT) OF FEMORAL VEIN OF RIGHT LOWER EXTREMITY (H): ICD-10-CM

## 2024-11-07 LAB — INR BLD: 2.3 (ref 0.9–1.1)

## 2024-11-07 PROCEDURE — 85610 PROTHROMBIN TIME: CPT

## 2024-11-07 PROCEDURE — 36416 COLLJ CAPILLARY BLOOD SPEC: CPT

## 2024-11-07 NOTE — PROGRESS NOTES
ANTICOAGULATION MANAGEMENT     Jordan Barnett 64 year old male is on warfarin with therapeutic INR result. (Goal INR 2.0-3.0)    Recent labs: (last 7 days)     11/07/24  0826   INR 2.3*       ASSESSMENT     Source(s): Chart Review and Patient/Caregiver Call     Warfarin doses taken: Warfarin taken as instructed  Diet: No new diet changes identified  Medication/supplement changes: None noted  New illness, injury, or hospitalization: No  Signs or symptoms of bleeding or clotting: No  Previous result: Supratherapeutic  Additional findings: None       PLAN     Recommended plan for no diet, medication or health factor changes affecting INR     Dosing Instructions: Continue your current warfarin dose with next INR in 4 weeks       Summary  As of 11/7/2024      Full warfarin instructions:  2.5 mg every Tue, Thu, Sat; 5 mg all other days   Next INR check:  12/5/2024               Telephone call with Jordan who verbalizes understanding and agrees to plan    Lab visit scheduled    Education provided: Contact [phone] with any changes, questions or concerns.     Plan made per [facility] anticoagulation protocol    Vani Dumont RN  11/7/2024  Anticoagulation Clinic  [employer] for routing messages: carmel FRANCIS  [facility] patient phone line: [phone]        _______________________________________________________________________     Anticoagulation Episode Summary       Current INR goal:  2.0-3.0   TTR:  58.6% (1 y)   Target end date:  Indefinite   Send INR reminders to:  FLAVIO FARNCIS    Indications    Acute deep vein thrombosis (DVT) of other specified vein of right lower extremity (H) [I82.491]  Long term current use of anticoagulant therapy [Z79.01]  Acute deep vein thrombosis (DVT) of femoral vein of right lower extremity (H) [I82.411]             Comments:  --             Anticoagulation Care Providers       Provider Role Specialty Phone number    Kaleb Barnett MD Referring Emergency Medicine [phone]     Juan Antonio Flanagan MD [facility] Medicine [phone]

## 2024-12-05 ENCOUNTER — ANTICOAGULATION THERAPY VISIT (OUTPATIENT)
Dept: ANTICOAGULATION | Facility: CLINIC | Age: 64
End: 2024-12-05

## 2024-12-05 ENCOUNTER — LAB (OUTPATIENT)
Dept: LAB | Facility: CLINIC | Age: 64
End: 2024-12-05
Payer: COMMERCIAL

## 2024-12-05 DIAGNOSIS — Z79.01 LONG TERM CURRENT USE OF ANTICOAGULANT THERAPY: ICD-10-CM

## 2024-12-05 DIAGNOSIS — I82.491 ACUTE DEEP VEIN THROMBOSIS (DVT) OF OTHER SPECIFIED VEIN OF RIGHT LOWER EXTREMITY (H): ICD-10-CM

## 2024-12-05 DIAGNOSIS — I82.411 ACUTE DEEP VEIN THROMBOSIS (DVT) OF FEMORAL VEIN OF RIGHT LOWER EXTREMITY (H): ICD-10-CM

## 2024-12-05 DIAGNOSIS — I82.491 ACUTE DEEP VEIN THROMBOSIS (DVT) OF OTHER SPECIFIED VEIN OF RIGHT LOWER EXTREMITY (H): Primary | ICD-10-CM

## 2024-12-05 LAB — INR BLD: 2.1 (ref 0.9–1.1)

## 2024-12-05 NOTE — PROGRESS NOTES
ANTICOAGULATION MANAGEMENT     Jordan Barnett 64 year old male is on warfarin with therapeutic INR result. (Goal INR 2.0-3.0)    Recent labs: (last 7 days)     12/05/24  0808   INR 2.1*       ASSESSMENT     Warfarin Lab Questionnaire    Warfarin Doses Last 7 Days      12/5/2024     8:10 AM   Dose in Tablet or Mg   TAB or MG? milligram (mg)     Pt Rptd Dose SUNDAY MONDAY TUESDAY WED THURS FRIDAY SATURDAY 12/5/2024   8:10 AM 5 5 2.5 5 2.5 5 2.5         12/5/2024   Warfarin Lab Questionnaire   Missed doses within past 14 days? No   Changes in diet or alcohol within past 14 days? No   Medication changes since last result? Yes   Please list: Vitamin D added   Injuries or illness since last result? Yes   If yes, please explain: I had a cold.   New shortness of breath, severe headaches or sudden changes in vision since last result? No   Abnormal bleeding since last result? No   Upcoming surgery, procedure? No   Best number to call with results? [phone]        Previous result: Therapeutic last visit; previously outside of goal range  Additional findings: None       PLAN     Recommended plan for no diet, medication or health factor changes affecting INR     Dosing Instructions: Continue your current warfarin dose with next INR in 5 weeks       Summary  As of 12/5/2024      Full warfarin instructions:  2.5 mg every Tue, Thu, Sat; 5 mg all other days   Next INR check:  1/16/2025               Telephone call with Jordan who verbalizes understanding and agrees to plan    Patient elected to schedule next visit in 6 weeks    Education provided: Contact [phone] with any changes, questions or concerns.     Plan made per [facility] anticoagulation protocol    Vani Dumont, RN  12/5/2024  Anticoagulation Clinic  [employer] for routing messages: carmel FRANCIS  [facility] patient phone line: [phone]        _______________________________________________________________________     Anticoagulation Episode Summary       Current  INR goal:  2.0-3.0   TTR:  58.6% (1 y)   Target end date:  Indefinite   Send INR reminders to:  FLAVIO FRANCIS    Indications    Acute deep vein thrombosis (DVT) of other specified vein of right lower extremity (H) [I82.491]  Long term current use of anticoagulant therapy [Z79.01]  Acute deep vein thrombosis (DVT) of femoral vein of right lower extremity (H) [I82.411]             Comments:  --             Anticoagulation Care Providers       Provider Role Specialty Phone number    Kaleb Barnett MD Referring Emergency Medicine [phone]    Juan Antonio Flanagan MD Referring Family Medicine [phone]

## 2024-12-08 DIAGNOSIS — Z79.01 LONG TERM CURRENT USE OF ANTICOAGULANT THERAPY: ICD-10-CM

## 2024-12-08 DIAGNOSIS — I82.491 ACUTE DEEP VEIN THROMBOSIS (DVT) OF OTHER SPECIFIED VEIN OF RIGHT LOWER EXTREMITY (H): ICD-10-CM

## 2024-12-09 RX ORDER — WARFARIN SODIUM 5 MG/1
TABLET ORAL
Qty: 80 TABLET | Refills: 1 | Status: SHIPPED | OUTPATIENT
Start: 2024-12-09

## 2024-12-09 NOTE — TELEPHONE ENCOUNTER
ANTICOAGULATION MANAGEMENT:  Medication Refill    Anticoagulation Summary  As of 12/5/2024      Warfarin maintenance plan:  2.5 mg (5 mg x 0.5) every Tue, Thu, Sat; 5 mg (5 mg x 1) all other days   Next INR check:  1/16/2025   Target end date:  Indefinite    Indications    Acute deep vein thrombosis (DVT) of other specified vein of right lower extremity (H) [I82.491]  Long term current use of anticoagulant therapy [Z79.01]  Acute deep vein thrombosis (DVT) of femoral vein of right lower extremity (H) [I82.411]                 Anticoagulation Care Providers       Provider Role Specialty Phone number    Kaleb Barnett MD Referring Emergency Medicine [phone]    Juan Antonio Flanagan MD Referring Family Medicine [phone]            Refill Criteria    Visit with referring provider/group: Meets criteria: visit within referring provider group in the last 15 months on 5/9/24    [facility] referral last signed: 07/08/2024; within last year:  Yes    Lab monitoring not exceeding 2 weeks overdue: No    Jordan meets all criteria for refill. Rx instructions and quantity supplied updated to match patient's current dosing plan. Warfarin 90 day supply with 1 refill granted per [facility] protocol     Joann Gurrola RN  Anticoagulation Clinic

## 2025-01-08 ENCOUNTER — MYC MEDICAL ADVICE (OUTPATIENT)
Dept: FAMILY MEDICINE | Facility: CLINIC | Age: 65
End: 2025-01-08
Payer: COMMERCIAL

## 2025-01-16 ENCOUNTER — ANTICOAGULATION THERAPY VISIT (OUTPATIENT)
Dept: ANTICOAGULATION | Facility: CLINIC | Age: 65
End: 2025-01-16

## 2025-01-16 ENCOUNTER — LAB (OUTPATIENT)
Dept: LAB | Facility: CLINIC | Age: 65
End: 2025-01-16
Payer: COMMERCIAL

## 2025-01-16 DIAGNOSIS — I82.411 ACUTE DEEP VEIN THROMBOSIS (DVT) OF FEMORAL VEIN OF RIGHT LOWER EXTREMITY (H): ICD-10-CM

## 2025-01-16 DIAGNOSIS — Z79.01 LONG TERM CURRENT USE OF ANTICOAGULANT THERAPY: ICD-10-CM

## 2025-01-16 DIAGNOSIS — I82.491 ACUTE DEEP VEIN THROMBOSIS (DVT) OF OTHER SPECIFIED VEIN OF RIGHT LOWER EXTREMITY (H): ICD-10-CM

## 2025-01-16 DIAGNOSIS — I82.491 ACUTE DEEP VEIN THROMBOSIS (DVT) OF OTHER SPECIFIED VEIN OF RIGHT LOWER EXTREMITY (H): Primary | ICD-10-CM

## 2025-01-16 LAB — INR BLD: 1.6 (ref 0.9–1.1)

## 2025-01-16 NOTE — PROGRESS NOTES
ANTICOAGULATION MANAGEMENT     Jordan Barnett 64 year old male is on warfarin with subtherapeutic INR result. (Goal INR 2.0-3.0)    Recent labs: (last 7 days)     01/16/25  0701   INR 1.6*       ASSESSMENT     Source(s): Chart Review and Patient/Caregiver Call     Warfarin doses taken: Warfarin taken as instructed  Diet: No new diet changes identified  Medication/supplement changes: None noted  New illness, injury, or hospitalization: No  Signs or symptoms of bleeding or clotting: No  Previous result: Therapeutic last 2(+) visits  Additional findings: None       PLAN     Recommended plan for no diet, medication or health factor changes affecting INR     Dosing Instructions: Increase your warfarin dose (10% change) with next INR in 2 weeks       Summary  As of 1/16/2025      Full warfarin instructions:  1/16: 5 mg; Otherwise 2.5 mg every Tue, Sat; 5 mg all other days   Next INR check:  1/30/2025               Telephone call with Jordan who verbalizes understanding and agrees to plan and who agrees to plan and repeated back plan correctly    Lab visit scheduled    Education provided: Symptom monitoring: monitoring for bleeding signs and symptoms, monitoring for clotting signs and symptoms, monitoring for stroke signs and symptoms, and when to seek medical attention/emergency care  Contact [phone] with any changes, questions or concerns.     Plan made per [facility] anticoagulation protocol    Vani Dumont RN  1/16/2025  Anticoagulation Clinic  [employer] for routing messages: carmel FRANCIS  [facility] patient phone line: [phone]        _______________________________________________________________________     Anticoagulation Episode Summary       Current INR goal:  2.0-3.0   TTR:  60.7% (1 y)   Target end date:  Indefinite   Send INR reminders to:  FLAVIO FRANCIS    Indications    Acute deep vein thrombosis (DVT) of other specified vein of right lower extremity (H) [I82.491]  Long term current use of  anticoagulant therapy [Z79.01]  Acute deep vein thrombosis (DVT) of femoral vein of right lower extremity (H) [I82.411]             Comments:  --             Anticoagulation Care Providers       Provider Role Specialty Phone number    Kaleb Barnett MD Referring Emergency Medicine [phone]    Juan Antonio Flanagan MD Referring Family Medicine [phone]

## 2025-01-30 ENCOUNTER — LAB (OUTPATIENT)
Dept: LAB | Facility: CLINIC | Age: 65
End: 2025-01-30
Payer: COMMERCIAL

## 2025-01-30 ENCOUNTER — ANTICOAGULATION THERAPY VISIT (OUTPATIENT)
Dept: ANTICOAGULATION | Facility: CLINIC | Age: 65
End: 2025-01-30

## 2025-01-30 DIAGNOSIS — I82.411 ACUTE DEEP VEIN THROMBOSIS (DVT) OF FEMORAL VEIN OF RIGHT LOWER EXTREMITY (H): ICD-10-CM

## 2025-01-30 DIAGNOSIS — I82.491 ACUTE DEEP VEIN THROMBOSIS (DVT) OF OTHER SPECIFIED VEIN OF RIGHT LOWER EXTREMITY (H): ICD-10-CM

## 2025-01-30 DIAGNOSIS — Z79.01 LONG TERM CURRENT USE OF ANTICOAGULANT THERAPY: ICD-10-CM

## 2025-01-30 DIAGNOSIS — I82.491 ACUTE DEEP VEIN THROMBOSIS (DVT) OF OTHER SPECIFIED VEIN OF RIGHT LOWER EXTREMITY (H): Primary | ICD-10-CM

## 2025-01-30 LAB — INR BLD: 1.6 (ref 0.9–1.1)

## 2025-01-30 NOTE — PROGRESS NOTES
ANTICOAGULATION MANAGEMENT     Jordan Barnett 64 year old male is on warfarin with subtherapeutic INR result. (Goal INR 2.0-3.0)    Recent labs: (last 7 days)     01/30/25  0827   INR 1.6*       ASSESSMENT     Source(s): Chart Review and Patient/Caregiver Call     Warfarin doses taken: Warfarin taken as instructed  Diet: No new diet changes identified  Medication/supplement changes: None noted  New illness, injury, or hospitalization: No  Signs or symptoms of bleeding or clotting: No  Previous result: Subtherapeutic  Additional findings: None       PLAN     Recommended plan for no diet, medication or health factor changes affecting INR     Dosing Instructions: booster dose then Increase your warfarin dose (8.3% change) with next INR in 2 weeks       Summary  As of 1/30/2025      Full warfarin instructions:  1/30: 7.5 mg; Otherwise 2.5 mg every Tue; 5 mg all other days   Next INR check:  2/13/2025               Telephone call with Jordan who verbalizes understanding and agrees to plan    Lab visit scheduled    Education provided: Goal range and lab monitoring: goal range and significance of current result    Plan made per [facility] anticoagulation protocol    Zuleyka Floyd RN  1/30/2025  Anticoagulation Clinic  [employer] for routing messages: carmel FRANCIS  [facility] patient phone line: [phone]        _______________________________________________________________________     Anticoagulation Episode Summary       Current INR goal:  2.0-3.0   TTR:  57.2% (1 y)   Target end date:  Indefinite   Send INR reminders to:  FLAVIO FRANCIS    Indications    Acute deep vein thrombosis (DVT) of other specified vein of right lower extremity (H) [I82.491]  Long term current use of anticoagulant therapy [Z79.01]  Acute deep vein thrombosis (DVT) of femoral vein of right lower extremity (H) [I82.411]             Comments:  --             Anticoagulation Care Providers       Provider Role Specialty Phone number    Kaleb Barnett,  MD Referring Emergency Medicine [phone]    Juan Antonio Flanagan MD Referring Family Medicine [phone]

## 2025-02-13 ENCOUNTER — MYC MEDICAL ADVICE (OUTPATIENT)
Dept: ANTICOAGULATION | Facility: CLINIC | Age: 65
End: 2025-02-13

## 2025-02-13 ENCOUNTER — LAB (OUTPATIENT)
Dept: LAB | Facility: CLINIC | Age: 65
End: 2025-02-13
Payer: COMMERCIAL

## 2025-02-13 ENCOUNTER — ANTICOAGULATION THERAPY VISIT (OUTPATIENT)
Dept: ANTICOAGULATION | Facility: CLINIC | Age: 65
End: 2025-02-13

## 2025-02-13 DIAGNOSIS — I82.411 ACUTE DEEP VEIN THROMBOSIS (DVT) OF FEMORAL VEIN OF RIGHT LOWER EXTREMITY (H): ICD-10-CM

## 2025-02-13 DIAGNOSIS — E78.5 HYPERLIPIDEMIA LDL GOAL <160: Primary | ICD-10-CM

## 2025-02-13 DIAGNOSIS — I82.491 ACUTE DEEP VEIN THROMBOSIS (DVT) OF OTHER SPECIFIED VEIN OF RIGHT LOWER EXTREMITY (H): Primary | ICD-10-CM

## 2025-02-13 DIAGNOSIS — Z79.01 LONG TERM CURRENT USE OF ANTICOAGULANT THERAPY: ICD-10-CM

## 2025-02-13 DIAGNOSIS — I82.491 ACUTE DEEP VEIN THROMBOSIS (DVT) OF OTHER SPECIFIED VEIN OF RIGHT LOWER EXTREMITY (H): ICD-10-CM

## 2025-02-13 LAB — INR BLD: 2.1 (ref 0.9–1.1)

## 2025-02-13 NOTE — PROGRESS NOTES
ANTICOAGULATION MANAGEMENT     Jordan Barnett 64 year old male is on warfarin with therapeutic INR result. (Goal INR 2.0-3.0)    Recent labs: (last 7 days)     02/13/25  0803   INR 2.1*       ASSESSMENT     Source(s): Chart Review   Previous result: Subtherapeutic  Additional findings: None     PLAN     Recommended plan for no diet, medication or health factor changes affecting INR     Dosing Instructions: Continue your current warfarin dose with next INR in 3 weeks       Summary  As of 2/13/2025      Full warfarin instructions:  2.5 mg every Tue; 5 mg all other days   Next INR check:  3/6/2025               Detailed voice message left for Jordan with dosing instructions and follow up date.     Contact [phone] to schedule and with any changes, questions or concerns.     Education provided: Please call back if any changes to your diet, medications or how you've been taking warfarin    Plan made per [facility] anticoagulation protocol    Joann Gurrola RN  2/13/2025  Anticoagulation Clinic  [facility] for routing messages: carmel FRANCIS  [facility] patient phone line: [phone]        _______________________________________________________________________     Anticoagulation Episode Summary       Current INR goal:  2.0-3.0   TTR:  54.2% (1 y)   Target end date:  Indefinite   Send INR reminders to:  FLAVIO FRANCIS    Indications    Acute deep vein thrombosis (DVT) of other specified vein of right lower extremity (H) [I82.491]  Long term current use of anticoagulant therapy [Z79.01]  Acute deep vein thrombosis (DVT) of femoral vein of right lower extremity (H) [I82.411]             Comments:  --             Anticoagulation Care Providers       Provider Role Specialty Phone number    Kaleb Barnett MD Referring Emergency Medicine [phone]    Juan Antonio Flanagan MD Referring Family Medicine [phone]

## 2025-03-06 ENCOUNTER — ANTICOAGULATION THERAPY VISIT (OUTPATIENT)
Dept: ANTICOAGULATION | Facility: CLINIC | Age: 65
End: 2025-03-06

## 2025-03-06 ENCOUNTER — LAB (OUTPATIENT)
Dept: LAB | Facility: CLINIC | Age: 65
End: 2025-03-06
Payer: COMMERCIAL

## 2025-03-06 DIAGNOSIS — I82.491 ACUTE DEEP VEIN THROMBOSIS (DVT) OF OTHER SPECIFIED VEIN OF RIGHT LOWER EXTREMITY (H): ICD-10-CM

## 2025-03-06 DIAGNOSIS — Z79.01 LONG TERM CURRENT USE OF ANTICOAGULANT THERAPY: ICD-10-CM

## 2025-03-06 DIAGNOSIS — I82.411 ACUTE DEEP VEIN THROMBOSIS (DVT) OF FEMORAL VEIN OF RIGHT LOWER EXTREMITY (H): ICD-10-CM

## 2025-03-06 DIAGNOSIS — I82.491 ACUTE DEEP VEIN THROMBOSIS (DVT) OF OTHER SPECIFIED VEIN OF RIGHT LOWER EXTREMITY (H): Primary | ICD-10-CM

## 2025-03-06 LAB — INR BLD: 2.2 (ref 0.9–1.1)

## 2025-03-06 NOTE — PROGRESS NOTES
ANTICOAGULATION MANAGEMENT     Jordan Barnett 64 year old male is on warfarin with therapeutic INR result. (Goal INR 2.0-3.0)    Recent labs: (last 7 days)     03/06/25  0929   INR 2.2*       ASSESSMENT     Source(s): Chart Review and Patient/Caregiver Call     Warfarin doses taken: Warfarin taken as instructed  Diet: No new diet changes identified  Medication/supplement changes: None noted  New illness, injury, or hospitalization: No  Signs or symptoms of bleeding or clotting: No  Previous result: Therapeutic last visit; previously outside of goal range  Additional findings: None       PLAN     Recommended plan for no diet, medication or health factor changes affecting INR     Dosing Instructions: Continue your current warfarin dose with next INR in 5 weeks       Summary  As of 3/6/2025      Full warfarin instructions:  2.5 mg every Tue; 5 mg all other days   Next INR check:  4/10/2025               Telephone call with Jordan who verbalizes understanding and agrees to plan    Lab visit scheduled    Education provided: Goal range and lab monitoring: goal range and significance of current result    Plan made per [facility] anticoagulation protocol    Zuleyka Floyd RN  3/6/2025  Anticoagulation Clinic  [email or web address] for routing messages: carmel FRANCIS  [facility] patient phone line: [phone]        _______________________________________________________________________     Anticoagulation Episode Summary       Current INR goal:  2.0-3.0   TTR:  59.6% (1 y)   Target end date:  Indefinite   Send INR reminders to:  FLAVIO FRANCIS    Indications    Acute deep vein thrombosis (DVT) of other specified vein of right lower extremity (H) [I82.491]  Long term current use of anticoagulant therapy [Z79.01]  Acute deep vein thrombosis (DVT) of femoral vein of right lower extremity (H) [I82.411]             Comments:  --             Anticoagulation Care Providers       Provider Role Specialty Phone number    Kaleb Barnett MD  Referring Emergency Medicine [phone]    Juan Antonio Flanagan MD Referring Family Medicine [phone]

## 2025-04-10 ENCOUNTER — LAB (OUTPATIENT)
Dept: LAB | Facility: CLINIC | Age: 65
End: 2025-04-10
Payer: COMMERCIAL

## 2025-04-10 ENCOUNTER — ANTICOAGULATION THERAPY VISIT (OUTPATIENT)
Dept: ANTICOAGULATION | Facility: CLINIC | Age: 65
End: 2025-04-10

## 2025-04-10 DIAGNOSIS — I82.491 ACUTE DEEP VEIN THROMBOSIS (DVT) OF OTHER SPECIFIED VEIN OF RIGHT LOWER EXTREMITY (H): ICD-10-CM

## 2025-04-10 DIAGNOSIS — I82.411 ACUTE DEEP VEIN THROMBOSIS (DVT) OF FEMORAL VEIN OF RIGHT LOWER EXTREMITY (H): ICD-10-CM

## 2025-04-10 DIAGNOSIS — I82.491 ACUTE DEEP VEIN THROMBOSIS (DVT) OF OTHER SPECIFIED VEIN OF RIGHT LOWER EXTREMITY (H): Primary | ICD-10-CM

## 2025-04-10 DIAGNOSIS — Z79.01 LONG TERM CURRENT USE OF ANTICOAGULANT THERAPY: ICD-10-CM

## 2025-04-10 LAB — INR BLD: 4 (ref 0.9–1.1)

## 2025-04-10 NOTE — PROGRESS NOTES
ANTICOAGULATION MANAGEMENT     Jordan Barnett 64 year old male is on warfarin with supratherapeutic INR result. (Goal INR 2.0-3.0)    Recent labs: (last 7 days)     04/10/25  0719   INR 4.0*       ASSESSMENT     Source(s): Chart Review and Patient/Caregiver Call     Warfarin doses taken: Warfarin taken as instructed  Diet: No new diet changes identified  Medication/supplement changes: None noted  New illness, injury, or hospitalization: pt did have diarrhea in the last week, but it was his typical diarrhea that he experiences   Signs or symptoms of bleeding or clotting: epistaxis - pt did not have any concerns. Pt was able to control the bleeding  Previous result: Therapeutic last 2(+) visits  Additional findings: None     PLAN     Recommended plan for no diet, medication or health factor changes affecting INR     Dosing Instructions: hold dose then decrease your warfarin dose (15.4% change) with next INR in 10 days       Summary  As of 4/10/2025      Full warfarin instructions:  4/10: Hold; Otherwise 2.5 mg every Tue, Thu, Sat; 5 mg all other days   Next INR check:  4/17/2025               Telephone call with Jordan who verbalizes understanding and agrees to plan    Lab visit scheduled    Education provided: Please call back if any changes to your diet, medications or how you've been taking warfarin  Symptom monitoring: monitoring for bleeding signs and symptoms, when to seek medical attention/emergency care, and if you hit your head or have a bad fall seek emergency care    Plan made per [facility] anticoagulation protocol    Joann Gurrola RN  4/10/2025  Anticoagulation Clinic  [employer] for routing messages: carmel FRANCIS  [facility] patient phone line: [phone]        _______________________________________________________________________     Anticoagulation Episode Summary       Current INR goal:  2.0-3.0   TTR:  57.9% (1 y)   Target end date:  Indefinite   Send INR reminders to:  FLAVIO FRANCIS    Indications     Acute deep vein thrombosis (DVT) of other specified vein of right lower extremity (H) [I82.491]  Long term current use of anticoagulant therapy [Z79.01]  Acute deep vein thrombosis (DVT) of femoral vein of right lower extremity (H) [I82.411]             Comments:  --             Anticoagulation Care Providers       Provider Role Specialty Phone number    Kaleb Barnett MD Referring Emergency Medicine [phone]    Juan Antonio Flanagan MD Referring Family Medicine [phone]

## 2025-04-17 ENCOUNTER — LAB (OUTPATIENT)
Dept: LAB | Facility: CLINIC | Age: 65
End: 2025-04-17
Payer: COMMERCIAL

## 2025-04-17 ENCOUNTER — ANTICOAGULATION THERAPY VISIT (OUTPATIENT)
Dept: ANTICOAGULATION | Facility: CLINIC | Age: 65
End: 2025-04-17

## 2025-04-17 DIAGNOSIS — I82.491 ACUTE DEEP VEIN THROMBOSIS (DVT) OF OTHER SPECIFIED VEIN OF RIGHT LOWER EXTREMITY (H): Primary | ICD-10-CM

## 2025-04-17 DIAGNOSIS — I82.411 ACUTE DEEP VEIN THROMBOSIS (DVT) OF FEMORAL VEIN OF RIGHT LOWER EXTREMITY (H): ICD-10-CM

## 2025-04-17 DIAGNOSIS — I82.491 ACUTE DEEP VEIN THROMBOSIS (DVT) OF OTHER SPECIFIED VEIN OF RIGHT LOWER EXTREMITY (H): ICD-10-CM

## 2025-04-17 DIAGNOSIS — Z79.01 LONG TERM CURRENT USE OF ANTICOAGULANT THERAPY: ICD-10-CM

## 2025-04-17 LAB — INR BLD: 2.5 (ref 0.9–1.1)

## 2025-04-17 NOTE — PROGRESS NOTES
ANTICOAGULATION MANAGEMENT     Jordan Barnett 64 year old male is on warfarin with therapeutic INR result. (Goal INR 2.0-3.0)    Recent labs: (last 7 days)     04/17/25  1338   INR 2.5*       ASSESSMENT     Source(s): Chart Review and Patient/Caregiver Call     Warfarin doses taken: Warfarin taken as instructed  Diet: No new diet changes identified  Medication/supplement changes: None noted  New illness, injury, or hospitalization: No  Signs or symptoms of bleeding or clotting: No  Previous result: Supratherapeutic  Additional findings: None       PLAN     Recommended plan for no diet, medication or health factor changes affecting INR     Dosing Instructions: Continue your current warfarin dose with next INR in 2 weeks       Summary  As of 4/17/2025      Full warfarin instructions:  2.5 mg every Tue, Thu, Sat; 5 mg all other days   Next INR check:  5/1/2025               Telephone call with Jordan who verbalizes understanding and agrees to plan    Lab visit scheduled    Education provided: Goal range and lab monitoring: goal range and significance of current result    Plan made per [facility] anticoagulation protocol    Zuleyka Floyd RN  4/17/2025  Anticoagulation Clinic  [employer] for routing messages: carmel FRANCIS  [facility] patient phone line: [phone]        _______________________________________________________________________     Anticoagulation Episode Summary       Current INR goal:  2.0-3.0   TTR:  58.6% (1 y)   Target end date:  Indefinite   Send INR reminders to:  FLAVIO FRANCIS    Indications    Acute deep vein thrombosis (DVT) of other specified vein of right lower extremity (H) [I82.491]  Long term current use of anticoagulant therapy [Z79.01]  Acute deep vein thrombosis (DVT) of femoral vein of right lower extremity (H) [I82.411]             Comments:  --             Anticoagulation Care Providers       Provider Role Specialty Phone number    Kaleb Barnett MD Referring Emergency Medicine  [phone]    Juan Antonio Flanagan MD [facility] Medicine [phone]

## 2025-04-18 DIAGNOSIS — I10 ESSENTIAL HYPERTENSION WITH GOAL BLOOD PRESSURE LESS THAN 140/90: ICD-10-CM

## 2025-04-18 NOTE — TELEPHONE ENCOUNTER
Requested Prescriptions   Pending Prescriptions Disp Refills    losartan (COZAAR) 50 MG tablet [Pharmacy Med Name: Losartan Potassium 50 MG Oral Tablet] 90 tablet 0     Sig: Take 1 tablet by mouth once daily       Angiotensin-II Receptors Failed - 4/18/2025  9:56 AM        Failed - Has GFR on file in past 12 months and most recent value is normal        Passed - Most recent blood pressure under 140/90 in past 12 months     BP Readings from Last 3 Encounters:   06/13/24 100/68   05/09/24 102/68   01/12/23 100/70       No data recorded            Passed - Medication is active on med list and the sig matches. RN to manually verify dose and sig if red X/fail.     If the protocol passes (green check), you do not need to verify med dose and sig.    A prescription matches if they are the same clinical intention.    For Example: once daily and every morning are the same.    The protocol can not identify upper and lower case letters as matching and will fail.     For Example: Take 1 tablet (50 mg) by mouth daily     TAKE 1 TABLET (50 MG) BY MOUTH DAILY    For all fails (red x), verify dose and sig.    If the refill does match what is on file, the RN can still proceed to approve the refill request.       If they do not match, route to the appropriate provider.             Passed - Medication indicated for associated diagnosis     The medication is prescribed for one or more of the following conditions:    Chronic Kidney Disease (CDK)    Heart Failure (HF)    Diabetes, Nephropathy   Hypertension    Coronary Artery Disease (CAD)   Raynaud's Disease   Ischemic cardiomyopathy   Cardiomyopathy   Pulmonary Hypertension          Passed - Recent (12 mo) or future (90days) visit within the authorizing provider's specialty     The patient must have completed an in-person or virtual visit within the past 12 months or has a future visit scheduled within the next 90 days with the authorizing provider s specialty.  Urgent care and e-visits  do not qualify as an office visit for this protocol.          Passed - Patient is age 18 or older        Passed - Normal serum potassium on file in past 12 months     Recent Labs   Lab Test 10/17/24  1626   POTASSIUM 4.8

## 2025-04-21 RX ORDER — LOSARTAN POTASSIUM 50 MG/1
50 TABLET ORAL DAILY
Qty: 90 TABLET | Refills: 3 | Status: SHIPPED | OUTPATIENT
Start: 2025-04-21

## 2025-05-01 ENCOUNTER — ANTICOAGULATION THERAPY VISIT (OUTPATIENT)
Dept: ANTICOAGULATION | Facility: CLINIC | Age: 65
End: 2025-05-01

## 2025-05-01 ENCOUNTER — LAB (OUTPATIENT)
Dept: LAB | Facility: CLINIC | Age: 65
End: 2025-05-01
Payer: COMMERCIAL

## 2025-05-01 DIAGNOSIS — I82.491 ACUTE DEEP VEIN THROMBOSIS (DVT) OF OTHER SPECIFIED VEIN OF RIGHT LOWER EXTREMITY (H): Primary | ICD-10-CM

## 2025-05-01 DIAGNOSIS — E78.5 HYPERLIPIDEMIA LDL GOAL <160: ICD-10-CM

## 2025-05-01 DIAGNOSIS — Z79.01 LONG TERM CURRENT USE OF ANTICOAGULANT THERAPY: ICD-10-CM

## 2025-05-01 DIAGNOSIS — Z13.6 SCREENING FOR CARDIOVASCULAR CONDITION: Primary | ICD-10-CM

## 2025-05-01 DIAGNOSIS — I82.411 ACUTE DEEP VEIN THROMBOSIS (DVT) OF FEMORAL VEIN OF RIGHT LOWER EXTREMITY (H): ICD-10-CM

## 2025-05-01 DIAGNOSIS — I82.491 ACUTE DEEP VEIN THROMBOSIS (DVT) OF OTHER SPECIFIED VEIN OF RIGHT LOWER EXTREMITY (H): ICD-10-CM

## 2025-05-01 LAB — INR BLD: 3.3 (ref 0.9–1.1)

## 2025-05-01 NOTE — PROGRESS NOTES
ANTICOAGULATION MANAGEMENT     Jordan Barnett 64 year old male is on warfarin with supratherapeutic INR result. (Goal INR 2.0-3.0)    Recent labs: (last 7 days)     05/01/25  1523   INR 3.3*       ASSESSMENT     Warfarin Lab Questionnaire    Warfarin Doses Last 7 Days      4/30/2025     7:44 AM   Dose in Tablet or Mg   TAB or MG? milligram (mg)     Pt Rptd Dose SUNDAY MONDAY TUESDAY WED THURS FRIDAY SATURDAY 4/30/2025   7:44 AM 5 5 2.5 5 2.5 5 2.5         4/30/2025   Warfarin Lab Questionnaire   Missed doses within past 14 days? No   Changes in diet or alcohol within past 14 days? No   Medication changes since last result? No   Injuries or illness since last result? No   New shortness of breath, severe headaches or sudden changes in vision since last result? No   Abnormal bleeding since last result? No   Upcoming surgery, procedure? No     Previous result: Therapeutic last visit; previously outside of goal range  Additional findings: None       PLAN     Recommended plan for no diet, medication or health factor changes affecting INR     Dosing Instructions: decrease your warfarin dose (9% change) with next INR in 2 weeks       Summary  As of 5/1/2025      Full warfarin instructions:  5 mg every Mon, Wed, Fri; 2.5 mg all other days   Next INR check:  5/15/2025               Telephone call with Jordan who verbalizes understanding and agrees to plan and who agrees to plan and repeated back plan correctly    Lab visit scheduled    Education provided: Symptom monitoring: monitoring for bleeding signs and symptoms and when to seek medical attention/emergency care  Contact [phone] with any changes, questions or concerns.     Plan made per [facility] anticoagulation protocol    Vani Dumont, RN  5/1/2025  Anticoagulation Clinic  [employer] for routing messages: carmel FRANCIS  [facility] patient phone line: [phone]        _______________________________________________________________________     Anticoagulation  Episode Summary       Current INR goal:  2.0-3.0   TTR:  61.0% (1 y)   Target end date:  Indefinite   Send INR reminders to:  FLAVIO FRANCIS    Indications    Acute deep vein thrombosis (DVT) of other specified vein of right lower extremity (H) [I82.491]  Long term current use of anticoagulant therapy [Z79.01]  Acute deep vein thrombosis (DVT) of femoral vein of right lower extremity (H) [I82.411]             Comments:  --             Anticoagulation Care Providers       Provider Role Specialty Phone number    Kaleb Barnett MD Referring Emergency Medicine [phone]    Juan Antonio Flanagan MD Referring Family Medicine [phone]

## 2025-05-08 ENCOUNTER — PATIENT OUTREACH (OUTPATIENT)
Dept: CARE COORDINATION | Facility: CLINIC | Age: 65
End: 2025-05-08
Payer: COMMERCIAL

## 2025-05-15 ENCOUNTER — LAB (OUTPATIENT)
Dept: LAB | Facility: CLINIC | Age: 65
End: 2025-05-15
Payer: COMMERCIAL

## 2025-05-15 ENCOUNTER — ANTICOAGULATION THERAPY VISIT (OUTPATIENT)
Dept: ANTICOAGULATION | Facility: CLINIC | Age: 65
End: 2025-05-15

## 2025-05-15 DIAGNOSIS — E78.5 HYPERLIPIDEMIA LDL GOAL <160: ICD-10-CM

## 2025-05-15 DIAGNOSIS — I82.491 ACUTE DEEP VEIN THROMBOSIS (DVT) OF OTHER SPECIFIED VEIN OF RIGHT LOWER EXTREMITY (H): ICD-10-CM

## 2025-05-15 DIAGNOSIS — I82.491 ACUTE DEEP VEIN THROMBOSIS (DVT) OF OTHER SPECIFIED VEIN OF RIGHT LOWER EXTREMITY (H): Primary | ICD-10-CM

## 2025-05-15 DIAGNOSIS — Z79.01 LONG TERM CURRENT USE OF ANTICOAGULANT THERAPY: ICD-10-CM

## 2025-05-15 DIAGNOSIS — I82.411 ACUTE DEEP VEIN THROMBOSIS (DVT) OF FEMORAL VEIN OF RIGHT LOWER EXTREMITY (H): ICD-10-CM

## 2025-05-15 LAB
CHOLEST SERPL-MCNC: 173 MG/DL
FASTING STATUS PATIENT QL REPORTED: YES
HDLC SERPL-MCNC: 54 MG/DL
INR BLD: 2 (ref 0.9–1.1)
LDLC SERPL CALC-MCNC: 91 MG/DL
NONHDLC SERPL-MCNC: 119 MG/DL
TRIGL SERPL-MCNC: 138 MG/DL

## 2025-05-15 NOTE — PROGRESS NOTES
ANTICOAGULATION MANAGEMENT     Jordan Barnett 64 year old male is on warfarin with therapeutic INR result. (Goal INR 2.0-3.0)    Recent labs: (last 7 days)     05/15/25  1538   INR 2.0*       ASSESSMENT     Warfarin Lab Questionnaire    Warfarin Doses Last 7 Days      5/15/2025     2:59 AM   Dose in Tablet or Mg   TAB or MG? milligram (mg)     Pt Rptd Dose BAR MONDAY TUESDAY WED THURS FRIDAY SATURDAY   5/15/2025   2:59 AM 2.5 5 2.5 5 2.5 5 2.5         5/15/2025   Warfarin Lab Questionnaire   Missed doses within past 14 days? No   Changes in diet or alcohol within past 14 days? No   Medication changes since last result? No   Injuries or illness since last result? No   New shortness of breath, severe headaches or sudden changes in vision since last result? No   Abnormal bleeding since last result? No   Upcoming surgery, procedure? No   Best number to call with results? [phone]     Previous result: Supratherapeutic  Additional findings: None       PLAN     Recommended plan for no diet, medication or health factor changes affecting INR     Dosing Instructions: Continue your current warfarin dose with next INR in 3 weeks       Summary  As of 5/15/2025      Full warfarin instructions:  5 mg every Mon, Wed, Fri; 2.5 mg all other days   Next INR check:  6/5/2025               Telephone call with Jordan who verbalizes understanding and agrees to plan    Lab visit scheduled    Education provided: Please call back if any changes to your diet, medications or how you've been taking warfarin    Plan made per [facility] anticoagulation protocol    Ivett Bill RN  5/15/2025  Anticoagulation Clinic  [employer] for routing messages: carmel FRANCIS  [facility] patient phone line: [phone]        _______________________________________________________________________     Anticoagulation Episode Summary       Current INR goal:  2.0-3.0   TTR:  60.8% (1 y)   Target end date:  Indefinite   Send INR reminders to:  FLAVIO  [place]    Indications    Acute deep vein thrombosis (DVT) of femoral vein of right lower extremity (H) [I82.411]  Long term current use of anticoagulant therapy [Z79.01]             Comments:  --             Anticoagulation Care Providers       Provider Role Specialty Phone number    Juan Antonio Flanagan MD Referring Family Medicine [phone]

## 2025-05-16 ENCOUNTER — RESULTS FOLLOW-UP (OUTPATIENT)
Dept: FAMILY MEDICINE | Facility: CLINIC | Age: 65
End: 2025-05-16

## 2025-06-04 ENCOUNTER — ANTICOAGULATION THERAPY VISIT (OUTPATIENT)
Dept: ANTICOAGULATION | Facility: CLINIC | Age: 65
End: 2025-06-04

## 2025-06-04 ENCOUNTER — LAB (OUTPATIENT)
Dept: LAB | Facility: CLINIC | Age: 65
End: 2025-06-04
Payer: COMMERCIAL

## 2025-06-04 ENCOUNTER — DOCUMENTATION ONLY (OUTPATIENT)
Dept: ANTICOAGULATION | Facility: CLINIC | Age: 65
End: 2025-06-04

## 2025-06-04 ENCOUNTER — RESULTS FOLLOW-UP (OUTPATIENT)
Dept: ANTICOAGULATION | Facility: CLINIC | Age: 65
End: 2025-06-04

## 2025-06-04 DIAGNOSIS — I82.491 ACUTE DEEP VEIN THROMBOSIS (DVT) OF OTHER SPECIFIED VEIN OF RIGHT LOWER EXTREMITY (H): ICD-10-CM

## 2025-06-04 DIAGNOSIS — Z79.01 LONG TERM CURRENT USE OF ANTICOAGULANT THERAPY: ICD-10-CM

## 2025-06-04 DIAGNOSIS — I82.411 ACUTE DEEP VEIN THROMBOSIS (DVT) OF FEMORAL VEIN OF RIGHT LOWER EXTREMITY (H): Primary | ICD-10-CM

## 2025-06-04 LAB — INR BLD: 2.3 (ref 0.9–1.1)

## 2025-06-04 PROCEDURE — 36416 COLLJ CAPILLARY BLOOD SPEC: CPT

## 2025-06-04 PROCEDURE — 85610 PROTHROMBIN TIME: CPT

## 2025-06-04 NOTE — CONFIDENTIAL NOTE
ANTICOAGULATION CLINIC REFERRAL RENEWAL REQUEST       An annual renewal order is required for all patients referred to [facility].?  Please review and sign the pended referral order for Jordan Barnett.       ANTICOAGULATION SUMMARY      Warfarin indication(s)   DVT    Mechanical heart valve present?  NO       Current goal range   INR: 2.0-3.0     Goal appropriate for indication? Goal INR 2-3, standard for indication(s) above     Time in Therapeutic Range (TTR)  (Goal > 60%) 61%       Office visit with referring provider's group within last year no on 5/9/24 (OV 6/26/25 for preop)       Joann Gurrola RN  [facility]

## 2025-06-04 NOTE — PROGRESS NOTES
ANTICOAGULATION MANAGEMENT     Jordan Barnett 64 year old male is on warfarin with therapeutic INR result. (Goal INR 2.0-3.0)    Recent labs: (last 7 days)     06/04/25  0852   INR 2.3*       ASSESSMENT     Warfarin Lab Questionnaire    Warfarin Doses Last 7 Days      6/4/2025     2:45 AM   Dose in Tablet or Mg   TAB or MG? milligram (mg)     Pt Rptd Dose SUNDAY MONDAY TUESDAY WED THURS FRIDAY SATURDAY 6/4/2025   2:45 AM 2.5 5 2.5 5 2.5 5 2.5         6/4/2025   Warfarin Lab Questionnaire   Missed doses within past 14 days? No   Changes in diet or alcohol within past 14 days? No   Medication changes since last result? No   Injuries or illness since last result? No   New shortness of breath, severe headaches or sudden changes in vision since last result? No   Abnormal bleeding since last result? No   Upcoming surgery, procedure? No   Best number to call with results? [phone]     Previous result: Therapeutic last visit; previously outside of goal range  Additional findings: pt did fill out that he was taking 10 mg 4 days of the week on the tablet form. After a lengthy discussion, patient does feel he was taking the correct dose.     PLAN     Recommended plan for temporary change(s) affecting INR     Dosing Instructions: Continue your current warfarin dose with next INR in 2 weeks      Due to confusion about dosing, recheck in 2 weeks to make sure INR is therapeutic.     Summary  As of 6/4/2025      Full warfarin instructions:  5 mg every Mon, Wed, Fri; 2.5 mg all other days   Next INR check:  6/18/2025               Telephone call with Jordan who verbalizes understanding and agrees to plan    Lab visit scheduled    Education provided: Please call back if any changes to your diet, medications or how you've been taking warfarin    Plan made per [facility] anticoagulation protocol    Joann Gurrola, RN  6/4/2025  Anticoagulation Clinic  [employer] for routing messages: carmel FRANCIS  [facility] patient phone line:  [phone]        _______________________________________________________________________     Anticoagulation Episode Summary       Current INR goal:  2.0-3.0   TTR:  61.0% (1 y)   Target end date:  Indefinite   Send INR reminders to:  FLAVIO FRANCIS    Indications    Acute deep vein thrombosis (DVT) of femoral vein of right lower extremity (H) [I82.411]  Long term current use of anticoagulant therapy [Z79.01]             Comments:  --             Anticoagulation Care Providers       Provider Role Specialty Phone number    Juan Antonio Flanagan MD Referring Family Medicine [phone]

## 2025-06-13 DIAGNOSIS — I82.491 ACUTE DEEP VEIN THROMBOSIS (DVT) OF OTHER SPECIFIED VEIN OF RIGHT LOWER EXTREMITY (H): ICD-10-CM

## 2025-06-13 DIAGNOSIS — Z79.01 LONG TERM CURRENT USE OF ANTICOAGULANT THERAPY: ICD-10-CM

## 2025-06-16 RX ORDER — WARFARIN SODIUM 5 MG/1
TABLET ORAL
Qty: 70 TABLET | Refills: 1 | Status: SHIPPED | OUTPATIENT
Start: 2025-06-16

## 2025-06-16 NOTE — TELEPHONE ENCOUNTER
ANTICOAGULATION MANAGEMENT:  Medication Refill    Anticoagulation Summary  As of 6/4/2025      Warfarin maintenance plan:  5 mg (5 mg x 1) every Mon, Wed, Fri; 2.5 mg (5 mg x 0.5) all other days   Next INR check:  6/18/2025   Target end date:  Indefinite    Indications    Acute deep vein thrombosis (DVT) of femoral vein of right lower extremity (H) [I82.411]  Long term current use of anticoagulant therapy [Z79.01]                 Anticoagulation Care Providers       Provider Role Specialty Phone number    Juan Antonio Flanagan MD Referring Family Medicine [phone]            Refill Criteria    Visit with referring provider/group: Meets criteria: visit within referring provider group in the last 15 months on 5/9/24    [facility] referral last signed: 06/04/2025; within last year:  Yes    Lab monitoring is up to date (not exceeding 2 weeks overdue): Yes    Jordan meets all criteria for refill. Rx instructions and quantity supplied updated to match patient's current dosing plan. Warfarin 90 day supply with 1 refill granted per [facility] protocol     Joann Gurrola RN  Anticoagulation Clinic

## 2025-06-18 ENCOUNTER — RESULTS FOLLOW-UP (OUTPATIENT)
Dept: ANTICOAGULATION | Facility: CLINIC | Age: 65
End: 2025-06-18

## 2025-06-18 ENCOUNTER — LAB (OUTPATIENT)
Dept: LAB | Facility: CLINIC | Age: 65
End: 2025-06-18
Payer: COMMERCIAL

## 2025-06-18 ENCOUNTER — ANTICOAGULATION THERAPY VISIT (OUTPATIENT)
Dept: ANTICOAGULATION | Facility: CLINIC | Age: 65
End: 2025-06-18

## 2025-06-18 DIAGNOSIS — Z79.01 LONG TERM CURRENT USE OF ANTICOAGULANT THERAPY: ICD-10-CM

## 2025-06-18 DIAGNOSIS — I82.491 ACUTE DEEP VEIN THROMBOSIS (DVT) OF OTHER SPECIFIED VEIN OF RIGHT LOWER EXTREMITY (H): ICD-10-CM

## 2025-06-18 DIAGNOSIS — I82.411 ACUTE DEEP VEIN THROMBOSIS (DVT) OF FEMORAL VEIN OF RIGHT LOWER EXTREMITY (H): Primary | ICD-10-CM

## 2025-06-18 DIAGNOSIS — I82.411 ACUTE DEEP VEIN THROMBOSIS (DVT) OF FEMORAL VEIN OF RIGHT LOWER EXTREMITY (H): ICD-10-CM

## 2025-06-18 LAB — INR BLD: 2.4 (ref 0.9–1.1)

## 2025-06-18 PROCEDURE — 36416 COLLJ CAPILLARY BLOOD SPEC: CPT

## 2025-06-18 PROCEDURE — 85610 PROTHROMBIN TIME: CPT

## 2025-06-18 NOTE — PROGRESS NOTES
ANTICOAGULATION MANAGEMENT     Jordan Barnett 65 year old male is on warfarin with therapeutic INR result. (Goal INR 2.0-3.0)    Recent labs: (last 7 days)     06/18/25  0805   INR 2.4*       ASSESSMENT     Warfarin Lab Questionnaire    Warfarin Doses Last 7 Days      6/18/2025     7:52 AM   Dose in Tablet or Mg   TAB or MG? milligram (mg)     Pt Rptd Dose SUNDAY MONDAY TUESDAY WED THURS FRIDAY SATURDAY 6/18/2025   7:52 AM 2.5 5 2.5 5 2.5 5 2.5         6/18/2025   Warfarin Lab Questionnaire   Missed doses within past 14 days? No   Changes in diet or alcohol within past 14 days? No   Medication changes since last result? No   Injuries or illness since last result? No   New shortness of breath, severe headaches or sudden changes in vision since last result? No   Abnormal bleeding since last result? No   Upcoming surgery, procedure? No   Best number to call with results? [phone]     Previous result: Therapeutic last 2(+) visits  Additional findings: None       PLAN     Recommended plan for no diet, medication or health factor changes and previous 2 INR results within goal affecting INR     Dosing Instructions: Continue your current warfarin dose with next INR in 4 weeks       Summary  As of 6/18/2025      Full warfarin instructions:  5 mg every Mon, Wed, Fri; 2.5 mg all other days   Next INR check:  7/16/2025               Telephone call with Jordan who verbalizes understanding and agrees to plan    Lab visit scheduled    Education provided: Contact [phone] with any changes, questions or concerns.     Plan made per [facility] anticoagulation protocol    Fabiana Arguello RN  6/18/2025  Anticoagulation Clinic  [employer] for routing messages: carmel FRANCIS  [facility] patient phone line: [phone]        _______________________________________________________________________     Anticoagulation Episode Summary       Current INR goal:  2.0-3.0   TTR:  64.8% (1 y)   Target end date:  Indefinite   Send INR reminders  to:  FLAVIO FRANCIS    Indications    Acute deep vein thrombosis (DVT) of femoral vein of right lower extremity (H) [I82.411]  Long term current use of anticoagulant therapy [Z79.01]  Acute deep vein thrombosis (DVT) of other specified vein of right lower extremity (H) [I82.491]             Comments:  --             Anticoagulation Care Providers       Provider Role Specialty Phone number    Juan Antonio Flanagan MD Referring Family Medicine [phone]

## 2025-06-26 ENCOUNTER — RESULTS FOLLOW-UP (OUTPATIENT)
Dept: FAMILY MEDICINE | Facility: CLINIC | Age: 65
End: 2025-06-26

## 2025-06-26 ENCOUNTER — OFFICE VISIT (OUTPATIENT)
Dept: FAMILY MEDICINE | Facility: CLINIC | Age: 65
End: 2025-06-26
Payer: COMMERCIAL

## 2025-06-26 VITALS
SYSTOLIC BLOOD PRESSURE: 98 MMHG | HEART RATE: 75 BPM | HEIGHT: 70 IN | WEIGHT: 164.9 LBS | BODY MASS INDEX: 23.61 KG/M2 | DIASTOLIC BLOOD PRESSURE: 60 MMHG | TEMPERATURE: 99.1 F | OXYGEN SATURATION: 96 % | RESPIRATION RATE: 20 BRPM

## 2025-06-26 DIAGNOSIS — F25.9 SCHIZOAFFECTIVE DISORDER, UNSPECIFIED TYPE (H): ICD-10-CM

## 2025-06-26 DIAGNOSIS — Z00.00 ROUTINE GENERAL MEDICAL EXAMINATION AT A HEALTH CARE FACILITY: Primary | ICD-10-CM

## 2025-06-26 DIAGNOSIS — Z87.891 HISTORY OF TOBACCO USE, PRESENTING HAZARDS TO HEALTH: ICD-10-CM

## 2025-06-26 DIAGNOSIS — N18.32 STAGE 3B CHRONIC KIDNEY DISEASE (H): ICD-10-CM

## 2025-06-26 DIAGNOSIS — Z12.5 SCREENING FOR PROSTATE CANCER: ICD-10-CM

## 2025-06-26 DIAGNOSIS — R13.19 ESOPHAGEAL DYSPHAGIA: ICD-10-CM

## 2025-06-26 LAB
CREAT UR-MCNC: 32.4 MG/DL
MICROALBUMIN UR-MCNC: <12 MG/L
MICROALBUMIN/CREAT UR: NORMAL MG/G{CREAT}
PSA SERPL DL<=0.01 NG/ML-MCNC: 2.84 NG/ML (ref 0–4.5)

## 2025-06-26 SDOH — HEALTH STABILITY: PHYSICAL HEALTH: ON AVERAGE, HOW MANY DAYS PER WEEK DO YOU ENGAGE IN MODERATE TO STRENUOUS EXERCISE (LIKE A BRISK WALK)?: 7 DAYS

## 2025-06-26 SDOH — HEALTH STABILITY: PHYSICAL HEALTH: ON AVERAGE, HOW MANY MINUTES DO YOU ENGAGE IN EXERCISE AT THIS LEVEL?: 20 MIN

## 2025-06-26 ASSESSMENT — PAIN SCALES - GENERAL: PAINLEVEL_OUTOF10: MILD PAIN (1)

## 2025-06-26 ASSESSMENT — SOCIAL DETERMINANTS OF HEALTH (SDOH): HOW OFTEN DO YOU GET TOGETHER WITH FRIENDS OR RELATIVES?: TWICE A WEEK

## 2025-06-26 ASSESSMENT — ENCOUNTER SYMPTOMS: HIP PAIN: 1

## 2025-06-26 NOTE — PATIENT INSTRUCTIONS
Patient Education     Referral for endoscopy to evaluate your esophagus and stomach has been signed. Schedulers will call you in the next 3-5 business days.     Schedule low dose cT scan for lung cancer screening.    You will be contacted in 1-2 days for results of your lab tests.     If your hip pain worsens again, follow up in the clinic    Preventative Care Visits include: Yearly physicals, Well-child visits, Welcome to Medicare visits, & Medicare yearly wellness exams.    The purpose of these visits is to discuss your medical history and prevent health problems before you are sick.  You may need to pay a copay, coinsurance or deductible if your visit today includes testing or treating for a new or existing condition.    Additional charges may be incurred for today's visit. If you have questions about what your insurance plan covers, please contact your Insurance Company's member service department.  If you have questions specific to a bill you have already received from [employer], please contact the [employer] Billing Office at [phone].    Preventive Care Advice   This is general advice given by our system to help you stay healthy. However, your care team may have specific advice just for you. Please talk to your care team about your preventive care needs.  Nutrition  Eat 5 or more servings of fruits and vegetables each day.  Try wheat bread, brown rice and whole grain pasta (instead of white bread, rice, and pasta).  Get enough calcium and vitamin D. Check the label on foods and aim for 100% of the RDA (recommended daily allowance).  Lifestyle  Exercise at least 150 minutes each week  (30 minutes a day, 5 days a week).  Do muscle strengthening activities 2 days a week. These help control your weight and prevent disease.  No smoking.  Wear sunscreen to prevent skin cancer.  Have a dental exam and cleaning every 6 months.  Yearly exams  See your health care team every year to talk about:  Any changes in your  health.  Any medicines your care team has prescribed.  Preventive care, family planning, and ways to prevent chronic diseases.  Shots (vaccines)   HPV shots (up to age 26), if you've never had them before.  Hepatitis B shots (up to age 59), if you've never had them before.  COVID-19 shot: Get this shot when it's due.  Flu shot: Get a flu shot every year.  Tetanus shot: Get a tetanus shot every 10 years.  Pneumococcal, hepatitis A, and RSV shots: Ask your care team if you need these based on your risk.  Shingles shot (for age 50 and up)  General health tests  Diabetes screening:  Starting at age 35, Get screened for diabetes at least every 3 years.  If you are younger than age 35, ask your care team if you should be screened for diabetes.  Cholesterol test: At age 39, start having a cholesterol test every 5 years, or more often if advised.  Bone density scan (DEXA): At age 50, ask your care team if you should have this scan for osteoporosis (brittle bones).  Hepatitis C: Get tested at least once in your life.  STIs (sexually transmitted infections)  Before age 24: Ask your care team if you should be screened for STIs.  After age 24: Get screened for STIs if you're at risk. You are at risk for STIs (including HIV) if:  You are sexually active with more than one person.  You don't use condoms every time.  You or a partner was diagnosed with a sexually transmitted infection.  If you are at risk for HIV, ask about PrEP medicine to prevent HIV.  Get tested for HIV at least once in your life, whether you are at risk for HIV or not.  Cancer screening tests  Cervical cancer screening: If you have a cervix, begin getting regular cervical cancer screening tests starting at age 21.  Breast cancer scan (mammogram): If you've ever had breasts, begin having regular mammograms starting at age 40. This is a scan to check for breast cancer.  Colon cancer screening: It is important to start screening for colon cancer at age 45.  Have  a colonoscopy test every 10 years (or more often if you're at risk) Or, ask your provider about stool tests like a FIT test every year or Cologuard test every 3 years.  To learn more about your testing options, visit:   .  For help making a decision, visit:   https://maricruz.soren/qz72848.  Prostate cancer screening test: If you have a prostate, ask your care team if a prostate cancer screening test (PSA) at age 55 is right for you.  Lung cancer screening: If you are a current or former smoker ages 50 to 80, ask your care team if ongoing lung cancer screenings are right for you.  For informational purposes only. Not to replace the advice of your health care provider. Copyright   2023 [employer]. All rights reserved. Clinically reviewed by the  [employer] [facility]. [employer] 197522 - REV 01/24.  Safer Sex: Care Instructions  Overview  Safer sex is a way to reduce your risk of getting a sexually transmitted infection (STI). It can also help prevent pregnancy.  Several products can help you practice safer sex and reduce your chance of STIs. One of the best is a condom. There are internal and external condoms. You can use a special rubber sheet (dental dam) for protection during oral sex. Disposable gloves can keep your hands from touching blood, semen, or other body fluids that can carry infections.  Remember that birth control methods such as diaphragms, IUDs, foams, and birth control pills do not stop you from getting STIs.  Follow-up care is a key part of your treatment and safety. Be sure to make and go to all appointments, and call your doctor if you are having problems. It's also a good idea to know your test results and keep a list of the medicines you take.  How can you care for yourself at home?  Think about getting vaccinated to help prevent hepatitis A, hepatitis B, and human papillomavirus (HPV). They can be spread through sex.  Use a condom every time you have sex. Use an external  "condom, which goes on the penis. Or use an internal condom, which goes into the vagina or anus.  Make sure you use the right size external condom. A condom that's too small can break easily. A condom that's too big can slip off during sex.  Use a new condom each time you have sex. Be careful not to poke a hole in the condom when you open the wrapper.  Don't use an internal condom and an external condom at the same time.  Never use petroleum jelly (such as Vaseline), grease, hand lotion, baby oil, or anything with oil in it. These products can make holes in the condom.  After intercourse, hold the edge of the condom as you remove it. This will help keep semen from spilling out of the condom.  Do not have sex with anyone who has symptoms of an STI, such as sores on the genitals or mouth.  Do not drink a lot of alcohol or use drugs before sex.  Limit your sex partners. Sex with one partner who has sex only with you can reduce your risk of getting an STI.  Don't share sex toys. But if you do share them, use a condom and clean the sex toys between each use.  Talk to any partners before you have sex. Talk about what you feel comfortable with and whether you have any boundaries with sex. And find out if your partner or partners may be at risk for any STI. Keep in mind that a person may be able to spread an STI even if they do not have symptoms. You and any partners may want to get tested for STIs.  Where can you learn more?  Go to https://www.[employer].net/patiented  Enter B608 in the search box to learn more about \"Safer Sex: Care Instructions.\"  Current as of: April 30, 2024  Content Version: 14.5    7508-3227 [employer].   Care instructions adapted under license by your healthcare professional. If you have questions about a medical condition or this instruction, always ask your healthcare professional. [employer] disclaims any warranty or liability for your use of this information.       "

## 2025-06-26 NOTE — PROGRESS NOTES
Preventive Care Visit  [facility]  Juan Antonio Flanagan MD, Family Medicine  Jun 26, 2025      Assessment & Plan     Routine general medical examination at a health care facility  Patient was advised on recommended screening and preventive health recommendations.  He verbalized understanding and agreed to the plans below.   Up to date on immunizations.    Esophageal dysphagia  New symptom within the last few months.  No overt abnormal findings on exam today. No red flags to date per his history  DDx: esophageal stricture, esophagitis, spasms, occult mass  Advised evaluation would entail endoscopy. Patient agrees.  Further recommendations thereafter.  Return precautions discussed and given to patient.   - Adult GI  Referral - Procedure Only  - OFFICE/OUTPT VISIT,EST,LEVL IV    History of tobacco use, presenting hazards to health  - CT Chest Lung Cancer Screen Low Dose Without    Screening for prostate cancer  - Prostate Specific Antigen Screen    Schizoaffective disorder, unspecified type (H)  Stable on meds.    Stage 3b chronic kidney disease (H)  Sees nephrology in the fall.  - Albumin Random Urine Quantitative with Creat Ratio    The longitudinal plan of care for the diagnosis(es)/condition(s) as documented were addressed during this visit. Due to the added complexity in care, I will continue to support Jordan in the subsequent management and with ongoing continuity of care.      Patient has been advised of split billing requirements and indicates understanding: Yes      Reviewed preventive health counseling, as reflected in patient instructions  Counseling  Appropriate preventive services were addressed with this patient via screening, questionnaire, or discussion as appropriate for fall prevention, nutrition, physical activity, Tobacco-use cessation, social engagement, weight loss and cognition.  Checklist reviewing preventive services available has been given to the  "patient.  Reviewed patient's diet, addressing concerns and/or questions.         Follow-up   Return in about 1 year (around 6/26/2026) for In-person visit for next wellness exam; sooner if with recurrent hip pain.     Follow-up Visit   Expected date:  Jun 26, 2026 (Approximate)      Follow Up Appointment Details:     Follow-up with whom?: PCP    Follow-Up for what?: Adult Preventive    How?: In Person                 Subjective   Jordan is a 65 year old, presenting for the following:  Physical, Problems Swallowing (Food getting stuck in the esophagus, fluid comes back up periodically- not \"vomiting\", but just will not stay down. ), Hip Pain (Improved significantly with lessened activity. Pt states he has reduced exercising movements and has felt better. ), and Hypertension        6/26/2025     7:08 AM   Additional Questions   Roomed by Halina COX MA   Accompanied by self         6/26/2025     7:08 AM   Patient Reported Additional Medications   Patient reports taking the following new medications Vitamin C daily          Hip Pain      Chief Complaint   Patient presents with    Physical    Problems Swallowing     Food getting stuck in the esophagus, fluid comes back up periodically- not \"vomiting\", but just will not stay down.     Hip Pain     Improved significantly with lessened activity. Pt states he has reduced exercising movements and has felt better.     Hypertension          Hypertension Follow-up    Do you check your blood pressure regularly outside of the clinic? No   Are you following a low salt diet? Yes  Are your blood pressures ever more than 140 on the top number (systolic) OR more   than 90 on the bottom number (diastolic), for example 140/90? N/A    BP Readings from Last 2 Encounters:   06/26/25 98/60   06/13/24 100/68     Concern - dysphagia  Onset: 2-3 months  Description: food feels getting stuck in the chest; sometimes fluids can regurgitate  Intensity: mild, moderate  Progression of Symptoms:  " worsening  Accompanying Signs & Symptoms: bloating at the start but that has resolved now; patient denies abd pain, dyspnea, emesis, hematemesis, coffee-ground emesis; patient has lost a little weight - no active attempt to lose weight.  Previous history of similar problem: none; patient denies hx of GERD  Precipitating factors:        Worsened by: if eating fast; if eating a larger meal  Alleviating factors:        Improved by: none  Therapies tried and outcome: None    Right hip pain - getting better.  Will revisit another day.    Advance Care Planning    Document on file is a Health Care Directive or POLST.        6/26/2025   General Health   How would you rate your overall physical health? (!) FAIR   Feel stress (tense, anxious, or unable to sleep) Not at all         6/26/2025   Nutrition   Three or more servings of calcium each day? Yes   Diet: Regular (no restrictions)   How many servings of fruit and vegetables per day? (!) 0-1   How many sweetened beverages each day? (!) 2         6/26/2025   Exercise   Days per week of moderate/strenous exercise 7 days   Average minutes spent exercising at this level 20 min         6/26/2025   Social Factors   Frequency of gathering with friends or relatives Twice a week   Worry food won't last until get money to buy more No   Food not last or not have enough money for food? No   Do you have housing? (Housing is defined as stable permanent housing and does not include staying outside in a car, in a tent, in an abandoned building, in an overnight shelter, or couch-surfing.) Yes   Are you worried about losing your housing? No   Lack of transportation? No   Unable to get utilities (heat,electricity)? No         6/26/2025   Fall Risk   Fallen 2 or more times in the past year? No   Trouble with walking or balance? No          6/26/2025   Dental   Dentist two times every year? Yes         Today's PHQ-2 Score:       6/26/2025     2:44 AM   PHQ-2 ( 1999 [employer])   Q1: Little  interest or pleasure in doing things 0   Q2: Feeling down, depressed or hopeless 1   PHQ-2 Score 1    Q1: Little interest or pleasure in doing things Not at all   Q2: Feeling down, depressed or hopeless Several days   PHQ-2 Score 1       Patient-reported           2025   Substance Use   Alcohol more than 3/day or more than 7/wk Not Applicable   Do you use any other substances recreationally? No     Social History     Tobacco Use    Smoking status: Former     Current packs/day: 0.00     Average packs/day: 1 pack/day for 35.8 years (35.8 ttl pk-yrs)     Types: Cigarettes     Start date: 1976     Quit date: 2012     Years since quittin.2    Smokeless tobacco: Never    Tobacco comments:     2 PPD   Vaping Use    Vaping status: Never Used   Substance Use Topics    Alcohol use: Not Currently     Comment: RARE    Drug use: Not Currently     Types: Marijuana, Hashish, LSD     Comment: 30 years ago           2025   STI Screening   New sexual partner(s) since last STI/HIV test? No   Last PSA:   PSA   Date Value Ref Range Status   2019 2.47 0 - 4 ug/L Final     Comment:     Assay Method:  Chemiluminescence using Siemens Vista analyzer     ASCVD Risk   The 10-year ASCVD risk score (Vee DK, et al., 2019) is: 7.5%    Values used to calculate the score:      Age: 65 years      Sex: Male      Is Non- : No      Diabetic: No      Tobacco smoker: No      Systolic Blood Pressure: 98 mmHg      Is BP treated: Yes      HDL Cholesterol: 54 mg/dL      Total Cholesterol: 173 mg/dL           Reviewed and updated as needed this visit by Provider    Allergies  Meds  Problems               Past Medical History:   Diagnosis Date    CKD (chronic kidney disease)     stage 3    Depressive disorder     Schizoaffective - manic depressive with schizophrenic tendencies    Depressive disorder, not elsewhere classified     Manic     HTN (hypertension)     Unspecified schizophrenia,  unspecified condition      Past Surgical History:   Procedure Laterality Date    ABDOMEN SURGERY      Double hernja    COLONOSCOPY      normal. has fam hx colon cancer    COLONOSCOPY N/A 10/13/2014    Procedure: COLONOSCOPY;  Surgeon: Santy Constantino MD;  Location: [facility]    COLONOSCOPY N/A 2022    Procedure: COLONOSCOPY;  Surgeon: Sherif Duarte MD;  Location: [facility]    HERNIA REPAIR, INGUINAL RT/LT  2004    SURGICAL HISTORY OF -       Incised & Drained - ? Perirectal Abscess      Patient Active Problem List   Diagnosis    Severe bipolar I disorder, most recent episode mixed, with psychotic features (H)    Sebaceous cyst    Stage 3b chronic kidney disease (H)    Schizoaffective disorder (H)    Hyperlipidemia LDL goal <160    Tubular adenoma of colon    Former smoker    Benign essential hypertension    Former smoker, stopped smoking in distant past    Deep vein thrombosis (DVT) (H)    Acute deep vein thrombosis (DVT) of femoral vein of right lower extremity (H)    Acute deep vein thrombosis (DVT) of right lower extremity, unspecified vein (H)    Acute deep vein thrombosis (DVT) of other specified vein of right lower extremity (H)    Long term current use of anticoagulant therapy     Past Surgical History:   Procedure Laterality Date    ABDOMEN SURGERY      Double hernja    COLONOSCOPY      normal. has fam hx colon cancer    COLONOSCOPY N/A 10/13/2014    Procedure: COLONOSCOPY;  Surgeon: Santy Constantino MD;  Location: [facility]    COLONOSCOPY N/A 2022    Procedure: COLONOSCOPY;  Surgeon: Sherif Duarte MD;  Location: [facility]    HERNIA REPAIR, INGUINAL RT/LT  2004    SURGICAL HISTORY OF -       Incised & Drained - ? Perirectal Abscess        Social History     Tobacco Use    Smoking status: Former     Current packs/day: 0.00     Average packs/day: 1 pack/day for 35.8 years (35.8 ttl pk-yrs)     Types: Cigarettes     Start date: 1976     Quit date: 2012     Years since quittin.2     "Smokeless tobacco: Never    Tobacco comments:     2 PPD   Substance Use Topics    Alcohol use: Not Currently     Comment: RARE     Family History   Problem Relation Age of Onset    Thyroid Disease Mother     Coronary Artery Disease Mother         pacemaker    Cancer Paternal Grandfather     Cancer Sister     Cancer - colorectal Sister     Colon Cancer Sister     Mental Illness Nephew         Noé committed suicide - ex wife committed suicide also         Current Outpatient Medications   Medication Sig Dispense Refill    lamoTRIgine (LAMICTAL) 100 MG tablet Take 1 tablet (100 mg) by mouth 2 times daily 60 tablet 0    losartan (COZAAR) 50 MG tablet Take 1 tablet by mouth once daily 90 tablet 3    Multiple Vitamins-Minerals (MULTIVITAMIN ADULT PO) Take 1 tablet by mouth At Bedtime       OLANZapine (ZYPREXA) 5 MG tablet TAKE 1 TABLET BY MOUTH ONCE DAILY AT BEDTIME AND 1/2 (ONE-HALF) AS NEEDED AS DIRECTED      Omega-3 Fatty Acids (OMEGA 3 PO) Take 2,400 mg by mouth every evening      risperiDONE (RISPERDAL) 0.25 MG tablet  (Patient taking differently: Take 0.25 mg by mouth daily. Pt takes one tablet one time daily)      warfarin ANTICOAGULANT (COUMADIN/JANTOVEN) 5 MG tablet Take 5 mg every Mon, Wed, Fri; 2.5 mg all other days or As directed by Anticoagulation clinic 70 tablet 1     Allergies   Allergen Reactions    Lisinopril Nausea     Felt weakness nausea, couldn't sleep         Review of Systems  Constitutional, HEENT, cardiovascular, pulmonary, GI, , musculoskeletal, neuro, skin, endocrine and psych systems are negative, except as otherwise noted.     Objective    Exam  BP 98/60 (BP Location: Right arm, Patient Position: Sitting, Cuff Size: Adult Large)   Pulse 75   Temp 99.1  F (37.3  C) (Tympanic)   Resp 20   Ht 1.788 m (5' 10.4\")   Wt 74.8 kg (164 lb 14.4 oz)   SpO2 96%   BMI 23.39 kg/m     Estimated body mass index is 23.39 kg/m  as calculated from the following:    Height as of this encounter: 1.788 m " "(5' 10.4\").    Weight as of this encounter: 74.8 kg (164 lb 14.4 oz).    Physical Exam  GENERAL APPEARANCE: well-nourished, ambulatory w/o assist, alert and no distress  EYES: pink conj, no icterus, PERRL, EOMI  HENT: ear canals and TM's normal, nose clear,  mouth without ulcers or lesions, oropharynx clear and oral mucous membranes moist  NECK: no adenopathy, no asymmetry, masses, or scars and thyroid normal to palpation  RESP: lungs clear to auscultation - no rales, rhonchi or wheezes  CV: normal rate, regular rhythm, no murmur,   ABDOMEN: soft, nontender, no hepatosplenomegaly, no masses and bowel sounds normal  DIRECT RECTAL EXAM : deferred  MS: no musculoskeletal defects are noted and gait is age appropriate without ataxia; no edema  SKIN: no suspicious lesions or rashes  NEURO: Normal strength and tone, sensory exam grossly normal, mentation intact and speech normal   PSYCH: well-kempt, linear thought process, slightly anxious mood, appropriate affect, no suicidality/aggression/hallucination       Signed Electronically by: Juan Antonio Flanagan MD    "

## 2025-07-07 ENCOUNTER — HOSPITAL ENCOUNTER (OUTPATIENT)
Facility: CLINIC | Age: 65
End: 2025-07-07
Attending: STUDENT IN AN ORGANIZED HEALTH CARE EDUCATION/TRAINING PROGRAM | Admitting: STUDENT IN AN ORGANIZED HEALTH CARE EDUCATION/TRAINING PROGRAM
Payer: COMMERCIAL

## 2025-07-07 ENCOUNTER — TELEPHONE (OUTPATIENT)
Dept: GASTROENTEROLOGY | Facility: CLINIC | Age: 65
End: 2025-07-07
Payer: COMMERCIAL

## 2025-07-07 NOTE — TELEPHONE ENCOUNTER
Endoscopy Scheduling Screen      What insurance is in the chart?  Other:  [facility]    Ordering/Referring Provider: Juan Antonio Flanagan MD    (If ordering provider performs procedure, schedule with ordering provider unless otherwise instructed. )    BMI: 23.39    Sedation Ordered  general anesthesia.   BMI<= 45 45 < BMI <= 48 48 < BMI < = 50  BMI > 50   No Restrictions No [facility]  No [facility]  [facility] with exceptions Hospital Only OR Only       Do you have a history of malignant hyperthermia?  NO    (Females) Are you currently pregnant?   NO     Are you currently on dialysis?   NO    Do you need assistance transferring?   NO    BMI: There is no height or weight on file to calculate BMI.     Is patients BMI > 50?  NO    BMI > 40?  NO    Do you have a diagnosis of diabetes?  NO    Do you take an Oral or Injectable medication for weight loss or diabetes (excluding insulin)?  NO    Do you take the medication Naltrexone?  NO    Do you take blood thinners?  Yes, you must contact your prescribing provider for directions on holding or bridging with a different medication.  WARFARIN  Prep   Are you currently have chronic kidney disease?  Yes (Golytely Prep)    Do you have a diagnosis of cystic fibrosis (CF)?  NO    On a regular basis do you go 3 -5 days between each bowel movements?  NO    Preferred Pharmacy:    [facility] [address] [place] [address] [address] [place] [address]  [address] [place] [id number]  Phone: [phone] Fax: [phone]      Final Scheduling Details     Procedure scheduled  Upper endoscopy (EGD)    Surgeon:  Theresa      Date of procedure:  7/31/25     [facility] - Per order.    What is your communication preference for Instructions and/or Bowel Prep?   Lotus Tissue Repairhart    Patient Reminders:    You will receive a call from a Nurse to review instructions and health history.  This assessment must be completed prior to your procedure.  Failure to complete the Nurse assessment may result in the  procedure being cancelled.       On the day of your procedure, please designate an adult(s) who can drive you home stay with you for the next 24 hours. The medicines used in the exam will make you sleepy. You will not be able to drive.       You cannot take public transportation, ride share services, or non-medical taxi service without a responsible caregiver.  Medical transport services are allowed with the requirement that a responsible caregiver will receive you at your destination.  We require that drivers and caregivers are confirmed prior to your procedure.

## 2025-07-16 ENCOUNTER — ANTICOAGULATION THERAPY VISIT (OUTPATIENT)
Dept: ANTICOAGULATION | Facility: CLINIC | Age: 65
End: 2025-07-16

## 2025-07-16 ENCOUNTER — LAB (OUTPATIENT)
Dept: LAB | Facility: CLINIC | Age: 65
End: 2025-07-16
Payer: COMMERCIAL

## 2025-07-16 DIAGNOSIS — I82.491 ACUTE DEEP VEIN THROMBOSIS (DVT) OF OTHER SPECIFIED VEIN OF RIGHT LOWER EXTREMITY (H): ICD-10-CM

## 2025-07-16 DIAGNOSIS — I82.411 ACUTE DEEP VEIN THROMBOSIS (DVT) OF FEMORAL VEIN OF RIGHT LOWER EXTREMITY (H): ICD-10-CM

## 2025-07-16 DIAGNOSIS — Z79.01 LONG TERM CURRENT USE OF ANTICOAGULANT THERAPY: ICD-10-CM

## 2025-07-16 DIAGNOSIS — I82.411 ACUTE DEEP VEIN THROMBOSIS (DVT) OF FEMORAL VEIN OF RIGHT LOWER EXTREMITY (H): Primary | ICD-10-CM

## 2025-07-16 LAB — INR BLD: 2.5 (ref 0.9–1.1)

## 2025-07-16 PROCEDURE — 85610 PROTHROMBIN TIME: CPT

## 2025-07-16 PROCEDURE — 36416 COLLJ CAPILLARY BLOOD SPEC: CPT

## 2025-07-16 NOTE — PROGRESS NOTES
ANTICOAGULATION MANAGEMENT     Jordan Barnett 65 year old male is on warfarin with therapeutic INR result. (Goal INR 2.0-3.0)    Recent labs: (last 7 days)     07/16/25  0811   INR 2.5*       ASSESSMENT     Warfarin Lab Questionnaire    Warfarin Doses Last 7 Days      7/15/2025     6:18 PM   Dose in Tablet or Mg   TAB or MG? milligram (mg)     Pt Rptd Dose BAR MONDAY TUESDAY WED THURS FRIDAY SATURDAY   7/15/2025   6:18 PM 2.5 5 2.5 5 2.5 5 2.5         7/15/2025   Warfarin Lab Questionnaire   Missed doses within past 14 days? Yes   If yes; please list when: A few days ago, did make up for the dose the next day   Changes in diet or alcohol within past 14 days? No   Medication changes since last result? No   Injuries or illness since last result? No   New shortness of breath, severe headaches or sudden changes in vision since last result? Yes   If yes, please explain:  Shortage of breath   Abnormal bleeding since last result? No   Upcoming surgery, procedure? Yes   Please explain, date scheduled? Endoscopy 7-     Previous result: Therapeutic last 2(+) visits  Additional findings: Upcoming surgery/procedure 7/31/25 EGD, TE sent to [facility]     PLAN     Recommended plan for temporary change(s) affecting INR     Dosing Instructions: Continue your current warfarin dose with next INR in 2-3 weeks    [facility] to call with procedure plan when complete.    Summary  As of 7/16/2025      Full warfarin instructions:  5 mg every Mon, Wed, Fri; 2.5 mg all other days   Next INR check:  8/7/2025               Telephone call with Jordan who verbalizes understanding and agrees to plan    Lab visit scheduled    Education provided: Please call back if any changes to your diet, medications or how you've been taking warfarin    Plan made per [facility] anticoagulation protocol    Joann Gurrola, RN  7/16/2025  Anticoagulation Clinic  [facility] for routing messages: carmel FRANCIS  [facility] patient phone line:  [phone]        _______________________________________________________________________     Anticoagulation Episode Summary       Current INR goal:  2.0-3.0   TTR:  69.9% (1 y)   Target end date:  Indefinite   Send INR reminders to:  FLAVIO FRANCIS    Indications    Acute deep vein thrombosis (DVT) of femoral vein of right lower extremity (H) [I82.411]  Long term current use of anticoagulant therapy [Z79.01]  Acute deep vein thrombosis (DVT) of other specified vein of right lower extremity (H) [I82.491]             Comments:  --             Anticoagulation Care Providers       Provider Role Specialty Phone number    Juan Antonio Flanagan MD Referring Family Medicine [phone]

## 2025-07-23 ENCOUNTER — HOSPITAL ENCOUNTER (OUTPATIENT)
Dept: CT IMAGING | Facility: CLINIC | Age: 65
Discharge: HOME | End: 2025-07-23
Attending: FAMILY MEDICINE
Payer: COMMERCIAL

## 2025-07-23 DIAGNOSIS — Z87.891 HISTORY OF TOBACCO USE, PRESENTING HAZARDS TO HEALTH: ICD-10-CM

## 2025-07-23 PROCEDURE — 71271 CT THORAX LUNG CANCER SCR C-: CPT

## 2025-07-24 ENCOUNTER — TELEPHONE (OUTPATIENT)
Dept: PULMONOLOGY | Facility: CLINIC | Age: 65
End: 2025-07-24
Payer: COMMERCIAL

## 2025-07-24 DIAGNOSIS — R91.8 PULMONARY NODULES: Primary | ICD-10-CM

## 2025-07-24 NOTE — TELEPHONE ENCOUNTER
[facility] Radiology Lung Cancer Screening CT result notification:     LDCT/Lung Cancer Screening CT Exam date: 7/23/25  Radiologist Impression AND Recommendations:   IMPRESSION:  1.  New 6 mm nodule in the posterior left lower lobe likely represents some atelectasis or scarring.  2.  Stable 2 mm left upper lobe nodule.  3.  LungRADS CATEGORY: 4A: Suspicious.     Category 4A (suspicious, 5-15% chance of malignancy)     Solid nodule(s)  Greater than or equal to 8 mm to <15 mm at baseline  Growing nodule(s) <8 mm  New nodule 6 mm to <8 mm  Subsolid nodule(s)  Greater than or equal to 6 mm total diameter w/ solid component Greater than or equal to 6 mm to <8 mm  New or growing <4 mm solid component  Endobronchial nodule     Recommended follow up  Category 4:  3 month follow up with LDCT  PET/CT may be used if there is a greater than or equal to 8 mm solid component   Pertinent Findings:  NODULES: New 6 mm posterior left lower lobe nodule on image 265 of series 3 likely represents some atelectasis or scarring.  Stable 2 mm nodule in the lateral left upper lobe on image 141.  Calcified granulomas are seen in the right lung.     Ordering Provider: Dr. Masha Barnett did receive the remaining radiology results from their PCP.         Alexia Ortega.      Your low dose CT scan of the chest showed a suspect nodule that is new to you. They recommended a 3-month repeat CT scan. Has someone reached out to you yet about this? If not let me know so I can order the repeat CT scan. The number to schedule is the same that you called before. Let me know if you need it again.     Please contact the clinic if you have any additional questions or concerns.  Have a great day!     Yours truly,  Dr. Flanagan   Written by Juan Antonio Flanagan MD on 7/24/2025  1:11 PM CDT  Lung Nodule Program Protocol recommendation [Pertaining to lung nodules]:    Lung RADS 4A Protocol:  Results RN to notify Patient of  "results/recommendations and offer a referral to [facility] within 4 to 6 weeks  Offer referral to [facility] Lung Nodule clinic instead of the 3 month CT. (Yes/No/NA):  n/a-PCP recommends 3 month CT  If Patient agrees to a referral to [facility] [9023 - Adult Oncology/Hematology  referral with reason for referral \"Interventional pulmonology (Lung Nodule)\"], place referral to [facility] to be seen within 4 to 6 weeks.  (Yes/No/NA):  n/a  If Patient refuses referral, place order for 3 month CT ([id number]).   (Yes/No/NA):  Yes    RN communicated the lung nodule finding to the patient (Yes/No):  yes  The patient had the following questions: n/a  Correct letter sent as per [facility] Lung nodule protocol (Yes/No):  yes  Did Patient have any CT's of chest previous? (Yes/No/NA) n/a  If no comparisons used, inquire if patient has had any chest CT's in the past and if so, request priors.    Amy Neri, RN  RN Care Coordinator  Lung Nodule/Interventional Pulmonology Clinic  [facility]  Phone: [phone]    "

## 2025-07-25 ENCOUNTER — ANESTHESIA EVENT (OUTPATIENT)
Dept: GASTROENTEROLOGY | Facility: CLINIC | Age: 65
End: 2025-07-25
Payer: COMMERCIAL

## 2025-07-25 ASSESSMENT — LIFESTYLE VARIABLES: TOBACCO_USE: 1

## 2025-07-25 NOTE — ANESTHESIA PREPROCEDURE EVALUATION
Anesthesia Pre-Procedure Evaluation    Patient: Jordan Barnett   MRN: [id number] : 1960          Procedure : Procedure(s):  Esophagoscopy, gastroscopy, duodenoscopy (EGD), combined         Past Medical History:   Diagnosis Date    CKD (chronic kidney disease)     stage 3    Depressive disorder     Schizoaffective - manic depressive with schizophrenic tendencies    Depressive disorder, not elsewhere classified     Manic     HTN (hypertension)     Unspecified schizophrenia, unspecified condition       Past Surgical History:   Procedure Laterality Date    ABDOMEN SURGERY      Double hernja    COLONOSCOPY      normal. has fam hx colon cancer    COLONOSCOPY N/A 10/13/2014    Procedure: COLONOSCOPY;  Surgeon: Santy Constantino MD;  Location: [facility]    COLONOSCOPY N/A 2022    Procedure: COLONOSCOPY;  Surgeon: Sherif Duarte MD;  Location: [facility]    HERNIA REPAIR, INGUINAL RT/LT  2004    SURGICAL HISTORY OF -       Incised & Drained - ? Perirectal Abscess       Allergies   Allergen Reactions    Lisinopril Nausea     Felt weakness nausea, couldn't sleep      Social History     Tobacco Use    Smoking status: Former     Current packs/day: 0.00     Average packs/day: 1 pack/day for 35.8 years (35.8 ttl pk-yrs)     Types: Cigarettes     Start date: 1976     Quit date: 2012     Years since quittin.3    Smokeless tobacco: Never    Tobacco comments:     2 PPD   Substance Use Topics    Alcohol use: Not Currently     Comment: RARE      Wt Readings from Last 1 Encounters:   25 74.8 kg (164 lb 14.4 oz)        Anesthesia Evaluation   Pt has had prior anesthetic. Type: General and MAC.        ROS/MED HX  ENT/Pulmonary:     (+)                tobacco use, Past use,                       Neurologic:       Cardiovascular:     (+) Dyslipidemia hypertension- -   -  - -   Taking blood thinners                                   METS/Exercise Tolerance:     Hematologic:     (+) History of blood clots,    pt  "is anticoagulated,           Musculoskeletal:       GI/Hepatic:       Renal/Genitourinary: Comment: Stage 3b chronic kidney disease (H)      (+) renal disease, type: CRI,            Endo:       Psychiatric/Substance Use:     (+) psychiatric history bipolar, depression and schizophrenia       Infectious Disease:       Malignancy:       Other:              Physical Exam  Airway  Mallampati: II  TM distance: >3 FB  Neck ROM: full  Upper bite lip test: I  Mouth opening: >= 4 cm    Cardiovascular   Rhythm: regular  Rate: normal rate     Dental   (+) Minor Abnormalities - some fillings, tiny chips    increased risk of dental damage  Pulmonary Breath sounds clear to auscultation        Neurological   He appears awake, alert and oriented x3.    Other Findings       OUTSIDE LABS:  CBC:   Lab Results   Component Value Date    WBC 6.4 10/17/2024    WBC 6.1 02/15/2024    HGB 11.6 (L) 10/17/2024    HGB 11.8 (L) 02/15/2024    HCT 35.2 (L) 10/17/2024    HCT 35.9 (L) 02/15/2024     10/17/2024     02/15/2024     BMP:   Lab Results   Component Value Date     10/17/2024     02/15/2024    POTASSIUM 4.8 10/17/2024    POTASSIUM 4.7 02/15/2024    CHLORIDE 107 10/17/2024    CHLORIDE 104 02/15/2024    CO2 24 10/17/2024    CO2 23 02/15/2024    BUN 37.7 (H) 10/17/2024    BUN 54.9 (H) 02/15/2024    CR 1.82 (H) 10/17/2024    CR 1.68 (H) 02/15/2024    GLC 93 10/17/2024     (H) 02/15/2024     COAGS:   Lab Results   Component Value Date    PTT 28 11/28/2020    INR 2.5 (H) 07/16/2025     POC: No results found for: \"BGM\", \"HCG\", \"HCGS\"  HEPATIC:   Lab Results   Component Value Date    ALBUMIN 4.0 10/17/2024    PROTTOTAL 7.2 02/15/2024    ALT 34 02/15/2024    AST 22 02/15/2024    ALKPHOS 90 02/15/2024    BILITOTAL <0.2 02/15/2024     OTHER:   Lab Results   Component Value Date    A1C 5.8 (H) 01/12/2023    BENNY 9.4 10/17/2024    PHOS 3.5 10/17/2024    TSH 3.70 06/15/2018    T4 7.1 01/08/2008    SED 16 (H) 01/08/2008 "       Anesthesia Plan    ASA Status:  2      NPO Status: NPO Appropriate   Anesthesia Type: General.  Airway: natural airway.  Induction: intravenous.  Maintenance: TIVA.   Techniques and Equipment:       - Monitoring Plan: standard ASA monitoring     Consents    Anesthesia Plan(s) and associated risks, benefits, and realistic alternatives discussed. Questions answered and patient/representative(s) expressed understanding.     - Discussed: CRNA     - Discussed with:  Patient, family        - Pt is DNR/DNI Status: no DNR     Blood Consent:      - Discussed with: not discussed.     - Consented: consented to blood products     Postoperative Care    Pain management: multimodal analgesia.     Comments:                   AUGUSTUS Vaca CRNA    I have reviewed the pertinent notes and labs in the chart from the past 30 days and (re)examined the patient.  Any updates or changes from those notes are reflected in this note.    Clinically Significant Risk Factors Present on Admission                   # Hypertension: Noted on problem list

## 2025-07-27 LAB
ABO + RH BLD: NORMAL
BLD GP AB SCN SERPL QL: NEGATIVE
SPECIMEN EXP DATE BLD: NORMAL

## 2025-07-28 ENCOUNTER — HOSPITAL ENCOUNTER (INPATIENT)
Facility: CLINIC | Age: 65
LOS: 2 days | Discharge: HOME | End: 2025-07-30
Attending: STUDENT IN AN ORGANIZED HEALTH CARE EDUCATION/TRAINING PROGRAM | Admitting: STUDENT IN AN ORGANIZED HEALTH CARE EDUCATION/TRAINING PROGRAM
Payer: COMMERCIAL

## 2025-07-28 ENCOUNTER — NURSE TRIAGE (OUTPATIENT)
Dept: FAMILY MEDICINE | Facility: CLINIC | Age: 65
End: 2025-07-28

## 2025-07-28 ENCOUNTER — ANCILLARY PROCEDURE (OUTPATIENT)
Dept: GENERAL RADIOLOGY | Facility: CLINIC | Age: 65
End: 2025-07-28
Attending: NURSE PRACTITIONER
Payer: COMMERCIAL

## 2025-07-28 ENCOUNTER — ANTICOAGULATION THERAPY VISIT (OUTPATIENT)
Dept: ANTICOAGULATION | Facility: CLINIC | Age: 65
End: 2025-07-28

## 2025-07-28 ENCOUNTER — OFFICE VISIT (OUTPATIENT)
Dept: PEDIATRICS | Facility: CLINIC | Age: 65
End: 2025-07-28
Payer: COMMERCIAL

## 2025-07-28 VITALS
BODY MASS INDEX: 21.22 KG/M2 | OXYGEN SATURATION: 99 % | HEART RATE: 66 BPM | DIASTOLIC BLOOD PRESSURE: 72 MMHG | SYSTOLIC BLOOD PRESSURE: 115 MMHG | WEIGHT: 149.6 LBS

## 2025-07-28 DIAGNOSIS — Z79.01 LONG TERM CURRENT USE OF ANTICOAGULANT THERAPY: ICD-10-CM

## 2025-07-28 DIAGNOSIS — N18.32 ACUTE RENAL FAILURE SUPERIMPOSED ON STAGE 3B CHRONIC KIDNEY DISEASE, UNSPECIFIED ACUTE RENAL FAILURE TYPE (H): ICD-10-CM

## 2025-07-28 DIAGNOSIS — R42 DIZZINESS: ICD-10-CM

## 2025-07-28 DIAGNOSIS — I95.1 ORTHOSTATIC HYPOTENSION: ICD-10-CM

## 2025-07-28 DIAGNOSIS — R13.19 ESOPHAGEAL DYSPHAGIA: ICD-10-CM

## 2025-07-28 DIAGNOSIS — R06.09 EXERTIONAL DYSPNEA: ICD-10-CM

## 2025-07-28 DIAGNOSIS — R63.4 WEIGHT LOSS: ICD-10-CM

## 2025-07-28 DIAGNOSIS — I82.411 ACUTE DEEP VEIN THROMBOSIS (DVT) OF FEMORAL VEIN OF RIGHT LOWER EXTREMITY (H): Primary | ICD-10-CM

## 2025-07-28 DIAGNOSIS — D64.9 SYMPTOMATIC ANEMIA: Primary | ICD-10-CM

## 2025-07-28 DIAGNOSIS — N17.9 ACUTE RENAL FAILURE SUPERIMPOSED ON STAGE 3B CHRONIC KIDNEY DISEASE, UNSPECIFIED ACUTE RENAL FAILURE TYPE (H): ICD-10-CM

## 2025-07-28 DIAGNOSIS — D64.9 ACUTE ANEMIA: Primary | ICD-10-CM

## 2025-07-28 DIAGNOSIS — I82.491 ACUTE DEEP VEIN THROMBOSIS (DVT) OF OTHER SPECIFIED VEIN OF RIGHT LOWER EXTREMITY (H): ICD-10-CM

## 2025-07-28 DIAGNOSIS — F31.64 SEVERE BIPOLAR I DISORDER, MOST RECENT EPISODE MIXED, WITH PSYCHOTIC FEATURES (H): ICD-10-CM

## 2025-07-28 DIAGNOSIS — R91.8 PULMONARY NODULES: ICD-10-CM

## 2025-07-28 LAB
ABO + RH BLD: NORMAL
ALBUMIN SERPL BCG-MCNC: 3.9 G/DL (ref 3.5–5.2)
ALBUMIN SERPL BCG-MCNC: 4.1 G/DL (ref 3.5–5.2)
ALP SERPL-CCNC: 67 U/L (ref 40–150)
ALP SERPL-CCNC: 69 U/L (ref 40–150)
ALT SERPL W P-5'-P-CCNC: 16 U/L (ref 0–70)
ALT SERPL W P-5'-P-CCNC: 16 U/L (ref 0–70)
ANION GAP SERPL CALCULATED.3IONS-SCNC: 14 MMOL/L (ref 7–15)
ANION GAP SERPL CALCULATED.3IONS-SCNC: 16 MMOL/L (ref 7–15)
AST SERPL W P-5'-P-CCNC: 11 U/L (ref 0–45)
AST SERPL W P-5'-P-CCNC: 11 U/L (ref 0–45)
BASOPHILS # BLD AUTO: 0 10E3/UL (ref 0–0.2)
BASOPHILS # BLD AUTO: 0.1 10E3/UL (ref 0–0.2)
BASOPHILS NFR BLD AUTO: 1 %
BASOPHILS NFR BLD AUTO: 1 %
BILIRUB SERPL-MCNC: <0.2 MG/DL
BILIRUB SERPL-MCNC: <0.2 MG/DL
BLD GP AB SCN SERPL QL: NEGATIVE
BLD PROD TYP BPU: NORMAL
BLOOD COMPONENT TYPE: NORMAL
BUN SERPL-MCNC: 34.7 MG/DL (ref 8–23)
BUN SERPL-MCNC: 34.7 MG/DL (ref 8–23)
CALCIUM SERPL-MCNC: 9.2 MG/DL (ref 8.8–10.4)
CALCIUM SERPL-MCNC: 9.9 MG/DL (ref 8.8–10.4)
CHLORIDE SERPL-SCNC: 102 MMOL/L (ref 98–107)
CHLORIDE SERPL-SCNC: 104 MMOL/L (ref 98–107)
CODING SYSTEM: NORMAL
CREAT SERPL-MCNC: 2.42 MG/DL (ref 0.67–1.17)
CREAT SERPL-MCNC: 2.55 MG/DL (ref 0.67–1.17)
CROSSMATCH: NORMAL
EGFRCR SERPLBLD CKD-EPI 2021: 27 ML/MIN/1.73M2
EGFRCR SERPLBLD CKD-EPI 2021: 29 ML/MIN/1.73M2
EOSINOPHIL # BLD AUTO: 0.2 10E3/UL (ref 0–0.7)
EOSINOPHIL # BLD AUTO: 0.3 10E3/UL (ref 0–0.7)
EOSINOPHIL NFR BLD AUTO: 3 %
EOSINOPHIL NFR BLD AUTO: 4 %
ERYTHROCYTE [DISTWIDTH] IN BLOOD BY AUTOMATED COUNT: 15.1 % (ref 10–15)
ERYTHROCYTE [DISTWIDTH] IN BLOOD BY AUTOMATED COUNT: 15.3 % (ref 10–15)
FERRITIN SERPL-MCNC: 8 NG/ML (ref 31–409)
GLUCOSE SERPL-MCNC: 110 MG/DL (ref 70–99)
GLUCOSE SERPL-MCNC: 83 MG/DL (ref 70–99)
HCO3 SERPL-SCNC: 20 MMOL/L (ref 22–29)
HCO3 SERPL-SCNC: 21 MMOL/L (ref 22–29)
HCT VFR BLD AUTO: 21.7 % (ref 40–53)
HCT VFR BLD AUTO: 22.8 % (ref 40–53)
HGB BLD-MCNC: 6.4 G/DL (ref 13.3–17.7)
HGB BLD-MCNC: 7 G/DL (ref 13.3–17.7)
HGB BLD-MCNC: 7.6 G/DL (ref 13.3–17.7)
IMM GRANULOCYTES # BLD: 0 10E3/UL
IMM GRANULOCYTES # BLD: 0 10E3/UL
IMM GRANULOCYTES NFR BLD: 0 %
IMM GRANULOCYTES NFR BLD: 0 %
INR PPP: 1.44 (ref 0.85–1.15)
IRON SERPL-MCNC: 21 UG/DL (ref 61–157)
ISSUE DATE AND TIME: NORMAL
LYMPHOCYTES # BLD AUTO: 1.6 10E3/UL (ref 0.8–5.3)
LYMPHOCYTES # BLD AUTO: 2.3 10E3/UL (ref 0.8–5.3)
LYMPHOCYTES NFR BLD AUTO: 23 %
LYMPHOCYTES NFR BLD AUTO: 31 %
MCH RBC QN AUTO: 22.9 PG (ref 26.5–33)
MCH RBC QN AUTO: 23.3 PG (ref 26.5–33)
MCHC RBC AUTO-ENTMCNC: 29.5 G/DL (ref 31.5–36.5)
MCHC RBC AUTO-ENTMCNC: 30.7 G/DL (ref 31.5–36.5)
MCV RBC AUTO: 76 FL (ref 78–100)
MCV RBC AUTO: 78 FL (ref 78–100)
MCV RBC AUTO: 79 FL (ref 78–100)
MONOCYTES # BLD AUTO: 0.6 10E3/UL (ref 0–1.3)
MONOCYTES # BLD AUTO: 0.6 10E3/UL (ref 0–1.3)
MONOCYTES NFR BLD AUTO: 8 %
MONOCYTES NFR BLD AUTO: 9 %
NEUTROPHILS # BLD AUTO: 4.1 10E3/UL (ref 1.6–8.3)
NEUTROPHILS # BLD AUTO: 4.5 10E3/UL (ref 1.6–8.3)
NEUTROPHILS NFR BLD AUTO: 56 %
NEUTROPHILS NFR BLD AUTO: 65 %
NRBC # BLD AUTO: 0 10E3/UL
NRBC BLD AUTO-RTO: 0 /100
NT-PROBNP SERPL-MCNC: 41 PG/ML (ref 0–177)
PLATELET # BLD AUTO: 323 10E3/UL (ref 150–450)
PLATELET # BLD AUTO: 350 10E3/UL (ref 150–450)
POTASSIUM SERPL-SCNC: 4.1 MMOL/L (ref 3.4–5.3)
POTASSIUM SERPL-SCNC: 4.4 MMOL/L (ref 3.4–5.3)
PROT SERPL-MCNC: 6.7 G/DL (ref 6.4–8.3)
PROT SERPL-MCNC: 7.1 G/DL (ref 6.4–8.3)
PROTHROMBIN TIME: 17.3 SECONDS (ref 11.8–14.8)
RBC # BLD AUTO: 2.8 10E6/UL (ref 4.4–5.9)
RBC # BLD AUTO: 3.01 10E6/UL (ref 4.4–5.9)
SODIUM SERPL-SCNC: 138 MMOL/L (ref 135–145)
SODIUM SERPL-SCNC: 139 MMOL/L (ref 135–145)
SPECIMEN EXP DATE BLD: NORMAL
TRANSFERRIN SERPL-MCNC: 283 MG/DL (ref 200–360)
TSH SERPL DL<=0.005 MIU/L-ACNC: 2.93 UIU/ML (ref 0.3–4.2)
UNIT ABO/RH: NORMAL
UNIT NUMBER: NORMAL
UNIT STATUS: NORMAL
UNIT TYPE ISBT: 5100
WBC # BLD AUTO: 6.9 10E3/UL (ref 4–11)
WBC # BLD AUTO: 7.3 10E3/UL (ref 4–11)

## 2025-07-28 PROCEDURE — 86900 BLOOD TYPING SEROLOGIC ABO: CPT | Performed by: NURSE PRACTITIONER

## 2025-07-28 PROCEDURE — 82728 ASSAY OF FERRITIN: CPT | Performed by: NURSE PRACTITIONER

## 2025-07-28 PROCEDURE — 85018 HEMOGLOBIN: CPT

## 2025-07-28 PROCEDURE — 93005 ELECTROCARDIOGRAM TRACING: CPT | Performed by: NURSE PRACTITIONER

## 2025-07-28 PROCEDURE — 258N000003 HC RX IP 258 OP 636: Performed by: STUDENT IN AN ORGANIZED HEALTH CARE EDUCATION/TRAINING PROGRAM

## 2025-07-28 PROCEDURE — 85025 COMPLETE CBC W/AUTO DIFF WBC: CPT | Performed by: NURSE PRACTITIONER

## 2025-07-28 PROCEDURE — 83880 ASSAY OF NATRIURETIC PEPTIDE: CPT | Performed by: NURSE PRACTITIONER

## 2025-07-28 PROCEDURE — 120N000001 HC R&B MED SURG/OB

## 2025-07-28 PROCEDURE — 83540 ASSAY OF IRON: CPT | Performed by: NURSE PRACTITIONER

## 2025-07-28 PROCEDURE — 250N000011 HC RX IP 250 OP 636

## 2025-07-28 PROCEDURE — 84443 ASSAY THYROID STIM HORMONE: CPT | Performed by: NURSE PRACTITIONER

## 2025-07-28 PROCEDURE — 84466 ASSAY OF TRANSFERRIN: CPT | Performed by: STUDENT IN AN ORGANIZED HEALTH CARE EDUCATION/TRAINING PROGRAM

## 2025-07-28 PROCEDURE — 86923 COMPATIBILITY TEST ELECTRIC: CPT | Performed by: INTERNAL MEDICINE

## 2025-07-28 PROCEDURE — 84460 ALANINE AMINO (ALT) (SGPT): CPT

## 2025-07-28 PROCEDURE — 85004 AUTOMATED DIFF WBC COUNT: CPT

## 2025-07-28 PROCEDURE — 3074F SYST BP LT 130 MM HG: CPT | Performed by: NURSE PRACTITIONER

## 2025-07-28 PROCEDURE — P9016 RBC LEUKOCYTES REDUCED: HCPCS

## 2025-07-28 PROCEDURE — 86850 RBC ANTIBODY SCREEN: CPT | Performed by: NURSE PRACTITIONER

## 2025-07-28 PROCEDURE — 80053 COMPREHEN METABOLIC PANEL: CPT | Performed by: NURSE PRACTITIONER

## 2025-07-28 PROCEDURE — 99215 OFFICE O/P EST HI 40 MIN: CPT | Mod: 25 | Performed by: NURSE PRACTITIONER

## 2025-07-28 PROCEDURE — 99223 1ST HOSP IP/OBS HIGH 75: CPT

## 2025-07-28 PROCEDURE — 71046 X-RAY EXAM CHEST 2 VIEWS: CPT | Mod: TC | Performed by: RADIOLOGY

## 2025-07-28 PROCEDURE — 86923 COMPATIBILITY TEST ELECTRIC: CPT

## 2025-07-28 PROCEDURE — 3078F DIAST BP <80 MM HG: CPT | Performed by: NURSE PRACTITIONER

## 2025-07-28 PROCEDURE — 250N000013 HC RX MED GY IP 250 OP 250 PS 637

## 2025-07-28 PROCEDURE — 86901 BLOOD TYPING SEROLOGIC RH(D): CPT | Performed by: NURSE PRACTITIONER

## 2025-07-28 PROCEDURE — 250N000011 HC RX IP 250 OP 636: Performed by: STUDENT IN AN ORGANIZED HEALTH CARE EDUCATION/TRAINING PROGRAM

## 2025-07-28 PROCEDURE — 82310 ASSAY OF CALCIUM: CPT

## 2025-07-28 PROCEDURE — 36415 COLL VENOUS BLD VENIPUNCTURE: CPT

## 2025-07-28 PROCEDURE — 96360 HYDRATION IV INFUSION INIT: CPT | Performed by: NURSE PRACTITIONER

## 2025-07-28 PROCEDURE — 99417 PROLNG OP E/M EACH 15 MIN: CPT | Performed by: NURSE PRACTITIONER

## 2025-07-28 PROCEDURE — 85610 PROTHROMBIN TIME: CPT | Performed by: NURSE PRACTITIONER

## 2025-07-28 PROCEDURE — 36415 COLL VENOUS BLD VENIPUNCTURE: CPT | Performed by: NURSE PRACTITIONER

## 2025-07-28 RX ORDER — RISPERIDONE 0.25 MG/1
0.25 TABLET ORAL EVERY EVENING
Status: DISCONTINUED | OUTPATIENT
Start: 2025-07-28 | End: 2025-07-30 | Stop reason: HOSPADM

## 2025-07-28 RX ORDER — SODIUM CHLORIDE, SODIUM LACTATE, POTASSIUM CHLORIDE, CALCIUM CHLORIDE 600; 310; 30; 20 MG/100ML; MG/100ML; MG/100ML; MG/100ML
INJECTION, SOLUTION INTRAVENOUS CONTINUOUS
Status: CANCELLED | OUTPATIENT
Start: 2025-07-28

## 2025-07-28 RX ORDER — MAGNESIUM HYDROXIDE/ALUMINUM HYDROXICE/SIMETHICONE 120; 1200; 1200 MG/30ML; MG/30ML; MG/30ML
30 SUSPENSION ORAL EVERY 4 HOURS PRN
Status: DISCONTINUED | OUTPATIENT
Start: 2025-07-28 | End: 2025-07-30 | Stop reason: HOSPADM

## 2025-07-28 RX ORDER — DIPHENHYDRAMINE HYDROCHLORIDE 50 MG/ML
25 INJECTION, SOLUTION INTRAMUSCULAR; INTRAVENOUS
Status: DISCONTINUED | OUTPATIENT
Start: 2025-07-28 | End: 2025-07-30 | Stop reason: HOSPADM

## 2025-07-28 RX ORDER — ONDANSETRON 2 MG/ML
4 INJECTION INTRAMUSCULAR; INTRAVENOUS EVERY 6 HOURS PRN
Status: DISCONTINUED | OUTPATIENT
Start: 2025-07-28 | End: 2025-07-30 | Stop reason: HOSPADM

## 2025-07-28 RX ORDER — ALBUTEROL SULFATE 90 UG/1
1-2 INHALANT RESPIRATORY (INHALATION)
Status: DISCONTINUED | OUTPATIENT
Start: 2025-07-28 | End: 2025-07-30 | Stop reason: HOSPADM

## 2025-07-28 RX ORDER — AMOXICILLIN 250 MG
1 CAPSULE ORAL 2 TIMES DAILY PRN
Status: DISCONTINUED | OUTPATIENT
Start: 2025-07-28 | End: 2025-07-30 | Stop reason: HOSPADM

## 2025-07-28 RX ORDER — RISPERIDONE 0.25 MG/1
0.25 TABLET ORAL EVERY EVENING
COMMUNITY

## 2025-07-28 RX ORDER — LIDOCAINE 40 MG/G
CREAM TOPICAL
Status: CANCELLED | OUTPATIENT
Start: 2025-07-28

## 2025-07-28 RX ORDER — LOSARTAN POTASSIUM 50 MG/1
50 TABLET ORAL DAILY
Status: DISCONTINUED | OUTPATIENT
Start: 2025-07-28 | End: 2025-07-30 | Stop reason: HOSPADM

## 2025-07-28 RX ORDER — CALCIUM CARBONATE 500 MG/1
1000 TABLET, CHEWABLE ORAL 4 TIMES DAILY PRN
Status: DISCONTINUED | OUTPATIENT
Start: 2025-07-28 | End: 2025-07-30 | Stop reason: HOSPADM

## 2025-07-28 RX ORDER — AMOXICILLIN 250 MG
2 CAPSULE ORAL 2 TIMES DAILY PRN
Status: DISCONTINUED | OUTPATIENT
Start: 2025-07-28 | End: 2025-07-30 | Stop reason: HOSPADM

## 2025-07-28 RX ORDER — DIPHENHYDRAMINE HYDROCHLORIDE 50 MG/ML
50 INJECTION, SOLUTION INTRAMUSCULAR; INTRAVENOUS
Status: DISCONTINUED | OUTPATIENT
Start: 2025-07-28 | End: 2025-07-30 | Stop reason: HOSPADM

## 2025-07-28 RX ORDER — OLANZAPINE 5 MG/1
5 TABLET, FILM COATED ORAL AT BEDTIME
Status: DISCONTINUED | OUTPATIENT
Start: 2025-07-28 | End: 2025-07-30 | Stop reason: HOSPADM

## 2025-07-28 RX ORDER — LIDOCAINE 40 MG/G
CREAM TOPICAL
Status: DISCONTINUED | OUTPATIENT
Start: 2025-07-28 | End: 2025-07-30 | Stop reason: HOSPADM

## 2025-07-28 RX ORDER — ALBUTEROL SULFATE 0.83 MG/ML
2.5 SOLUTION RESPIRATORY (INHALATION)
Status: DISCONTINUED | OUTPATIENT
Start: 2025-07-28 | End: 2025-07-30 | Stop reason: HOSPADM

## 2025-07-28 RX ORDER — ONDANSETRON 4 MG/1
4 TABLET, ORALLY DISINTEGRATING ORAL EVERY 6 HOURS PRN
Status: DISCONTINUED | OUTPATIENT
Start: 2025-07-28 | End: 2025-07-30 | Stop reason: HOSPADM

## 2025-07-28 RX ORDER — DEXTROSE, SODIUM CHLORIDE, SODIUM LACTATE, POTASSIUM CHLORIDE, AND CALCIUM CHLORIDE 5; .6; .31; .03; .02 G/100ML; G/100ML; G/100ML; G/100ML; G/100ML
INJECTION, SOLUTION INTRAVENOUS CONTINUOUS
Status: DISCONTINUED | OUTPATIENT
Start: 2025-07-28 | End: 2025-07-30

## 2025-07-28 RX ORDER — LAMOTRIGINE 100 MG/1
100 TABLET ORAL 2 TIMES DAILY
Status: DISCONTINUED | OUTPATIENT
Start: 2025-07-28 | End: 2025-07-30 | Stop reason: HOSPADM

## 2025-07-28 RX ORDER — METHYLPREDNISOLONE SODIUM SUCCINATE 40 MG/ML
40 INJECTION INTRAMUSCULAR; INTRAVENOUS
Status: DISCONTINUED | OUTPATIENT
Start: 2025-07-28 | End: 2025-07-30 | Stop reason: HOSPADM

## 2025-07-28 RX ORDER — PROCHLORPERAZINE MALEATE 5 MG/1
10 TABLET ORAL EVERY 6 HOURS PRN
Status: DISCONTINUED | OUTPATIENT
Start: 2025-07-28 | End: 2025-07-30 | Stop reason: HOSPADM

## 2025-07-28 RX ORDER — MEPERIDINE HYDROCHLORIDE 25 MG/ML
25 INJECTION INTRAMUSCULAR; INTRAVENOUS; SUBCUTANEOUS
Refills: 0 | Status: DISCONTINUED | OUTPATIENT
Start: 2025-07-28 | End: 2025-07-30 | Stop reason: HOSPADM

## 2025-07-28 RX ADMIN — OLANZAPINE 5 MG: 5 TABLET, FILM COATED ORAL at 20:42

## 2025-07-28 RX ADMIN — Medication 125 ML: at 12:36

## 2025-07-28 RX ADMIN — LAMOTRIGINE 100 MG: 100 TABLET ORAL at 20:41

## 2025-07-28 RX ADMIN — Medication 1000 ML: at 13:09

## 2025-07-28 RX ADMIN — RISPERIDONE 0.25 MG: 0.25 TABLET, FILM COATED ORAL at 18:40

## 2025-07-28 RX ADMIN — DEXTROSE, SODIUM CHLORIDE, SODIUM LACTATE, POTASSIUM CHLORIDE, AND CALCIUM CHLORIDE: 5; .6; .31; .03; .02 INJECTION, SOLUTION INTRAVENOUS at 21:59

## 2025-07-28 RX ADMIN — IRON SUCROSE 200 MG: 20 INJECTION, SOLUTION INTRAVENOUS at 20:41

## 2025-07-28 ASSESSMENT — ACTIVITIES OF DAILY LIVING (ADL)
ADLS_ACUITY_SCORE: 25

## 2025-07-28 ASSESSMENT — COLUMBIA-SUICIDE SEVERITY RATING SCALE - C-SSRS
6. HAVE YOU EVER DONE ANYTHING, STARTED TO DO ANYTHING, OR PREPARED TO DO ANYTHING TO END YOUR LIFE?: NO
1. IN THE PAST MONTH, HAVE YOU WISHED YOU WERE DEAD OR WISHED YOU COULD GO TO SLEEP AND NOT WAKE UP?: NO
2. HAVE YOU ACTUALLY HAD ANY THOUGHTS OF KILLING YOURSELF IN THE PAST MONTH?: NO

## 2025-07-28 NOTE — MEDICATION SCRIBE - ADMISSION MEDICATION HISTORY
Medication Scribe Admission Medication History    Admission medication history is complete. The information provided in this note is only as accurate as the sources available at the time of the update.    Information Source(s): Patient, Prescription bottles, and CareEverywhere/SureScripts via in-person    Pertinent Information: PTA med list reviewed with patient in room and finished at desk.  He has bottle of Losartan and Lamotrigine with him today.  He will send those home with his guest.  He states that until this morning, he had not taken Multivitamin and Fish Oil for a few weeks.  He states that he never takes the as needed dose of Olanzapine.   He states that this is a strong medicine.  Warfarin has been on hold since 7/24/25 for future endoscopy.    Changes made to PTA medication list:  Added: Risperidone 0.25 mg with directions  Deleted:  Risperidone 0.25 mg without directions  Changed: None    Allergies reviewed with patient and updates made in EHR: yes, no allergies.    Medication History Completed By: Rosa Wayne 7/28/2025 5:07 PM    PTA med list reviewed with patient in room and finished at desk

## 2025-07-28 NOTE — PROGRESS NOTES
[facility] [facility] [place]    Patient admitted to room 2309 at approximately 1505 via wheel chair from clinic. Patient was accompanied by transport tech.     Verbal SBAR report received from [place] prior to patient arrival.     Patient ambulated to bed with stand-by assist. Patient alert and oriented X 3. The patient is not having any pain.  . Admission vital signs: There were no vitals taken for this visit. Patient was oriented to plan of care, call light, bed controls, tv, telephone, bathroom, and visiting hours.     Risk Assessment    The following safety risks were identified during admission: fall. Yellow risk band applied: NO.     Skin Initial Assessment    This writer admitted this patient and completed a full skin assessment and Jerzy score in the Adult PCS flowsheet.   Photo documentation of skin problem and/or wound competed via [employer] application (located under Media):  No    Appropriate interventions initiated as needed.     Secondary skin check completed by pt declined at this time.         Education    Patient has a Frederica to Observation order: No  Observation education completed and documented: No      Alen Nagel RN

## 2025-07-28 NOTE — PROGRESS NOTES
Acute and Diagnostic Services Clinic Visit    Assessment & Plan     Symptomatic anemia  Acute renal failure superimposed on stage 3b chronic kidney disease, unspecified acute renal failure type (H)  Orthostatic hypotension  Exertional dyspnea  Dizziness  See HPI - presented to [facility] today for a few weeks of worsening lightheaded dizziness and presyncopal sensation, and progressively worsening dyspnea on exertion. Orthostatic VS show /65, HR 57 lying with drop in BP to 86/57, and increase in HR to 97 standing. Recent PCP visit with c/o esophageal phase dysphagia that has been worsening over the past few weeks resulting in reduced po intake. Workup pursued given symptoms, EKG without ischemic changes or ectopy. Normal TSH and BNP. CBC with microcytic anemia with Hgb 7.0, most recent Hgb 11.6 in October. Denies hematochezia, melena, hematemesis. Hx CKD 3b with baseline Cr around 1.7, Cr today is 2.55. Has had mild intermittent lower abd pain over a few weeks, however abd exam is benign today. With no obvious bleeding source, raises concern for malignancy. Given symptomatic anemia, would likely benefit from transfusion. Likely needs expedited endoscopy as well as further imaging, although would need contrast and GFR currently 27. IV fluids started in [facility] clinic. Would recommend admission for management of symptoms, further evaluation. Spoke with Dr. Ren who accepts patient for admission. Patient transferred to med surg by [facility] RN without incident.  - Ferritin  - Iron  - Direct Admission Order  - sodium chloride 0.9% BOLUS 1,000 mL  - Direct Admission Order  - NT-proBNP; Future  - XR Chest 2 Views; Future  - EKG 12-lead, tracing only  - NT-proBNP  - Direct Admission Order  - CBC with platelets differential; Future  - Comprehensive metabolic panel; Future  - Direct Admission Order    Esophageal dysphagia  Weight loss  Has had esophageal phase dysphagia for a few months, worsening over few weeks resulting in decreased  oral intake. 15lb weight loss since 6/26/25 PCP visit. Has planned EGD on 7/23.  - Direct Admission Order  - TSH with free T4 reflex; Future  - Direct Admission Order    Long term current use of anticoagulant therapy  2/2 hx of DVT. Currently holding warfarin due to planned EGD on 7/31, INR currently 1.44  - INR; Future  - INR    Pulmonary nodules  Non contrasted CT chest lung cancer screening done on 7/23 with new 6mm nodule to the posterior LLL with recommended repeat CT in 3 months.    Severe bipolar I disorder, most recent episode mixed, with psychotic features (H)  Stable on lamictal, risperidone, olanzapine, follows with psychiatry.      65 minutes were spent doing chart review, history and exam, documentation and further activities per the note.         Yaritza Ortega is a 65 year old, presenting for the following health issues:  Dizziness    HPI      Dizziness  Onset/Duration: 3 weeks  Description:   Do you feel faint: YES  Does it feel like the surroundings (bed, room) are moving: No  Unsteady/off balance: No  Have you passed out or fallen: No  Intensity: severe  Progression of Symptoms: worsening  Accompanying Signs & Symptoms:  Heart palpitations or chest pain: No  Nausea, vomiting: No  Weakness or lack of coordination in arms or legs: No  Vision or speech changes: No  Numbness or tingling: No  Ringing in ears (Tinnitus): No  Hearing Loss: No  History:   Head trauma/concussion history: No  Previous similar symptoms: No  Recent bleeding history: No  Any new medications (BP?): No  Precipitating factors:   Worse with activity: YES  Worse with head movement: No  Alleviating factors:   Does staying in a fixed position give relief: no   Therapies tried and outcome: None    Past medical history significant for hypertension treated with losartan 50 mg daily, 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    Presents to clinic today with a 3-week history of lightheadedness and presyncopal sensation.  This occurs frequently while he is standing upright and often when he changes position such as moving from a seated to standing position.  He denies any room spinning sensation, headache, vision changes, racing heart sensation, palpitations.  He has had some mild exertional dyspnea that has progressively worsened over the past several weeks.  He denies any chest pain currently, however notes about 2 weeks ago had a brief episode of left-sided chest discomfort.  He has not had any swelling of his extremities.    He recently had a primary care appointment where he reported to his PCP that he has developed some esophageal dysphagia.  Patient notes that this has been ongoing for a couple months, however this has worsened over the past several weeks.  He notes that he can initiate a swallow without difficulty, however food or fluid \"gets stuck\" and points to his anterior neck.  He notes that often fluid will come up after he swallows.  Denies any significant vomiting.  He does note that he has chronic diarrhea which he attributes to his medications.  This has been unchanged recently.  He has had some intermittent left lower quadrant abdominal pain, however none currently.  He denies hematochezia or melena.  He has not had any hematemesis.  An EGD was ordered to evaluate this esophageal dysphagia and is scheduled on July 31, so he has therefore been holding his warfarin since July 24.    Of note, he relates a significant weight loss since his clinic visit on June 26.  He has attributed this to not being able to eat much related to the dysphagia.  His weight in clinic on June 26 was 164 pounds.  His weight today is 149 pounds.    He did have a noncontrasted CT of the chest for lung cancer screening on July 23 as he is a former smoker.  This did reveal a new 6 mm nodule in the posterior left lower lobe with " recommendation to repeat imaging in 3 months.    Review of Systems  Constitutional, HEENT, cardiovascular, pulmonary, GI, , musculoskeletal, neuro, skin, endocrine and psych systems are negative, except as otherwise noted.      Objective    /71 (BP Location: Left arm, Patient Position: Sitting, Cuff Size: Adult Regular)   Pulse 78   SpO2 99%   There is no height or weight on file to calculate BMI.  Physical Exam  Vitals and nursing note reviewed.   Constitutional:       Comments: Pale appearance   HENT:      Head: Normocephalic and atraumatic.      Mouth/Throat:      Mouth: Mucous membranes are moist.   Eyes:      Comments: Non-icteric   Cardiovascular:      Rate and Rhythm: Normal rate and regular rhythm.      Pulses: Normal pulses.      Heart sounds: Normal heart sounds, S1 normal and S2 normal. Heart sounds not distant. No murmur heard.     No friction rub. No gallop.   Pulmonary:      Effort: Pulmonary effort is normal.      Breath sounds: Normal breath sounds.   Abdominal:      General: Abdomen is flat. Bowel sounds are normal.      Palpations: Abdomen is soft.   Musculoskeletal:      Cervical back: Neck supple.      Right lower leg: No edema.      Left lower leg: No edema.   Skin:     General: Skin is warm and dry.      Capillary Refill: Capillary refill takes less than 2 seconds.   Neurological:      General: No focal deficit present.      Mental Status: He is alert and oriented to person, place, and time.   Psychiatric:         Mood and Affect: Mood normal.         Behavior: Behavior normal.         Thought Content: Thought content normal.         Judgment: Judgment normal.            EKG - Reviewed and interpreted by me appears normal, NSR, normal axis, normal intervals, no acute ST/T changes c/w ischemia, no LVH by voltage criteria, unchanged from previous tracings  Recent Results (from the past 24 hours)   CBC with platelets differential    Narrative    The following orders were created for  panel order CBC with platelets differential.  Procedure                               Abnormality         Status                     ---------                               -----------         ------                     CBC with platelets and ...[[id number]]  Abnormal            Final result                 Please view results for these tests on the individual orders.   Comprehensive metabolic panel   Result Value Ref Range    Sodium 139 135 - 145 mmol/L    Potassium 4.4 3.4 - 5.3 mmol/L    Carbon Dioxide (CO2) 21 (L) 22 - 29 mmol/L    Anion Gap 16 (H) 7 - 15 mmol/L    Urea Nitrogen 34.7 (H) 8.0 - 23.0 mg/dL    Creatinine 2.55 (H) 0.67 - 1.17 mg/dL    GFR Estimate 27 (L) >60 mL/min/1.73m2    Calcium 9.9 8.8 - 10.4 mg/dL    Chloride 102 98 - 107 mmol/L    Glucose 110 (H) 70 - 99 mg/dL    Alkaline Phosphatase 69 40 - 150 U/L    AST 11 0 - 45 U/L    ALT 16 0 - 70 U/L    Protein Total 7.1 6.4 - 8.3 g/dL    Albumin 4.1 3.5 - 5.2 g/dL    Bilirubin Total <0.2 <=1.2 mg/dL   NT-proBNP   Result Value Ref Range    NT-proBNP 41 0 - 177 pg/mL   TSH with free T4 reflex   Result Value Ref Range    TSH 2.93 0.30 - 4.20 uIU/mL   CBC with platelets and differential   Result Value Ref Range    WBC Count 6.9 4.0 - 11.0 10e3/uL    RBC Count 3.01 (L) 4.40 - 5.90 10e6/uL    Hemoglobin 7.0 (LL) 13.3 - 17.7 g/dL    Hematocrit 22.8 (L) 40.0 - 53.0 %    MCV 76 (L) 78 - 100 fL    MCH 23.3 (L) 26.5 - 33.0 pg    MCHC 30.7 (L) 31.5 - 36.5 g/dL    RDW 15.1 (H) 10.0 - 15.0 %    Platelet Count 350 150 - 450 10e3/uL    % Neutrophils 65 %    % Lymphocytes 23 %    % Monocytes 8 %    % Eosinophils 3 %    % Basophils 1 %    % Immature Granulocytes 0 %    Absolute Neutrophils 4.5 1.6 - 8.3 10e3/uL    Absolute Lymphocytes 1.6 0.8 - 5.3 10e3/uL    Absolute Monocytes 0.6 0.0 - 1.3 10e3/uL    Absolute Eosinophils 0.2 0.0 - 0.7 10e3/uL    Absolute Basophils 0.1 0.0 - 0.2 10e3/uL    Absolute Immature Granulocytes 0.0 <=0.4 10e3/uL   Ferritin   Result Value Ref  Range    Ferritin 8 (L) 31 - 409 ng/mL   Iron   Result Value Ref Range    Iron 21 (L) 61 - 157 ug/dL   XR Chest 2 Views    Narrative    EXAM: XR CHEST 2 VIEWS  LOCATION: [facility]  DATE: 7/28/2025    INDICATION:  Exertional dyspnea  COMPARISON: 7/23/2025      Impression    IMPRESSION: Stable calcified granuloma in the anterior right middle lobe and a few calcified mediastinal and hilar lymph nodes. No infiltrate, pleural effusion or pneumothorax. The cardiac and mediastinal silhouettes are normal.   INR   Result Value Ref Range    INR 1.44 (H) 0.85 - 1.15    PT 17.3 (H) 11.8 - 14.8 Seconds     No results found for this visit on 07/28/25.  XR Chest 2 Views  Result Date: 7/28/2025  EXAM: XR CHEST 2 VIEWS LOCATION: [facility] DATE: 7/28/2025 INDICATION:  Exertional dyspnea COMPARISON: 7/23/2025     IMPRESSION: Stable calcified granuloma in the anterior right middle lobe and a few calcified mediastinal and hilar lymph nodes. No infiltrate, pleural effusion or pneumothorax. The cardiac and mediastinal silhouettes are normal.    CT Chest Lung Cancer Screen Low Dose Without  Result Date: 7/24/2025  EXAM: LOW DOSE LUNG CANCER SCREENING CT CHEST LOCATION: [facility] DATE: 7/23/2025 INDICATION: Lung cancer screening. History of smoking. High risk patient. COMPARISON: 6/6/2024 TECHNIQUE: Low-dose lung cancer screening non-contrast CT chest. Dose reduction techniques were used. Images assessed using LungRADS 2022 criteria. FINDINGS: NODULES: New 6 mm posterior left lower lobe nodule on image 265 of series 3 likely represents some atelectasis or scarring. Stable 2 mm nodule in the lateral left upper lobe on image 141. Calcified granulomas are seen in the right lung. LUNGS AND PLEURA: Mild emphysema in the upper lungs. No infiltrate or effusion. MEDIASTINUM: No axillary or mediastinal adenopathy. The esophagus is patulous and fluid-filled. No  evidence for wall thickening or mass. Calcified right hilar and mediastinal lymph nodes. No pericardial effusion. CORONARY ARTERY CALCIFICATION: Mild. LIMITED UPPER ABDOMEN: Calcified granulomas are seen in the spleen. MUSCULOSKELETAL: Degenerative changes are noted through the spine.     IMPRESSION: 1.  New 6 mm nodule in the posterior left lower lobe likely represents some atelectasis or scarring. 2.  Stable 2 mm left upper lobe nodule. 3.  LungRADS CATEGORY: 4A: Suspicious. Category 4A (suspicious, 5-15% chance of malignancy) Solid nodule(s) Greater than or equal to 8 mm to <15 mm at baseline Growing nodule(s) <8 mm New nodule 6 mm to <8 mm Subsolid nodule(s) Greater than or equal to 6 mm total diameter w/ solid component Greater than or equal to 6 mm to <8 mm New or growing <4 mm solid component Endobronchial nodule Recommended follow up Category 4: 3 month follow up with LDCT PET/CT may be used if there is a greater than or equal to 8 mm solid component        Signed Electronically by: AUGUSTUS Medina CNP

## 2025-07-28 NOTE — TELEPHONE ENCOUNTER
"Spoke to patient to triage him as he sent the following My chart message:       Jordan Barnett to [facility] (supporting Juan Antonio Flanagan MD)  DJ      7/26/25  6:54 PM  Dr Flanagan  I am asking for a leave from work. I am not able to swallow and feel weak and dizzy when I walk.  CT Chest Lung Cancer Screen Low Dose Without  ~~~~~~~~~~~~~~~~~~~~~~~~~~~~~~~~~~~~~~~~~~~~~~~~~~~~~~~~~~~~~~~~~~~~~    \"When I swallow, it comes right back up. I threw up and it was clear mucus and that was about a week ago.. I kneel and stand a lot at work and I get dizzy, so I kneel a lot at work..\"     \"In the last 24 hours, I would say I have drank about 20 ounces of fluid and I ate some ice cream and I drank 2 cups of coffee this am, too..\"     \"I am urinating but not as often as usual but I am urinating as much as usual when I do go\"    Mouth is moist.     Denies difficulty breathing.       Reason for Disposition    Lightheadedness (dizziness) present now, after 2 hours of rest and fluids    Additional Information    Negative: SEVERE difficulty breathing (e.g., struggling for each breath, speaks in single words)    Negative: Shock suspected (e.g., cold/pale/clammy skin, too weak to stand, low BP, rapid pulse)    Negative: Difficult to awaken or acting confused (e.g., disoriented, slurred speech)    Negative: Fainted, and still feels dizzy afterwards    Negative: Overdose (accidental or intentional) of medications    Negative: New neurologic deficit that is present now: * Weakness of the face, arm, or leg on one side of the body * Numbness of the face, arm, or leg on one side of the body * Loss of speech or garbled speech    Negative: Heart beating < 50 beats per minute OR > 140 beats per minute    Negative: Sounds like a life-threatening emergency to the triager    Negative: Chest pain    Negative: Rectal bleeding, bloody stool, or tarry-black stool    Negative: Vomiting is main symptom    Negative: " "Diarrhea is main symptom    Negative: Headache is main symptom    Negative: Heat exhaustion suspected (i.e., dehydration from heat exposure)    Negative: Patient states that they are having an anxiety or panic attack    Negative: Dizziness from low blood sugar (i.e., < 60 mg/dl or 3.5 mmol/l)    Negative: SEVERE dizziness (e.g., unable to stand, requires support to walk, feels like passing out now)    Negative: SEVERE headache or neck pain    Negative: Spinning or tilting sensation (vertigo) present now and one or more stroke risk factors (i.e., hypertension, diabetes mellitus, prior stroke/TIA, heart attack, age over 60) (Exception: Prior physician evaluation for this AND no different/worse than usual.)    Negative: Neurologic deficit that was brief (now gone), ANY of the following:* Weakness of the face, arm, or leg on one side of the body* Numbness of the face, arm, or leg on one side of the body* Loss of speech or garbled speech    Negative: Loss of vision or double vision  (Exception: Similar to previous migraines.)    Negative: Extra heartbeats, irregular heart beating, or heart is beating very fast (i.e., 'palpitations')    Negative: Difficulty breathing    Negative: Drinking very little and dehydration suspected (e.g., no urine > 12 hours, very dry mouth, very lightheaded)    Negative: Follows bleeding (e.g., stomach, rectum, vagina)  (Exception: Became dizzy from sight of small amount blood.)    Negative: Patient sounds very sick or weak to the triager    Answer Assessment - Initial Assessment Questions  1. DESCRIPTION: \"Describe your dizziness.\"      \"It's like ah... like I'm weak and like I might fall down when it happens.\"     2. LIGHTHEADED: \"Do you feel lightheaded?\" (e.g., somewhat faint, woozy, weak upon standing)    \"I often feel lightheaded if I stand or move too quickly. I am going to take a HARSHA from work, until this gets figured out..\"         3. VERTIGO: \"Do you feel like either you or the room " "is spinning or tilting?\" (i.e. vertigo)    Denies vertigo is occurring.        4. SEVERITY: \"How bad is it?\"  \"Do you feel like you are going to faint?\" \"Can you stand and walk?\"    - MILD: Feels slightly dizzy, but walking normally.    - MODERATE: Feels unsteady when walking, but not falling; interferes with normal activities (e.g., school, work).    - SEVERE: Unable to walk without falling, or requires assistance to walk without falling; feels like passing out now.         Moderate    5. ONSET:  \"When did the dizziness begin?\"       \"A month ago with swallowing issues, then a week or two ago, the dizziness began\"    6. AGGRAVATING FACTORS: \"Does anything make it worse?\" (e.g., standing, change in head position)        \"If I get up too fast it is worse..\"     7. HEART RATE: \"Can you tell me your heart rate?\" \"How many beats in 15 seconds?\"  (Note: not all patients can do this)          \"I have an apple watch that takes my pulse and It is around 66 now but it gets to 120 at work..\"    8. CAUSE: \"What do you think is causing the dizziness?\"        \"I am not eating or drinking as much-I am having swallowing issues..\"     9. RECURRENT SYMPTOM: \"Have you had dizziness before?\" If Yes, ask: \"When was the last time?\" \"What happened that time?\"        \"It's jacques all the time\"    10. OTHER SYMPTOMS: \"Do you have any other symptoms?\" (e.g., fever, chest pain, vomiting, diarrhea, bleeding)    Denies all.     11. PREGNANCY: \"Is there any chance you are pregnant?\" \"When was your last menstrual period?\"        N/A    Protocols used: Dizziness-A-OH    Swetha Sow RN    "

## 2025-07-28 NOTE — TELEPHONE ENCOUNTER
Referral to Acute and Diagnostic Services    [phone] ([place]) [place] [address] [place] [id number]    Transition to Acute & Diagnostic Services Clinic has been discussed with patient, and he agrees with next level of care.   Patient understands that evaluation/treatment at [facility] typically takes significantly longer than in clinic/urgent care (>2 hours).  The [facility] Acute and Diagnostics Services Clinic has been contacted by provider/staff to confirm patient acceptance.         Special issues:  NOne      None                     The following provider has assessed this patient for intervention at [facility], and directed the patient for referral: Juan Antonio Flanagan MD

## 2025-07-28 NOTE — TELEPHONE ENCOUNTER
"Dr. Flanagan,     Do you want him to come in to [facility] clinic for possible dehydration?     I did advise he may need to be seen for fluids, he then stated: \"I would rather try just drinking more first..\"     See triage below.    Please advise. Swetha Sow RN        "

## 2025-07-28 NOTE — PROGRESS NOTES
ANTICOAGULATION MANAGEMENT     Jordan Barnett 65 year old male is on warfarin with subtherapeutic INR result. (Goal INR 2.0-3.0)    Recent labs: (last 7 days)     07/28/25  1230   INR 1.44*       ASSESSMENT     Patient had an OV today. Pt is currently admitted to the hospital.        PLAN     ACC to review chart and hospital discharge.  Joann Gurrola RN on 7/28/2025 at 3:14 PM

## 2025-07-28 NOTE — H&P
[facility]    History and Physical  Hospital Medicine       Date of Admission:  7/28/2025  Date of Service: 7/28/2025     Assessment & Plan   Jordan Barnett is a 65 year old male with past medical history significant for HTN, DVT x2 on chronic anticoagulation, schizoaffective disorder, CKD3b, and esophageal dysphagia,  who presented to [facility] with dizziness, SETHI, lower abdominal pain. Admitted 07/28/25 for symptomatic anemia suspect secondary to GI bleeding, and SHARIF.     Symptomatic anemia   GI bleeding suspected   Dizziness, dyspnea on exertion x2 weeks pta.   Initial Hgb 7.0 ? 6.4. Iron 21, Ferratin 8. BUN elevated (34.7). BNP wnl.   VS stable, reassuring. Improved symptoms after 2L IVF at [facility]. No signs/symptoms suggestive of bleeding source. Suspect anemia is multifactorial secondary to slow GI bleeding and/or iron deficiency.     Iron deficit by Ganzoni equation = 1400mg   - 1unit pRBC    - IV Venofer 200mg daily x5 days or until discharge, then transition to PO   - General surgery consulted; EGD tomorrow   - NPO at midnight   - Hold pta warfarin     Acute kidney injury in CKD3b   Initial Cr 2.55, GFR 27. Baseline Cr ~1.5-1.8.   Suspect secondary to decreased oral intake in the setting of esophageal dysphagia as below.   - IVF: D5LR @ 125ml/hr   - Strict I/Os, daily weights     Esophageal dysphagia   Has had esophageal phase dysphagia for a few months, worsening over few weeks resulting in decreased oral intake. 15lb weight loss since 6/26/25 PCP visit. CT chest lung cancer screening on 07/23/25 noted esophagus is patulous and fluid-filled. No evidence for wall thickening or mass.   - Mechanical/soft dental diet until NPO   - XR esophagram ordered; if normal and nothing on EGD consider SLP for oropharyngeal dysphagia evaluation      Hx DVT (2019, 2020)  Long term use of anticoagulation   Had DVT 10/2019. Then recurrent DVT 11/28/2020 after having stopped warfarin on  09/23/2020. Negative hematology testing on 11/2019.   Warfarin has been held since 07/26/25 for scheduled EGD on 07/31/25. INR 1.44 on admission.   -  Holding pta warfarin as above     Essential hypertension   Normotensive on admission.   -  holding pta losartan given SHARIF as above     Schizoaffective disorder   Bipolar I disorder   Mood stable at time of admission.   -  Continued pta lamotrigine, olanzapine, and risperidone     Clinically Significant Risk Factors Present on Admission                # Drug Induced Coagulation Defect: home medication list includes an anticoagulant medication    # Hypertension: Noted on problem list      # Anemia: based on hgb <11                       Diet: Mechanical/Dental Soft Diet  DVT Prophylaxis: Pneumatic Compression Devices  Martinez Catheter: Not present  Code Status: Full Code      Disposition Plan   Medically Ready for Discharge: Anticipated in 2-4 Days       The patient's care was discussed with the Patient and Patient's Family.  I have discussed patient and formulated plan with attending hospitalist physician, Dr. Jasen Rodríguez PA-C        Primary Care Physician   Juan Antonio Flanagan [facility] [phone]    History is obtained from the patient,  and review of old records via the EMR.    History of Present Illness   Jordan Barnett is a 65 year old male with past medical history significant for HTN, DVT x2 on chronic anticoagulation, schizoaffective disorder, CKD3b, and esophageal dysphagia,  who presented to [facility] with dizziness, SETHI, lower abdominal pain. Admitted 07/28/25 for symptomatic anemia suspect secondary to GI bleeding, and SHARIF.     Jordan has had dizziness with ambulation and dyspnea on exertion, progressive for ~2 weeks pta, with dramatic increase in severity over the past 5 days pta. His symptoms were so dramatic on 07/25/25 that he had to leave work and has initiated a leave of absence from his work.   He denies dizziness/lightheadedness while  "at rest, shortness of breath at rest, vision changes, URI symptoms, syncope, falls, LE edema, orthopnea.     He has had difficulty swallowing x2-3 months pta. He is able to initiate swallow but food seems to get \"stuck\" in his esophagus and frequently regurgitates his food and/or vomits. Nonbloody emesis. No pain with swallowing. Does endorse hoarse voice x2-3 weeks pta. He reports that at times he is unable to tolerate liquids or solids, sometimes solids only, sometimes liquids only. With this he has had extremely decreased oral intake and has lost 15 lbs since clinic visit on 06/26/25. He denies chronic GERD or heartburn symptoms.   Endorses decreased frequency of bowel movements x2-3 weeks pta which he attributes to poor intake. Over the past 2-3 weeks he has had diarrhea with each bowel movement. Denies melena or hematochezia. Denies acute abdominal pain. Does not use NSAIDs.   Denies dysuria, hematuria, urinary frequency or hesitancy.    Family history significant for sister with colon cancer at age 52. Patient's sister Helene is at bedside.   Lives in a private residence with his father. Endorses good medication compliance. Denies alcohol use, tobacco use, illicit drug use.     Received [facility]L [facility]F at [facility] and reports major improvement in his dizziness and SETHI.     Review of Systems   The 10 point Review of Systems is negative other than noted in the HPI or here.     Past Medical History    Past Medical History:   Diagnosis Date    CKD (chronic kidney disease)     stage 3    Depressive disorder     Schizoaffective - manic depressive with schizophrenic tendencies    Depressive disorder, not elsewhere classified     Manic     HTN (hypertension)     Unspecified schizophrenia, unspecified condition        Past Surgical History   Past Surgical History:   Procedure Laterality Date    ABDOMEN SURGERY      Double hernja    COLONOSCOPY      normal. has fam hx colon cancer    COLONOSCOPY N/A 10/13/2014    Procedure: " COLONOSCOPY;  Surgeon: Santy Constantino MD;  Location: [facility]    COLONOSCOPY N/A 02/03/2022    Procedure: COLONOSCOPY;  Surgeon: Sherif Duarte MD;  Location: [facility]    HERNIA REPAIR, INGUINAL RT/LT  02/12/2004    SURGICAL HISTORY OF -       Incised & Drained - ? Perirectal Abscess         Prior to Admission Medications   Prior to Admission Medications   Prescriptions Last Dose Informant Patient Reported? Taking?   Multiple Vitamins-Minerals (MULTIVITAMIN ADULT PO) 7/28/2025 Morning Self Yes Yes   Sig: Take 1 tablet by mouth At Bedtime    OLANZapine (ZYPREXA) 5 MG tablet 7/27/2025 at 11:00 PM Self Yes Yes   Sig: TAKE 1 TABLET BY MOUTH ONCE DAILY AT BEDTIME AND 1/2 (ONE-HALF) AS NEEDED AS DIRECTED   Omega-3 Fatty Acids (OMEGA 3 PO) 7/28/2025 Morning Self Yes Yes   Sig: Take 2,400 mg by mouth daily.   lamoTRIgine (LAMICTAL) 100 MG tablet 7/28/2025 Self No Yes   Sig: Take 1 tablet (100 mg) by mouth 2 times daily   losartan (COZAAR) 50 MG tablet 7/27/2025 at 11:00 PM Self No Yes   Sig: Take 1 tablet by mouth once daily   risperiDONE (RISPERDAL) 0.25 MG tablet 7/27/2025 Evening Self Yes Yes   Sig: Take 0.25 mg by mouth every evening.   warfarin ANTICOAGULANT (COUMADIN/JANTOVEN) 5 MG tablet 7/24/2025 Self No Yes   Sig: Take 5 mg every Mon, Wed, Fri; 2.5 mg all other days or As directed by Anticoagulation clinic      Facility-Administered Medications Last Administration Doses Remaining   sodium chloride (PF) 0.9% PF flush 3 mL None recorded    sodium chloride 0.9% BOLUS 1,000 mL 7/28/2025  1:09 PM 0        Allergies   Allergies   Allergen Reactions    Lisinopril Nausea     Felt weakness nausea, couldn't sleep       Family History    Family History   Problem Relation Age of Onset    Thyroid Disease Mother     Coronary Artery Disease Mother         pacemaker    Cancer Paternal Grandfather     Cancer Sister     Cancer - colorectal Sister     Colon Cancer Sister     Mental Illness Nephew         Noé committed suicide - ex  wife committed suicide also       Social History   Social History     Socioeconomic History    Marital status: Single     Spouse name: Not on file    Number of children: Not on file    Years of education: Not on file    Highest education level: Not on file   Occupational History    Not on file   Tobacco Use    Smoking status: Former     Current packs/day: 0.00     Average packs/day: 1 pack/day for 35.8 years (35.8 ttl pk-yrs)     Types: Cigarettes     Start date: 1976     Quit date: 2012     Years since quittin.3    Smokeless tobacco: Never    Tobacco comments:     2 PPD   Vaping Use    Vaping status: Never Used   Substance and Sexual Activity    Alcohol use: Not Currently     Comment: RARE    Drug use: Not Currently     Types: Marijuana, Hashish, LSD     Comment: 30 years ago    Sexual activity: Yes     Partners: Female     Birth control/protection: Abstinence, I.U.D., Pill   Other Topics Concern    Parent/sibling w/ CABG, MI or angioplasty before 65F 55M? No   Social History Narrative    Not on file     Social Drivers of Health     Financial Resource Strain: Low Risk  (2025)    Financial Resource Strain     Within the past 12 months, have you or your family members you live with been unable to get utilities (heat, electricity) when it was really needed?: No   Food Insecurity: Low Risk  (2025)    Food Insecurity     Within the past 12 months, did you worry that your food would run out before you got money to buy more?: No     Within the past 12 months, did the food you bought just not last and you didn t have money to get more?: No   Transportation Needs: Low Risk  (2025)    Transportation Needs     Within the past 12 months, has lack of transportation kept you from medical appointments, getting your medicines, non-medical meetings or appointments, work, or from getting things that you need?: No   Physical Activity: Insufficiently Active (2025)    Exercise Vital Sign     Days of  "Exercise per Week: 7 days     Minutes of Exercise per Session: 20 min   Stress: No Stress Concern Present (6/26/2025)    [employer] [place] [employer] - Occupational Stress Questionnaire     Feeling of Stress : Not at all   Social Connections: Unknown (6/26/2025)    Social Connection and Isolation Panel [NHANES]     Frequency of Communication with Friends and Family: Not on file     Frequency of Social Gatherings with Friends and Family: Twice a week     Attends [employer] Services: Not on file     Active Member of Clubs or Organizations: Not on file     Attends Club or Organization Meetings: Not on file     Marital Status: Not on file   Interpersonal Safety: Low Risk  (7/28/2025)    Interpersonal Safety     Do you feel physically and emotionally safe where you currently live?: Yes     Within the past 12 months, have you been hit, slapped, kicked or otherwise physically hurt by someone?: No     Within the past 12 months, have you been humiliated or emotionally abused in other ways by your partner or ex-partner?: No   Housing Stability: Low Risk  (7/28/2025)    Housing Stability     Do you have housing? : Yes     Are you worried about losing your housing?: No       Physical Exam   BP 99/55   Pulse 78   Temp 97.8  F (36.6  C) (Oral)   Resp 16   Ht 1.788 m (5' 10.39\")   Wt 67.9 kg (149 lb 11.1 oz)   SpO2 100%   BMI 21.24 kg/m       Weight: 149 lbs 11.08 oz Body mass index is 21.24 kg/m .     Constitutional: Well developed adult. Pleasant, responding to questions appropriately. Alert and oriented, appears comfortable, nontoxic, no acute distress.  Eyes: Eyes are clear, no scleral icterus, pupils are reactive, EOM intact bilaterally. Bilateral conjunctival pallor.   HENT: Oropharynx is clear and moist. No evidence of cranial trauma.   Lymph/Hematologic: No epitrochlear, axillary, anterior or posterior cervical, or supraclavicular lymphadenopathy is appreciated.  Cardiovascular: Regular rate and rhythm, " normal S1 and S2, and no murmur, rub, or gallops noted. Radial & dorsalis pedis pulses 2+ bilaterally. No pitting lower extremity edema.  Respiratory: Respirations unlabored on room air, Clear to auscultation bilaterally without wheezes, crackles, rhonchi. Chest rise is symmetric.   GI: Active bowel sounds throughout. Soft, not distended, non-tender to palpation, without rebound or guarding. No palpable masses, no hepatomegaly.   Musculoskeletal: Normal muscle bulk and tone. Moves all extremities spontaneously. No gross deformity.  Skin: Warm and dry, no rashes on exposed skin.   Neurologic: Neck supple.  is symmetric. No notable tremor. Speech is clear and fluent.       Data   Data reviewed today:     I have personally reviewed the following data over the past 24 hrs:    7.3  \   7.6 (L)   / 323     138 104 34.7 (H) /  83   4.1 20 (L) 2.42 (H) \     ALT: 16 AST: 11 AP: 67 TBILI: <0.2   ALB: 3.9 TOT PROTEIN: 6.7 LIPASE: N/A     Trop: N/A BNP: 41     TSH: 2.93 T4: N/A A1C: N/A     INR:  1.44 (H) PTT:  N/A   D-dimer:  N/A Fibrinogen:  N/A     Ferritin:  8 (L) % Retic:  N/A LDH:  N/A         Recent Results (from the past 24 hours)   XR Chest 2 Views    Narrative    EXAM: XR CHEST 2 VIEWS  LOCATION: [facility]  DATE: 7/28/2025    INDICATION:  Exertional dyspnea  COMPARISON: 7/23/2025      Impression    IMPRESSION: Stable calcified granuloma in the anterior right middle lobe and a few calcified mediastinal and hilar lymph nodes. No infiltrate, pleural effusion or pneumothorax. The cardiac and mediastinal silhouettes are normal.       I personally reviewed no images or EKG's today

## 2025-07-28 NOTE — TELEPHONE ENCOUNTER
"Spoke to patient.     \"I have an appt on the 31st for a scope down my throat and I hope to take care of that then. I haven't been eating much which is why I think I am weak and dizzy..\"      See separate encounter from today with triage via telephone encounter as I cannot pull up triage protocol in this encounter.     Swetha Sow RN      "

## 2025-07-28 NOTE — TELEPHONE ENCOUNTER
I talked to the patient and he is not short of breath.  He complains of dizziness.  When I asked him why he hasn't drank anything yet today he said because he gets dizzy.  He said sometimes he has a hard time swallowing.  He can get to [facility] today so I will call [facility]

## 2025-07-29 ENCOUNTER — ANESTHESIA (OUTPATIENT)
Dept: GASTROENTEROLOGY | Facility: CLINIC | Age: 65
End: 2025-07-29
Payer: COMMERCIAL

## 2025-07-29 ENCOUNTER — APPOINTMENT (OUTPATIENT)
Dept: GENERAL RADIOLOGY | Facility: CLINIC | Age: 65
End: 2025-07-29
Attending: STUDENT IN AN ORGANIZED HEALTH CARE EDUCATION/TRAINING PROGRAM
Payer: COMMERCIAL

## 2025-07-29 ENCOUNTER — ANESTHESIA EVENT (OUTPATIENT)
Dept: GASTROENTEROLOGY | Facility: CLINIC | Age: 65
End: 2025-07-29
Payer: COMMERCIAL

## 2025-07-29 LAB
ALBUMIN SERPL BCG-MCNC: 3.8 G/DL (ref 3.5–5.2)
ALP SERPL-CCNC: 66 U/L (ref 40–150)
ALT SERPL W P-5'-P-CCNC: 14 U/L (ref 0–70)
ANION GAP SERPL CALCULATED.3IONS-SCNC: 12 MMOL/L (ref 7–15)
AST SERPL W P-5'-P-CCNC: 13 U/L (ref 0–45)
BASOPHILS # BLD AUTO: 0 10E3/UL (ref 0–0.2)
BASOPHILS NFR BLD AUTO: 1 %
BILIRUB SERPL-MCNC: 0.3 MG/DL
BLD PROD TYP BPU: NORMAL
BLOOD COMPONENT TYPE: NORMAL
BUN SERPL-MCNC: 30 MG/DL (ref 8–23)
CALCIUM SERPL-MCNC: 9.3 MG/DL (ref 8.8–10.4)
CHLORIDE SERPL-SCNC: 111 MMOL/L (ref 98–107)
CODING SYSTEM: NORMAL
CREAT SERPL-MCNC: 2.3 MG/DL (ref 0.67–1.17)
CROSSMATCH: NORMAL
EGFRCR SERPLBLD CKD-EPI 2021: 31 ML/MIN/1.73M2
EOSINOPHIL # BLD AUTO: 0.3 10E3/UL (ref 0–0.7)
EOSINOPHIL NFR BLD AUTO: 5 %
ERYTHROCYTE [DISTWIDTH] IN BLOOD BY AUTOMATED COUNT: 15.3 % (ref 10–15)
GLUCOSE SERPL-MCNC: 98 MG/DL (ref 70–99)
HCO3 SERPL-SCNC: 19 MMOL/L (ref 22–29)
HCT VFR BLD AUTO: 27.3 % (ref 40–53)
HGB BLD-MCNC: 6.7 G/DL (ref 13.3–17.7)
HGB BLD-MCNC: 8.1 G/DL (ref 13.3–17.7)
HGB BLD-MCNC: 8.5 G/DL (ref 13.3–17.7)
HOLD SPECIMEN: NORMAL
IMM GRANULOCYTES # BLD: 0 10E3/UL
IMM GRANULOCYTES NFR BLD: 1 %
ISSUE DATE AND TIME: NORMAL
LYMPHOCYTES # BLD AUTO: 1.6 10E3/UL (ref 0.8–5.3)
LYMPHOCYTES NFR BLD AUTO: 24 %
MCH RBC QN AUTO: 23.4 PG (ref 26.5–33)
MCHC RBC AUTO-ENTMCNC: 29.7 G/DL (ref 31.5–36.5)
MCV RBC AUTO: 78 FL (ref 78–100)
MCV RBC AUTO: 78 FL (ref 78–100)
MCV RBC AUTO: 79 FL (ref 78–100)
MONOCYTES # BLD AUTO: 0.7 10E3/UL (ref 0–1.3)
MONOCYTES NFR BLD AUTO: 10 %
NEUTROPHILS # BLD AUTO: 3.9 10E3/UL (ref 1.6–8.3)
NEUTROPHILS NFR BLD AUTO: 59 %
NRBC # BLD AUTO: 0 10E3/UL
NRBC BLD AUTO-RTO: 0 /100
PLATELET # BLD AUTO: 325 10E3/UL (ref 150–450)
POTASSIUM SERPL-SCNC: 4.3 MMOL/L (ref 3.4–5.3)
PROT SERPL-MCNC: 6.6 G/DL (ref 6.4–8.3)
RBC # BLD AUTO: 3.46 10E6/UL (ref 4.4–5.9)
SODIUM SERPL-SCNC: 142 MMOL/L (ref 135–145)
UNIT ABO/RH: NORMAL
UNIT NUMBER: NORMAL
UNIT STATUS: NORMAL
UNIT TYPE ISBT: 5100
UPPER GI ENDOSCOPY: NORMAL
WBC # BLD AUTO: 6.5 10E3/UL (ref 4–11)

## 2025-07-29 PROCEDURE — 43239 EGD BIOPSY SINGLE/MULTIPLE: CPT | Performed by: STUDENT IN AN ORGANIZED HEALTH CARE EDUCATION/TRAINING PROGRAM

## 2025-07-29 PROCEDURE — 250N000011 HC RX IP 250 OP 636: Performed by: STUDENT IN AN ORGANIZED HEALTH CARE EDUCATION/TRAINING PROGRAM

## 2025-07-29 PROCEDURE — 250N000011 HC RX IP 250 OP 636: Performed by: NURSE ANESTHETIST, CERTIFIED REGISTERED

## 2025-07-29 PROCEDURE — 74221 X-RAY XM ESOPHAGUS 2CNTRST: CPT

## 2025-07-29 PROCEDURE — 85018 HEMOGLOBIN: CPT | Performed by: INTERNAL MEDICINE

## 2025-07-29 PROCEDURE — P9016 RBC LEUKOCYTES REDUCED: HCPCS | Performed by: INTERNAL MEDICINE

## 2025-07-29 PROCEDURE — 250N000013 HC RX MED GY IP 250 OP 250 PS 637

## 2025-07-29 PROCEDURE — 36415 COLL VENOUS BLD VENIPUNCTURE: CPT

## 2025-07-29 PROCEDURE — 80053 COMPREHEN METABOLIC PANEL: CPT

## 2025-07-29 PROCEDURE — 120N000001 HC R&B MED SURG/OB

## 2025-07-29 PROCEDURE — 99221 1ST HOSP IP/OBS SF/LOW 40: CPT | Mod: 25 | Performed by: STUDENT IN AN ORGANIZED HEALTH CARE EDUCATION/TRAINING PROGRAM

## 2025-07-29 PROCEDURE — 99232 SBSQ HOSP IP/OBS MODERATE 35: CPT | Performed by: INTERNAL MEDICINE

## 2025-07-29 PROCEDURE — 370N000017 HC ANESTHESIA TECHNICAL FEE, PER MIN: Performed by: STUDENT IN AN ORGANIZED HEALTH CARE EDUCATION/TRAINING PROGRAM

## 2025-07-29 PROCEDURE — 85004 AUTOMATED DIFF WBC COUNT: CPT

## 2025-07-29 PROCEDURE — 258N000003 HC RX IP 258 OP 636: Performed by: STUDENT IN AN ORGANIZED HEALTH CARE EDUCATION/TRAINING PROGRAM

## 2025-07-29 PROCEDURE — 250N000009 HC RX 250: Performed by: NURSE ANESTHETIST, CERTIFIED REGISTERED

## 2025-07-29 PROCEDURE — 250N000011 HC RX IP 250 OP 636

## 2025-07-29 PROCEDURE — 85014 HEMATOCRIT: CPT

## 2025-07-29 PROCEDURE — 88305 TISSUE EXAM BY PATHOLOGIST: CPT | Mod: TC | Performed by: STUDENT IN AN ORGANIZED HEALTH CARE EDUCATION/TRAINING PROGRAM

## 2025-07-29 PROCEDURE — 0DB48ZX EXCISION OF ESOPHAGOGASTRIC JUNCTION, VIA NATURAL OR ARTIFICIAL OPENING ENDOSCOPIC, DIAGNOSTIC: ICD-10-PCS | Performed by: STUDENT IN AN ORGANIZED HEALTH CARE EDUCATION/TRAINING PROGRAM

## 2025-07-29 PROCEDURE — 88342 IMHCHEM/IMCYTCHM 1ST ANTB: CPT | Mod: TC | Performed by: STUDENT IN AN ORGANIZED HEALTH CARE EDUCATION/TRAINING PROGRAM

## 2025-07-29 PROCEDURE — 36415 COLL VENOUS BLD VENIPUNCTURE: CPT | Performed by: INTERNAL MEDICINE

## 2025-07-29 PROCEDURE — 258N000003 HC RX IP 258 OP 636: Performed by: PHYSICIAN ASSISTANT

## 2025-07-29 RX ORDER — LIDOCAINE 40 MG/G
CREAM TOPICAL
Status: DISCONTINUED | OUTPATIENT
Start: 2025-07-29 | End: 2025-07-29 | Stop reason: HOSPADM

## 2025-07-29 RX ORDER — DIATRIZOATE MEGLUMINE AND DIATRIZOATE SODIUM 660; 100 MG/ML; MG/ML
120 SOLUTION ORAL; RECTAL ONCE
Status: COMPLETED | OUTPATIENT
Start: 2025-07-29 | End: 2025-07-29

## 2025-07-29 RX ORDER — LIDOCAINE HYDROCHLORIDE 20 MG/ML
INJECTION, SOLUTION INFILTRATION; PERINEURAL PRN
Status: DISCONTINUED | OUTPATIENT
Start: 2025-07-29 | End: 2025-07-29

## 2025-07-29 RX ORDER — SODIUM CHLORIDE, SODIUM LACTATE, POTASSIUM CHLORIDE, CALCIUM CHLORIDE 600; 310; 30; 20 MG/100ML; MG/100ML; MG/100ML; MG/100ML
INJECTION, SOLUTION INTRAVENOUS CONTINUOUS
Status: DISCONTINUED | OUTPATIENT
Start: 2025-07-29 | End: 2025-07-29 | Stop reason: HOSPADM

## 2025-07-29 RX ORDER — PROPOFOL 10 MG/ML
INJECTION, EMULSION INTRAVENOUS CONTINUOUS PRN
Status: DISCONTINUED | OUTPATIENT
Start: 2025-07-29 | End: 2025-07-29

## 2025-07-29 RX ORDER — GLYCOPYRROLATE 0.2 MG/ML
INJECTION, SOLUTION INTRAMUSCULAR; INTRAVENOUS PRN
Status: DISCONTINUED | OUTPATIENT
Start: 2025-07-29 | End: 2025-07-29

## 2025-07-29 RX ADMIN — GLYCOPYRROLATE 0.3 MG: 0.2 INJECTION, SOLUTION INTRAMUSCULAR; INTRAVENOUS at 09:24

## 2025-07-29 RX ADMIN — PROPOFOL 200 MCG/KG/MIN: 10 INJECTION, EMULSION INTRAVENOUS at 09:19

## 2025-07-29 RX ADMIN — LAMOTRIGINE 100 MG: 100 TABLET ORAL at 11:17

## 2025-07-29 RX ADMIN — IRON SUCROSE 200 MG: 20 INJECTION, SOLUTION INTRAVENOUS at 19:34

## 2025-07-29 RX ADMIN — SODIUM CHLORIDE, POTASSIUM CHLORIDE, SODIUM LACTATE AND CALCIUM CHLORIDE: 600; 310; 30; 20 INJECTION, SOLUTION INTRAVENOUS at 08:11

## 2025-07-29 RX ADMIN — DEXTROSE, SODIUM CHLORIDE, SODIUM LACTATE, POTASSIUM CHLORIDE, AND CALCIUM CHLORIDE: 5; .6; .31; .03; .02 INJECTION, SOLUTION INTRAVENOUS at 19:34

## 2025-07-29 RX ADMIN — DIATRIZOATE MEGLUMINE AND DIATRIZOATE SODIUM 40 ML: 660; 100 SOLUTION ORAL; RECTAL at 10:38

## 2025-07-29 RX ADMIN — LIDOCAINE HYDROCHLORIDE 15 MG: 20 INJECTION, SOLUTION INFILTRATION; PERINEURAL at 09:24

## 2025-07-29 RX ADMIN — RISPERIDONE 0.25 MG: 0.25 TABLET, FILM COATED ORAL at 17:16

## 2025-07-29 RX ADMIN — TOPICAL ANESTHETIC 2 SPRAY: 200 SPRAY DENTAL; PERIODONTAL at 09:20

## 2025-07-29 RX ADMIN — DEXTROSE, SODIUM CHLORIDE, SODIUM LACTATE, POTASSIUM CHLORIDE, AND CALCIUM CHLORIDE: 5; .6; .31; .03; .02 INJECTION, SOLUTION INTRAVENOUS at 05:17

## 2025-07-29 RX ADMIN — OLANZAPINE 5 MG: 5 TABLET, FILM COATED ORAL at 20:32

## 2025-07-29 RX ADMIN — LAMOTRIGINE 100 MG: 100 TABLET ORAL at 20:32

## 2025-07-29 ASSESSMENT — ACTIVITIES OF DAILY LIVING (ADL)
ADLS_ACUITY_SCORE: 25

## 2025-07-29 ASSESSMENT — LIFESTYLE VARIABLES: TOBACCO_USE: 1

## 2025-07-29 NOTE — ANESTHESIA POSTPROCEDURE EVALUATION
Patient: Jordan Barnett    Procedure: Procedure(s):  ESOPHAGOGASTRODUODENOSCOPY, WITH BIOPSY       Anesthesia Type:  General    Note:  Disposition: Inpatient   Postop Pain Control: Uneventful            Sign Out: Well controlled pain   PONV: No   Neuro/Psych: Uneventful            Sign Out: Acceptable/Baseline neuro status   Airway/Respiratory: Uneventful            Sign Out: Acceptable/Baseline resp. status   CV/Hemodynamics: Uneventful            Sign Out: Acceptable CV status; No obvious hypovolemia; No obvious fluid overload   Other NRE: NONE   DID A NON-ROUTINE EVENT OCCUR? No           Last vitals:  Vitals Value Taken Time   /85 07/29/25 10:00   Temp 36.7  C (98.1  F) 07/29/25 09:39   Pulse 80 07/29/25 10:00   Resp 16 07/29/25 10:00   SpO2 98 % 07/29/25 10:01   Vitals shown include unfiled device data.    Electronically Signed By: AUGUSTUS San CRNA  July 29, 2025  10:33 AM

## 2025-07-29 NOTE — ANESTHESIA CARE TRANSFER NOTE
Patient: Jordan Barnett    Procedure: Procedure(s):  ESOPHAGOGASTRODUODENOSCOPY, WITH BIOPSY       Diagnosis: Acute anemia [D64.9]  Diagnosis Additional Information: No value filed.    Anesthesia Type:   General     Note:    Oropharynx: oropharynx clear of all foreign objects and spontaneously breathing  Level of Consciousness: drowsy  Oxygen Supplementation: room air    Independent Airway: airway patency satisfactory and stable  Dentition: dentition unchanged  Vital Signs Stable: post-procedure vital signs reviewed and stable  Report to RN Given: handoff report given  Patient transferred to: Phase II    Handoff Report: Identifed the Patient, Identified the Reponsible Provider, Reviewed the pertinent medical history, Discussed the surgical course, Reviewed Intra-OP anesthesia mangement and issues during anesthesia, Set expectations for post-procedure period and Allowed opportunity for questions and acknowledgement of understanding      Vitals:  Vitals Value Taken Time   BP     Temp     Pulse     Resp     SpO2         Electronically Signed By: AUGUSTUS San CRNA  July 29, 2025  9:40 AM

## 2025-07-29 NOTE — ANESTHESIA PREPROCEDURE EVALUATION
Anesthesia Pre-Procedure Evaluation    Patient: Jordan Barnett   MRN: [id number] : 1960          Procedure : Procedure(s):  ESOPHAGOGASTRODUODENOSCOPY         Past Medical History:   Diagnosis Date     CKD (chronic kidney disease)     stage 3     Depressive disorder     Schizoaffective - manic depressive with schizophrenic tendencies     Depressive disorder, not elsewhere classified     Manic      HTN (hypertension)      Unspecified schizophrenia, unspecified condition       Past Surgical History:   Procedure Laterality Date     ABDOMEN SURGERY      Double hernja     COLONOSCOPY      normal. has fam hx colon cancer     COLONOSCOPY N/A 10/13/2014    Procedure: COLONOSCOPY;  Surgeon: Santy Constantino MD;  Location: [facility]     COLONOSCOPY N/A 2022    Procedure: COLONOSCOPY;  Surgeon: Sherif Duarte MD;  Location: [facility]     HERNIA REPAIR, INGUINAL RT/LT  2004     SURGICAL HISTORY OF -       Incised & Drained - ? Perirectal Abscess       Allergies   Allergen Reactions     Lisinopril Nausea     Felt weakness nausea, couldn't sleep      Social History     Tobacco Use     Smoking status: Former     Current packs/day: 0.00     Average packs/day: 1 pack/day for 35.8 years (35.8 ttl pk-yrs)     Types: Cigarettes     Start date: 1976     Quit date: 2012     Years since quittin.3     Smokeless tobacco: Never     Tobacco comments:     2 PPD   Substance Use Topics     Alcohol use: Not Currently     Comment: RARE      Wt Readings from Last 1 Encounters:   25 70.2 kg (154 lb 11.2 oz)        Anesthesia Evaluation   Pt has had prior anesthetic. Type: General.        ROS/MED HX  ENT/Pulmonary:     (+)                tobacco use, Past use,                       Neurologic:  - neg neurologic ROS     Cardiovascular:     (+) Dyslipidemia hypertension- -   -  - -   Taking blood thinners                                   METS/Exercise Tolerance:     Hematologic:     (+) History of blood clots,    pt  "is anticoagulated,           Musculoskeletal:  - neg musculoskeletal ROS     GI/Hepatic:  - neg GI/hepatic ROS     Renal/Genitourinary: Comment: Stage 3b chronic kidney disease (H)      (+) renal disease, type: CRI,            Endo:  - neg endo ROS     Psychiatric/Substance Use:     (+) psychiatric history bipolar, depression and schizophrenia       Infectious Disease:  - neg infectious disease ROS     Malignancy:  - neg malignancy ROS     Other:  - neg other ROS            Physical Exam  Airway  Cardiovascular - normal exam   Dental   (+) Minor Abnormalities - some fillings, tiny chips      Pulmonary - normal exam      Neurological - normal exam  He appears awake, alert and oriented x3.    Other Findings       OUTSIDE LABS:  CBC:   Lab Results   Component Value Date    WBC 6.5 07/29/2025    WBC 7.3 07/28/2025    HGB 8.1 (L) 07/29/2025    HGB 7.6 (L) 07/28/2025    HCT 27.3 (L) 07/29/2025    HCT 21.7 (L) 07/28/2025     07/29/2025     07/28/2025     BMP:   Lab Results   Component Value Date     07/29/2025     07/28/2025    POTASSIUM 4.3 07/29/2025    POTASSIUM 4.1 07/28/2025    CHLORIDE 111 (H) 07/29/2025    CHLORIDE 104 07/28/2025    CO2 19 (L) 07/29/2025    CO2 20 (L) 07/28/2025    BUN 30.0 (H) 07/29/2025    BUN 34.7 (H) 07/28/2025    CR 2.30 (H) 07/29/2025    CR 2.42 (H) 07/28/2025    GLC 98 07/29/2025    GLC 83 07/28/2025     COAGS:   Lab Results   Component Value Date    PTT 28 11/28/2020    INR 1.44 (H) 07/28/2025     POC: No results found for: \"BGM\", \"HCG\", \"HCGS\"  HEPATIC:   Lab Results   Component Value Date    ALBUMIN 3.8 07/29/2025    PROTTOTAL 6.6 07/29/2025    ALT 14 07/29/2025    AST 13 07/29/2025    ALKPHOS 66 07/29/2025    BILITOTAL 0.3 07/29/2025     OTHER:   Lab Results   Component Value Date    A1C 5.8 (H) 01/12/2023    BENNY 9.3 07/29/2025    PHOS 3.5 10/17/2024    TSH 2.93 07/28/2025    T4 7.1 01/08/2008    SED 16 (H) 01/08/2008       Anesthesia Plan    ASA Status:  3  "     NPO Status: NPO Appropriate   Anesthesia Type: General.  Maintenance: Balanced.   Techniques and Equipment:       - Monitoring Plan: standard ASA monitoring     Consents    Anesthesia Plan(s) and associated risks, benefits, and realistic alternatives discussed. Questions answered and patient/representative(s) expressed understanding.     - Discussed: CRNA     - Discussed with:                Postoperative Care         Comments:                 AUGUSTUS San CRNA    I have reviewed the pertinent notes and labs in the chart from the past 30 days and (re)examined the patient.  Any updates or changes from those notes are reflected in this note.    Clinically Significant Risk Factors Present on Admission          # Hyperchloremia: Highest Cl = 111 mmol/L in last 2 days, will monitor as appropriate           # Drug Induced Coagulation Defect: home medication list includes an anticoagulant medication    # Hypertension: Noted on problem list      # Anemia: based on hgb <11                         Private car

## 2025-07-29 NOTE — PROGRESS NOTES
Pt alert and oriented. Vitals stable. MD at bedside to discuss EGD results and biopsies. Pt off to radiology for esophogram. Report given to Steve JOHNS med surg Rn. Pt to return to med surg unit following scan in radiology.

## 2025-07-29 NOTE — PLAN OF CARE
Problem: Oral Intake Inadequate  Goal: Improved Oral Intake  Outcome: Not Progressing     Problem: Adult Inpatient Plan of Care  Goal: Optimal Comfort and Wellbeing  Outcome: Met   Goal Outcome Evaluation:         Pt states overall he feels much better today than yesterday. Denies dizziness no unsteadiness noted. Has been on clear liquids today and is tolerating very well. Denies any stools today or nausea/vomiting. Currently receiving a unit of blood with no reaction or complications thus far. Remains in good spirits. No other new concerns at this time.

## 2025-07-29 NOTE — CONSULTS
Surgical Consultation/History and Physical  [facility]    Jordan is seen in consultation for upper GI bleed, at the request of Dr. Jasen Ren    Chief Complaint:  anemia    History of Present Illness: Jordan Barnett is a 65 year old male with past medical history significant for HTN, DVT x2 on chronic anticoagulation, schizoaffective disorder, CKD3b, and esophageal dysphagia,  who presented to [facility] with dizziness, SETHI, lower abdominal pain. Admitted 07/28/25 for symptomatic anemia suspect secondary to GI bleeding, and SHARIF.     Patient Active Problem List   Diagnosis    Severe bipolar I disorder, most recent episode mixed, with psychotic features (H)    Sebaceous cyst    Stage 3b chronic kidney disease (H)    Schizoaffective disorder (H)    Hyperlipidemia LDL goal <160    Tubular adenoma of colon    Former smoker    Benign essential hypertension    Former smoker, stopped smoking in distant past    Deep vein thrombosis (DVT) (H)    Acute deep vein thrombosis (DVT) of femoral vein of right lower extremity (H)    Acute deep vein thrombosis (DVT) of right lower extremity, unspecified vein (H)    Acute deep vein thrombosis (DVT) of other specified vein of right lower extremity (H)    Long term current use of anticoagulant therapy    Acute anemia       Past Medical History:   Diagnosis Date    CKD (chronic kidney disease)     stage 3    Depressive disorder     Schizoaffective - manic depressive with schizophrenic tendencies    Depressive disorder, not elsewhere classified     Manic     HTN (hypertension)     Unspecified schizophrenia, unspecified condition        Past Surgical History:   Procedure Laterality Date    ABDOMEN SURGERY      Double hernja    COLONOSCOPY      normal. has fam hx colon cancer    COLONOSCOPY N/A 10/13/2014    Procedure: COLONOSCOPY;  Surgeon: Santy Constantino MD;  Location: [facility]    COLONOSCOPY N/A 02/03/2022    Procedure: COLONOSCOPY;  Surgeon: Sherif Duarte MD;   "Location: [facility]    HERNIA REPAIR, INGUINAL RT/LT  2004    SURGICAL HISTORY OF -       Incised & Drained - ? Perirectal Abscess        Family History   Problem Relation Age of Onset    Thyroid Disease Mother     Coronary Artery Disease Mother         pacemaker    Cancer Paternal Grandfather     Cancer Sister     Cancer - colorectal Sister     Colon Cancer Sister     Mental Illness Nephew         Noé committed suicide - ex wife committed suicide also       Social History     Tobacco Use    Smoking status: Former     Current packs/day: 0.00     Average packs/day: 1 pack/day for 35.8 years (35.8 ttl pk-yrs)     Types: Cigarettes     Start date: 1976     Quit date: 2012     Years since quittin.3    Smokeless tobacco: Never    Tobacco comments:     2 PPD   Substance Use Topics    Alcohol use: Not Currently     Comment: RARE        History   Drug Use Unknown     Comment: 30 years ago       No current outpatient medications on file.       Allergies   Allergen Reactions    Lisinopril Nausea     Felt weakness nausea, couldn't sleep       Review of Systems:   10 point ROS negative other than what was listed in HPI    Physical Exam:  /74   Pulse 59   Temp 97.9  F (36.6  C) (Oral)   Resp 16   Ht 1.788 m (5' 10.39\")   Wt 70.2 kg (154 lb 11.2 oz)   SpO2 100%   BMI 21.95 kg/m      Constitutional- No acute distress, well nourished, non-toxic  Eyes: Anicteric, no injection.  PERRL  ENT:  Normocephalic, atraumatic, Nose midline, moist mucus membranes  Neck - supple, no LAD  Respiratory- Nonlabored breathing on room air  Cardiovascular - Extremities warm and well perfused.  Abdomen - Soft, non-tender, no hepatosplenomegaly, no palpable masses  Neuro - No focal neuro deficits, Alert and oriented x 3  Psych: Appropriate mood and affect  Musculoskeletal: Normal gait, symmetric strength.  FROM upper and lower extremities.  Skin: Warm, Dry    CBC RESULTS:   Recent Labs   Lab Test 25  0558   WBC 6.5 "   RBC 3.46*   HGB 8.1*   HCT 27.3*   MCV 79   MCH 23.4*   MCHC 29.7*   RDW 15.3*        Last Comprehensive Metabolic Panel:  Lab Results   Component Value Date     07/29/2025    POTASSIUM 4.3 07/29/2025    CHLORIDE 111 (H) 07/29/2025    CO2 19 (L) 07/29/2025    ANIONGAP 12 07/29/2025    GLC 98 07/29/2025    BUN 30.0 (H) 07/29/2025    CR 2.30 (H) 07/29/2025    GFRESTIMATED 31 (L) 07/29/2025    BENNY 9.3 07/29/2025           Assessment:  1. Jordan Barnett is a 65 year old male with a history of HTN, DVT, dysphagia, who presented to hospital with anemia suspected to be secondary to upper GI bleed    Plan:   1. NPO  2. Plan for upper endoscopy today  3. Return to inpatient unit post-procedure for monitoring        Pratik Cardenas MD on 7/29/2025 at 8:22 AM

## 2025-07-29 NOTE — PROGRESS NOTES
[facility]    Hospitalist Progress Note    Assessment & Plan   Jordan Barnett is a 65 year old male with past medical history significant for HTN, DVT x2 on chronic anticoagulation, schizoaffective disorder, CKD3b, and esophageal dysphagia,  who presented to [facility] with dizziness, SETHI, lower abdominal pain. Admitted 07/28/25 for symptomatic anemia suspect secondary to GI bleeding, and SHARIF.      Symptomatic anemia   GI bleeding suspected   GE mass  Dizziness, dyspnea on exertion x2 weeks pta.   Initial Hgb 7.0 ? 6.4. Iron 21, Ferratin 8. BUN elevated (34.7). BNP wnl.   VS stable, reassuring. Improved symptoms after 2L IVF at [facility]. No signs/symptoms suggestive of bleeding source. Suspect anemia is multifactorial secondary to slow GI bleeding and/or iron deficiency.     Iron deficit by Ganzoni equation = 1400mg   - 1unit pRBC    - IV Venofer 200mg daily x5 days or until discharge, then transition to PO   - General surgery consult appreciated  - EGD showed gastroesophageal flap valve, erythematous friable (with contact bleeding) and nodular mucosa at GE junction.  Concerning for malignancy.  Biopsied, path pending.  May need oncology referral.  - Esophogram shows substantial narrowing at GE junction concerning for stricture.  - Hold pta warfarin   - Follow hemoglobin q 8 hours  - Possible discharge in AM if hemoglobin stable     Acute kidney injury in CKD3b   Initial Cr 2.55, GFR 27. Baseline Cr ~1.5-1.8.   Suspect secondary to decreased oral intake in the setting of esophageal dysphagia as below.   - IVF: D5LR @ 125ml/hr   - Strict I/Os, daily weights      Esophageal dysphagia   Esophageal stricture  Has had esophageal phase dysphagia for a few months, worsening over few weeks resulting in decreased oral intake. 15lb weight loss since 6/26/25 PCP visit. CT chest lung cancer screening on 07/23/25 noted esophagus is patulous and fluid-filled. No evidence for wall thickening or mass.    - Clear liquid diet following EGD and esophogram      Hx DVT (2019, 2020)  Long term use of anticoagulation   Had DVT 10/2019. Then recurrent DVT 11/28/2020 after having stopped warfarin on 09/23/2020. Negative hematology testing on 11/2019.   Warfarin has been held since 07/26/25 for scheduled EGD on 07/31/25. INR 1.44 on admission.   -  Holding pta warfarin as above      Essential hypertension   Normotensive on admission.   -  holding pta losartan given SHARIF as above      Schizoaffective disorder   Bipolar I disorder   Mood stable at time of admission.   -  Continued pta lamotrigine, olanzapine, and risperidone     Clinically Significant Risk Factors Present on Admission       # Hyperchloremia: Highest Cl = 111 mmol/L in last 2 days, will monitor as appropriate           # Drug Induced Coagulation Defect: home medication list includes an anticoagulant medication    # Hypertension: Noted on problem list      # Anemia: based on hgb <11                Date of Service (when I saw the patient): 07/29/2025     Diet: Clear Liquid Diet    DVT Prophylaxis: Pneumatic Compression Devices  Martinez Catheter: Not present  Code Status: Full Code      Disposition: Medically Ready for Discharge: Anticipated in 2-4 Days    Benny Hui MD    Interval History   Patient is sitting in room.  He has no acute complaints.    -Data reviewed today: I reviewed all new labs and imaging results over the last 24 hours. I personally reviewed no ECG or imaging today.    Physical Exam   Temp: 97.2  F (36.2  C) Temp src: Oral BP: 105/62 Pulse: 68   Resp: 16 SpO2: 97 % O2 Device: None (Room air)    Vitals:    07/28/25 1518 07/29/25 0716   Weight: 67.9 kg (149 lb 11.1 oz) 70.2 kg (154 lb 11.2 oz)     Vital Signs with Ranges  Temp:  [97.2  F (36.2  C)-98.1  F (36.7  C)] 97.2  F (36.2  C)  Pulse:  [57-94] 68  Resp:  [16-18] 16  BP: ()/(52-85) 105/62  SpO2:  [97 %-100 %] 97 %  I/O last 3 completed shifts:  In: 420 [IV Piggyback:120]  Out: -      Gen: Well nourished, well developed, alert and oriented x 3, no acute distressed  HEENT: Atraumatic, normocephalic; sclera non-injected, anicterric; oral mucosa moist, no lesion, no exudate  Lungs: Clear to ausculation, no wheezes, no rhonchi, no rales  Heart: Regular rate, regular rhythm, no gallops, no rubs, no murmurs  GI: Bowel sound normal, no hepatosplenomegaly, no masses, non-tender, non-distended, no guarding, no rebound tenderness  Extremities: No rashes, no edema  Musculoskeletal: Normal muscle mass, normal muscle tone, no arthritis    Medications   Current Facility-Administered Medications   Medication Dose Route Frequency Provider Last Rate Last Admin    dextrose 5% in lactated ringers infusion   Intravenous Continuous Lindsey Rodríguez PA-C 125 mL/hr at 07/29/25 0517 New Bag at 07/29/25 0517     Current Facility-Administered Medications   Medication Dose Route Frequency Provider Last Rate Last Admin    iron sucrose (VENOFER) 200 mg in sodium chloride 0.9 % 120 mL intermittent infusion  200 mg Intravenous Q24H Jasen Ren  mL/hr at 07/28/25 2041 200 mg at 07/28/25 2041    lamoTRIgine (LaMICtal) tablet 100 mg  100 mg Oral BID Lindsey Rodríguez PA-C   100 mg at 07/29/25 1117    [Held by provider] losartan (COZAAR) tablet 50 mg  50 mg Oral Daily Lindsey Rodríguez PA-C        OLANZapine (zyPREXA) tablet 5 mg  5 mg Oral At Bedtime Lindsey Rodríguez PA-C   5 mg at 07/28/25 2042    risperiDONE (risperDAL) tablet 0.25 mg  0.25 mg Oral QPM Lindsey Rodríguez PA-C   0.25 mg at 07/28/25 1840    sodium chloride (PF) 0.9% PF flush 3 mL  3 mL Intracatheter Q8H [facility] Lindsey Rodríguez PA-C           Recent Results (from the past 24 hours)   XR Esophagram    Narrative    EXAM: XR ESOPHAGRAM DOUBLE CONTRAST  LOCATION: [facility]  DATE: 7/29/2025    INDICATION: esophageal dysphagia, iron deficiency with egd tomorrow, evaluate for web stricture  COMPARISON: None.  TECHNIQUE: Routine.    RADIATION  DOSE: Dose Area Product 504.92 microGy-m2    FINDINGS:   Limited single-contrast esophagram was performed.     The upper and mid intrathoracic esophagus appear grossly unremarkable with normal peristalsis and without any evidence for diverticulum, large intrinsic mass, or substantial extrinsic mass effect. There is a standing column of contrast in the distal   esophagus with substantial narrowing at the gastroesophageal junction. There is delayed transit of contrast into the stomach. Upright radiographs obtained at 5 and 15 minutes after ingestion demonstrate persistent retained contrast within the distal   esophagus.       Impression    IMPRESSION:   1.  Substantial narrowing at the gastroesophageal junction is concerning for a stricture. This would be better assessed with direct visualization.     Significant Results and Procedures     Data   Recent Labs   Lab 07/29/25  0558 07/28/25  2157 07/28/25  1540 07/28/25  1230 07/28/25  1158   WBC 6.5  --  7.3  --  6.9   HGB 8.1* 7.6* 6.4*  --  7.0*   MCV 79 79 78  --  76*     --  323  --  350   INR  --   --   --  1.44*  --      --  138  --  139   POTASSIUM 4.3  --  4.1  --  4.4   CHLORIDE 111*  --  104  --  102   CO2 19*  --  20*  --  21*   BUN 30.0*  --  34.7*  --  34.7*   CR 2.30*  --  2.42*  --  2.55*   ANIONGAP 12  --  14  --  16*   BENNY 9.3  --  9.2  --  9.9   GLC 98  --  83  --  110*   ALBUMIN 3.8  --  3.9  --  4.1   PROTTOTAL 6.6  --  6.7  --  7.1   BILITOTAL 0.3  --  <0.2  --  <0.2   ALKPHOS 66  --  67  --  69   ALT 14  --  16  --  16   AST 13  --  11  --  11

## 2025-07-30 ENCOUNTER — TELEPHONE (OUTPATIENT)
Dept: ANTICOAGULATION | Facility: CLINIC | Age: 65
End: 2025-07-30
Payer: COMMERCIAL

## 2025-07-30 VITALS
WEIGHT: 161.6 LBS | TEMPERATURE: 97.4 F | OXYGEN SATURATION: 100 % | BODY MASS INDEX: 23.13 KG/M2 | HEART RATE: 56 BPM | SYSTOLIC BLOOD PRESSURE: 117 MMHG | DIASTOLIC BLOOD PRESSURE: 70 MMHG | RESPIRATION RATE: 18 BRPM | HEIGHT: 70 IN

## 2025-07-30 DIAGNOSIS — I82.491 ACUTE DEEP VEIN THROMBOSIS (DVT) OF OTHER SPECIFIED VEIN OF RIGHT LOWER EXTREMITY (H): ICD-10-CM

## 2025-07-30 DIAGNOSIS — I82.411 ACUTE DEEP VEIN THROMBOSIS (DVT) OF FEMORAL VEIN OF RIGHT LOWER EXTREMITY (H): Primary | ICD-10-CM

## 2025-07-30 DIAGNOSIS — Z79.01 LONG TERM CURRENT USE OF ANTICOAGULANT THERAPY: ICD-10-CM

## 2025-07-30 LAB
ANION GAP SERPL CALCULATED.3IONS-SCNC: 12 MMOL/L (ref 7–15)
BUN SERPL-MCNC: 16.4 MG/DL (ref 8–23)
CALCIUM SERPL-MCNC: 9.2 MG/DL (ref 8.8–10.4)
CHLORIDE SERPL-SCNC: 112 MMOL/L (ref 98–107)
CREAT SERPL-MCNC: 1.97 MG/DL (ref 0.67–1.17)
EGFRCR SERPLBLD CKD-EPI 2021: 37 ML/MIN/1.73M2
ERYTHROCYTE [DISTWIDTH] IN BLOOD BY AUTOMATED COUNT: 15.4 % (ref 10–15)
GLUCOSE SERPL-MCNC: 115 MG/DL (ref 70–99)
HCO3 SERPL-SCNC: 19 MMOL/L (ref 22–29)
HCT VFR BLD AUTO: 28.6 % (ref 40–53)
HGB BLD-MCNC: 8.6 G/DL (ref 13.3–17.7)
MCH RBC QN AUTO: 23.9 PG (ref 26.5–33)
MCHC RBC AUTO-ENTMCNC: 30.1 G/DL (ref 31.5–36.5)
MCV RBC AUTO: 79 FL (ref 78–100)
PLATELET # BLD AUTO: 268 10E3/UL (ref 150–450)
POTASSIUM SERPL-SCNC: 4.7 MMOL/L (ref 3.4–5.3)
RBC # BLD AUTO: 3.6 10E6/UL (ref 4.4–5.9)
SODIUM SERPL-SCNC: 143 MMOL/L (ref 135–145)
WBC # BLD AUTO: 5.3 10E3/UL (ref 4–11)

## 2025-07-30 PROCEDURE — 250N000013 HC RX MED GY IP 250 OP 250 PS 637: Performed by: INTERNAL MEDICINE

## 2025-07-30 PROCEDURE — 250N000013 HC RX MED GY IP 250 OP 250 PS 637

## 2025-07-30 PROCEDURE — 99239 HOSP IP/OBS DSCHRG MGMT >30: CPT | Performed by: INTERNAL MEDICINE

## 2025-07-30 PROCEDURE — 36415 COLL VENOUS BLD VENIPUNCTURE: CPT | Performed by: INTERNAL MEDICINE

## 2025-07-30 PROCEDURE — 85027 COMPLETE CBC AUTOMATED: CPT | Performed by: INTERNAL MEDICINE

## 2025-07-30 PROCEDURE — 80048 BASIC METABOLIC PNL TOTAL CA: CPT | Performed by: INTERNAL MEDICINE

## 2025-07-30 RX ORDER — PANTOPRAZOLE SODIUM 40 MG/1
40 TABLET, DELAYED RELEASE ORAL
Qty: 60 TABLET | Refills: 0 | Status: SHIPPED | OUTPATIENT
Start: 2025-07-30

## 2025-07-30 RX ORDER — PANTOPRAZOLE SODIUM 40 MG/1
40 TABLET, DELAYED RELEASE ORAL
Status: DISCONTINUED | OUTPATIENT
Start: 2025-07-30 | End: 2025-07-30 | Stop reason: HOSPADM

## 2025-07-30 RX ADMIN — PANTOPRAZOLE SODIUM 40 MG: 40 TABLET, DELAYED RELEASE ORAL at 08:21

## 2025-07-30 RX ADMIN — LAMOTRIGINE 100 MG: 100 TABLET ORAL at 07:28

## 2025-07-30 ASSESSMENT — ACTIVITIES OF DAILY LIVING (ADL)
ADLS_ACUITY_SCORE: 29
ADLS_ACUITY_SCORE: 25
ADLS_ACUITY_SCORE: 29
ADLS_ACUITY_SCORE: 25
ADLS_ACUITY_SCORE: 29
ADLS_ACUITY_SCORE: 29
ADLS_ACUITY_SCORE: 25
ADLS_ACUITY_SCORE: 29

## 2025-07-30 NOTE — PLAN OF CARE
Goal Outcome Evaluation:  Problem: Adult Inpatient Plan of Care  Goal: Absence of Hospital-Acquired Illness or Injury  Outcome: Progressing  Intervention: Identify and Manage Fall Risk  Recent Flowsheet Documentation  Taken 7/29/2025 2300 by Gaye Melendez RN  Safety Promotion/Fall Prevention: nonskid shoes/slippers when out of bed  Hgb 8.6 this am. Patient is pleasant and cooperative. Denies pain. Independent in room. Vital signs stable.

## 2025-07-30 NOTE — TELEPHONE ENCOUNTER
Patient may hold warfarin until hospital follow up office visit as he is at risk for more blood loss.

## 2025-07-30 NOTE — DISCHARGE SUMMARY
[facility]  Hospitalist Discharge Summary       Date of Admission:  7/28/2025  Date of Discharge:  July 30, 2025  Discharging Provider: Benny Hui MD      Discharge Diagnoses     Symptomatic anemia   Upper GI bleeding suspected   GE mass, biopsied, pathology pending  Acute kidney injury in CKD3b   Esophageal dysphagia   Esophageal stricture  Hx DVT (2019, 2020)  Long term use of anticoagulation   Essential hypertension   Schizoaffective disorder   Bipolar I disorder   Hyperchloremia  Coagulation Defect      Follow-ups Needed After Discharge   Follow-up Appointments       Hospital Follow-up with Existing Primary Care Provider (PCP)      Follow up on pathology from EGD on 7/29/2025    Schedule Primary Care visit within: 7 Days   Recommended labs and Imaging (to be ordered by Primary Care Provider): BMP and CBC             Unresulted Labs Ordered in the Past 30 Days of this Admission       Date and Time Order Name Status Description    7/29/2025  9:30 AM Surgical Pathology Exam In process         These results will be followed up by Benny Hui or PCP    Discharge Disposition   Discharged to home    Hospital Course   Jordan Barnett is a 65 year old male who presented to [facility] with dizziness, SETHI, lower abdominal pain. Admitted 07/28/25 for symptomatic anemia suspect secondary to upper GI bleeding and SHARIF.      Symptomatic anemia   Upper GI bleeding suspected   GE mass, biopsied, pathology pending  Dizziness, dyspnea on exertion x2 weeks pta.   Initial Hgb 7.0 ? 6.4. Iron 21, Ferratin 8. BUN elevated (34.7). BNP wnl.   VS stable, reassuring. Improved symptoms after 2L IVF at [facility]. No signs/symptoms suggestive of bleeding source. Suspect anemia is multifactorial secondary to slow GI bleeding and/or iron deficiency.     Iron deficit by Ganzoni equation = 1400mg   - 1unit pRBC    - IV Venofer 200mg daily x5 days or until discharge, then transition to PO   - General surgery  consult appreciated  - EGD showed gastroesophageal flap valve, erythematous friable (with contact bleeding) and nodular mucosa at GE junction.  Concerning for malignancy.  Biopsied, path pending.  May need oncology referral.  - Esophogram shows substantial narrowing at GE junction concerning for stricture.  - Hold pta warfarin   - Hemoglobin decreased to 6.7 post procedure, given 1 unit pRBCs.  - Follow hemoglobin q 8 hours  - Hemoglobin stable overnight 8.5  ? 8.6     Acute kidney injury in CKD3b   Initial Cr 2.55, GFR 27. Baseline Cr ~1.5-1.8.   Suspect secondary to decreased oral intake in the setting of esophageal dysphagia as below.   - IVF: D5LR @ 125ml/hr   - Strict I/Os, daily weights      Esophageal dysphagia   Esophageal stricture  Has had esophageal phase dysphagia for a few months, worsening over few weeks resulting in decreased oral intake. 15lb weight loss since 6/26/25 PCP visit. CT chest lung cancer screening on 07/23/25 noted esophagus is patulous and fluid-filled. No evidence for wall thickening or mass.   - Soft mechanical diet following EGD and esophogram   - Nutrition recommend 2-3 Ensure Plus between meals     Hx DVT (2019, 2020)  Long term use of anticoagulation   Had DVT 10/2019. Then recurrent DVT 11/28/2020 after having stopped warfarin on 09/23/2020. Negative hematology testing on 11/2019.   Warfarin has been held since 07/26/25 for scheduled EGD on 07/31/25. INR 1.44 on admission.   - Follow up CBC with PCP to determine when to restart     Essential hypertension   Normotensive on admission.   - Held pta losartan during hospitalization given SHARIF as above   - Resume at discharge     Schizoaffective disorder   Bipolar I disorder   Mood stable at time of admission.   -  Continued pta lamotrigine, olanzapine, and risperidone     Clinically Significant Risk Factors       # Hyperchloremia: Highest Cl = 112 mmol/L in last 2 days, will monitor as appropriate           # Coagulation Defect: INR =  1.44 (Ref range: 0.85 - 1.15) and/or PTT = N/A, will monitor for bleeding    # Hypertension: Noted on problem list                     Code Status Full Code    Physical Exam   Vital Signs: Temp: 97.4  F (36.3  C) Temp src: Oral BP: 117/70 Pulse: 56   Resp: 18 SpO2: 100 % O2 Device: None (Room air)    Weight: 161 lbs 9.6 oz    Gen: Well nourished, well developed, resting comfortably in bed, no acute distress  Head: atraumatic normocephalic; sclera non-injected, anicterric; oral mucosa moist  Neck: supple without spinal abnormality  Chest: clear to auscultation bilaterally, no wheezes, no rhonchi, no rales.  Cardiovascular: regular rate and rhythm, no gallops or rubs, no murmurs, no edema  Abdomen: bowel sounds normal, no hepatosplenomegaly, no masses, non-tender, non-distended, no guarding, no rebound tenderness  Musculoskeletal: normal muscle mass, normal muscle tone  Skin: no rashes, no chronic venous stasis    40 MINUTES SPENT BY ME on the date of service doing chart review, history, exam, documentation & further activities per the note.     Benny Hui MD  [facility]  ______________________________________________________________________    Primary Care Physician   Juan Antonio Flanagan    Discharge Orders      Activity    Your activity upon discharge: activity as tolerated     Reason for your hospital stay    This is a 65 year old male admitted for anemia, acute kidney injury, and dysphagia.     Full Code     Diet    Soft Mechanical Diet and 2-3 Ensure Plus High Protein between Meals     Hospital Follow-up with Existing Primary Care Provider (PCP)    Follow up on pathology from EGD on 7/29/2025       Significant Results and Procedures     Discharge Medications   Current Discharge Medication List        START taking these medications    Details   pantoprazole (PROTONIX) 40 MG EC tablet Take 1 tablet (40 mg) by mouth 2 times daily (before meals).  Qty: 60 tablet, Refills: 0     Associated Diagnoses: Acute anemia           CONTINUE these medications which have NOT CHANGED    Details   lamoTRIgine (LAMICTAL) 100 MG tablet Take 1 tablet (100 mg) by mouth 2 times daily  Qty: 60 tablet, Refills: 0    Associated Diagnoses: Schizoaffective disorder, bipolar type (H)      losartan (COZAAR) 50 MG tablet Take 1 tablet by mouth once daily  Qty: 90 tablet, Refills: 3    Associated Diagnoses: Essential hypertension with goal blood pressure less than 140/90      Multiple Vitamins-Minerals (MULTIVITAMIN ADULT PO) Take 1 tablet by mouth At Bedtime       OLANZapine (ZYPREXA) 5 MG tablet TAKE 1 TABLET BY MOUTH ONCE DAILY AT BEDTIME AND 1/2 (ONE-HALF) AS NEEDED AS DIRECTED      Omega-3 Fatty Acids (OMEGA 3 PO) Take 2,400 mg by mouth daily.      risperiDONE (RISPERDAL) 0.25 MG tablet Take 0.25 mg by mouth every evening.           STOP taking these medications       warfarin ANTICOAGULANT (COUMADIN/JANTOVEN) 5 MG tablet Comments:   Reason for Stopping:             Allergies   Allergies   Allergen Reactions    Lisinopril Nausea     Felt weakness nausea, couldn't sleep

## 2025-07-30 NOTE — CONSULTS
CLINICAL NUTRITION SERVICES  Reason for Assessment:  Nutrition education regarding full liquid diet  Diet History:  Patient reports over the last month he has noticed much more challenges with swallowing foods at home. Pt endorsed a good appetite but notes he not been eating as well as a result of his swallowing challenges. Per patient he has lost around 15 lbs during this time frame.   Patient reports he typically eats 3 meals daily. He reports that since he works over night his meals are typically are around 7 pm, around midnight and again at 2 am. Patient reports he typically will have fried eggs, [employer] chili with ritz crackers, blended smoothie with protein powders and/or muscle milk (more recently).  Patient reports his sister is an RD, and he currently lives at home with his father. He notes good support home and not no financial constraints with purchasing nutrition supplements such as ensure.   Wt Readings from Last 15 Encounters:   07/30/25 73.3 kg (161 lb 9.6 oz)   07/28/25 67.9 kg (149 lb 9.6 oz)   06/26/25 74.8 kg (164 lb 14.4 oz)   05/09/24 75.8 kg (167 lb)   01/12/23 80.1 kg (176 lb 9.6 oz)   Questionable weight discrepancy noted 7/28-7/30.   Nutrition Diagnosis:  Food- and nutrition-related knowledge deficit r/t no previous knowledge of full liquid diet AEB patient report.  Interventions:  Provided instruction on maintaining sufficient oral/nutritional intakes on full liquid diet. Food lists for full liquid diet. RD went over ways to increase calories and protein with meals and snacks and emphasized the importance of meal frequency during visit. RD also went over the role of nutritional supplements and patient recommendation to have 2-3 supplements between meals to assist with weight stability.  Provided handouts full liquid diet MNT provided.  Goals:   Patient will verbalize understanding of the importance of maintaining adequate calories and protein intakes on full liquid diet.   Return  "demonstration pt identifies 1-2 higher calorie/pro full liquid meal/snack ideas.  Follow-up:    Patient to ask any further nutrition-related questions before discharge.  In addition, pt may request outpatient RD appointment.  Lucita Gandhi RDN, LD  Clinical Dietitian  Office: [phone]  Hours: M-F 8-3pm   Weekend/Holiday RD Vocera - \"Weekend Clinical Dietitian\" (No longer using pager)    " Yes

## 2025-07-30 NOTE — TELEPHONE ENCOUNTER
ANTICOAGULATION  MANAGEMENT: Discharge Review    Jordan Barnett chart reviewed for anticoagulation continuity of care    Hospital Admission on 7/28-7/30 for Symptomatic anemia    Discharge Diagnoses  Symptomatic anemia   Upper GI bleeding suspected   GE mass, biopsied, pathology pending  Acute kidney injury in CKD3b   Esophageal dysphagia   Esophageal stricture  Hx DVT (2019, 2020)  Long term use of anticoagulation   Essential hypertension   Schizoaffective disorder   Bipolar I disorder   Hyperchloremia  Coagulation Defect    Discharge disposition: Home    Results:    Recent labs: (last 7 days)     07/28/25  1230   INR 1.44*     Anticoagulation inpatient management:     held warfarin due to suspected bleed     Anticoagulation discharge instructions:     Warfarin dosing: warfarin discontinued   Bridging: No   INR goal change: No      Medication changes affecting anticoagulation: Yes: pantoprazole (Protonix), stop warfarin, pause losartan    Additional factors affecting anticoagulation: No     PLAN     Patient is going to hold warfarin. Pt does have an apt on 8/7/25.    Spoke with Jordan. Patient was unsure if he is to resume his warfarin.    Anticoagulation Calendar updated    Joann Gurrola RN  7/30/2025  Anticoagulation Clinic  [facility] for routing messages: carmel FRANCIS  [facility] patient phone line: [phone]

## 2025-07-30 NOTE — PROGRESS NOTES
[facility] DISCHARGE NOTE    Patient discharged to home at 1:04 PM via ambulation. Accompanied by sister and staff. Discharge instructions reviewed with patient, opportunity offered to ask questions. Prescriptions sent to patients preferred pharmacy. All belongings sent with patient.    Alen Nagel RN

## 2025-07-30 NOTE — TELEPHONE ENCOUNTER
Dr. Flanagan, patient does have an INR appt and an OV with PCP on 8/7/25.   Warfarin is listed as discontinued from Admission (see note below).    Per admission notes: Follow up with CBC with PCP to determine when to restart.    Is it ok to continue to hold warfarin until OV with [facility] on 8/7/25?  Please review and advise.  Thank you,  Joann Gurrola RN on 7/30/2025 at 5:14 PM

## 2025-07-31 ENCOUNTER — PATIENT OUTREACH (OUTPATIENT)
Dept: FAMILY MEDICINE | Facility: CLINIC | Age: 65
End: 2025-07-31
Payer: COMMERCIAL

## 2025-07-31 ENCOUNTER — ANESTHESIA (OUTPATIENT)
Dept: GASTROENTEROLOGY | Facility: CLINIC | Age: 65
End: 2025-07-31
Payer: COMMERCIAL

## 2025-07-31 ENCOUNTER — RESULTS FOLLOW-UP (OUTPATIENT)
Dept: SURGERY | Facility: CLINIC | Age: 65
End: 2025-07-31

## 2025-07-31 LAB
PATH REPORT.COMMENTS IMP SPEC: NORMAL
PATH REPORT.FINAL DX SPEC: NORMAL
PATH REPORT.GROSS SPEC: NORMAL
PATH REPORT.MICROSCOPIC SPEC OTHER STN: NORMAL
PATH REPORT.RELEVANT HX SPEC: NORMAL
PHOTO IMAGE: NORMAL

## 2025-07-31 PROCEDURE — 88342 IMHCHEM/IMCYTCHM 1ST ANTB: CPT | Mod: 26 | Performed by: PATHOLOGY

## 2025-07-31 PROCEDURE — 88305 TISSUE EXAM BY PATHOLOGIST: CPT | Mod: 26 | Performed by: PATHOLOGY

## 2025-07-31 NOTE — TELEPHONE ENCOUNTER
Transitions of Care Outreach  Chief Complaint   Patient presents with    Hospital F/U       Most Recent Admission Date: 7/28/2025   Most Recent Admission Diagnosis: Acute anemia - D64.9     Most Recent Discharge Date: 7/30/2025   Most Recent Discharge Diagnosis: Acute anemia - D64.9     Transitions of Care Assessment    Discharge Assessment  How are you doing now that you are home?: doing good. appt with Dr Flanagan next thursday  How are your symptoms? (Red Flag symptoms escalate to triage hotline per guidelines): Improved  Do you know how to contact your clinic care team if you have future questions or changes to your health status? : Yes  Does the patient have their discharge instructions? : Yes  Does the patient have questions regarding their discharge instructions? : No  Were you started on any new medications or were there changes to any of your previous medications? : Yes  Does the patient have all of their medications?: Yes  Do you have questions regarding any of your medications? : No    Follow up Plan          Future Appointments   Date Time Provider Department Center   8/7/2025  7:45 AM [facility]   8/7/2025 10:00 AM [facility], [facility]       Outpatient Plan as outlined on AVS reviewed with patient.    For any urgent concerns, please contact our 24 hour nurse triage line: [phone] ([phone])       Renuka Edge RN

## 2025-08-01 ENCOUNTER — TELEPHONE (OUTPATIENT)
Dept: MEDSURG UNIT | Facility: CLINIC | Age: 65
End: 2025-08-01
Payer: COMMERCIAL

## 2025-08-01 DIAGNOSIS — K92.2 UGIB (UPPER GASTROINTESTINAL BLEED): Primary | ICD-10-CM

## 2025-08-04 RX ORDER — ESOMEPRAZOLE MAGNESIUM 40 MG/1
40 CAPSULE, DELAYED RELEASE ORAL 2 TIMES DAILY
Qty: 60 CAPSULE | Refills: 0 | Status: SHIPPED | OUTPATIENT
Start: 2025-08-04 | End: 2025-08-07

## 2025-08-07 ENCOUNTER — ANTICOAGULATION THERAPY VISIT (OUTPATIENT)
Dept: ANTICOAGULATION | Facility: CLINIC | Age: 65
End: 2025-08-07

## 2025-08-07 ENCOUNTER — TELEPHONE (OUTPATIENT)
Dept: ANTICOAGULATION | Facility: CLINIC | Age: 65
End: 2025-08-07

## 2025-08-07 ENCOUNTER — OFFICE VISIT (OUTPATIENT)
Dept: FAMILY MEDICINE | Facility: CLINIC | Age: 65
End: 2025-08-07
Payer: COMMERCIAL

## 2025-08-07 ENCOUNTER — LAB (OUTPATIENT)
Dept: LAB | Facility: CLINIC | Age: 65
End: 2025-08-07
Payer: COMMERCIAL

## 2025-08-07 VITALS
DIASTOLIC BLOOD PRESSURE: 74 MMHG | OXYGEN SATURATION: 99 % | HEIGHT: 70 IN | SYSTOLIC BLOOD PRESSURE: 112 MMHG | TEMPERATURE: 97.1 F | HEART RATE: 78 BPM | WEIGHT: 150 LBS | RESPIRATION RATE: 20 BRPM | BODY MASS INDEX: 21.47 KG/M2

## 2025-08-07 DIAGNOSIS — F25.0 SCHIZOAFFECTIVE DISORDER, BIPOLAR TYPE (H): ICD-10-CM

## 2025-08-07 DIAGNOSIS — K92.2 ACUTE UPPER GI BLEED: ICD-10-CM

## 2025-08-07 DIAGNOSIS — R13.10 DYSPHAGIA, UNSPECIFIED TYPE: Primary | ICD-10-CM

## 2025-08-07 DIAGNOSIS — I82.491 ACUTE DEEP VEIN THROMBOSIS (DVT) OF OTHER SPECIFIED VEIN OF RIGHT LOWER EXTREMITY (H): ICD-10-CM

## 2025-08-07 DIAGNOSIS — I82.411 ACUTE DEEP VEIN THROMBOSIS (DVT) OF FEMORAL VEIN OF RIGHT LOWER EXTREMITY (H): Primary | ICD-10-CM

## 2025-08-07 DIAGNOSIS — I82.411 ACUTE DEEP VEIN THROMBOSIS (DVT) OF FEMORAL VEIN OF RIGHT LOWER EXTREMITY (H): ICD-10-CM

## 2025-08-07 DIAGNOSIS — K92.2 UGIB (UPPER GASTROINTESTINAL BLEED): ICD-10-CM

## 2025-08-07 DIAGNOSIS — Z79.01 LONG TERM CURRENT USE OF ANTICOAGULANT THERAPY: ICD-10-CM

## 2025-08-07 DIAGNOSIS — D64.9 ANEMIA, UNSPECIFIED TYPE: Primary | ICD-10-CM

## 2025-08-07 DIAGNOSIS — R13.19 ESOPHAGEAL DYSPHAGIA: ICD-10-CM

## 2025-08-07 DIAGNOSIS — I10 BENIGN ESSENTIAL HYPERTENSION: ICD-10-CM

## 2025-08-07 LAB
ANION GAP SERPL CALCULATED.3IONS-SCNC: 12 MMOL/L (ref 7–15)
BUN SERPL-MCNC: 20.8 MG/DL (ref 8–23)
CALCIUM SERPL-MCNC: 10.1 MG/DL (ref 8.8–10.4)
CHLORIDE SERPL-SCNC: 103 MMOL/L (ref 98–107)
CREAT SERPL-MCNC: 2.11 MG/DL (ref 0.67–1.17)
EGFRCR SERPLBLD CKD-EPI 2021: 34 ML/MIN/1.73M2
ERYTHROCYTE [DISTWIDTH] IN BLOOD BY AUTOMATED COUNT: 17.3 % (ref 10–15)
GLUCOSE SERPL-MCNC: 111 MG/DL (ref 70–99)
HCO3 SERPL-SCNC: 26 MMOL/L (ref 22–29)
HCT VFR BLD AUTO: 32.3 % (ref 40–53)
HGB BLD-MCNC: 9.8 G/DL (ref 13.3–17.7)
INR BLD: 1.1 (ref 0.9–1.1)
MCH RBC QN AUTO: 24.3 PG (ref 26.5–33)
MCHC RBC AUTO-ENTMCNC: 30.3 G/DL (ref 31.5–36.5)
MCV RBC AUTO: 80 FL (ref 78–100)
PLATELET # BLD AUTO: 281 10E3/UL (ref 150–450)
POTASSIUM SERPL-SCNC: 4.5 MMOL/L (ref 3.4–5.3)
RBC # BLD AUTO: 4.04 10E6/UL (ref 4.4–5.9)
SODIUM SERPL-SCNC: 141 MMOL/L (ref 135–145)
WBC # BLD AUTO: 6.3 10E3/UL (ref 4–11)

## 2025-08-07 RX ORDER — ESOMEPRAZOLE MAGNESIUM 40 MG/1
40 CAPSULE, DELAYED RELEASE ORAL 2 TIMES DAILY
Qty: 60 CAPSULE | Refills: 1 | Status: SHIPPED | OUTPATIENT
Start: 2025-08-07

## 2025-08-07 ASSESSMENT — PAIN SCALES - GENERAL: PAINLEVEL_OUTOF10: NO PAIN (0)

## 2025-08-08 ENCOUNTER — TELEPHONE (OUTPATIENT)
Dept: FAMILY MEDICINE | Facility: CLINIC | Age: 65
End: 2025-08-08
Payer: COMMERCIAL

## 2025-08-11 ENCOUNTER — TELEPHONE (OUTPATIENT)
Dept: NEPHROLOGY | Facility: CLINIC | Age: 65
End: 2025-08-11
Payer: COMMERCIAL

## 2025-08-12 ENCOUNTER — TRANSCRIBE ORDERS (OUTPATIENT)
Dept: OTHER | Age: 65
End: 2025-08-12

## 2025-08-12 DIAGNOSIS — D12.0 BENIGN NEOPLASM OF CECUM: ICD-10-CM

## 2025-08-12 DIAGNOSIS — R13.19 ESOPHAGEAL DYSPHAGIA: Primary | ICD-10-CM

## 2025-08-13 ENCOUNTER — MYC MEDICAL ADVICE (OUTPATIENT)
Dept: GASTROENTEROLOGY | Facility: CLINIC | Age: 65
End: 2025-08-13
Payer: COMMERCIAL

## 2025-08-13 ENCOUNTER — HOSPITAL ENCOUNTER (OUTPATIENT)
Facility: CLINIC | Age: 65
End: 2025-08-13
Payer: COMMERCIAL

## 2025-08-15 DIAGNOSIS — R13.19 ESOPHAGEAL DYSPHAGIA: Primary | ICD-10-CM

## 2025-08-20 ENCOUNTER — TRANSFERRED RECORDS (OUTPATIENT)
Dept: HEALTH INFORMATION MANAGEMENT | Facility: CLINIC | Age: 65
End: 2025-08-20
Payer: COMMERCIAL

## 2025-08-21 ENCOUNTER — OFFICE VISIT (OUTPATIENT)
Dept: FAMILY MEDICINE | Facility: CLINIC | Age: 65
End: 2025-08-21
Payer: COMMERCIAL

## 2025-08-21 ENCOUNTER — ANTICOAGULATION THERAPY VISIT (OUTPATIENT)
Dept: ANTICOAGULATION | Facility: CLINIC | Age: 65
End: 2025-08-21

## 2025-08-21 VITALS
DIASTOLIC BLOOD PRESSURE: 64 MMHG | HEART RATE: 93 BPM | BODY MASS INDEX: 21.62 KG/M2 | TEMPERATURE: 98.5 F | WEIGHT: 151 LBS | OXYGEN SATURATION: 98 % | SYSTOLIC BLOOD PRESSURE: 102 MMHG | HEIGHT: 70 IN | RESPIRATION RATE: 16 BRPM

## 2025-08-21 DIAGNOSIS — I82.411 ACUTE DEEP VEIN THROMBOSIS (DVT) OF FEMORAL VEIN OF RIGHT LOWER EXTREMITY (H): Primary | ICD-10-CM

## 2025-08-21 DIAGNOSIS — I10 ESSENTIAL HYPERTENSION WITH GOAL BLOOD PRESSURE LESS THAN 140/90: ICD-10-CM

## 2025-08-21 DIAGNOSIS — I82.491 ACUTE DEEP VEIN THROMBOSIS (DVT) OF OTHER SPECIFIED VEIN OF RIGHT LOWER EXTREMITY (H): ICD-10-CM

## 2025-08-21 DIAGNOSIS — Z79.01 LONG TERM CURRENT USE OF ANTICOAGULANT THERAPY: ICD-10-CM

## 2025-08-21 DIAGNOSIS — D64.9 ANEMIA, UNSPECIFIED TYPE: ICD-10-CM

## 2025-08-21 DIAGNOSIS — R13.19 ESOPHAGEAL DYSPHAGIA: ICD-10-CM

## 2025-08-21 DIAGNOSIS — N17.9 AKI (ACUTE KIDNEY INJURY): Primary | ICD-10-CM

## 2025-08-21 DIAGNOSIS — I82.411 ACUTE DEEP VEIN THROMBOSIS (DVT) OF FEMORAL VEIN OF RIGHT LOWER EXTREMITY (H): ICD-10-CM

## 2025-08-21 LAB
ANION GAP SERPL CALCULATED.3IONS-SCNC: 12 MMOL/L (ref 7–15)
BUN SERPL-MCNC: 21.1 MG/DL (ref 8–23)
CALCIUM SERPL-MCNC: 9.9 MG/DL (ref 8.8–10.4)
CHLORIDE SERPL-SCNC: 105 MMOL/L (ref 98–107)
CREAT SERPL-MCNC: 2.09 MG/DL (ref 0.67–1.17)
CRP SERPL-MCNC: 3.62 MG/L
EGFRCR SERPLBLD CKD-EPI 2021: 35 ML/MIN/1.73M2
ERYTHROCYTE [DISTWIDTH] IN BLOOD BY AUTOMATED COUNT: 19.6 % (ref 10–15)
FERRITIN SERPL-MCNC: 142 NG/ML (ref 31–409)
GLUCOSE SERPL-MCNC: 98 MG/DL (ref 70–99)
HCO3 SERPL-SCNC: 25 MMOL/L (ref 22–29)
HCT VFR BLD AUTO: 31.7 % (ref 40–53)
HGB BLD-MCNC: 9.9 G/DL (ref 13.3–17.7)
INR BLD: 1.1 (ref 0.9–1.1)
IRON BINDING CAPACITY (ROCHE): 296 UG/DL (ref 240–430)
IRON SATN MFR SERPL: 14 % (ref 15–46)
IRON SERPL-MCNC: 40 UG/DL (ref 61–157)
MCH RBC QN AUTO: 25.1 PG (ref 26.5–33)
MCHC RBC AUTO-ENTMCNC: 31.2 G/DL (ref 31.5–36.5)
MCV RBC AUTO: 80.3 FL (ref 78–100)
PLATELET # BLD AUTO: 320 10E3/UL (ref 150–450)
POTASSIUM SERPL-SCNC: 4.3 MMOL/L (ref 3.4–5.3)
RBC # BLD AUTO: 3.95 10E6/UL (ref 4.4–5.9)
SODIUM SERPL-SCNC: 142 MMOL/L (ref 135–145)
WBC # BLD AUTO: 9.07 10E3/UL (ref 4–11)

## 2025-08-21 ASSESSMENT — PAIN SCALES - GENERAL: PAINLEVEL_OUTOF10: NO PAIN (0)

## (undated) DEVICE — ENDO FORCEP ENDOJAW BIOPSY 2.8MMX230CM FB-220U